# Patient Record
Sex: FEMALE | Race: BLACK OR AFRICAN AMERICAN | Employment: UNEMPLOYED | URBAN - METROPOLITAN AREA
[De-identification: names, ages, dates, MRNs, and addresses within clinical notes are randomized per-mention and may not be internally consistent; named-entity substitution may affect disease eponyms.]

---

## 2017-01-05 ENCOUNTER — HOSPITAL ENCOUNTER (OUTPATIENT)
Dept: LAB | Age: 57
Discharge: HOME | End: 2017-01-05

## 2017-01-05 PROCEDURE — 99001 SPECIMEN HANDLING PT-LAB: CPT | Performed by: SPECIALIST

## 2017-02-13 DIAGNOSIS — R06.02 SOB (SHORTNESS OF BREATH): Primary | ICD-10-CM

## 2017-02-15 ENCOUNTER — OFFICE VISIT (OUTPATIENT)
Dept: PULMONOLOGY | Age: 57
End: 2017-02-15

## 2017-02-15 VITALS
WEIGHT: 209 LBS | TEMPERATURE: 98.4 F | HEART RATE: 84 BPM | RESPIRATION RATE: 21 BRPM | BODY MASS INDEX: 33.59 KG/M2 | OXYGEN SATURATION: 95 % | HEIGHT: 66 IN | DIASTOLIC BLOOD PRESSURE: 70 MMHG | SYSTOLIC BLOOD PRESSURE: 140 MMHG

## 2017-02-15 DIAGNOSIS — Z72.0 TOBACCO ABUSE: ICD-10-CM

## 2017-02-15 DIAGNOSIS — R76.12 POSITIVE QUANTIFERON-TB GOLD TEST: ICD-10-CM

## 2017-02-15 DIAGNOSIS — J44.9 COPD, MILD (HCC): Primary | ICD-10-CM

## 2017-02-15 DIAGNOSIS — R76.11 POSITIVE PPD: ICD-10-CM

## 2017-02-15 DIAGNOSIS — R06.02 SOB (SHORTNESS OF BREATH): ICD-10-CM

## 2017-02-15 RX ORDER — SPIRONOLACTONE 25 MG/1
TABLET ORAL DAILY
COMMUNITY
End: 2018-07-11

## 2017-02-15 RX ORDER — CYCLOBENZAPRINE HCL 10 MG
TABLET ORAL
Status: ON HOLD | COMMUNITY
End: 2018-07-11 | Stop reason: CLARIF

## 2017-02-15 RX ORDER — METFORMIN HYDROCHLORIDE 500 MG/1
500 TABLET ORAL 2 TIMES DAILY WITH MEALS
Status: ON HOLD | COMMUNITY
End: 2018-07-11

## 2017-02-15 NOTE — PROGRESS NOTES
Problem list:  Positive PPD skin test in past &Positive Quantiferon gold study    Tobacco abuse   PFT demonstrated mild COPD with relatively well preserved FEV1     HPI: Ms. Mainor Powers is 61 y/o female who  Carries history of DM, HTN, Hepatitis C, Bipolar disorder evaluated for Positive Quantiferon Gold study in the setting of Past history of Positive PPD related to latent TB. She has been seen by ID and per patient she has received treatment as recommended. PFT performed this morning demonstrated mild obstructive changes with relatively well preserved FEV1. She still smokes half a pack per day      Allergies   Allergen Reactions    Ace Inhibitors Cough    Penicillins Hives     Current Outpatient Prescriptions   Medication Sig Dispense Refill    spironolactone (ALDACTONE) 25 mg tablet Take  by mouth daily.  metFORMIN (GLUCOPHAGE) 500 mg tablet Take  by mouth two (2) times daily (with meals).  cyclobenzaprine (FLEXERIL) 10 mg tablet Take  by mouth three (3) times daily as needed for Muscle Spasm(s).  cloNIDine HCl (CATAPRES) 0.2 mg tablet Take  by mouth two (2) times a day.  Shower Chair XX clemencia Use daily with showering. Dx: djd of knee 1 each 0    losartan-hydrochlorothiazide (HYZAAR) 50-12.5 mg per tablet Take 1 tablet by mouth daily. 30 tablet 5    Omeprazole delayed release (PRILOSEC D/R) 20 mg tablet Take 1 tablet by mouth daily. 30 tablet 5    Lancets misc Use daily or as directed for blood sugar monitoring, dx 250.00 50 Each 11    glucose blood VI test strips (BLOOD GLUCOSE TEST) strip Use daily or as directed for blood sugar monitoring. .00 50 Strip 11    Blood-Glucose Meter monitoring kit Use as directed. 1 Kit 0    meloxicam (MOBIC) 15 mg tablet Take 15 mg by mouth daily.  HYDROcodone-acetaminophen (NORCO) 5-325 mg per tablet Take 1 Tab by mouth every four (4) hours as needed for Pain. Max Daily Amount: 6 Tabs.  12 Tab 0    naproxen (NAPROSYN) 375 mg tablet Take 1 Tab by mouth two (2) times daily (with meals). 20 Tab 0    conjugated estrogens (PREMARIN) 0.625 mg/gram vaginal cream Insert 0.5 g into vagina daily. 45 g 2    hydrocortisone valerate (WEST-FELISHA) 0.2 % ointment Apply  to affected area two (2) times a day. use thin layer 30 g 1    cetirizine (ZYRTEC) 10 mg tablet Take 1 Tab by mouth daily. 30 Tab 5    traMADol (ULTRAM) 50 mg tablet Take 2 Tabs by mouth two (2) times daily as needed for Pain. 120 Tab 2     Review of Systems   Constitutional: Negative. HENT: Negative. Eyes: Negative. Respiratory: Negative. Cardiovascular: Negative. Gastrointestinal: Negative. Genitourinary: Negative. Musculoskeletal: Negative. Skin: Negative. Neurological: Negative. Endo/Heme/Allergies: Negative. Psychiatric/Behavioral: Negative. Blood pressure 140/70, pulse 84, temperature 98.4 °F (36.9 °C), temperature source Oral, resp. rate 21, height 5' 6\" (1.676 m), weight 94.8 kg (209 lb), SpO2 95 %. Physical Exam   Constitutional: She is oriented to person, place, and time. She appears well-developed. HENT:   Head: Normocephalic and atraumatic. Eyes: EOM are normal. Pupils are equal, round, and reactive to light. Neck: Normal range of motion. Cardiovascular: Normal rate and regular rhythm. Pulmonary/Chest: Effort normal. She has no wheezes. Abdominal: Soft. Bowel sounds are normal.   Neurological: She is alert and oriented to person, place, and time. No cranial nerve deficit. Skin: Skin is warm and dry. Psychiatric: She has a normal mood and affect. Her behavior is normal.   Nursing note and vitals reviewed. Investigation   Chest X-ray (06/30/16): No acute cardiopulmonary process.     PFT: not done      Assessment:  Positive Quantiferon Gold study in the setting of Known history of positive PPD- per patient she has received arppropriate treatment   MILD COPD  Tobacco abuse      Plan:  Patient has been seen by ID at [facility] and per her She has received treatment for latent TB. I don't have report to verify this. For mild COPD patient has been advised to use Combivent Respimat one puff every six hours as needed   Importance of smoking cessation discussed with patient at length, She has cut back ,  But does not think she can quit. RTC in six months time.     Luis Grande MD, YOLETTE   Pulmonary and Critical Care Medicine

## 2017-02-15 NOTE — MR AVS SNAPSHOT
Visit Information Date & Time Provider Department Dept. Phone Encounter #  
 2/15/2017  9:45 AM Alize Choudhury MD [facility] [place] [facility] [id number] Upcoming Health Maintenance Date Due Pneumococcal 19-64 Medium Risk (1 of 1 - PPSV23) 9/20/1977 DTaP/Tdap/Td series (1 - Tdap) 9/20/1979 EYE EXAM RETINAL OR DILATED Q1 8/13/2014 MICROALBUMIN Q1 11/19/2014 HEMOGLOBIN A1C Q6M 4/21/2015 FOOT EXAM Q1 7/18/2015 LIPID PANEL Q1 10/21/2015 INFLUENZA AGE 9 TO ADULT 8/1/2016 PAP AKA CERVICAL CYTOLOGY 9/6/2016 BREAST CANCER SCRN MAMMOGRAM 4/13/2018 COLONOSCOPY 9/18/2023 Allergies as of 2/15/2017  Review Complete On: 2/15/2017 By: Alex Huber, RT Severity Noted Reaction Type Reactions Ace Inhibitors  07/29/2013    Cough Penicillins  04/30/2013   Not Verified Hives Current Immunizations  Reviewed on 9/14/2015 Name Date Influenza Vaccine 9/1/2015 Pneumococcal Conjugate (PCV-13) 9/1/2015 Not reviewed this visit You Were Diagnosed With   
  
 Codes Comments COPD, mild ([facility] [address]    -  Primary ICD-10-CM: J44.9 ICD-9-CM: 053 SOB (shortness of breath)     ICD-10-CM: R06.02 
ICD-9-CM: 786.05 Tobacco abuse     ICD-10-CM: Z72.0 ICD-9-CM: 305.1 Positive PPD     ICD-10-CM: R76.11 
ICD-9-CM: 795.51 Positive QuantiFERON-TB Gold test     ICD-10-CM: R76.12 
ICD-9-CM: 795.52 Vitals BP Pulse Temp Resp Height(growth percentile) Weight(growth percentile) 140/70 (BP 1 Location: Right arm, BP Patient Position: At rest) 84 98.4 °F (36.9 °C) (Oral) 21 5' 6\" (1.676 m) 209 lb (94.8 kg) SpO2 BMI OB Status Smoking Status 95% 33.73 kg/m2 Postmenopausal Current Every Day Smoker BMI and BSA Data Body Mass Index Body Surface Area  
 33.73 kg/m 2 2.1 m 2 Preferred Pharmacy Pharmacy Name Phone  [address] [facility], [address] [facility] [phone] Your Updated Medication List  
  
   
This list is accurate as of: 2/15/17  9:52 AM.  Always use your most recent med list.  
  
  
  
  
 Blood-Glucose Meter monitoring kit Use as directed. cetirizine 10 mg tablet Commonly known as:  ZYRTEC Take 1 Tab by mouth daily. cloNIDine HCl 0.2 mg tablet Commonly known as:  CATAPRES Take  by mouth two (2) times a day. conjugated estrogens 0.625 mg/gram vaginal cream  
Commonly known as:  PREMARIN Insert 0.5 g into vagina daily. cyclobenzaprine 10 mg tablet Commonly known as:  FLEXERIL Take  by mouth three (3) times daily as needed for Muscle Spasm(s). glucose blood VI test strips strip Commonly known as:  blood glucose test  
Use daily or as directed for blood sugar monitoring. .00 HYDROcodone-acetaminophen 5-325 mg per tablet Commonly known as:  Wayna Glaze Take 1 Tab by mouth every four (4) hours as needed for Pain. Max Daily Amount: 6 Tabs. hydrocortisone valerate 0.2 % ointment Commonly known as:  WEST-FELISHA Apply  to affected area two (2) times a day. use thin layer  
  
 ipratropium-albuterol  mcg/actuation inhaler Commonly known as:  Airam Pour Take 1 Puff by inhalation every six (6) hours as needed for Wheezing. Lancets Misc Use daily or as directed for blood sugar monitoring, dx 250.00  
  
 losartan-hydroCHLOROthiazide 50-12.5 mg per tablet Commonly known as:  HYZAAR Take 1 tablet by mouth daily. meloxicam 15 mg tablet Commonly known as:  MOBIC Take 15 mg by mouth daily. metFORMIN 500 mg tablet Commonly known as:  GLUCOPHAGE Take  by mouth two (2) times daily (with meals). naproxen 375 mg tablet Commonly known as:  NAPROSYN Take 1 Tab by mouth two (2) times daily (with meals). Omeprazole delayed release 20 mg tablet Commonly known as:  PRILOSEC D/R Take 1 tablet by mouth daily. [address] [employer] [facility] Use daily with showering. Dx: djd of knee  
  
 spironolactone 25 mg tablet Commonly known as:  ALDACTONE Take  by mouth daily. traMADol 50 mg tablet Commonly known as:  ULTRAM  
Take 2 Tabs by mouth two (2) times daily as needed for Pain. Prescriptions Sent to Pharmacy Refills  
 ipratropium-albuterol (COMBIVENT RESPIMAT)  mcg/actuation inhaler 11 Sig: Take 1 Puff by inhalation every six (6) hours as needed for Wheezing. Class: Normal  
 Pharmacy: [address] [place] [address] #: [phone] Route: Inhalation We Performed the Following AMB POC PFT COMPLETE W/BRONCHODILATOR [87606 CPT(R)] DIFFUSING CAPACITY [id number] CPT(R)] GAS DILUT/WASHOUT LUNG VOL W/WO DISTRIB VENT&VOL [31749 CPT(R)] Patient Instructions Patient has been seen by ID at [facility] and per her She has received treatment for latent TB. For mild COPD patient has been advised to use Combivent Respimat one puff every six hours as needed Importance of smoking cessation discussed with patient at length, She has cut back ,  But does not think she can quit. RTC in six months time. Please provide this summary of care documentation to your next provider. Your primary care clinician is listed as Darnell Matos. If you have any questions after today's visit, please call [phone].

## 2017-02-15 NOTE — PATIENT INSTRUCTIONS
Patient has been seen by ID at [facility] and per her She has received treatment for latent TB. For mild COPD patient has been advised to use Combivent Respimat one puff every six hours as needed   Importance of smoking cessation discussed with patient at length, She has cut back ,  But does not think she can quit. RTC in six months time.

## 2017-05-15 ENCOUNTER — HOSPITAL ENCOUNTER (OUTPATIENT)
Dept: MAMMOGRAPHY | Age: 57
Discharge: HOME | End: 2017-05-15
Attending: INTERNAL MEDICINE
Payer: MEDICARE

## 2017-05-15 DIAGNOSIS — Z12.31 VISIT FOR SCREENING MAMMOGRAM: ICD-10-CM

## 2017-05-15 PROCEDURE — 77067 SCR MAMMO BI INCL CAD: CPT

## 2017-06-29 ENCOUNTER — HOSPITAL ENCOUNTER (OUTPATIENT)
Dept: LAB | Age: 57
Discharge: HOME | End: 2017-06-29

## 2017-06-29 PROCEDURE — 99001 SPECIMEN HANDLING PT-LAB: CPT | Performed by: SPECIALIST

## 2017-08-30 ENCOUNTER — HOSPITAL ENCOUNTER (OUTPATIENT)
Dept: ULTRASOUND IMAGING | Age: 57
Discharge: HOME | End: 2017-08-30
Attending: INTERNAL MEDICINE
Payer: MEDICARE

## 2017-08-30 DIAGNOSIS — N64.4 BILATERAL MASTODYNIA: ICD-10-CM

## 2017-08-30 PROCEDURE — 76642 ULTRASOUND BREAST LIMITED: CPT

## 2017-10-27 ENCOUNTER — HOSPITAL ENCOUNTER (OUTPATIENT)
Dept: LAB | Age: 57
Discharge: HOME | End: 2017-10-27

## 2017-10-27 PROCEDURE — 99001 SPECIMEN HANDLING PT-LAB: CPT | Performed by: SPECIALIST

## 2017-10-31 ENCOUNTER — HOSPITAL ENCOUNTER (OUTPATIENT)
Dept: MAMMOGRAPHY | Age: 57
Discharge: HOME | End: 2017-10-31
Attending: INTERNAL MEDICINE
Payer: MEDICARE

## 2017-10-31 ENCOUNTER — HOSPITAL ENCOUNTER (OUTPATIENT)
Dept: ULTRASOUND IMAGING | Age: 57
Discharge: HOME | End: 2017-10-31
Attending: INTERNAL MEDICINE
Payer: MEDICARE

## 2017-10-31 DIAGNOSIS — R10.2 ADNEXAL TENDERNESS, RIGHT: ICD-10-CM

## 2017-10-31 DIAGNOSIS — N64.1 FAT NECROSIS OF BREAST: ICD-10-CM

## 2017-10-31 PROCEDURE — 76830 TRANSVAGINAL US NON-OB: CPT

## 2017-10-31 PROCEDURE — 77061 BREAST TOMOSYNTHESIS UNI: CPT

## 2018-02-26 ENCOUNTER — OFFICE VISIT (OUTPATIENT)
Dept: ORTHOPEDIC SURGERY | Age: 58
End: 2018-02-26

## 2018-02-26 VITALS
HEART RATE: 96 BPM | WEIGHT: 193 LBS | SYSTOLIC BLOOD PRESSURE: 112 MMHG | BODY MASS INDEX: 31.02 KG/M2 | HEIGHT: 66 IN | RESPIRATION RATE: 15 BRPM | TEMPERATURE: 98.3 F | DIASTOLIC BLOOD PRESSURE: 77 MMHG

## 2018-02-26 DIAGNOSIS — M51.36 DEGENERATIVE DISC DISEASE, LUMBAR: ICD-10-CM

## 2018-02-26 DIAGNOSIS — M17.0 PRIMARY OSTEOARTHRITIS OF BOTH KNEES: Primary | ICD-10-CM

## 2018-02-26 DIAGNOSIS — M54.50 CHRONIC MIDLINE LOW BACK PAIN WITHOUT SCIATICA: ICD-10-CM

## 2018-02-26 DIAGNOSIS — G89.29 CHRONIC MIDLINE LOW BACK PAIN WITHOUT SCIATICA: ICD-10-CM

## 2018-02-26 RX ORDER — FOLIC ACID 1 MG/1
TABLET ORAL DAILY
COMMUNITY
End: 2018-07-11

## 2018-02-26 RX ORDER — PREDNISONE 20 MG/1
TABLET ORAL
Qty: 28 TAB | Refills: 0 | Status: SHIPPED | OUTPATIENT
Start: 2018-02-26 | End: 2018-07-11

## 2018-02-26 RX ORDER — MELOXICAM 15 MG/1
15 TABLET ORAL DAILY
Qty: 90 TAB | Refills: 0 | Status: SHIPPED | OUTPATIENT
Start: 2018-02-26 | End: 2018-05-07 | Stop reason: SDUPTHER

## 2018-02-26 RX ORDER — CHLORTHALIDONE 25 MG/1
TABLET ORAL DAILY
COMMUNITY
End: 2018-07-11

## 2018-02-26 NOTE — PROGRESS NOTES
HISTORY OF PRESENT ILLNESS    Floridalma Pa is a 59y.o. year old female comes in today as new patient to be evaluated and treated for: arthritis    Has pain ankles, wrists, knees, low back due to osteoarthritis not caused by any injury but much worse with weather change. Had been to Dr. Emre Marrufo for about 2 years but not good benefit from treatment. Pain rated 8 or so knees, ankles. Did have knee injections which would last 6 months or so. Known DDD lumbar and no PT in many years. XR Knees 2015 OA bilateral left > right per report. Current Outpatient Prescriptions   Medication Sig Dispense Refill    chlorthalidone (HYGROTEN) 25 mg tablet Take  by mouth daily.  folic acid (FOLVITE) 1 mg tablet Take  by mouth daily.  spironolactone (ALDACTONE) 25 mg tablet Take  by mouth daily.  metFORMIN (GLUCOPHAGE) 500 mg tablet Take  by mouth two (2) times daily (with meals).  cloNIDine HCl (CATAPRES) 0.2 mg tablet Take  by mouth two (2) times a day.  cyclobenzaprine (FLEXERIL) 10 mg tablet Take  by mouth three (3) times daily as needed for Muscle Spasm(s).  ipratropium-albuterol (COMBIVENT RESPIMAT)  mcg/actuation inhaler Take 1 Puff by inhalation every six (6) hours as needed for Wheezing. 1 Inhaler 11    meloxicam (MOBIC) 15 mg tablet Take 15 mg by mouth daily.  HYDROcodone-acetaminophen (NORCO) 5-325 mg per tablet Take 1 Tab by mouth every four (4) hours as needed for Pain. Max Daily Amount: 6 Tabs. 12 Tab 0    naproxen (NAPROSYN) 375 mg tablet Take 1 Tab by mouth two (2) times daily (with meals). 20 Tab 0    conjugated estrogens (PREMARIN) 0.625 mg/gram vaginal cream Insert 0.5 g into vagina daily. 45 g 2    hydrocortisone valerate (WEST-FELISHA) 0.2 % ointment Apply  to affected area two (2) times a day. use thin layer 30 g 1    Shower Chair XX clemencia Use daily with showering.  Dx: djd of knee 1 each 0    losartan-hydrochlorothiazide (HYZAAR) 50-12.5 mg per tablet Take 1 tablet by mouth daily. 30 tablet 5    Omeprazole delayed release (PRILOSEC D/R) 20 mg tablet Take 1 tablet by mouth daily. 30 tablet 5    cetirizine (ZYRTEC) 10 mg tablet Take 1 Tab by mouth daily. 30 Tab 5    traMADol (ULTRAM) 50 mg tablet Take 2 Tabs by mouth two (2) times daily as needed for Pain. 120 Tab 2    Lancets misc Use daily or as directed for blood sugar monitoring, dx 250.00 50 Each 11    glucose blood VI test strips (BLOOD GLUCOSE TEST) strip Use daily or as directed for blood sugar monitoring. .00 50 Strip 11    Blood-Glucose Meter monitoring kit Use as directed. 1 Kit 0     Past Medical History:   Diagnosis Date    Alcohol dependence in remission ([facility] [address]     alcohol withdrawal discharged 05/31/2013    Arthritis     knees and back    Bipolar 1 disorder ([facility] [address]     seeing Psychiatrist    Depression     Diabetes mellitus ([facility] [address]     DJD (degenerative joint disease) of knee     DM2 (diabetes mellitus, type 2) ([facility])     borderline    Eustachian tube dysfunction     Dr. Jenaro Vivar GERD (gastroesophageal reflux disease)     Hepatitis C      prior IV drug abuse - Genotype 1a    Hypertension     Needle phobia     from prior IV drug abuse    Psoriasis      Family History   Problem Relation Age of Onset    MS Mother     Heart Attack Mother      Social History     Social History    Marital status: SINGLE     Spouse name: N/A    Number of children: N/A    Years of education: N/A     Occupational History    Personal Care Assistant      disability     Social History Main Topics    Smoking status: Current Every Day Smoker     Packs/day: 0.50     Years: 40.00     Types: Cigarettes    Smokeless tobacco: Never Used    Alcohol use Yes      Comment: occasional    Drug use: Yes      Comment: jennifer twice a month    Sexual activity: No      Comment:      Other Topics Concern    None     Social History Narrative       ROS:  No numb, swell, bruise.  No All other systems reviewed and negative aside from that written in the HPI. Objective:  Visit Vitals    /77    Pulse 96    Temp 98.3 °F (36.8 °C)    Resp 15    Ht 5' 6\" (1.676 m)    Wt 193 lb (87.5 kg)    BMI 31.15 kg/m2     GEN: Appears stated age in NAD. HEAD:  Normocephalic, atraumatic. NEURO:  Sensation intact light touch B/L lower extremities. MS:  LE Strength +5/5 bilateral .  Neg slump.  bilateral  Knee:  No Effusion . Lachman negative. Valgus negative. Varus negative. negative joint line tenderness medial, lateral.  Pablito negative. Posterior drawer negative. Noble compression test negative. Patellar apprehension negative. Patellar facet  positive tender to palpation medial some crepitance bilateral .  EXT: no clubbing/cyanosis. no edema. SKIN: Warm/dry without rash. Assessment/Plan:     ICD-10-CM ICD-9-CM    1. Primary osteoarthritis of both knees M17.0 715.16 REFERRAL TO PHYSICAL THERAPY      predniSONE (DELTASONE) 20 mg tablet      meloxicam (MOBIC) 15 mg tablet   2. Chronic midline low back pain without sciatica M54.5 724.2 REFERRAL TO PHYSICAL THERAPY    G89.29 338.29 predniSONE (DELTASONE) 20 mg tablet      meloxicam (MOBIC) 15 mg tablet   3. Degenerative disc disease, lumbar M51.36 722.52 REFERRAL TO PHYSICAL THERAPY      predniSONE (DELTASONE) 20 mg tablet      meloxicam (MOBIC) 15 mg tablet       Patient verbalizes understanding of evaluation and plan. Discussed need for rehab and antiinflammatory to help with chronic issues so will refer PT and start pred taper and use mobic PRN and RTC 6 weeks.

## 2018-02-26 NOTE — MR AVS SNAPSHOT
[address] [place] [address] [place], [address] [facility] [id number] 
[phone] Patient: Monet Cerda MRN: [id number] PWW:[birth year] Visit Information Date & Time Provider Department Dept. Phone Encounter #  
 2/26/2018  9:20 AM Nino Lesches, [address] [facility] [phone] [id number] Follow-up Instructions Return in about 6 weeks (around 4/9/2018) for DDD, knee OA. Upcoming Health Maintenance Date Due Pneumococcal 19-64 Medium Risk (1 of 1 - PPSV23) 9/20/1977 DTaP/Tdap/Td series (1 - Tdap) 9/20/1979 EYE EXAM RETINAL OR DILATED Q1 8/13/2014 MICROALBUMIN Q1 11/19/2014 HEMOGLOBIN A1C Q6M 4/21/2015 FOOT EXAM Q1 7/18/2015 LIPID PANEL Q1 10/21/2015 PAP AKA CERVICAL CYTOLOGY 9/6/2016 Influenza Age 5 to Adult 8/1/2017 BREAST CANCER SCRN MAMMOGRAM 10/31/2019 COLONOSCOPY 9/18/2023 Allergies as of 2/26/2018  Review Complete On: 2/26/2018 By: Nino Lesches, DO Severity Noted Reaction Type Reactions Ace Inhibitors  07/29/2013    Cough Penicillins  04/30/2013   Not Verified Hives Current Immunizations  Reviewed on 9/14/2015 Name Date Influenza Vaccine 9/1/2015 Pneumococcal Conjugate (PCV-13) 9/1/2015 Not reviewed this visit You Were Diagnosed With   
  
 Codes Comments Primary osteoarthritis of both knees    -  Primary ICD-10-CM: M17.0 ICD-9-CM: 715.16 Chronic midline low back pain without sciatica     ICD-10-CM: M54.5, G89.29 ICD-9-CM: 724.2, 338.29 Degenerative disc disease, lumbar     ICD-10-CM: M51.36 
ICD-9-CM: 722.52 Vitals BP Pulse Temp Resp Height(growth percentile) Weight(growth percentile) 112/77 96 98.3 °F (36.8 °C) 15 5' 6\" (1.676 m) 193 lb (87.5 kg) BMI OB Status Smoking Status 31.15 kg/m2 Postmenopausal Current Every Day Smoker Vitals History BMI and BSA Data Body Mass Index Body Surface Area 31.15 kg/m 2 2.02 m 2 Preferred Pharmacy Pharmacy Name Phone [facility] [address] [place] [address]  [phone] Your Updated Medication List  
  
   
This list is accurate as of 2/26/18  9:39 AM.  Always use your most recent med list.  
  
  
  
  
 Blood-Glucose Meter monitoring kit Use as directed. cetirizine 10 mg tablet Commonly known as:  ZYRTEC Take 1 Tab by mouth daily. chlorthalidone 25 mg tablet Commonly known as:  Sabina Acosta Take  by mouth daily. cloNIDine HCl 0.2 mg tablet Commonly known as:  CATAPRES Take  by mouth two (2) times a day. conjugated estrogens 0.625 mg/gram vaginal cream  
Commonly known as:  PREMARIN Insert 0.5 g into vagina daily. cyclobenzaprine 10 mg tablet Commonly known as:  FLEXERIL Take  by mouth three (3) times daily as needed for Muscle Spasm(s). folic acid 1 mg tablet Commonly known as:  Google Take  by mouth daily. glucose blood VI test strips strip Commonly known as:  blood glucose test  
Use daily or as directed for blood sugar monitoring. .00 HYDROcodone-acetaminophen 5-325 mg per tablet Commonly known as:  Cary Records Take 1 Tab by mouth every four (4) hours as needed for Pain. Max Daily Amount: 6 Tabs. hydrocortisone valerate 0.2 % ointment Commonly known as:  WEST-FELISHA Apply  to affected area two (2) times a day. use thin layer  
  
 ipratropium-albuterol  mcg/actuation inhaler Commonly known as:  Blima Duane Take 1 Puff by inhalation every six (6) hours as needed for Wheezing. Lancets Misc Use daily or as directed for blood sugar monitoring, dx 250.00  
  
 losartan-hydroCHLOROthiazide 50-12.5 mg per tablet Commonly known as:  HYZAAR Take 1 tablet by mouth daily. meloxicam 15 mg tablet Commonly known as:  MOBIC Take 1 Tab by mouth daily. metFORMIN 500 mg tablet Commonly known as:  GLUCOPHAGE  
 Take  by mouth two (2) times daily (with meals). naproxen 375 mg tablet Commonly known as:  NAPROSYN Take 1 Tab by mouth two (2) times daily (with meals). Omeprazole delayed release 20 mg tablet Commonly known as:  PRILOSEC D/R Take 1 tablet by mouth daily. predniSONE 20 mg tablet Commonly known as:  Burnard Nation Take 2 tabs in AM with food for 7 days then 1 tab until gone [employer] [employer] Use daily with showering. Dx: djd of knee  
  
 spironolactone 25 mg tablet Commonly known as:  ALDACTONE Take  by mouth daily. traMADol 50 mg tablet Commonly known as:  ULTRAM  
Take 2 Tabs by mouth two (2) times daily as needed for Pain. Prescriptions Sent to Pharmacy Refills  
 predniSONE (DELTASONE) 20 mg tablet 0 Sig: Take 2 tabs in AM with food for 7 days then 1 tab until gone Class: Normal  
 Pharmacy: [facility] [place] [address] Ph #: [phone]  
 meloxicam (MOBIC) 15 mg tablet 0 Sig: Take 1 Tab by mouth daily. Class: Normal  
 Pharmacy: [facility] [place] [address] Ph #: [phone] Route: Oral  
  
We Performed the Following REFERRAL TO PHYSICAL THERAPY [AGM52 Custom] Comments:  
 Eval and Tx 
 
2-3 per week for 4-6 weeks 
 
 
[id number] Follow-up Instructions Return in about 6 weeks (around 4/9/2018) for DDD, knee OA. Referral Information Referral ID Referred By Referred To  
  
 [id number] [facility]   
   [address] [facility]   
   [address] [facility], [address] [place] [address] Phone: [phone] Fax: [phone] Visits Status Start Date End Date 1 New Request 2/26/18 2/26/19 If your referral has a status of pending review or denied, additional information will be sent to support the outcome of this decision. Introducing [facility]! Dear Ambika Pickett: Thank you for requesting a [employer] account. Our records indicate that you have previously registered for a [employer] account but its currently inactive. Please call our [employer] support line at [phone]. Additional Information If you have questions, please visit the Frequently Asked Questions section of the [employer] website at [email or web address] Remember, [employer] is NOT to be used for urgent needs. For medical emergencies, dial 911. Now available from your iPhone and Android! Please provide this summary of care documentation to your next provider. Your primary care clinician is listed as Duyen Morales. If you have any questions after today's visit, please call [phone].

## 2018-03-07 ENCOUNTER — APPOINTMENT (OUTPATIENT)
Dept: PHYSICAL THERAPY | Age: 58
End: 2018-03-07
Payer: MEDICARE

## 2018-03-22 ENCOUNTER — HOSPITAL ENCOUNTER (OUTPATIENT)
Dept: PHYSICAL THERAPY | Age: 58
Discharge: HOME | End: 2018-03-22
Payer: MEDICARE

## 2018-03-22 PROCEDURE — G8979 MOBILITY GOAL STATUS: HCPCS

## 2018-03-22 PROCEDURE — G8978 MOBILITY CURRENT STATUS: HCPCS

## 2018-03-22 PROCEDURE — 97161 PT EVAL LOW COMPLEX 20 MIN: CPT

## 2018-03-22 PROCEDURE — G8980 MOBILITY D/C STATUS: HCPCS

## 2018-03-22 NOTE — PROGRESS NOTES
[address] PHYSICAL THERAPY  [address] [place] [address] Rebecca Eaton, [address] - Phone: [phone]  Fax: [phone] / 7906 [facility]  Patient Name: Rubina Whaley : 1960   Medical   Diagnosis: Low back pain [M54.5] Treatment Diagnosis: Low back, knee, ankle pain   Onset Date: \"years ago\"     Referral Source: Brandi Low DO Start of Care [facility]): 3/22/2018   Prior Hospitalization: See medical history Provider #: [id number]   Prior Level of Function: Progressive decline in function however independent with ADL's and functional activities   Comorbidities: Osteoporosis, Arthritis, Diabetes, HTN, Tobacco use   Medications: Verified on Patient Summary List   The Plan of Care and following information is based on the information from the initial evaluation.   ===========================================================================================  Assessment / key information:  Pt is a 59year old female who presents to PT today with chronic low back, bilateral knee pain with recent increase in ankles giving out on her resulting in 2 falls since the new year. Prior to the subjective portion of the evaluation, Pt reports she had participated in PT prior which did not help her pain at all. PT encouraged pt due to her recent falls, she should participate in PT to decrease falls risk and increase her activity level. Pt was apprehensive however reported she would decide to keep coming to PT based on the evaluation. PT was mid way through the evaluation and Pt stood up and left. PT was assessing left hamstring gross strength which caused pt to have left knee pain. PT did not hear a snap/pop. Pt began to cry and stated \"I knew I should not have come. \" Pt walked to the lobby and waited for her public transportation. Pt was not walking with any antalgic gait pattern or limping to any degree.   While patient was seated in the [place] PT called Dr. Willard Rider nurse and reported what had happened during the evaluation. The nurse offered for the patient to be seen day of. PT informed patient and she stated she relies on public transportation and has to give the company a 5 day notice. The patient stated she would call Dr. Willard Rider office when she got home to schedule a f/u appointment. Pt would not benefit from skilled Pt services.  ===========================================================================================  Eval Complexity: History: MEDIUM  Complexity : 1-2 comorbidities / personal factors will impact the outcome/ POC Exam:LOW Complexity : 1-2 Standardized tests and measures addressing body structure, function, activity limitation and / or participation in recreation  Presentation: LOW Complexity : Stable, uncomplicated  Clinical Decision Making:MEDIUM Complexity : FOTO score of 26-74Overall Complexity:LOW   Patient / Family readiness to learn indicated by: other Pt had a negative view towards physical therapy from the start of the evaluation. Pt left mid evaluation and stated that \"this is not for me. \"  Persons(s) to be included in education: patient (P)  Barriers to Learning/Limitations: yes;  emotional  Measures taken: Pt encouraged pt to continue PT however she stated that it was not for her. Patient Goal (s): NA   Patient self reported health status: good    G-Codes (GP): Mobility [id number] Current  CL= 60-79%   Goal  CK= 40-59%  D/C  CL= 60-79%. The severity rating is based on the FOTO Score    Therapist Signature: Everett Jean PT Date: 7/27/7314   Certification Period: 3/22 Time: 9:32 AM   ===========================================================================================  I certify that the above Physical Therapy Services are being furnished while the patient is under my care. I agree with the treatment plan and certify that this therapy is necessary.     Physician Signature:        Date: Time:     Please sign and return to [facility] or you may fax the signed copy to [phone]. Thank you.

## 2018-03-22 NOTE — PROGRESS NOTES
PHYSICAL THERAPY - DAILY TREATMENT NOTE    Patient Name: Brandon Coronel        Date: 3/22/2018  : 1960   YES Patient  Verified  Visit #:   1   of   1  Insurance: Payor: [place] / Plan: [place] MEDICARE PART A & B / Product Type: Medicare /      In time: 10:05 Out time: 10:33   Total Treatment Time: 28     Medicare Time Tracking (below)   Total Timed Codes (min):  0 1:1 Treatment Time:  0     TREATMENT AREA = Low back pain [M54.5]    SUBJECTIVE    Pain Level (on 0 to 10 scale):  4  / 10   Medication Changes/New allergies or changes in medical history, any new surgeries or procedures? YES    If yes, update Summary List   Subjective Functional Status/Changes:  []  No changes reported   CC:Low back, bilateral knee and ankle pain    SHAYAN/HPI: Pt reports long history of knee and back pain. Pt states her ankle pain started this year. Pt states she was diagnosed with DDD in her early 27's. Pt. States she has had \"bone on bone\" in her knees since her 20's. Pt states she changed her MD because the last DR was of no use to her. Pt states her ankles give out and they cannot support her body weight. Pt states she has fallen 2x this new year. Right is worse than left. She has a walker but refuses to use it. Pt reports numbness/tingling on the bottom of her feet. Pt. States she is walking when her ankle gives out. She is scared to go out in public because of her fear of falling. \"I walk as slow as a turtle because I am scared of falling. \" I had a shot in my knees and ankles. Ankle pain came back after 1 minute. Pain  Today:4/10 (ankles and knees)   Best:0/10  Worst:10/10    Aggravating factors:Pt reports buckling when she is standing or walking    Relieving Factors:rest and sleeping    Past History/Treatments:Pt reports PT on same subjective complaints years ago that \"didn't do much of anything. \"     Pain/difficulty with functional activities:  [x] Yes [] No Sit<>stand  [x] Yes [] No Squatting  [x] Yes [] No Lifting  [x] Yes [] No Pushing/pulling  [x] Yes [] No   Chores/yardwork  [x] Yes [] No Stairs  [x] Yes [] No Walking(5 minutes)    Diagnostic Tests: [] Lab work [x] X-rays    [] CT [] MRI     [] Other:  Results:Low back, knees, ankles. Per pt, \"they have issues, you would have to ask my PCP. \"     OBJECTIVE  Observation/Posture: Forward held head, rounded shoulders, moderate kyphotic posture    Gait: [] Normal     [x] Abnormal: Mild forward flexed trunk, right hip drop drop during left stance phase and trunk lean. 5x sit<>stand:45 seconds    L/S Active Movements: NT due to pt leaving mid evaluation    LE Strength:    Left Right   Hip flexion 4/5 NT   Hip extension NT NT   Hip abduction NT NT   Hip IR NT NT   Hip ER NT NT   Knee extension 4/5 NT   Knee flexion 4-/5 P! NT   Ankle PF NT NT   Ankle DF NT NT     LE A/PROM  Not tested due to patient leaving mid evaluation    Other Objective/Functional Measures:    No other information to note due to patient leaving mid evaluation     Post Treatment Pain Level (on 0 to 10) scale:   NA  / 10     ASSESSMENT    Assessment/Changes in Function:See POC     Justification for Eval Code Complexity: LOW  Patient History (low 0, mod 1-2, high 3-4): Moderate: chronic low back, knee and ankle pain, recent increase in falls   Examination (low 1-2, mod 3+, high 4+): Low: decreased LE strength, increased 5x sit<>stand test, however unable to obtain further objective measures. Clinical Presentation (low: stable/uncomplicated; mod: evolving; high: unstable/unpredictable): Low, appeared stable, however further objective measures needed  Clinical Decision Making (low , mod 26-74, high 1-25):  Moderate: FOTO: 30    [x]  See Plan of Care  []  See Progress Note/ Recertification  []  See Discharge Summary           Progress toward goals / Updated goals:    See POC     PLAN    []  Upgrade activities as tolerated  []  Update interventions per flow sheet No Continue plan of care   [x] Discharge due to : Patient left mid evaluation and stated this was not for her.   []  Other:      Therapist: Zuleika Vega DPT    Date: 3/22/2018 Time: 9:33 AM     Future Appointments  Date Time Provider Carolina Devine   4/19/2018 9:20 AM Brandin Deleon DO [address] [place] [address]

## 2018-05-07 DIAGNOSIS — M54.50 CHRONIC MIDLINE LOW BACK PAIN WITHOUT SCIATICA: ICD-10-CM

## 2018-05-07 DIAGNOSIS — M51.36 DEGENERATIVE DISC DISEASE, LUMBAR: ICD-10-CM

## 2018-05-07 DIAGNOSIS — M17.0 PRIMARY OSTEOARTHRITIS OF BOTH KNEES: ICD-10-CM

## 2018-05-07 DIAGNOSIS — G89.29 CHRONIC MIDLINE LOW BACK PAIN WITHOUT SCIATICA: ICD-10-CM

## 2018-05-07 RX ORDER — MELOXICAM 15 MG/1
15 TABLET ORAL DAILY
Qty: 30 TAB | Refills: 0 | Status: SHIPPED | OUTPATIENT
Start: 2018-05-07 | End: 2018-07-11

## 2018-06-13 ENCOUNTER — APPOINTMENT (OUTPATIENT)
Dept: CT IMAGING | Age: 58
DRG: 682 | End: 2018-06-13
Attending: NURSE PRACTITIONER
Payer: MEDICARE

## 2018-06-13 ENCOUNTER — HOSPITAL ENCOUNTER (INPATIENT)
Age: 58
LOS: 14 days | Discharge: NURSING, REHAB OR LONG-TERM CARE | DRG: 682 | End: 2018-06-27
Attending: EMERGENCY MEDICINE | Admitting: INTERNAL MEDICINE
Payer: MEDICARE

## 2018-06-13 ENCOUNTER — APPOINTMENT (OUTPATIENT)
Dept: GENERAL RADIOLOGY | Age: 58
DRG: 682 | End: 2018-06-13
Attending: NURSE PRACTITIONER
Payer: MEDICARE

## 2018-06-13 DIAGNOSIS — N17.9 AKI (ACUTE KIDNEY INJURY) (HCC): ICD-10-CM

## 2018-06-13 DIAGNOSIS — R74.8 ELEVATED CPK: ICD-10-CM

## 2018-06-13 DIAGNOSIS — I48.92 ATRIAL FLUTTER, UNSPECIFIED TYPE (HCC): ICD-10-CM

## 2018-06-13 DIAGNOSIS — R41.0 CONFUSION: Primary | ICD-10-CM

## 2018-06-13 PROBLEM — M62.82 RHABDOMYOLYSIS: Status: ACTIVE | Noted: 2018-06-13

## 2018-06-13 PROBLEM — I48.91 ATRIAL FIBRILLATION WITH RVR (HCC): Status: ACTIVE | Noted: 2018-06-13

## 2018-06-13 LAB
ALBUMIN SERPL-MCNC: 2.6 G/DL (ref 3.4–5)
ALBUMIN/GLOB SERPL: 0.4 {RATIO} (ref 0.8–1.7)
ALP SERPL-CCNC: 46 U/L (ref 45–117)
ALT SERPL-CCNC: 78 U/L (ref 13–56)
AMPHET UR QL SCN: NEGATIVE
ANION GAP SERPL CALC-SCNC: 13 MMOL/L (ref 3–18)
APAP SERPL-MCNC: <2 UG/ML (ref 10–30)
APPEARANCE UR: ABNORMAL
AST SERPL-CCNC: 282 U/L (ref 15–37)
ATRIAL RATE: 178 BPM
BACTERIA URNS QL MICRO: ABNORMAL /HPF
BARBITURATES UR QL SCN: NEGATIVE
BASOPHILS # BLD: 0.1 K/UL (ref 0–0.06)
BASOPHILS NFR BLD: 1 % (ref 0–2)
BENZODIAZ UR QL: NEGATIVE
BILIRUB SERPL-MCNC: 0.4 MG/DL (ref 0.2–1)
BILIRUB UR QL: ABNORMAL
BNP SERPL-MCNC: 6339 PG/ML (ref 0–900)
BUN SERPL-MCNC: 128 MG/DL (ref 7–18)
BUN/CREAT SERPL: 53 (ref 12–20)
CALCIUM SERPL-MCNC: 9.7 MG/DL (ref 8.5–10.1)
CALCULATED R AXIS, ECG10: 65 DEGREES
CALCULATED T AXIS, ECG11: 74 DEGREES
CANNABINOIDS UR QL SCN: POSITIVE
CHLORIDE SERPL-SCNC: 100 MMOL/L (ref 100–108)
CK MB CFR SERPL CALC: 0.4 % (ref 0–4)
CK MB SERPL-MCNC: 28.4 NG/ML (ref 5–25)
CK SERPL-CCNC: 6954 U/L (ref 26–192)
CO2 SERPL-SCNC: 25 MMOL/L (ref 21–32)
COCAINE UR QL SCN: NEGATIVE
COLOR UR: ABNORMAL
CREAT SERPL-MCNC: 2.42 MG/DL (ref 0.6–1.3)
DIAGNOSIS, 93000: NORMAL
DIFFERENTIAL METHOD BLD: ABNORMAL
EOSINOPHIL # BLD: 0 K/UL (ref 0–0.4)
EOSINOPHIL NFR BLD: 0 % (ref 0–5)
EPITH CASTS URNS QL MICRO: ABNORMAL /LPF (ref 0–5)
ERYTHROCYTE [DISTWIDTH] IN BLOOD BY AUTOMATED COUNT: 12.9 % (ref 11.6–14.5)
GLOBULIN SER CALC-MCNC: 6.7 G/DL (ref 2–4)
GLUCOSE BLD STRIP.AUTO-MCNC: 125 MG/DL (ref 70–110)
GLUCOSE BLD STRIP.AUTO-MCNC: 156 MG/DL (ref 70–110)
GLUCOSE SERPL-MCNC: 158 MG/DL (ref 74–99)
GLUCOSE UR STRIP.AUTO-MCNC: NEGATIVE MG/DL
GRAN CASTS URNS QL MICRO: ABNORMAL /LPF
HCT VFR BLD AUTO: 47.1 % (ref 35–45)
HDSCOM,HDSCOM: ABNORMAL
HGB BLD-MCNC: 16.4 G/DL (ref 12–16)
HGB UR QL STRIP: ABNORMAL
INR PPP: 1.1 (ref 0.8–1.2)
KETONES UR QL STRIP.AUTO: NEGATIVE MG/DL
LACTATE BLD-SCNC: 1.8 MMOL/L (ref 0.4–2)
LACTATE BLD-SCNC: 2.7 MMOL/L (ref 0.4–2)
LEUKOCYTE ESTERASE UR QL STRIP.AUTO: ABNORMAL
LYMPHOCYTES # BLD: 1.1 K/UL (ref 0.9–3.6)
LYMPHOCYTES NFR BLD: 11 % (ref 21–52)
MAGNESIUM SERPL-MCNC: 3.4 MG/DL (ref 1.6–2.6)
MCH RBC QN AUTO: 27.2 PG (ref 24–34)
MCHC RBC AUTO-ENTMCNC: 34.8 G/DL (ref 31–37)
MCV RBC AUTO: 78.2 FL (ref 74–97)
METHADONE UR QL: NEGATIVE
MONOCYTES # BLD: 1.2 K/UL (ref 0.05–1.2)
MONOCYTES NFR BLD: 12 % (ref 3–10)
NEUTS SEG # BLD: 7.4 K/UL (ref 1.8–8)
NEUTS SEG NFR BLD: 76 % (ref 40–73)
NITRITE UR QL STRIP.AUTO: NEGATIVE
OPIATES UR QL: NEGATIVE
PCP UR QL: NEGATIVE
PH UR STRIP: 5 [PH] (ref 5–8)
PLATELET # BLD AUTO: 197 K/UL (ref 135–420)
PMV BLD AUTO: 12.6 FL (ref 9.2–11.8)
POTASSIUM SERPL-SCNC: 3.7 MMOL/L (ref 3.5–5.5)
PROT SERPL-MCNC: 9.3 G/DL (ref 6.4–8.2)
PROT UR STRIP-MCNC: 100 MG/DL
PROTHROMBIN TIME: 13.6 SEC (ref 11.5–15.2)
Q-T INTERVAL, ECG07: 274 MS
QRS DURATION, ECG06: 74 MS
QTC CALCULATION (BEZET), ECG08: 438 MS
RBC # BLD AUTO: 6.02 M/UL (ref 4.2–5.3)
RBC #/AREA URNS HPF: ABNORMAL /HPF (ref 0–5)
RBC MORPH BLD: ABNORMAL
SALICYLATES SERPL-MCNC: 3.5 MG/DL (ref 2.8–20)
SODIUM SERPL-SCNC: 138 MMOL/L (ref 136–145)
SP GR UR REFRACTOMETRY: 1.02 (ref 1–1.03)
TROPONIN I SERPL-MCNC: 0.11 NG/ML (ref 0–0.04)
TSH SERPL DL<=0.05 MIU/L-ACNC: 0.99 UIU/ML (ref 0.36–3.74)
UROBILINOGEN UR QL STRIP.AUTO: 0.2 EU/DL (ref 0.2–1)
VENTRICULAR RATE, ECG03: 154 BPM
WBC # BLD AUTO: 9.8 K/UL (ref 4.6–13.2)
WBC URNS QL MICRO: ABNORMAL /HPF (ref 0–4)

## 2018-06-13 PROCEDURE — 74011000250 HC RX REV CODE- 250: Performed by: EMERGENCY MEDICINE

## 2018-06-13 PROCEDURE — 80307 DRUG TEST PRSMV CHEM ANLYZR: CPT | Performed by: NURSE PRACTITIONER

## 2018-06-13 PROCEDURE — 80307 DRUG TEST PRSMV CHEM ANLYZR: CPT | Performed by: EMERGENCY MEDICINE

## 2018-06-13 PROCEDURE — 83735 ASSAY OF MAGNESIUM: CPT | Performed by: NURSE PRACTITIONER

## 2018-06-13 PROCEDURE — 72125 CT NECK SPINE W/O DYE: CPT

## 2018-06-13 PROCEDURE — 93005 ELECTROCARDIOGRAM TRACING: CPT

## 2018-06-13 PROCEDURE — 96365 THER/PROPH/DIAG IV INF INIT: CPT

## 2018-06-13 PROCEDURE — 70450 CT HEAD/BRAIN W/O DYE: CPT

## 2018-06-13 PROCEDURE — 71045 X-RAY EXAM CHEST 1 VIEW: CPT

## 2018-06-13 PROCEDURE — 85610 PROTHROMBIN TIME: CPT | Performed by: NURSE PRACTITIONER

## 2018-06-13 PROCEDURE — 96368 THER/DIAG CONCURRENT INF: CPT

## 2018-06-13 PROCEDURE — 77030021352 HC CBL LD SYS DISP COVD -B

## 2018-06-13 PROCEDURE — 77010033678 HC OXYGEN DAILY

## 2018-06-13 PROCEDURE — 82962 GLUCOSE BLOOD TEST: CPT

## 2018-06-13 PROCEDURE — 74011250636 HC RX REV CODE- 250/636: Performed by: EMERGENCY MEDICINE

## 2018-06-13 PROCEDURE — 83605 ASSAY OF LACTIC ACID: CPT

## 2018-06-13 PROCEDURE — 74011000250 HC RX REV CODE- 250: Performed by: NURSE PRACTITIONER

## 2018-06-13 PROCEDURE — 74011250636 HC RX REV CODE- 250/636: Performed by: NURSE PRACTITIONER

## 2018-06-13 PROCEDURE — 74011250637 HC RX REV CODE- 250/637: Performed by: EMERGENCY MEDICINE

## 2018-06-13 PROCEDURE — 87086 URINE CULTURE/COLONY COUNT: CPT | Performed by: EMERGENCY MEDICINE

## 2018-06-13 PROCEDURE — 96361 HYDRATE IV INFUSION ADD-ON: CPT

## 2018-06-13 PROCEDURE — 74011000258 HC RX REV CODE- 258: Performed by: EMERGENCY MEDICINE

## 2018-06-13 PROCEDURE — 77030020186 HC BOOT HL PROTCT SAGE -B

## 2018-06-13 PROCEDURE — 84443 ASSAY THYROID STIM HORMONE: CPT | Performed by: EMERGENCY MEDICINE

## 2018-06-13 PROCEDURE — 80053 COMPREHEN METABOLIC PANEL: CPT | Performed by: NURSE PRACTITIONER

## 2018-06-13 PROCEDURE — 85025 COMPLETE CBC W/AUTO DIFF WBC: CPT | Performed by: NURSE PRACTITIONER

## 2018-06-13 PROCEDURE — 96376 TX/PRO/DX INJ SAME DRUG ADON: CPT

## 2018-06-13 PROCEDURE — 99285 EMERGENCY DEPT VISIT HI MDM: CPT

## 2018-06-13 PROCEDURE — 81001 URINALYSIS AUTO W/SCOPE: CPT | Performed by: NURSE PRACTITIONER

## 2018-06-13 PROCEDURE — 74011000258 HC RX REV CODE- 258: Performed by: NURSE PRACTITIONER

## 2018-06-13 PROCEDURE — 51702 INSERT TEMP BLADDER CATH: CPT

## 2018-06-13 PROCEDURE — 82550 ASSAY OF CK (CPK): CPT | Performed by: NURSE PRACTITIONER

## 2018-06-13 PROCEDURE — 96375 TX/PRO/DX INJ NEW DRUG ADDON: CPT

## 2018-06-13 PROCEDURE — 65660000000 HC RM CCU STEPDOWN

## 2018-06-13 PROCEDURE — 83880 ASSAY OF NATRIURETIC PEPTIDE: CPT | Performed by: EMERGENCY MEDICINE

## 2018-06-13 RX ORDER — DILTIAZEM HYDROCHLORIDE 5 MG/ML
15 INJECTION INTRAVENOUS
Status: COMPLETED | OUTPATIENT
Start: 2018-06-13 | End: 2018-06-13

## 2018-06-13 RX ORDER — DILTIAZEM HYDROCHLORIDE 5 MG/ML
5 INJECTION INTRAVENOUS
Status: COMPLETED | OUTPATIENT
Start: 2018-06-13 | End: 2018-06-13

## 2018-06-13 RX ORDER — SODIUM CHLORIDE 0.9 % (FLUSH) 0.9 %
5-10 SYRINGE (ML) INJECTION EVERY 8 HOURS
Status: DISCONTINUED | OUTPATIENT
Start: 2018-06-13 | End: 2018-06-14 | Stop reason: SDUPTHER

## 2018-06-13 RX ORDER — DIGOXIN 0.25 MG/ML
500 INJECTION INTRAMUSCULAR; INTRAVENOUS
Status: COMPLETED | OUTPATIENT
Start: 2018-06-13 | End: 2018-06-13

## 2018-06-13 RX ORDER — SODIUM CHLORIDE 0.9 % (FLUSH) 0.9 %
5-10 SYRINGE (ML) INJECTION AS NEEDED
Status: DISCONTINUED | OUTPATIENT
Start: 2018-06-13 | End: 2018-06-27 | Stop reason: HOSPADM

## 2018-06-13 RX ORDER — MAGNESIUM SULFATE 100 %
4 CRYSTALS MISCELLANEOUS AS NEEDED
Status: DISCONTINUED | OUTPATIENT
Start: 2018-06-13 | End: 2018-06-27 | Stop reason: HOSPADM

## 2018-06-13 RX ORDER — SODIUM CHLORIDE 9 MG/ML
150 INJECTION, SOLUTION INTRAVENOUS CONTINUOUS
Status: DISCONTINUED | OUTPATIENT
Start: 2018-06-13 | End: 2018-06-14 | Stop reason: SDUPTHER

## 2018-06-13 RX ORDER — ACETAMINOPHEN 325 MG/1
650 TABLET ORAL
Status: DISCONTINUED | OUTPATIENT
Start: 2018-06-13 | End: 2018-06-27 | Stop reason: HOSPADM

## 2018-06-13 RX ORDER — HEPARIN SODIUM 5000 [USP'U]/ML
5000 INJECTION, SOLUTION INTRAVENOUS; SUBCUTANEOUS EVERY 8 HOURS
Status: DISCONTINUED | OUTPATIENT
Start: 2018-06-13 | End: 2018-06-27 | Stop reason: HOSPADM

## 2018-06-13 RX ORDER — INSULIN LISPRO 100 [IU]/ML
INJECTION, SOLUTION INTRAVENOUS; SUBCUTANEOUS
Status: DISCONTINUED | OUTPATIENT
Start: 2018-06-13 | End: 2018-06-27 | Stop reason: HOSPADM

## 2018-06-13 RX ORDER — ASPIRIN 300 MG/1
300 SUPPOSITORY RECTAL
Status: COMPLETED | OUTPATIENT
Start: 2018-06-13 | End: 2018-06-13

## 2018-06-13 RX ORDER — DEXTROSE 50 % IN WATER (D50W) INTRAVENOUS SYRINGE
25-50 AS NEEDED
Status: DISCONTINUED | OUTPATIENT
Start: 2018-06-13 | End: 2018-06-27 | Stop reason: HOSPADM

## 2018-06-13 RX ORDER — SODIUM CHLORIDE 9 MG/ML
125 INJECTION, SOLUTION INTRAVENOUS CONTINUOUS
Status: DISCONTINUED | OUTPATIENT
Start: 2018-06-13 | End: 2018-06-16

## 2018-06-13 RX ORDER — ONDANSETRON 2 MG/ML
4 INJECTION INTRAMUSCULAR; INTRAVENOUS
Status: DISCONTINUED | OUTPATIENT
Start: 2018-06-13 | End: 2018-06-27 | Stop reason: HOSPADM

## 2018-06-13 RX ORDER — DILTIAZEM HYDROCHLORIDE 5 MG/ML
5 INJECTION INTRAVENOUS ONCE
Status: COMPLETED | OUTPATIENT
Start: 2018-06-13 | End: 2018-06-13

## 2018-06-13 RX ADMIN — DILTIAZEM HYDROCHLORIDE 15 MG: 5 INJECTION INTRAVENOUS at 12:12

## 2018-06-13 RX ADMIN — SODIUM CHLORIDE 10 MG/HR: 900 INJECTION, SOLUTION INTRAVENOUS at 12:12

## 2018-06-13 RX ADMIN — DIGOXIN 500 MCG: 250 INJECTION, SOLUTION INTRAMUSCULAR; INTRAVENOUS; PARENTERAL at 10:39

## 2018-06-13 RX ADMIN — DILTIAZEM HYDROCHLORIDE 5 MG: 5 INJECTION INTRAVENOUS at 09:03

## 2018-06-13 RX ADMIN — SODIUM CHLORIDE 7.5 MG/HR: 900 INJECTION, SOLUTION INTRAVENOUS at 12:00

## 2018-06-13 RX ADMIN — SODIUM CHLORIDE 5 MG/HR: 900 INJECTION, SOLUTION INTRAVENOUS at 11:24

## 2018-06-13 RX ADMIN — HEPARIN SODIUM 5000 UNITS: 5000 INJECTION, SOLUTION INTRAVENOUS; SUBCUTANEOUS at 17:42

## 2018-06-13 RX ADMIN — DILTIAZEM HYDROCHLORIDE: 5 INJECTION INTRAVENOUS at 10:39

## 2018-06-13 RX ADMIN — SODIUM CHLORIDE 500 ML: 900 INJECTION, SOLUTION INTRAVENOUS at 10:41

## 2018-06-13 RX ADMIN — SODIUM CHLORIDE 125 ML/HR: 900 INJECTION, SOLUTION INTRAVENOUS at 17:42

## 2018-06-13 RX ADMIN — Medication 10 ML: at 18:03

## 2018-06-13 RX ADMIN — ASPIRIN 300 MG: 300 SUPPOSITORY RECTAL at 12:00

## 2018-06-13 RX ADMIN — Medication 10 ML: at 22:00

## 2018-06-13 RX ADMIN — SODIUM CHLORIDE 150 ML/HR: 900 INJECTION, SOLUTION INTRAVENOUS at 12:12

## 2018-06-13 NOTE — ED NOTES
TRANSFER - ED to INPATIENT REPORT:    SBAR report made available to receiving floor on this patient being transferred to [address] [place] [address] ([facility])  for routine progression of care       Admitting diagnosis Atrial fibrillation with RVR ([facility] [address]  Rhabdomyolysis    Information from the following report(s) ED Summary, MAR and Recent Results was made available to receiving floor. Lines:   Peripheral IV 06/13/18 Left;Upper Cephalic (Active)       Peripheral IV 06/13/18 Left Hand (Active)   Site Assessment Clean, dry, & intact 6/13/2018  8:11 AM   Phlebitis Assessment 0 6/13/2018  8:11 AM   Infiltration Assessment 0 6/13/2018  8:11 AM   Dressing Status Clean, dry, & intact 6/13/2018  8:11 AM   Dressing Type Transparent 6/13/2018  8:11 AM   Hub Color/Line Status Blue 6/13/2018  8:11 AM        Medication list unable to confirm    Opportunity for questions and clarification was provided. Patient is oriented to time, place, person and situation N/A  Patient is  continent (diallo) and ambulates without assistance at home; has not ambulated since arrived.      Valuables transported with patient     Patient transported with:   Dress Code

## 2018-06-13 NOTE — PROGRESS NOTES
Received patient from ED. Electronic SBAR reviewed prior to patients arrival.  Patient arrived to unit on 2L of O2 via NC. Patient in no apparent respiratory distress. Patient is alert to time and place and has garbled speech. Patient reports generalized pain at 5/10. Patient has a urinary bladder diallo catheter in place draining with gravity. Four eye skin assessment completed with Lloyd Garcia RN. Prevalon boots applied to bilateral feet.  Will continue to monitor patient throughout shift.        06/13/18 1647   Vitals   Temp 97.8 °F (36.6 °C)   Temp Source Oral   Pulse (Heart Rate) (!) 108   Heart Rate Source Monitor   Resp Rate 24   O2 Sat (%) 99 %   Level of Consciousness Alert   /69   MAP (Calculated) 87   BP 1 Location Right arm   BP 1 Method Automatic   BP Patient Position At rest   MEWS Score 3   Pain 1   Pain Scale 1 Numeric (0 - 10)   Pain Intensity 1 5

## 2018-06-13 NOTE — IP AVS SNAPSHOT
[address] [address] 
[phone] Patient: Melina Roe MRN: [id number] SDW:5/40/9267 A check bridgett indicates which time of day the medication should be taken. My Medications START taking these medications Instructions Each Dose to Equal  
 Morning Noon Evening Bedtime  
 aspirin 81 mg chewable tablet Your last dose was: Your next dose is: Take 1 Tab by mouth daily. 81 mg  
    
   
   
   
  
 colchicine 0.6 mg tablet Your last dose was: Your next dose is: Take 1 Tab by mouth two (2) times a day. 0.6 mg  
    
   
   
   
  
  
CONTINUE taking these medications Instructions Each Dose to Equal  
 Morning Noon Evening Bedtime Blood-Glucose Meter monitoring kit Your last dose was: Your next dose is:    
   
   
 Use as directed. cetirizine 10 mg tablet Commonly known as:  ZYRTEC Your last dose was: Your next dose is: Take 1 Tab by mouth daily. 10 mg  
    
   
   
   
  
 chlorthalidone 25 mg tablet Commonly known as:  Dariel Deborah Your last dose was: Your next dose is: Take  by mouth daily. cloNIDine HCl 0.2 mg tablet Commonly known as:  CATAPRES Your last dose was: Your next dose is: Take  by mouth two (2) times a day. conjugated estrogens 0.625 mg/gram vaginal cream  
Commonly known as:  PREMARIN Your last dose was: Your next dose is: Insert 0.5 g into vagina daily. 0.5 g  
    
   
   
   
  
 cyclobenzaprine 10 mg tablet Commonly known as:  FLEXERIL Your last dose was: Your next dose is: Take  by mouth three (3) times daily as needed for Muscle Spasm(s). folic acid 1 mg tablet Commonly known as:  Google Your last dose was: Your next dose is: Take  by mouth daily. glucose blood VI test strips strip Commonly known as:  blood glucose test  
   
Your last dose was: Your next dose is:    
   
   
 Use daily or as directed for blood sugar monitoring. .00 HYDROcodone-acetaminophen 5-325 mg per tablet Commonly known as:  Gleda Ridgel Your last dose was: Your next dose is: Take 1 Tab by mouth every four (4) hours as needed for Pain. Max Daily Amount: 6 Tabs. 1 Tab  
    
   
   
   
  
 hydrocortisone valerate 0.2 % ointment Commonly known as:  WEST-FELISHA Your last dose was: Your next dose is:    
   
   
 Apply  to affected area two (2) times a day. use thin layer  
     
   
   
   
  
 ipratropium-albuterol  mcg/actuation inhaler Commonly known as:  Anita Rash Your last dose was: Your next dose is: Take 1 Puff by inhalation every six (6) hours as needed for Wheezing. 1 Puff Lancets Misc Your last dose was: Your next dose is:    
   
   
 Use daily or as directed for blood sugar monitoring, dx 250.00  
     
   
   
   
  
 losartan-hydroCHLOROthiazide 50-12.5 mg per tablet Commonly known as:  HYZAAR Your last dose was: Your next dose is: Take 1 tablet by mouth daily. 1 Tab  
    
   
   
   
  
 meloxicam 15 mg tablet Commonly known as:  MOBIC Your last dose was: Your next dose is: Take 1 Tab by mouth daily. 15 mg  
    
   
   
   
  
 metFORMIN 500 mg tablet Commonly known as:  GLUCOPHAGE Your last dose was: Your next dose is: Take  by mouth two (2) times daily (with meals). naproxen 375 mg tablet Commonly known as:  NAPROSYN Your last dose was: Your next dose is: Take 1 Tab by mouth two (2) times daily (with meals). 375 mg Omeprazole delayed release 20 mg tablet Commonly known as:  PRILOSEC D/R Your last dose was: Your next dose is: Take 1 tablet by mouth daily. 20 mg  
    
   
   
   
  
 predniSONE 20 mg tablet Commonly known as:  Octavio Rothsay Your last dose was: Your next dose is: Take 2 tabs in AM with food for 7 days then 1 tab until gone [address] Your last dose was: Your next dose is:    
   
   
 Use daily with showering. Dx: djd of knee  
     
   
   
   
  
 spironolactone 25 mg tablet Commonly known as:  ALDACTONE Your last dose was: Your next dose is: Take  by mouth daily. traMADol 50 mg tablet Commonly known as:  ULTRAM  
   
Your last dose was: Your next dose is: Take 2 Tabs by mouth two (2) times daily as needed for Pain. 100 mg Where to Get Your Medications Information on where to get these meds will be given to you by the nurse or doctor. ! Ask your nurse or doctor about these medications  
  aspirin 81 mg chewable tablet  
 colchicine 0.6 mg tablet

## 2018-06-13 NOTE — ED PROVIDER NOTES
EMERGENCY DEPARTMENT HISTORY AND PHYSICAL EXAM    8:40 AM      Date: 6/13/2018  Patient Name: Lilia Lewis    History of Presenting Illness     Chief Complaint   Patient presents with    Altered mental status    Fall         History Provided By: Patient and daughter    Chief Complaint: AMS; patient found on ground with unknown amount of days per daughter and patient  Duration:  unknown  Timing:  Constant  Location: patient c/o neck pain  Quality: Aching  Severity: Moderate  Modifying Factors: none  Associated Symptoms: denies any other associated signs or symptoms      Additional History (Context): Lilia Lewis is a 59 y.o. female with a PMHX HTN, Arthritis, DM type 2, Hepatitis C, GERD, COPD, Bipolar 1 Disorder, Depression, Alcohol Dependence in Remission, DJD, Eustachian Tube Dysfunction, and Psoriasis arrived to ER via EMS from home for medical evaluation. Daughter reports patient was found on living room floor this am and confused. Daughter reports the last time she had spoke or seen her mother was six days ago. Patient currently alert to person, place, and location. Daughter reports patient had ripped off the labels on her prescription bottles . Patient not alert to time or day. Patient states, \"not sure what happened, I cant remember why I was on the floor. \"  Patient c/o neck pain and generalize bodyache. Patient smkoes less than 1/2 pack of cigarettes per day. Patient denies fever, chills, headache, dizziness, visual disturbance, CP, Palpitations, SOB, abdominal pain, N/V/D, flank pain, back pain, urinary sx's, or any other concerns.          PCP: Charna Epley, MD    Current Facility-Administered Medications   Medication Dose Route Frequency Provider Last Rate Last Dose    dilTIAZem (CARDIZEM) injection 5 mg  5 mg IntraVENous NOW Julienne Solum, NP        sodium chloride 0.9 % bolus infusion 500 mL  500 mL IntraVENous ONCE Caren Bob, JAX        dilTIAZem (CARDIZEM) 100 mg in 0.9% sodium chloride (MBP/ADV) 100 mL infusion  5 mg/hr IntraVENous TITRATE Earmon December, NP        digoxin (LANOXIN) injection 500 mcg  500 mcg IntraVENous NOW [facility] December, NP         Current Outpatient Prescriptions   Medication Sig Dispense Refill    meloxicam (MOBIC) 15 mg tablet Take 1 Tab by mouth daily. 30 Tab 0    chlorthalidone (HYGROTEN) 25 mg tablet Take  by mouth daily.  folic acid (FOLVITE) 1 mg tablet Take  by mouth daily.  predniSONE (DELTASONE) 20 mg tablet Take 2 tabs in AM with food for 7 days then 1 tab until gone 28 Tab 0    spironolactone (ALDACTONE) 25 mg tablet Take  by mouth daily.  metFORMIN (GLUCOPHAGE) 500 mg tablet Take  by mouth two (2) times daily (with meals).  cyclobenzaprine (FLEXERIL) 10 mg tablet Take  by mouth three (3) times daily as needed for Muscle Spasm(s).  ipratropium-albuterol (COMBIVENT RESPIMAT)  mcg/actuation inhaler Take 1 Puff by inhalation every six (6) hours as needed for Wheezing. 1 Inhaler 11    cloNIDine HCl (CATAPRES) 0.2 mg tablet Take  by mouth two (2) times a day.  HYDROcodone-acetaminophen (NORCO) 5-325 mg per tablet Take 1 Tab by mouth every four (4) hours as needed for Pain. Max Daily Amount: 6 Tabs. 12 Tab 0    naproxen (NAPROSYN) 375 mg tablet Take 1 Tab by mouth two (2) times daily (with meals). 20 Tab 0    conjugated estrogens (PREMARIN) 0.625 mg/gram vaginal cream Insert 0.5 g into vagina daily. 45 g 2    hydrocortisone valerate (WEST-FELISHA) 0.2 % ointment Apply  to affected area two (2) times a day. use thin layer 30 g 1    Shower Chair XX clemencia Use daily with showering. Dx: djd of knee 1 each 0    losartan-hydrochlorothiazide (HYZAAR) 50-12.5 mg per tablet Take 1 tablet by mouth daily. 30 tablet 5    Omeprazole delayed release (PRILOSEC D/R) 20 mg tablet Take 1 tablet by mouth daily. 30 tablet 5    cetirizine (ZYRTEC) 10 mg tablet Take 1 Tab by mouth daily.  30 Tab 5    traMADol (ULTRAM) 50 mg tablet Take 2 Tabs by mouth two (2) times daily as needed for Pain. 120 Tab 2    Lancets misc Use daily or as directed for blood sugar monitoring, dx 250.00 50 Each 11    glucose blood VI test strips (BLOOD GLUCOSE TEST) strip Use daily or as directed for blood sugar monitoring. .00 50 Strip 11    Blood-Glucose Meter monitoring kit Use as directed. 1 Kit 0       Past History     Past Medical History:  Past Medical History:   Diagnosis Date    Alcohol dependence in remission ([facility]     alcohol withdrawal discharged 2013    Arthritis     knees and back    Bipolar 1 disorder ([facility]     seeing Psychiatrist    Chronic obstructive pulmonary disease ([facility]     Depression     Diabetes mellitus ([facility]     DJD (degenerative joint disease) of knee     DM2 (diabetes mellitus, type 2) (HCC)     borderline    Eustachian tube dysfunction     Dr. 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 Berny Green - due for repeat     HX CYST REMOVAL      Hydrocystoma R eye removed    HX GYN      partical hysterectomy - fibroid tumors    HX HYSTERECTOMY      PARTIAL       Family History:  Family History   Problem Relation Age of Onset    MS Mother     Heart Attack Mother        Social History:  Social History   Substance Use Topics    Smoking status: Current Every Day Smoker     Packs/day: 0.50     Years: 40.00     Types: Cigarettes    Smokeless tobacco: Never Used    Alcohol use Yes      Comment: occasional       Allergies:   Allergies   Allergen Reactions    Ace Inhibitors Cough    Penicillins Hives         Review of Systems       Review of Systems   Constitutional: Negative for activity change, appetite change, chills, diaphoresis, fatigue, fever and unexpected weight change. Eyes: Negative for photophobia, pain and visual disturbance. Respiratory: Positive for cough. Negative for choking, chest tightness, shortness of breath, wheezing and stridor. Cardiovascular: Negative. Negative for chest pain, palpitations and leg swelling. Gastrointestinal: Negative for abdominal distention, abdominal pain, blood in stool, constipation, diarrhea, nausea, rectal pain and vomiting. Musculoskeletal: Positive for neck pain. Negative for back pain and neck stiffness. Skin: Negative. Neurological: Positive for syncope. Negative for dizziness, tremors, seizures, facial asymmetry, speech difficulty, weakness, light-headedness, numbness and headaches. All other systems reviewed and are negative. Physical Exam     Visit Vitals    /69    Pulse (!) 131    Temp 96.9 °F (36.1 °C)    Resp 21    SpO2 (!) 62%       Physical Exam   Constitutional: She appears well-developed and well-nourished. No distress. Eyes: Conjunctivae and EOM are normal. Pupils are equal, round, and reactive to light. Neck: Normal range of motion. Spinous process tenderness and muscular tenderness present. No rigidity. No edema, no erythema and normal range of motion present. Cardiovascular: An irregularly irregular rhythm present. Tachycardia present. 148   Pulmonary/Chest: Effort normal. No accessory muscle usage. No respiratory distress. She has no decreased breath sounds. She has no wheezes. She has rhonchi in the right lower field and the left lower field. She has no rales. She exhibits no tenderness. Abdominal: Soft. Bowel sounds are normal. She exhibits no distension and no mass. There is no tenderness. There is no rigidity, no rebound, no guarding, no CVA tenderness, no tenderness at McBurney's point and negative Marte's sign. Musculoskeletal: Normal range of motion. Right knee: She exhibits ecchymosis (mild).  She exhibits normal range of motion, no swelling, no effusion, no deformity, normal alignment and normal patellar mobility. Tenderness found. Medial joint line tenderness noted. Left knee: She exhibits ecchymosis (mild ecchymosis). She exhibits normal range of motion, no swelling, no effusion, no deformity, normal alignment, no LCL laxity and normal patellar mobility. Cervical back: She exhibits tenderness and bony tenderness. She exhibits normal range of motion, no swelling, no edema and no deformity. Thoracic back: Normal.        Lumbar back: Normal.   Neurological: She is alert. She has normal reflexes. She displays no atrophy, no tremor and normal reflexes. No sensory deficit. She exhibits normal muscle tone. She displays no seizure activity. Coordination normal.   Skin: Skin is warm and dry. She is not diaphoretic. Psychiatric: She has a normal mood and affect. Her speech is normal and behavior is normal. Thought content normal.   Nursing note and vitals reviewed. Diagnostic Study Results     Labs -  Recent Results (from the past 12 hour(s))   EKG, 12 LEAD, INITIAL    Collection Time: 06/13/18  8:42 AM   Result Value Ref Range    Ventricular Rate 154 BPM    Atrial Rate 178 BPM    QRS Duration 74 ms    Q-T Interval 274 ms    QTC Calculation (Bezet) 438 ms    Calculated R Axis 65 degrees    Calculated T Axis 74 degrees    Diagnosis       Atrial fibrillation with rapid ventricular response  Abnormal ECG  When compared with ECG of 23-DEC-2010 12:45,  Atrial fibrillation has replaced Sinus rhythm  Vent.  rate has increased BY  76 BPM  Confirmed by Shaun Antonio ([phone] [id number]) on 6/13/2018 9:38:40 AM     URINALYSIS W/ RFLX MICROSCOPIC    Collection Time: 06/13/18  8:45 AM   Result Value Ref Range    Color DARK YELLOW      Appearance TURBID      Specific gravity 1.023 1.005 - 1.030      pH (UA) 5.0 5.0 - 8.0      Protein 100 (A) NEG mg/dL    Glucose NEGATIVE  NEG mg/dL    Ketone NEGATIVE  NEG mg/dL    Bilirubin SMALL (A) NEG      Blood LARGE (A) NEG      Urobilinogen 0.2 0.2 - 1.0 EU/dL    Nitrites NEGATIVE  NEG      Leukocyte Esterase TRACE (A) NEG     DRUG SCREEN, URINE    Collection Time: 06/13/18  8:45 AM   Result Value Ref Range    BENZODIAZEPINES NEGATIVE  NEG      BARBITURATES NEGATIVE  NEG      THC (TH-CANNABINOL) POSITIVE (A) NEG      OPIATES NEGATIVE  NEG      PCP(PHENCYCLIDINE) NEGATIVE  NEG      COCAINE NEGATIVE  NEG      AMPHETAMINES NEGATIVE  NEG      METHADONE NEGATIVE  NEG      HDSCOM (NOTE)    URINE MICROSCOPIC ONLY    Collection Time: 06/13/18  8:45 AM   Result Value Ref Range    WBC 11 to 20 0 - 4 /hpf    RBC 2 to 4 0 - 5 /hpf    Epithelial cells 2+ 0 - 5 /lpf    Bacteria 4+ (A) NEG /hpf    Granular cast 10 to 15 NEG /lpf   CBC WITH AUTOMATED DIFF    Collection Time: 06/13/18  9:52 AM   Result Value Ref Range    WBC 9.8 4.6 - 13.2 K/uL    RBC 6.02 (H) 4.20 - 5.30 M/uL    HGB 16.4 (H) 12.0 - 16.0 g/dL    HCT 47.1 (H) 35.0 - 45.0 %    MCV 78.2 74.0 - 97.0 FL    MCH 27.2 24.0 - 34.0 PG    MCHC 34.8 31.0 - 37.0 g/dL    RDW 12.9 11.6 - 14.5 %    PLATELET 829 652 - 231 K/uL    MPV 12.6 (H) 9.2 - 11.8 FL    NEUTROPHILS PENDING %    LYMPHOCYTES PENDING %    MONOCYTES PENDING %    EOSINOPHILS PENDING %    BASOPHILS PENDING %    ABS. NEUTROPHILS PENDING K/UL    ABS. LYMPHOCYTES PENDING K/UL    ABS. MONOCYTES PENDING K/UL    ABS. EOSINOPHILS PENDING K/UL    ABS. BASOPHILS PENDING K/UL    DF PENDING    POC LACTIC ACID    Collection Time: 06/13/18  9:53 AM   Result Value Ref Range    Lactic Acid (POC) 2.7 (HH) 0.4 - 2.0 mmol/L       Radiologic Studies -   CT HEAD WO CONT    (Results Pending)   XR CHEST PA LAT    (Results Pending)   CT SPINE CERV WO CONT    (Results Pending)         Medical Decision Making   I am the first provider for this patient. I reviewed the vital signs, available nursing notes, past medical history, past surgical history, family history and social history.     Vital Signs-Reviewed the patient's vital signs. Records Reviewed: Old Medical Records (Time of Review: 8:40 AM)    ED Course: Progress Notes, Reevaluation, and Consults:    08:45 AM:  Consulted and discussed case with Dr. Grsielda Eldridge. Standard discussion included reason for patient ER visit, PMHx, V/S, ROS, PE. Lab, CT of head and neck, chest x-ray, and EKG ordered. Dr. Griselda Eldridge also has been made aware and has evaluated patient. Patient in afib, ordered cardizem IV.      09:35 AM:  Blood sent to lab    10:10 AM:  Report and care turned over to Dr. Felton Alaniz (ER Physician). Standard discussion included reason for patient ER visit, PMHx, V/S, ROS, PE. Pending diagnostic results at this time. Dr. Felton Alaniz has re-examined patient. Per Dr. Felton Alaniz, cardizem drip ordered and digoxin 500 mcg x1 dose. Provider Notes (Medical Decision Making):     Procedures:     Core Measures:     Critical Care Time:     For Hospitalized Patients:    1. Hospitalization Decision Time:  The decision to hospitalize the patient was made by  on 6/13/2018        Diagnosis     Clinical Impression: No diagnosis found. Disposition: Admit    Follow-up Information     None           Patient's Medications   Start Taking    No medications on file   Continue Taking    BLOOD-GLUCOSE METER MONITORING KIT    Use as directed. CETIRIZINE (ZYRTEC) 10 MG TABLET    Take 1 Tab by mouth daily. CHLORTHALIDONE (HYGROTEN) 25 MG TABLET    Take  by mouth daily. CLONIDINE HCL (CATAPRES) 0.2 MG TABLET    Take  by mouth two (2) times a day. CONJUGATED ESTROGENS (PREMARIN) 0.625 MG/GRAM VAGINAL CREAM    Insert 0.5 g into vagina daily. CYCLOBENZAPRINE (FLEXERIL) 10 MG TABLET    Take  by mouth three (3) times daily as needed for Muscle Spasm(s). FOLIC ACID (FOLVITE) 1 MG TABLET    Take  by mouth daily. GLUCOSE BLOOD VI TEST STRIPS (BLOOD GLUCOSE TEST) STRIP    Use daily or as directed for blood sugar monitoring.  .00    HYDROCODONE-ACETAMINOPHEN (NORCO) 5-325 MG PER TABLET    Take 1 Tab by mouth every four (4) hours as needed for Pain. Max Daily Amount: 6 Tabs. HYDROCORTISONE VALERATE (WEST-FELISHA) 0.2 % OINTMENT    Apply  to affected area two (2) times a day. use thin layer    IPRATROPIUM-ALBUTEROL (COMBIVENT RESPIMAT)  MCG/ACTUATION INHALER    Take 1 Puff by inhalation every six (6) hours as needed for Wheezing. LANCETS MISC    Use daily or as directed for blood sugar monitoring, dx 250.00    LOSARTAN-HYDROCHLOROTHIAZIDE (HYZAAR) 50-12.5 MG PER TABLET    Take 1 tablet by mouth daily. MELOXICAM (MOBIC) 15 MG TABLET    Take 1 Tab by mouth daily. METFORMIN (GLUCOPHAGE) 500 MG TABLET    Take  by mouth two (2) times daily (with meals). NAPROXEN (NAPROSYN) 375 MG TABLET    Take 1 Tab by mouth two (2) times daily (with meals). OMEPRAZOLE DELAYED RELEASE (PRILOSEC D/R) 20 MG TABLET    Take 1 tablet by mouth daily. PREDNISONE (DELTASONE) 20 MG TABLET    Take 2 tabs in AM with food for 7 days then 1 tab until gone    SHOWER CHAIR XX TAYLOR    Use daily with showering. Dx: djd of knee    SPIRONOLACTONE (ALDACTONE) 25 MG TABLET    Take  by mouth daily. TRAMADOL (ULTRAM) 50 MG TABLET    Take 2 Tabs by mouth two (2) times daily as needed for Pain. These Medications have changed    No medications on file   Stop Taking    No medications on file     _______________________________    Attestations:  JAX Peng acting as a scribe for and in the presence of Luis Mejia MD      June 13, 2018 at 10:18 AM       Provider Attestation:      I personally performed the services described in the documentation, reviewed the documentation, as recorded by the scribe in my presence, and it accurately and completely records my words and actions.  June 13, 2018 at 10:18 AM - Luis Mejia MD    _______________________________

## 2018-06-13 NOTE — PROGRESS NOTES
Patients MEWS score currently 3. Patient is sinus tach on the monitor with a rate between 110-104. Patient previously on Cardizem drip. Nursing supervisor aware of score. Will continue to monitor.

## 2018-06-13 NOTE — ED NOTES
10:00 AM :Pt care assumed from Annel Glassma ED provider. Pt complaint(s), current treatment plan, progression and available diagnostic results have been discussed thoroughly. Rounding occurred: yes  Intended Disposition: TBD   Pending diagnostic reports and/or labs (please list):     10:59 AM I was made aware by nurses that after multiple attempts they couldn't get a line in. Sterile prep, 20 gauge Diffusics in right arm. Sterile vascular probe used live. Good venous flow. IV secured and no complications. 1 attempt. 11:25 Patient more alert and more interactive. CRITICAL CARE:  Systems at risk are cardiovascular and renal    IV Fluids started for Rhabdomyolysis     Aspirin given for elevated Troponin. Digoxin and Cardizem started for A Flutter. 11:38 AM  I have spent 30 minutes of critical care time involved in lab review, consultations with specialist, family decision-making, and documentation. During this entire length of time I was immediately available to the patient. Critical Care: The reason for providing this level of medical care for this critically ill patient was due a critical illness that impaired one or more vital organ systems such that there was a high probability of imminent or life threatening deterioration in the patients condition. This care involved high complexity decision making to assess, manipulate, and support vital system functions, to treat this degreee vital organ system failure and to prevent further life threatening deterioration of the patients condition. Decision to admit was made by Dr. Marita Peraza at 11:33 AM    11:40 AM Consult: I discussed care with Dr. Macrina Avina (Hospitalist). It was a standard discussion including patient history, chief complaint, available diagnostic results, and predicted treatment course.   Will admit    12:42 PM  Pt converted to NSR will repeat ekg;  [address] acting as a scribe for and in the presence of Nii Smart MD      June 13, 2018 at 11:19 AM       Provider Attestation:      I personally performed the services described in the documentation, reviewed the documentation, as recorded by the scribe in my presence, and it accurately and completely records my words and actions. June 13, 2018 at 11:19 AM - Nii Smart MD      Diagnosis:   1. Confusion    2. Atrial flutter, unspecified type ([facility]    3. PAM (acute kidney injury) ([facility]    4. Elevated CPK          Disposition: admit      Follow-up Information     None          Patient's Medications   Start Taking    No medications on file   Continue Taking    BLOOD-GLUCOSE METER MONITORING KIT    Use as directed. CETIRIZINE (ZYRTEC) 10 MG TABLET    Take 1 Tab by mouth daily. CHLORTHALIDONE (HYGROTEN) 25 MG TABLET    Take  by mouth daily. CLONIDINE HCL (CATAPRES) 0.2 MG TABLET    Take  by mouth two (2) times a day. CONJUGATED ESTROGENS (PREMARIN) 0.625 MG/GRAM VAGINAL CREAM    Insert 0.5 g into vagina daily. CYCLOBENZAPRINE (FLEXERIL) 10 MG TABLET    Take  by mouth three (3) times daily as needed for Muscle Spasm(s). FOLIC ACID (FOLVITE) 1 MG TABLET    Take  by mouth daily. GLUCOSE BLOOD VI TEST STRIPS (BLOOD GLUCOSE TEST) STRIP    Use daily or as directed for blood sugar monitoring. .00    HYDROCODONE-ACETAMINOPHEN (NORCO) 5-325 MG PER TABLET    Take 1 Tab by mouth every four (4) hours as needed for Pain. Max Daily Amount: 6 Tabs. HYDROCORTISONE VALERATE (WEST-FELISHA) 0.2 % OINTMENT    Apply  to affected area two (2) times a day. use thin layer    IPRATROPIUM-ALBUTEROL (COMBIVENT RESPIMAT)  MCG/ACTUATION INHALER    Take 1 Puff by inhalation every six (6) hours as needed for Wheezing. LANCETS MISC    Use daily or as directed for blood sugar monitoring, dx 250.00    LOSARTAN-HYDROCHLOROTHIAZIDE (HYZAAR) 50-12.5 MG PER TABLET    Take 1 tablet by mouth daily. MELOXICAM (MOBIC) 15 MG TABLET    Take 1 Tab by mouth daily. METFORMIN (GLUCOPHAGE) 500 MG TABLET    Take  by mouth two (2) times daily (with meals). NAPROXEN (NAPROSYN) 375 MG TABLET    Take 1 Tab by mouth two (2) times daily (with meals). OMEPRAZOLE DELAYED RELEASE (PRILOSEC D/R) 20 MG TABLET    Take 1 tablet by mouth daily. PREDNISONE (DELTASONE) 20 MG TABLET    Take 2 tabs in AM with food for 7 days then 1 tab until gone    SHOWER CHAIR XX TAYLOR    Use daily with showering. Dx: djd of knee    SPIRONOLACTONE (ALDACTONE) 25 MG TABLET    Take  by mouth daily. TRAMADOL (ULTRAM) 50 MG TABLET    Take 2 Tabs by mouth two (2) times daily as needed for Pain.    These Medications have changed    No medications on file   Stop Taking    No medications on file

## 2018-06-13 NOTE — Clinical Note
Status[de-identified] Inpatient [101] Type of Bed: Stepdown [17] Inpatient Hospitalization Certified Necessary for the Following Reasons: 3. Patient receiving treatment that can only be provided in an inpatient setting (further clarification in H&P documentation) Admitting Diagnosis: Atrial fibrillation with RVR [facility] [[id number]] Admitting Physician: Chuck Way Attending Physician: Chuck Way Estimated Length of Stay: 2 Midnights Discharge Plan[de-identified] Home with Office Follow-up Comments: A-fib RVR with PAM and Rhabo

## 2018-06-13 NOTE — H&P
GENERAL GENERIC H&P/CONSULT    Leoncio Lorenzo is a 59 y.o. female with a PMHX HTN, Arthritis, DM type 2, Hepatitis C, GERD, COPD, Bipolar 1 Disorder, Depression, Alcohol Dependence in Remission, DJD, Eustachian Tube Dysfunction, and Psoriasis who presents to the ED for AMS noting patient was found by daughter on the floor this morning with confusion. Daughter notes last time she saw her was 6 days ago. Patient awake however not oriented to day or time and notes generalized myalgia and does not remember how she ended up on the floor. No cp, sob, fever, chills, diarrhea. UPon arrival patient was found to be in a-fib with rvr and was subseqeuntly placed on cardizem gtt and given Dig with conversion. Patient was also found to have Rhabdo along with PAM. Patient will be admitted for further evaluation.        Past Medical History:   Diagnosis Date    Alcohol dependence in remission ([facility] [address]     alcohol withdrawal discharged 2013    Arthritis     knees and back    Bipolar 1 disorder ([facility] [address]     seeing Psychiatrist    Chronic obstructive pulmonary disease ([facility] [address]     Depression     Diabetes mellitus ([facility] [address]     DJD (degenerative joint disease) of knee     DM2 (diabetes mellitus, type 2) (HCC)     borderline    Eustachian tube dysfunction     Dr. Buffy Ko GERD (gastroesophageal reflux disease)     Hepatitis C      prior IV drug abuse - Genotype 1a    Hypertension     Needle phobia     from prior IV drug abuse    Psoriasis       Past Surgical History:   Procedure Laterality Date    HX  SECTION      x 3    HX COLONOSCOPY      Dr. Tiny Nguyen - due for repeat     HX CYST REMOVAL      Hydrocystoma R eye removed    HX GYN      partical hysterectomy - fibroid tumors    HX HYSTERECTOMY      PARTIAL      Prior to Admission medications    Medication Sig Start Date End Date Taking? Authorizing Provider   meloxicam (MOBIC) 15 mg tablet Take 1 Tab by mouth daily.  18   Ying Lin, DO chlorthalidone (HYGROTEN) 25 mg tablet Take  by mouth daily. Historical Provider   folic acid (FOLVITE) 1 mg tablet Take  by mouth daily. Historical Provider   predniSONE (DELTASONE) 20 mg tablet Take 2 tabs in AM with food for 7 days then 1 tab until gone 2/26/18   Neal Jacobsen DO   spironolactone (ALDACTONE) 25 mg tablet Take  by mouth daily. Historical Provider   metFORMIN (GLUCOPHAGE) 500 mg tablet Take  by mouth two (2) times daily (with meals). Historical Provider   cyclobenzaprine (FLEXERIL) 10 mg tablet Take  by mouth three (3) times daily as needed for Muscle Spasm(s). Historical Provider   ipratropium-albuterol (COMBIVENT RESPIMAT)  mcg/actuation inhaler Take 1 Puff by inhalation every six (6) hours as needed for Wheezing. 2/15/17   Charles Orr MD   cloNIDine HCl (CATAPRES) 0.2 mg tablet Take  by mouth two (2) times a day. Historical Provider   HYDROcodone-acetaminophen (NORCO) 5-325 mg per tablet Take 1 Tab by mouth every four (4) hours as needed for Pain. Max Daily Amount: 6 Tabs. 12/11/15   ANTOINETTE Shrestha   naproxen (NAPROSYN) 375 mg tablet Take 1 Tab by mouth two (2) times daily (with meals). 12/11/15   ANTOINETTE Shrestha   conjugated estrogens (PREMARIN) 0.625 mg/gram vaginal cream Insert 0.5 g into vagina daily. 2/2/15   Suha Pedraza DO   hydrocortisone valerate (WEST-FELISHA) 0.2 % ointment Apply  to affected area two (2) times a day. use thin layer 2/2/15   Suha Pedraza DO   Shower Chair XX clemencia Use daily with showering. Dx: djd of knee 10/21/14   Suha Pedraza DO   losartan-hydrochlorothiazide [facility]) 50-12.5 mg per tablet Take 1 tablet by mouth daily. 10/21/14   Suha Pedraza DO   Omeprazole delayed release (PRILOSEC D/R) 20 mg tablet Take 1 tablet by mouth daily. 10/21/14   Suha Pedraza DO   cetirizine (ZYRTEC) 10 mg tablet Take 1 Tab by mouth daily.  7/18/14   Suha Pedraza, DO   traMADol [facility]coll) 50 mg tablet Take 2 Tabs by mouth two (2) times daily as needed for Pain. 7/18/14   Alicia Johnson DO   Lancets misc Use daily or as directed for blood sugar monitoring, dx 250.00 4/16/14   Alicia Johnson DO   glucose blood VI test strips (BLOOD GLUCOSE TEST) strip Use daily or as directed for blood sugar monitoring. .00 4/16/14   Alicia Johnson DO   Blood-Glucose Meter monitoring kit Use as directed. 4/15/14   Alicia Johnson DO     Allergies   Allergen Reactions    Ace Inhibitors Cough    Penicillins Hives      Social History   Substance Use Topics    Smoking status: Current Every Day Smoker     Packs/day: 0.50     Years: 40.00     Types: Cigarettes    Smokeless tobacco: Never Used    Alcohol use Yes      Comment: occasional      Family History   Problem Relation Age of Onset   Ibrahima Colindres MS Mother     Heart Attack Mother       Review of Systems   Unable to perform ROS: Acuity of condition   Musculoskeletal: Positive for myalgias.        Objective:          Patient Vitals for the past 8 hrs:   BP Temp Pulse Resp SpO2   06/13/18 1417 118/79 - (!) 107 26 -   06/13/18 1359 - - (!) 106 25 100 %   06/13/18 1345 106/82 - (!) 101 24 -   06/13/18 1330 105/79 - (!) 103 22 -   06/13/18 1315 113/79 - (!) 104 26 -   06/13/18 1300 113/80 - (!) 106 27 -   06/13/18 1245 112/79 - (!) 106 18 -   06/13/18 1238 110/79 - (!) 105 21 -   06/13/18 1230 (!) 88/54 - (!) 145 21 -   06/13/18 1215 121/90 - (!) 146 22 -   06/13/18 1202 112/83 - (!) 147 28 -   06/13/18 1200 - - (!) 144 23 -   06/13/18 1159 - - (!) 148 - -   06/13/18 1145 (!) 116/103 - (!) 139 20 -   06/13/18 1130 122/86 - (!) 135 26 -   06/13/18 1115 119/82 - (!) 133 23 -   06/13/18 1100 109/74 - (!) 129 23 -   06/13/18 1045 (!) 138/109 - (!) 141 26 -   06/13/18 1039 (!) 126/95 - (!) 152 - -   06/13/18 1030 (!) 126/95 - (!) 147 22 -   06/13/18 1028 - - (!) 144 23 -   06/13/18 1027 (!) 125/94 - - - -   06/13/18 1000 (!) 135/95 - (!) 144 25 - 06/13/18 0945 128/84 - (!) 138 21 -   06/13/18 0930 (!) 118/96 - (!) 136 22 -   06/13/18 0915 113/79 - (!) 132 29 -   06/13/18 0912 106/69 - (!) 131 21 (!) 62 %   06/13/18 0911 94/47 - (!) 132 26 (!) 70 %   06/13/18 0903 (!) 121/96 - (!) 181 - -   06/13/18 0900 (!) 121/96 - (!) 160 21 -   06/13/18 0845 113/85 - (!) 160 23 -   06/13/18 0830 101/79 96.9 °F (36.1 °C) (!) 160 23 -     Physical Exam   HENT:   Head: Normocephalic. Eyes: Pupils are equal, round, and reactive to light. Neck: Normal range of motion. Cardiovascular: Regular rhythm. Tachycardia present. Pulmonary/Chest: Effort normal and breath sounds normal. No respiratory distress. She has no wheezes. She has no rales. Abdominal: Soft. Bowel sounds are normal.   Neurological:   drowsy   Skin: She is not diaphoretic. Labs:    Recent Results (from the past 24 hour(s))   EKG, 12 LEAD, INITIAL    Collection Time: 06/13/18  8:42 AM   Result Value Ref Range    Ventricular Rate 154 BPM    Atrial Rate 178 BPM    QRS Duration 74 ms    Q-T Interval 274 ms    QTC Calculation (Bezet) 438 ms    Calculated R Axis 65 degrees    Calculated T Axis 74 degrees    Diagnosis       Atrial fibrillation with rapid ventricular response  Abnormal ECG  When compared with ECG of 23-DEC-2010 12:45,  Atrial fibrillation has replaced Sinus rhythm  Vent.  rate has increased BY  76 BPM  Confirmed by Wicho Dawson [id number] [id number]) on 6/13/2018 9:38:40 AM     URINALYSIS W/ RFLX MICROSCOPIC    Collection Time: 06/13/18  8:45 AM   Result Value Ref Range    Color DARK YELLOW      Appearance TURBID      Specific gravity 1.023 1.005 - 1.030      pH (UA) 5.0 5.0 - 8.0      Protein 100 (A) NEG mg/dL    Glucose NEGATIVE  NEG mg/dL    Ketone NEGATIVE  NEG mg/dL    Bilirubin SMALL (A) NEG      Blood LARGE (A) NEG      Urobilinogen 0.2 0.2 - 1.0 EU/dL    Nitrites NEGATIVE  NEG      Leukocyte Esterase TRACE (A) NEG     DRUG SCREEN, URINE    Collection Time: 06/13/18  8:45 AM   Result Value Ref Range    BENZODIAZEPINES NEGATIVE  NEG      BARBITURATES NEGATIVE  NEG      THC (TH-CANNABINOL) POSITIVE (A) NEG      OPIATES NEGATIVE  NEG      PCP(PHENCYCLIDINE) NEGATIVE  NEG      COCAINE NEGATIVE  NEG      AMPHETAMINES NEGATIVE  NEG      METHADONE NEGATIVE  NEG      HDSCOM (NOTE)    URINE MICROSCOPIC ONLY    Collection Time: 06/13/18  8:45 AM   Result Value Ref Range    WBC 11 to 20 0 - 4 /hpf    RBC 2 to 4 0 - 5 /hpf    Epithelial cells 2+ 0 - 5 /lpf    Bacteria 4+ (A) NEG /hpf    Granular cast 10 to 15 NEG /lpf   CBC WITH AUTOMATED DIFF    Collection Time: 06/13/18  9:52 AM   Result Value Ref Range    WBC 9.8 4.6 - 13.2 K/uL    RBC 6.02 (H) 4.20 - 5.30 M/uL    HGB 16.4 (H) 12.0 - 16.0 g/dL    HCT 47.1 (H) 35.0 - 45.0 %    MCV 78.2 74.0 - 97.0 FL    MCH 27.2 24.0 - 34.0 PG    MCHC 34.8 31.0 - 37.0 g/dL    RDW 12.9 11.6 - 14.5 %    PLATELET 709 290 - 588 K/uL    MPV 12.6 (H) 9.2 - 11.8 FL    NEUTROPHILS 76 (H) 40 - 73 %    LYMPHOCYTES 11 (L) 21 - 52 %    MONOCYTES 12 (H) 3 - 10 %    EOSINOPHILS 0 0 - 5 %    BASOPHILS 1 0 - 2 %    ABS. NEUTROPHILS 7.4 1.8 - 8.0 K/UL    ABS. LYMPHOCYTES 1.1 0.9 - 3.6 K/UL    ABS. MONOCYTES 1.2 0.05 - 1.2 K/UL    ABS. EOSINOPHILS 0.0 0.0 - 0.4 K/UL    ABS. BASOPHILS 0.1 (H) 0.0 - 0.06 K/UL    RBC COMMENTS NORMOCYTIC, NORMOCHROMIC      DF AUTOMATED     METABOLIC PANEL, COMPREHENSIVE    Collection Time: 06/13/18  9:52 AM   Result Value Ref Range    Sodium 138 136 - 145 mmol/L    Potassium 3.7 3.5 - 5.5 mmol/L    Chloride 100 100 - 108 mmol/L    CO2 25 21 - 32 mmol/L    Anion gap 13 3.0 - 18 mmol/L    Glucose 158 (H) 74 - 99 mg/dL     (H) 7.0 - 18 MG/DL    Creatinine 2.42 (H) 0.6 - 1.3 MG/DL    BUN/Creatinine ratio 53 (H) 12 - 20      GFR est AA 25 (L) >60 ml/min/1.73m2    GFR est non-AA 20 (L) >60 ml/min/1.73m2    Calcium 9.7 8.5 - 10.1 MG/DL    Bilirubin, total 0.4 0.2 - 1.0 MG/DL    ALT (SGPT) 78 (H) 13 - 56 U/L    AST (SGOT) 282 (H) 15 - 37 U/L    Alk.  phosphatase 46 45 - 117 U/L    Protein, total 9.3 (H) 6.4 - 8.2 g/dL    Albumin 2.6 (L) 3.4 - 5.0 g/dL    Globulin 6.7 (H) 2.0 - 4.0 g/dL    A-G Ratio 0.4 (L) 0.8 - 1.7     MAGNESIUM    Collection Time: 06/13/18  9:52 AM   Result Value Ref Range    Magnesium 3.4 (H) 1.6 - 2.6 mg/dL   CARDIAC PANEL,(CK, CKMB & TROPONIN)    Collection Time: 06/13/18  9:52 AM   Result Value Ref Range    CK 6954 (H) 26 - 192 U/L    CK - MB 28.4 (H) <3.6 ng/ml    CK-MB Index 0.4 0.0 - 4.0 %    Troponin-I, Qt. 0.11 (H) 0.0 - 0.045 NG/ML   PROTHROMBIN TIME + INR    Collection Time: 06/13/18  9:52 AM   Result Value Ref Range    Prothrombin time 13.6 11.5 - 15.2 sec    INR 1.1 0.8 - 1.2     ACETAMINOPHEN    Collection Time: 06/13/18  9:52 AM   Result Value Ref Range    Acetaminophen level <2 (L) 10.0 - 72.3 ug/mL   SALICYLATE    Collection Time: 06/13/18  9:52 AM   Result Value Ref Range    Salicylate level 3.5 2.8 - 20.0 MG/DL   TSH 3RD GENERATION    Collection Time: 06/13/18  9:52 AM   Result Value Ref Range    TSH 0.99 0.36 - 3.74 uIU/mL   NT-PRO BNP    Collection Time: 06/13/18  9:52 AM   Result Value Ref Range    NT pro-BNP 6339 (H) 0 - 900 PG/ML   POC LACTIC ACID    Collection Time: 06/13/18  9:53 AM   Result Value Ref Range    Lactic Acid (POC) 2.7 (HH) 0.4 - 2.0 mmol/L   EKG, 12 LEAD, INITIAL    Collection Time: 06/13/18 12:50 PM   Result Value Ref Range    Ventricular Rate 105 BPM    Atrial Rate 105 BPM    P-R Interval 132 ms    QRS Duration 78 ms    Q-T Interval 316 ms    QTC Calculation (Bezet) 417 ms    Calculated P Axis 63 degrees    Calculated R Axis 73 degrees    Calculated T Axis 58 degrees    Diagnosis       Sinus tachycardia  Otherwise normal ECG  When compared with ECG of 13-JUN-2018 08:42,  Sinus rhythm has replaced Atrial fibrillation     POC LACTIC ACID    Collection Time: 06/13/18  2:14 PM   Result Value Ref Range    Lactic Acid (POC) 1.8 0.4 - 2.0 mmol/L       Assessment:  Principal Problem:    Rhabdomyolysis (6/13/2018)    Active Problems:    Bipolar 1 disorder (HCC) ()      Overview: seeing Psychiatrist      Depression ()      DM2 (diabetes mellitus, type 2) ([facility] [address] (9/6/2013)      Atrial fibrillation with RVR ([email or web address] (6/13/2018)        Plan:    Rhabdo  -IVF  -2/2 trauma/fall    PAM  -2/2 rhabdo  -IVF    S/p Fall  -unclear of syncope?   -ECHO  -CT head negative  -UDS only positive for THC    A-fib with RVR  -resolved after cardizem and dig  -ekg ok  -no cp  -BNP 6K  -serial enzymes  -tele  -ECHO    DM II  -corrective insulin  -hold oaa    Depression  -hold meds    DVT prophyalxis  -scd/teds  -heparn sq    Signed:  Abdullahi Ramirez NP 6/13/2018

## 2018-06-13 NOTE — IP AVS SNAPSHOT
[address] [address] 
[phone] Patient: Ricard Favre MRN: [id number] TDS:2/58/7917 About your hospitalization You were admitted on:  June 13, 2018 You last received care in the:  [address] NEURO [facility] You were discharged on:  June 27, 2018 Why you were hospitalized Your primary diagnosis was:  Rhabdomyolysis Your diagnoses also included:  Atrial Fibrillation With Rvr ([facility]), Bipolar 1 Disorder ([facility]), Depression, Dm2 (Diabetes Mellitus, Type 2) ([facility]), Acute Kidney Injury ([facility]), Acute Metabolic Encephalopathy, Abnormal Urinalysis, Hyperammonemia ([facility]), Elevated Transaminase Level Follow-up Information Follow up With Details Comments Contact Info Mik Carbajal MD Schedule an appointment as soon as possible for a visit in 1 week follow up appointment [facility] [id number] [phone] Discharge Orders None A check bridgett indicates which time of day the medication should be taken. My Medications START taking these medications Instructions Each Dose to Equal  
 Morning Noon Evening Bedtime  
 aspirin 81 mg chewable tablet Your last dose was: Your next dose is: Take 1 Tab by mouth daily. 81 mg  
    
   
   
   
  
 colchicine 0.6 mg tablet Your last dose was: Your next dose is: Take 1 Tab by mouth two (2) times a day. 0.6 mg  
    
   
   
   
  
  
CONTINUE taking these medications Instructions Each Dose to Equal  
 Morning Noon Evening Bedtime Blood-Glucose Meter monitoring kit Your last dose was: Your next dose is:    
   
   
 Use as directed. cetirizine 10 mg tablet Commonly known as:  ZYRTEC Your last dose was: Your next dose is: Take 1 Tab by mouth daily. 10 mg  
    
   
   
   
  
 chlorthalidone 25 mg tablet Commonly known as:  Nayla Connors Your last dose was: Your next dose is: Take  by mouth daily. cloNIDine HCl 0.2 mg tablet Commonly known as:  CATAPRES Your last dose was: Your next dose is: Take  by mouth two (2) times a day. conjugated estrogens 0.625 mg/gram vaginal cream  
Commonly known as:  PREMARIN Your last dose was: Your next dose is: Insert 0.5 g into vagina daily. 0.5 g  
    
   
   
   
  
 cyclobenzaprine 10 mg tablet Commonly known as:  FLEXERIL Your last dose was: Your next dose is: Take  by mouth three (3) times daily as needed for Muscle Spasm(s). folic acid 1 mg tablet Commonly known as:  Google Your last dose was: Your next dose is: Take  by mouth daily. glucose blood VI test strips strip Commonly known as:  blood glucose test  
   
Your last dose was: Your next dose is:    
   
   
 Use daily or as directed for blood sugar monitoring. .00 HYDROcodone-acetaminophen 5-325 mg per tablet Commonly known as:  Shannon Palacio Your last dose was: Your next dose is: Take 1 Tab by mouth every four (4) hours as needed for Pain. Max Daily Amount: 6 Tabs. 1 Tab  
    
   
   
   
  
 hydrocortisone valerate 0.2 % ointment Commonly known as:  WEST-FELISHA Your last dose was: Your next dose is:    
   
   
 Apply  to affected area two (2) times a day. use thin layer  
     
   
   
   
  
 ipratropium-albuterol  mcg/actuation inhaler Commonly known as:  Chidi Edward Your last dose was: Your next dose is: Take 1 Puff by inhalation every six (6) hours as needed for Wheezing. 1 Puff Lancets Misc Your last dose was: Your next dose is:    
   
   
 Use daily or as directed for blood sugar monitoring, dx 250.00  
     
   
   
   
  
 losartan-hydroCHLOROthiazide 50-12.5 mg per tablet Commonly known as:  HYZAAR Your last dose was: Your next dose is: Take 1 tablet by mouth daily. 1 Tab  
    
   
   
   
  
 meloxicam 15 mg tablet Commonly known as:  MOBIC Your last dose was: Your next dose is: Take 1 Tab by mouth daily. 15 mg  
    
   
   
   
  
 metFORMIN 500 mg tablet Commonly known as:  GLUCOPHAGE Your last dose was: Your next dose is: Take  by mouth two (2) times daily (with meals). naproxen 375 mg tablet Commonly known as:  NAPROSYN Your last dose was: Your next dose is: Take 1 Tab by mouth two (2) times daily (with meals). 375 mg Omeprazole delayed release 20 mg tablet Commonly known as:  PRILOSEC D/R Your last dose was: Your next dose is: Take 1 tablet by mouth daily. 20 mg  
    
   
   
   
  
 predniSONE 20 mg tablet Commonly known as:  Dank Clark Your last dose was: Your next dose is: Take 2 tabs in AM with food for 7 days then 1 tab until gone [address] Your last dose was: Your next dose is:    
   
   
 Use daily with showering. Dx: djd of knee  
     
   
   
   
  
 spironolactone 25 mg tablet Commonly known as:  ALDACTONE Your last dose was: Your next dose is: Take  by mouth daily. traMADol 50 mg tablet Commonly known as:  ULTRAM  
   
Your last dose was: Your next dose is: Take 2 Tabs by mouth two (2) times daily as needed for Pain. 100 mg Where to Get Your Medications Information on where to get these meds will be given to you by the nurse or doctor. ! Ask your nurse or doctor about these medications  
  aspirin 81 mg chewable tablet  
 colchicine 0.6 mg tablet Opioid Education Prescription Opioids: What You Need to Know: 
 
 
What to do at [facility]: 
* Recommended activity: [facility] * If you experience any of the following symptoms as listed in your teachings, please inform your nurse and/ or doctor. *  Please give a list of your current medications to your Primary Care Provider. *  Please update this list whenever your medications are discontinued, doses are 
    changed, or new medications (including over-the-counter products) are added. *  Please carry medication information at all times in case of emergency situations. These are general instructions for a healthy lifestyle: No smoking/ No tobacco products/ Avoid exposure to second hand smoke Surgeon General's Warning:  Quitting smoking now greatly reduces serious risk to your health. Obesity, smoking, and sedentary lifestyle greatly increases your risk for illness A healthy diet, regular physical exercise & weight monitoring are important for maintaining a healthy lifestyle You may be retaining fluid if you have a history of heart failure or if you experience any of the following symptoms:  Weight gain of 3 pounds or more overnight or 5 pounds in a week, increased swelling in our hands or feet or shortness of breath while lying flat in bed. Please call your doctor as soon as you notice any of these symptoms; do not wait until your next office visit. Recognize signs and symptoms of STROKE: 
 
F-face looks uneven A-arms unable to move or move unevenly S-speech slurred or non-existent T-time-call 911 as soon as signs and symptoms begin-DO NOT go Back to bed or wait to see if you get better-TIME IS BRAIN. Warning Signs of HEART ATTACK Call 911 if you have these symptoms: 
? Chest discomfort. Most heart attacks involve discomfort in the center of the chest that lasts more than a few minutes, or that goes away and comes back. It can feel like uncomfortable pressure, squeezing, fullness, or pain. ? Discomfort in other areas of the upper body. Symptoms can include pain or discomfort in one or both arms, the back, neck, jaw, or stomach. ? Shortness of breath with or without chest discomfort. ? Other signs may include breaking out in a cold sweat, nausea, or lightheadedness. Don't wait more than five minutes to call [address]! Fast action can save your life. Calling 911 is almost always the fastest way to get lifesaving treatment. Emergency Medical Services staff can begin treatment when they arrive  up to an hour sooner than if someone gets to the hospital by car. Patient discharged without removing armband and transfered to another healthcare acute, sub acute , or extended care facility. Informed of privacy risks if armband lost or stolen The discharge information has been reviewed with the patient. The patient verbalized understanding. Discharge medications reviewed with the patient and appropriate educational materials and side effects teaching were provided. ___________________________________________________________________________________________________________________________________ MyChart Announcement We are excited to announce that we are making your provider's discharge notes available to you in [employer]hart.   You will see these notes when they are completed and signed by the physician that discharged you from your recent hospital stay. If you have any questions or concerns about any information you see in Conductor, please call the Health Information Department where you were seen or reach out to your Primary Care Provider for more information about your plan of care. Introducing [facility]! [employer] introduces Conductor patient portal. Now you can access parts of your medical record, email your doctor's office, and request medication refills online. 1. In your internet browser, go to [email or web address] 2. Click on the First Time User? Click Here link in the Sign In box. You will see the New Member Sign Up page. 3. Enter your Conductor Access Code exactly as it appears below. You will not need to use this code after youve completed the sign-up process. If you do not sign up before the expiration date, you must request a new code. · Conductor Access Code: [email or web address] Expires: 9/25/2018 11:27 AM 
 
4. Enter the last four digits of your Social Security Number (xxxx) and Date of Birth (mm/dd/yyyy) as indicated and click Submit. You will be taken to the next sign-up page. 5. Create a Conductor ID. This will be your Conductor login ID and cannot be changed, so think of one that is secure and easy to remember. 6. Create a Conductor password. You can change your password at any time. 7. Enter your Password Reset Question and Answer. This can be used at a later time if you forget your password. 8. Enter your e-mail address. You will receive e-mail notification when new information is available in [address] 9. Click Sign Up. You can now view and download portions of your medical record. 10. Click the Download Summary menu link to download a portable copy of your medical information. If you have questions, please visit the Frequently Asked Questions section of the Conductor website. Remember, Conductor is NOT to be used for urgent needs. For medical emergencies, dial 911. Now available from your iPhone and Android! Introducing Tomas Resendez As a [employer] patient, I wanted to make you aware of our electronic visit tool called Tomas Resendez. [email or web address] allows you to connect within minutes with a medical provider 24 hours a day, seven days a week via a mobile device or tablet or logging into a secure website from your computer. You can access Tomas Resendez from anywhere in the [place]. A virtual visit might be right for you when you have a simple condition and feel like you just dont want to get out of bed, or cant get away from work for an appointment, when your regular [facility] provider is not available (evenings, weekends or holidays), or when youre out of town and need minor care. Electronic visits cost only $49 and if the [email or web address] provider determines a prescription is needed to treat your condition, one can be electronically transmitted to a nearby pharmacy*. Please take a moment to enroll today if you have not already done so. The enrollment process is free and takes just a few minutes. To enroll, please download the [email or web address] elva to your tablet or phone, or visit [email or web address] to enroll on your computer. And, as an [address] [place] [address] patient with a [employer] account, the results of your visits will be scanned into your electronic medical record and your primary care provider will be able to view the scanned results. We urge you to continue to see your regular [facility] provider for your ongoing medical care. And while your primary care provider may not be the one available when you seek a Tomas Resendez virtual visit, the peace of mind you get from getting a real diagnosis real time can be priceless. For more information on Tomas Resendez, view our Frequently Asked Questions (FAQs) at [email or web address]. Sincerely, 
 
Meenakshi Johnson MD 
Chief Medical Officer [address] *:  certain medications cannot be prescribed via [facility] Unresulted tests-please follow up with your PCP on these results Procedure/Test Authorizing Provider  2D ECHO LIMTED ADULT W OR WO CONTR Lani Dill, NP  
 ACETAMINOPHEN Kanwal Sheffield MD  
 AMMONIA Rubye Parcel, NP  
 AMMONIA Nurudeen Vega Winchester, DO  
 CARDIAC PANEL,(CK, CKMB & TROPONIN) Rocio Justo, NP  
 CBC W/O DIFF Lani Dill, NP  
 CBC W/O DIFF Levorn Whittemore, NP  
 CBC WITH AUTOMATED DIFF Rocio Pinson, NP  
 CBC WITH AUTOMATED DIFF Nurudeen S Lawani, DO  
 CBC WITH AUTOMATED DIFF Nurudeen S Lawani, DO  
 CBC WITH AUTOMATED DIFF Nurudeen S Kimber Parisian, DO  
 CK Gerard Portland, DO  
 CK Levorn Whittemore, NP  
 CK Nurudeen Vega Winchester, DO  
 CK Nurudeen S Lawani, DO  
 CK Nurudeen S Lawani, DO  
 CRP, HIGH SENSITIVITY Lani Dill, NP  
 CT HEAD WO CONT Rocio Justo, NP  
 CT SPINE CERV WO CONT Rocio Justo, NP  
 CULTURE, URINE Kanwal Sheffield MD  
 DRUG SCREEN, URINE Rocio Pinson, NP  
 EKG, 12 LEAD, INITIAL Rocio Kemp, NP  
 EKG, 12 LEAD, INITIAL Kanwal Sheffield MD  
 HEMOGLOBIN A1C WITH EAG Gerard Portland, DO  
 LIPID PANEL Lani Dill, NP  
 MAGNESIUM Rocio Pinson, NP  
 METABOLIC PANEL, COMPREHENSIVE Gerard Portland, DO  
 METABOLIC PANEL, COMPREHENSIVE Rocio Pinson, NP  
 METABOLIC PANEL, COMPREHENSIVE Lani Dill, NP  
 METABOLIC PANEL, COMPREHENSIVE Lani Dill, NP  
 METABOLIC PANEL, COMPREHENSIVE Jugtown Buggy, DO  
 METABOLIC PANEL, COMPREHENSIVE Nurudeen Vega Winchester, DO  
 METABOLIC PANEL, COMPREHENSIVE Nurudeen S Kimber Parisian, DO  
 MRI BRAIN W WO CONT Gerard Portland, DO  
 NT-PRO BNP Rocio Justo, NP  
 OCCULT BLOOD, STOOL Lani Dill, NP  
 OCCULT BLOOD, STOOL Lani Dill, NP  
 OCCULT BLOOD, STOOL Elisha Schilder, NP  
 PHOSPHORUS Gordy Moan, NP  
 PHOSPHORUS [address] DO  
 PROTHROMBIN TIME + INR Chato ChiuJAX  
 SALICYLATE Maya Hassan MD  
 SED RATE (ESR) Elisha Schilder, JAX  
 T3, FREE Elisha Schilder, NP  
 T4, FREE Elisha Schilder, NP  
 TSH 3RD GENERATION Elisha Schilder, NP  
 TSH 3RD Lashawn Shahid MD  
 URINALYSIS W/ RFLX MICROSCOPIC Chato Chiu, NP  
 URINE MICROSCOPIC ONLY Chato Chiu, NP  
 VITAMIN B12 & FOLATE Sarah Beth Duel, NP  
 XR CHEST PA LAT Jen Bolanos, DO  
 XR CHEST PORT Maya Hassan MD  
  
Providers Seen During Your Hospitalization Provider Specialty Primary office phone Mayra Archer MD Emergency Medicine [phone] Maya Hassan MD Emergency Medicine [phone] Abhi Burnette MD Internal Medicine [phone] Jen Bolanos, DO Internal Medicine [phone] [address] DO Internal Medicine [phone] Nicolette Rand MD Family Practice [phone] Peyton Zazueta MD Internal Medicine [phone] Your Primary Care Physician (PCP) Primary Care Physician Office Phone Office Fax Jordan Dooley [phone] You are allergic to the following Allergen Reactions Ace Inhibitors Cough Penicillins Hives Recent Documentation Height Weight Breastfeeding? BMI OB Status Smoking Status 1.702 m 90.3 kg No 31.17 kg/m2 Postmenopausal Current Every Day Smoker Emergency Contacts Name Discharge Info Relation Home Work Mobile [address] [3] Daughter [21] [phone] Patient Belongings  The following personal items are in your possession at time of discharge: 
  Dental Appliances: None  Visual Aid: None      Home Medications: None Jewelry: Body Piercing (nose ring)  Clothing: None (pt reports coming with no belongings)    Other Valuables: None Discharge Instructions Attachments/References ATRIAL FIBRILLATION: GENERAL INFO (ENGLISH) ASPIRIN (BY MOUTH) (ENGLISH) COLCHICINE (BY MOUTH) (ENGLISH) Patient Handouts Learning About Atrial Fibrillation What is atrial fibrillation? Atrial fibrillation (say \"AY-tree-luther yji-xblo-THU-shun\") is the most common type of irregular heartbeat (arrhythmia). Normally, the heart beats in a strong, steady rhythm. In atrial fibrillation, a problem with the heart's electrical system causes the two upper parts of the heart (the atria) to quiver, or fibrillate. Your heart rate also may be faster than normal. 
Atrial fibrillation can be dangerous because if the heartbeat isn't strong and steady, blood can collect, or pool, in the atria. And pooled blood is more likely to form clots. Clots can travel to the brain, block blood flow, and cause a stroke. Atrial fibrillation can also lead to heart failure. Treatment for atrial fibrillation helps prevent stroke and heart failure. It also helps relieve symptoms. Atrial fibrillation is often caused by another heart problem. It may happen after heart surgery. It may also be caused by other problems, such as an overactive thyroid gland or lung disease. Many people with atrial fibrillation are able to live full and active lives. What are the symptoms? Some people feel symptoms when they have episodes of atrial fibrillation. But other people don't notice any symptoms. If you have symptoms, you may feel: · A fluttering, racing, or pounding feeling in your chest called palpitations. · Weak or tired. · Dizzy or lightheaded. · Short of breath. · Chest pain. · Confused. You may notice signs of atrial fibrillation when you check your pulse. Your pulse may seem uneven or fast. 
What can you expect when you have atrial fibrillation? At first, spells of atrial fibrillation may come on suddenly and last a short time. It may go away on its own or it goes away after treatment. This is called paroxysmal atrial fibrillation. Over time, the spells may last longer and occur more often. They often don't go away on their own. How is it treated? Treatments can help you feel better and prevent future problems, especially stroke and heart failure. The main types of treatment slow the heart rate, control the heart rhythm, and help prevent stroke. Your treatment will depend on the cause of your atrial fibrillation, your symptoms, and your risk for stroke. · Heart rate treatment. Medicine may be used to slow your heart rate. Your heartbeat may still be irregular. But these medicines keep your heart from beating too fast. They may also help relieve your symptoms. · Heart rhythm treatment. Different treatments may be used to try to stop atrial fibrillation and keep it from returning. They can also relieve symptoms. These treatments include medicine, electrical cardioversion to shock the heart back to a normal rhythm, a procedure called catheter ablation, and heart surgery. · Stroke prevention. You and your doctor can decide how to lower your risk. You may decide to take a blood-thinning medicine, such as aspirin or an anticoagulant. How can you live well with it? You can live well and help manage atrial fibrillation by having a heart-healthy lifestyle. To have a heart-healthy lifestyle: · Don't smoke. · Eat heart-healthy foods. · Be active. Talk to your doctor about what type and level of exercise is safe for you. · Stay at a healthy weight. Lose weight if you need to. · Manage stress. · Avoid alcohol if it triggers symptoms. · Manage other health problems such as high blood pressure, high cholesterol, and diabetes. · Avoid getting sick from the flu. Get a flu shot every year. Where can you learn more? Go to http://aleah-rm.info/. Enter 141-322-8968 in the search box to learn more about \"Learning About Atrial Fibrillation. \" Current as of: September 21, 2016 Content Version: 11.4 © 8066-7304 TableNOW. Care instructions adapted under license by [employer] (which disclaims liability or warranty for this information). If you have questions about a medical condition or this instruction, always ask your healthcare professional. [address] any warranty or liability for your use of this information. Aspirin (By mouth) Aspirin (AS-pir-in) Treats pain, fever, and inflammation. May lower risk of heart attack and stroke. Brand Name(s): Ascriptin Regular Strength, Aspergum, Aspir Low, Aspirin Adult Low Dose, Aspirin Low Dose, Barbra Aspirin Children's, Barbra Aspirin Regimen, Barbra Extra Strength, Barbra Genuine Aspirin, Barbra Low Dose, Bufferin, Bufferin Low Dose, Durlaza, Ecotrin, Ecpirin There may be other brand names for this medicine. When This Medicine Should Not Be Used: This medicine is not right for everyone. Do not use it if you had an allergic reaction to aspirin or other NSAIDs, or if you have a history of asthma with nasal polyps and rhinitis. How to Use This Medicine:  
Delayed Release Capsule, Long Acting Capsule, Gum, Tablet, Chewable Tablet, Fizzy Tablet, Coated Tablet, Long Acting Tablet, 24 Hour Capsule · Your doctor will tell you how much medicine to use. Do not use more than directed. · It is best to take this medicine with food or milk. · Capsule, tablet, or coated tablet: Swallow whole. Do not crush, break, or chew it. · Chewable tablet: You may chew it completely or swallow it whole. · Gum: Chew completely to make sure you get as much medicine as possible. Drink a full glass (8 ounces) of water after chewing the gum. · Swallow the extended-release capsule whole.  Do not crush, break, or chew it. Take the capsule with a full glass of water at the same time each day. · Follow the instructions on the medicine label if you are using this medicine without a prescription. · Missed dose: If you miss a dose of Durlaza, skip the missed dose and go back to your regular dosing schedule. Do not take extra medicine to make up for a missed dose. · Store the medicine in a closed container at room temperature, away from heat, moisture, and direct light. Drugs and Foods to Avoid: Ask your doctor or pharmacist before using any other medicine, including over-the-counter medicines, vitamins, and herbal products. · Some foods and medicines can affect how aspirin works. Tell your doctor if you are using any of the following: ¨ Dipyridamole, methotrexate, probenecid, sulfinpyrazone, ticlopidine ¨ Blood thinner (including clopidogrel, prasugrel, ticagrelor, warfarin) ¨ Blood pressure medicine ¨ Medicine to treat seizures (including phenytoin, valproic acid) ¨ NSAID pain or arthritis medicine (including celecoxib, diclofenac, ibuprofen, naproxen) ¨ Steroid medicine (including dexamethasone, hydrocortisone, methylprednisolone, prednisolone, prednisone) · Do not take Durlaza 2 hours before or 1 hour after you drink alcohol or take medicines that contain alcohol. Warnings While Using This Medicine: · Tell your doctor if you are pregnant or breastfeeding. Do not use this medicine during the later part of a pregnancy unless your doctor tells you to. · Tell your doctor if you have kidney disease, liver disease, high blood pressure, heart disease, or a history of stomach bleeding or ulcers. · This medicine may increase your risk for bleeding, including stomach ulcers. · Do not give aspirin to a child or teenager who has chickenpox or flu symptoms, unless the doctor says it is okay. Aspirin can cause a life-threatening reaction called Reye syndrome. · Tell any doctor or dentist who treats you that you are using this medicine. This medicine may affect certain medical test results. · Keep all medicine out of the reach of children. Never share your medicine with anyone. Possible Side Effects While Using This Medicine:  
Call your doctor right away if you notice any of these side effects: · Allergic reaction: Itching or hives, swelling in your face or hands, swelling or tingling in your mouth or throat, chest tightness, trouble breathing · Bloody or black stools, bloody vomit or vomit that looks like coffee grounds · Chest tightness, wheezing · Ringing in the ears · Severe stomach pain · Unusual bleeding, bruising, or weakness If you notice other side effects that you think are caused by this medicine, tell your doctor. Call your doctor for medical advice about side effects. You may report side effects to FDA at 9-200-FDA-3572 © 2017 [address]  Information is for End User's use only and may not be sold, redistributed or otherwise used for commercial purposes. The above information is an  only. It is not intended as medical advice for individual conditions or treatments. Talk to your doctor, nurse or pharmacist before following any medical regimen to see if it is safe and effective for you. Colchicine (By mouth) Colchicine ([id number]) Treats and prevents gout attacks. Also treats familial Mediterranean fever (FMF). Brand Name(s): Mklucia Juarezanna marie There may be other brand names for this medicine. When This Medicine Should Not Be Used: This medicine is generally considered safe for most people. Talk to your doctor if you have concerns. How to Use This Medicine:  
Capsule, Tablet · Take your medicine as directed. Your dose may need to be changed several times to find what works best for you. · Keep using this medicine for the full treatment time, even if you feel better after the first few doses. · This medicine should come with a Medication Guide. Ask your pharmacist for a copy if you do not have one. · Missed dose: Take a dose as soon as you remember. If it is almost time for your next dose, wait until then and take a regular dose. Do not take extra medicine to make up for a missed dose. · Store the medicine in a closed container at room temperature, away from heat, moisture, and direct light. Drugs and Foods to Avoid: Ask your doctor or pharmacist before using any other medicine, including over-the-counter medicines, vitamins, and herbal products. · Some medicines and foods can affect how colchicine works. Tell your doctor if you are using any of the following: 
¨ Aprepitant, cyclosporine, digoxin, diltiazem, nefazodone, ranolazine, verapamil ¨ Medicine to treat HIV or AIDS ¨ Medicine to treat an infection (such as clarithromycin, erythromycin, itraconazole, ketoconazole) ¨ Medicine to lower cholesterol (such as atorvastatin, bezafibrate, fenofibrate, fenofibric acid, fluvastatin, gemfibrozil, lovastatin, pravastatin, simvastatin) · Do not eat grapefruit or drink grapefruit juice while you are using this medicine. Warnings While Using This Medicine: · Tell your doctor if you are pregnant or breastfeeding, or if you have kidney disease, liver disease, anemia, bleeding problems, or muscle problems. · This medicine may cause muscle problems. · This medicine may make you bleed, bruise, or get infections more easily. Take precautions to prevent illness and injury. Wash your hands often. · Your doctor will do lab tests at regular visits to check on the effects of this medicine. Keep all appointments. · Keep all medicine out of the reach of children. Never share your medicine with anyone. Possible Side Effects While Using This Medicine:  
Call your doctor right away if you notice any of these side effects: · Allergic reaction: Itching or hives, swelling in your face or hands, swelling or tingling in your mouth or throat, chest tightness, trouble breathing · Bloody or black tarry stools, red or dark brown urine · Fever, chills, cough, sore throat, body aches · Muscle pain, tenderness, or weakness · Numbness or tingling in your hands or feet · Pale or gray lips, tongue, or palms · Severe diarrhea or vomiting · Unusual bleeding, bruising, or weakness If you notice these less serious side effects, talk with your doctor: · Mild diarrhea or vomiting, nausea, stomach pain or cramps If you notice other side effects that you think are caused by this medicine, tell your doctor. Call your doctor for medical advice about side effects. You may report side effects to FDA at 5-991-HCQ-8594 © 2017 [facility] Information is for End User's use only and may not be sold, redistributed or otherwise used for commercial purposes. The above information is an  only. It is not intended as medical advice for individual conditions or treatments. Talk to your doctor, nurse or pharmacist before following any medical regimen to see if it is safe and effective for you. Please provide this summary of care documentation to your next provider. Signatures-by signing, you are acknowledging that this After Visit Summary has been reviewed with you and you have received a copy. Patient Signature:  ____________________________________________________________ Date:  ____________________________________________________________  
  
Mckenzie Valdivia Provider Signature:  ____________________________________________________________ Date:  ____________________________________________________________

## 2018-06-14 ENCOUNTER — APPOINTMENT (OUTPATIENT)
Dept: MRI IMAGING | Age: 58
DRG: 682 | End: 2018-06-14
Attending: HOSPITALIST
Payer: MEDICARE

## 2018-06-14 LAB
ALBUMIN SERPL-MCNC: 2.3 G/DL (ref 3.4–5)
ALBUMIN/GLOB SERPL: 0.5 {RATIO} (ref 0.8–1.7)
ALP SERPL-CCNC: 39 U/L (ref 45–117)
ALT SERPL-CCNC: 66 U/L (ref 13–56)
ANION GAP SERPL CALC-SCNC: 12 MMOL/L (ref 3–18)
AST SERPL-CCNC: 237 U/L (ref 15–37)
ATRIAL RATE: 105 BPM
BACTERIA SPEC CULT: NORMAL
BILIRUB SERPL-MCNC: 0.3 MG/DL (ref 0.2–1)
BUN SERPL-MCNC: 114 MG/DL (ref 7–18)
BUN/CREAT SERPL: 62 (ref 12–20)
CALCIUM SERPL-MCNC: 8.1 MG/DL (ref 8.5–10.1)
CALCULATED P AXIS, ECG09: 63 DEGREES
CALCULATED R AXIS, ECG10: 73 DEGREES
CALCULATED T AXIS, ECG11: 58 DEGREES
CHLORIDE SERPL-SCNC: 111 MMOL/L (ref 100–108)
CHOLEST SERPL-MCNC: 137 MG/DL
CK SERPL-CCNC: 5696 U/L (ref 26–192)
CO2 SERPL-SCNC: 22 MMOL/L (ref 21–32)
CREAT SERPL-MCNC: 1.84 MG/DL (ref 0.6–1.3)
DIAGNOSIS, 93000: NORMAL
ERYTHROCYTE [DISTWIDTH] IN BLOOD BY AUTOMATED COUNT: 13.1 % (ref 11.6–14.5)
ERYTHROCYTE [SEDIMENTATION RATE] IN BLOOD: 54 MM/HR (ref 0–30)
EST. AVERAGE GLUCOSE BLD GHB EST-MCNC: 151 MG/DL
GLOBULIN SER CALC-MCNC: 4.7 G/DL (ref 2–4)
GLUCOSE BLD STRIP.AUTO-MCNC: 104 MG/DL (ref 70–110)
GLUCOSE BLD STRIP.AUTO-MCNC: 109 MG/DL (ref 70–110)
GLUCOSE BLD STRIP.AUTO-MCNC: 135 MG/DL (ref 70–110)
GLUCOSE BLD STRIP.AUTO-MCNC: 95 MG/DL (ref 70–110)
GLUCOSE SERPL-MCNC: 106 MG/DL (ref 74–99)
HBA1C MFR BLD: 6.9 % (ref 4.2–5.6)
HCT VFR BLD AUTO: 43 % (ref 35–45)
HDLC SERPL-MCNC: 12 MG/DL (ref 40–60)
HDLC SERPL: 11.4 {RATIO} (ref 0–5)
HGB BLD-MCNC: 14.3 G/DL (ref 12–16)
LDLC SERPL CALC-MCNC: 59.8 MG/DL (ref 0–100)
LIPID PROFILE,FLP: ABNORMAL
MCH RBC QN AUTO: 27.3 PG (ref 24–34)
MCHC RBC AUTO-ENTMCNC: 33.3 G/DL (ref 31–37)
MCV RBC AUTO: 82.1 FL (ref 74–97)
P-R INTERVAL, ECG05: 132 MS
PLATELET # BLD AUTO: 189 K/UL (ref 135–420)
PMV BLD AUTO: 13.3 FL (ref 9.2–11.8)
POTASSIUM SERPL-SCNC: 4.2 MMOL/L (ref 3.5–5.5)
PROT SERPL-MCNC: 7 G/DL (ref 6.4–8.2)
Q-T INTERVAL, ECG07: 316 MS
QRS DURATION, ECG06: 78 MS
QTC CALCULATION (BEZET), ECG08: 417 MS
RBC # BLD AUTO: 5.24 M/UL (ref 4.2–5.3)
SERVICE CMNT-IMP: NORMAL
SODIUM SERPL-SCNC: 145 MMOL/L (ref 136–145)
T3FREE SERPL-MCNC: 1.9 PG/ML (ref 2.18–3.98)
T4 FREE SERPL-MCNC: 1.2 NG/DL (ref 0.7–1.5)
TRIGL SERPL-MCNC: 326 MG/DL (ref ?–150)
TSH SERPL DL<=0.05 MIU/L-ACNC: 0.89 UIU/ML (ref 0.36–3.74)
VENTRICULAR RATE, ECG03: 105 BPM
VLDLC SERPL CALC-MCNC: 65.2 MG/DL
WBC # BLD AUTO: 12.1 K/UL (ref 4.6–13.2)

## 2018-06-14 PROCEDURE — 93306 TTE W/DOPPLER COMPLETE: CPT

## 2018-06-14 PROCEDURE — 74011000258 HC RX REV CODE- 258: Performed by: NURSE PRACTITIONER

## 2018-06-14 PROCEDURE — 80053 COMPREHEN METABOLIC PANEL: CPT | Performed by: HOSPITALIST

## 2018-06-14 PROCEDURE — 84439 ASSAY OF FREE THYROXINE: CPT | Performed by: NURSE PRACTITIONER

## 2018-06-14 PROCEDURE — 77030033269 HC SLV COMPR SCD KNE2 CARD -B

## 2018-06-14 PROCEDURE — 85652 RBC SED RATE AUTOMATED: CPT | Performed by: NURSE PRACTITIONER

## 2018-06-14 PROCEDURE — 74011250636 HC RX REV CODE- 250/636: Performed by: HOSPITALIST

## 2018-06-14 PROCEDURE — 74011250637 HC RX REV CODE- 250/637: Performed by: NURSE PRACTITIONER

## 2018-06-14 PROCEDURE — 80061 LIPID PANEL: CPT | Performed by: NURSE PRACTITIONER

## 2018-06-14 PROCEDURE — 74011000250 HC RX REV CODE- 250: Performed by: NURSE PRACTITIONER

## 2018-06-14 PROCEDURE — 36415 COLL VENOUS BLD VENIPUNCTURE: CPT | Performed by: NURSE PRACTITIONER

## 2018-06-14 PROCEDURE — 70553 MRI BRAIN STEM W/O & W/DYE: CPT

## 2018-06-14 PROCEDURE — A9575 INJ GADOTERATE MEGLUMI 0.1ML: HCPCS | Performed by: HOSPITALIST

## 2018-06-14 PROCEDURE — 82962 GLUCOSE BLOOD TEST: CPT

## 2018-06-14 PROCEDURE — 65270000029 HC RM PRIVATE

## 2018-06-14 PROCEDURE — 84481 FREE ASSAY (FT-3): CPT | Performed by: NURSE PRACTITIONER

## 2018-06-14 PROCEDURE — 77030021352 HC CBL LD SYS DISP COVD -B

## 2018-06-14 PROCEDURE — 85027 COMPLETE CBC AUTOMATED: CPT | Performed by: NURSE PRACTITIONER

## 2018-06-14 PROCEDURE — 84443 ASSAY THYROID STIM HORMONE: CPT | Performed by: NURSE PRACTITIONER

## 2018-06-14 PROCEDURE — 77010033678 HC OXYGEN DAILY

## 2018-06-14 PROCEDURE — 86141 C-REACTIVE PROTEIN HS: CPT | Performed by: NURSE PRACTITIONER

## 2018-06-14 PROCEDURE — 82550 ASSAY OF CK (CPK): CPT | Performed by: HOSPITALIST

## 2018-06-14 PROCEDURE — 74011250636 HC RX REV CODE- 250/636: Performed by: NURSE PRACTITIONER

## 2018-06-14 PROCEDURE — 92610 EVALUATE SWALLOWING FUNCTION: CPT

## 2018-06-14 PROCEDURE — 83036 HEMOGLOBIN GLYCOSYLATED A1C: CPT | Performed by: HOSPITALIST

## 2018-06-14 RX ORDER — LEVOFLOXACIN 5 MG/ML
750 INJECTION, SOLUTION INTRAVENOUS
Status: DISCONTINUED | OUTPATIENT
Start: 2018-06-14 | End: 2018-06-16

## 2018-06-14 RX ORDER — ATORVASTATIN CALCIUM 40 MG/1
80 TABLET, FILM COATED ORAL
Status: DISCONTINUED | OUTPATIENT
Start: 2018-06-14 | End: 2018-06-15

## 2018-06-14 RX ORDER — ALBUTEROL SULFATE 0.83 MG/ML
2.5 SOLUTION RESPIRATORY (INHALATION)
Status: DISCONTINUED | OUTPATIENT
Start: 2018-06-14 | End: 2018-06-27 | Stop reason: HOSPADM

## 2018-06-14 RX ORDER — AMOXICILLIN 250 MG
2 CAPSULE ORAL
Status: DISCONTINUED | OUTPATIENT
Start: 2018-06-14 | End: 2018-06-27 | Stop reason: HOSPADM

## 2018-06-14 RX ORDER — ACETAMINOPHEN 650 MG/1
650 SUPPOSITORY RECTAL
Status: DISCONTINUED | OUTPATIENT
Start: 2018-06-14 | End: 2018-06-27 | Stop reason: HOSPADM

## 2018-06-14 RX ORDER — ASPIRIN 325 MG
325 TABLET ORAL DAILY
Status: DISCONTINUED | OUTPATIENT
Start: 2018-06-14 | End: 2018-06-15

## 2018-06-14 RX ORDER — ONDANSETRON 2 MG/ML
2 INJECTION INTRAMUSCULAR; INTRAVENOUS
Status: DISCONTINUED | OUTPATIENT
Start: 2018-06-14 | End: 2018-06-27 | Stop reason: HOSPADM

## 2018-06-14 RX ORDER — LEVOFLOXACIN 5 MG/ML
750 INJECTION, SOLUTION INTRAVENOUS EVERY 24 HOURS
Status: DISCONTINUED | OUTPATIENT
Start: 2018-06-14 | End: 2018-06-14 | Stop reason: DRUGHIGH

## 2018-06-14 RX ORDER — GADOTERATE MEGLUMINE 376.9 MG/ML
15 INJECTION INTRAVENOUS
Status: COMPLETED | OUTPATIENT
Start: 2018-06-14 | End: 2018-06-14

## 2018-06-14 RX ORDER — ACETAMINOPHEN 325 MG/1
650 TABLET ORAL
Status: DISCONTINUED | OUTPATIENT
Start: 2018-06-14 | End: 2018-06-27 | Stop reason: HOSPADM

## 2018-06-14 RX ADMIN — ACETAMINOPHEN 650 MG: 325 TABLET ORAL at 00:28

## 2018-06-14 RX ADMIN — DOCUSATE SODIUM AND SENNOSIDES 2 TABLET: 8.6; 5 TABLET, FILM COATED ORAL at 23:51

## 2018-06-14 RX ADMIN — Medication 10 ML: at 06:00

## 2018-06-14 RX ADMIN — HEPARIN SODIUM 5000 UNITS: 5000 INJECTION, SOLUTION INTRAVENOUS; SUBCUTANEOUS at 02:16

## 2018-06-14 RX ADMIN — SODIUM CHLORIDE 125 ML/HR: 900 INJECTION, SOLUTION INTRAVENOUS at 03:39

## 2018-06-14 RX ADMIN — GADOTERATE MEGLUMINE 15 ML: 376.9 INJECTION INTRAVENOUS at 14:19

## 2018-06-14 RX ADMIN — HEPARIN SODIUM 5000 UNITS: 5000 INJECTION, SOLUTION INTRAVENOUS; SUBCUTANEOUS at 11:03

## 2018-06-14 RX ADMIN — HEPARIN SODIUM 5000 UNITS: 5000 INJECTION, SOLUTION INTRAVENOUS; SUBCUTANEOUS at 18:24

## 2018-06-14 RX ADMIN — ACETAMINOPHEN 650 MG: 325 TABLET ORAL at 11:03

## 2018-06-14 RX ADMIN — SODIUM CHLORIDE 125 ML/HR: 900 INJECTION, SOLUTION INTRAVENOUS at 11:07

## 2018-06-14 RX ADMIN — LEVOFLOXACIN 750 MG: 5 INJECTION, SOLUTION INTRAVENOUS at 18:23

## 2018-06-14 RX ADMIN — FOLIC ACID: 5 INJECTION, SOLUTION INTRAMUSCULAR; INTRAVENOUS; SUBCUTANEOUS at 18:22

## 2018-06-14 NOTE — PROGRESS NOTES
1220- Spoke with [facility] (Dtr) consented for MRI  of brain. MRI screening completed via telephone. Meaghan Najera RN Nurse Manager also spoke with daughter via telephone verifying MRI  checklist is completed and consent for MRI.

## 2018-06-14 NOTE — PROGRESS NOTES
Reason for Admission:   afib                  RRAT Score:     16             Do you (patient/family) have any concerns for transition/discharge? Plan for utilizing home health:     Yes, if refuses snf    Likelihood of readmission? Mod yellow            Transition of Care Plan:      Spoke with pt , difficult to understand so she allowed me to call her dtr. Lives alone, was independent with adls and amb. dtr states was totally self sufficient. pcp dr Walter Cr, pt states last seen 6/4. Has used hh in past. Designates her dtr for dcp. Demographics correct. Pt not open to snf/rehab at present, str will speak with her. snf list left in room for dtr and pt to discuss. plan snf, hh if pt wont agree. Patient has designated __________daughter______________ to participate in his/her discharge plan and to receive any needed information.      Name: Aida Ortez   Address:  Phone number:[phone]

## 2018-06-14 NOTE — PROGRESS NOTES
Received bedside shift report from SHANNAN Maurer (offgoing RN) and assumed care. Patient currently off unit. Unable to do dual NIH at this time. 1700 -- Patient returned to unit. Dual NIH assessment and skin assessment done with SHANNAN Maurer.

## 2018-06-14 NOTE — PROGRESS NOTES
conducted an initial consultation and Spiritual Assessment for Rafiq Bonilla, who is a 59 y.o.,female. Patients Primary Language is: [place]. According to the patients EMR Caodaism Affiliation is: Christian Luis.     The reason the Patient came to the hospital is:   Patient Active Problem List    Diagnosis Date Noted    Atrial fibrillation with RVR ([facility] 06/13/2018    Rhabdomyolysis 06/13/2018    Psoriasis     Eustachian tube dysfunction     Needle phobia     [facility] 09/06/2013    GERD (gastroesophageal reflux disease)     Bipolar 1 disorder (HCC)     Depression     Alcohol dependence in remission ([facility]     DJD (degenerative joint disease) of knee     Hypertension     Arthritis     Hepatitis C         The  provided the following Interventions:  Initiated a relationship of care and support. Explored issues of bucky, belief, spirituality and Presybeterian/ritual needs while hospitalized. Listened empathically as she tried to talk bits and pieces of words. Patient had very dry lips and asked for water and lotion. Nurses notified. Provided information about Spiritual Care Services. Offered prayer and assurance of continued prayers on patient's behalf. Chart reviewed. The following outcomes where achieved:  Patient shared limited information about both their medical narrative and spiritual journey/beliefs.  confirmed Patient's Caodaism Affiliation. Patient processed feeling about current hospitalization. Patient expressed gratitude for 's visit. Assessment:  Patient does not have any Presybeterian/cultural needs that will affect patients preferences in health care. There are no spiritual or Presybeterian issues which require intervention at this time. Plan:  Chaplains will continue to follow and will provide pastoral care on an as needed/requested basis.    recommends bedside caregivers page  on duty if patient shows signs of acute spiritual or emotional distress.      Intern [address]   [phone]

## 2018-06-14 NOTE — PROGRESS NOTES
1 - MRI Screening dual verified with patients daughter via phone. Daughter verified and will allow patient to have MRI. Pt is currently presenting with CVA like symptomology impairing her ability to verbally communicate.

## 2018-06-14 NOTE — ROUTINE PROCESS
Bedside and Verbal shift change report given to KATYA Chamberlain (oncoming nurse) by Hilda Monzon (offgoing nurse). Report included the following information SBAR, Kardex, Intake/Output, MAR, Recent Results and Cardiac Rhythm ST.     1030 Pt resting in bed. Complaints of pain have been addressed. Will continue to monitor. 1220 Per MRI pt is to be go for testing at [id number]. Will continue to monitor. 1239 Report given to Erasto RN. Report consists of situation, background, assessment, and recommendation. Will continue to monitor. 1331 Pt transported to MRI. 1410 Per MRI staff pt can be transported to 2 at completion of testing.

## 2018-06-14 NOTE — PROGRESS NOTES
Problem: Dysphagia (Adult)  Goal: *Acute Goals and Plan of Care (Insert Text)  Recommendations:  Diet: Puree/NTL  Meds: Crushed in puree  Aspiration Precautions  Oral Care TID    Goals:  Patient will:  1. Tolerate PO trials with 0 s/s overt distress in 4/5 trials  2. Utilize compensatory swallow strategies/maneuvers (decrease bite/sip, size/rate, alt. liq/sol) with min cues in 4/5 trials  3. Perform oral-motor/laryngeal exercises to increase oropharyngeal swallow function with min cues  4. Complete an objective swallow study (i.e., MBSS) to assess swallow integrity, r/o aspiration, and determine of safest LRD, min A      Outcome: Progressing Towards Goal  Speech LAnguage Pathology bedside swallow evaluation    Patient: Marilynn Phoenix (1 y.o. female)  Date: 6/14/2018  Primary Diagnosis: Atrial fibrillation with RVR (HCC)  Rhabdomyolysis        Precautions: Aspiration       PLOF: Independent    ASSESSMENT :  Based on the objective data described below, the patient presents with moderate oropharyngeal dysphagia. Pt A&Ox4. Expressive and receptive language intact. Pt with dysarthric speech; would benefit from motor speech evaluation. Oral-motor exam revealed pt with poor dentition; however, all other structures grossly intact for mastication and deglutition. Pt accepted self-fed thin liquid +/- straw with serial swallows; demo immediate cough and throat clear. Pt impulsive, max verbal cues to reduce rate; ineffective. NTL and puree trials; WFL. Solid cracker trial, pt with incoordinated and incomplete mastication, cracker removed by SLP. Recommend puree/NTL diet with aspiration precautions and supervision with all PO. D/w RN, Erasto. ST will follow as indicated. Patient will benefit from skilled intervention to address the above impairments.   Patients rehabilitation potential is considered to be Fair  Factors which may influence rehabilitation potential include:   []            None noted  []            Mental ability/status  []            Medical condition  []            Home/family situation and support systems  [x]            Safety awareness  []            Pain tolerance/management  []            Other:      PLAN :  Recommendations and Planned Interventions:  Puree/NTL  Frequency/Duration: Patient will be followed by speech-language pathology 1-2 times per day/4-7 days per week to address goals. Discharge Recommendations: To Be Determined     SUBJECTIVE:   Patient stated No.     OBJECTIVE:     Past Medical History:   Diagnosis Date    [facility] dependence in [facility] ([facility]     alcohol withdrawal discharged 2013    Arthritis     knees and back    Bipolar 1 disorder ([facility])     seeing Psychiatrist    Chronic obstructive pulmonary disease ([facility]     Depression     [facility]     DJD (degenerative joint disease) of knee     DM2 (diabetes mellitus, type 2) ([facility])     borderline    Eustachian tube dysfunction     Dr. Ashleigh Gardner GERD (gastroesophageal reflux disease)     Hepatitis C      prior IV drug abuse - Genotype 1a    Hypertension     Needle phobia     from prior IV drug abuse    Psoriasis      Past Surgical History:   Procedure Laterality Date    HX  SECTION      x 3    HX COLONOSCOPY      Dr. Suzanne Gay - due for repeat     HX CYST REMOVAL      Hydrocystoma R eye removed    HX GYN      partical hysterectomy - fibroid tumors    HX HYSTERECTOMY      PARTIAL     Prior Level of Function/Home Situation: Independent  Home Situation  Home Environment: Apartment  One/Two Story Residence: Two story  Living Alone: Yes  Support Systems: Family member(s)  Patient Expects to be Discharged to[de-identified] Apartment  Current DME Used/Available at Home: None  Diet prior to admission: Regular/thin  Current Diet:  Puree/NTL   Cognitive and Communication Status:  Neurologic State: Alert  Orientation Level: Oriented X4  Cognition: Impulsive  Perception: Appears intact  Perseveration: No perseveration noted  Safety/Judgement: Decreased awareness of need for safety  Oral Assessment:  Oral Assessment  Labial: Impaired coordination  Dentition: Natural  Oral Hygiene: Fair  Lingual: Incoordinated  Velum: No impairment  Mandible: No impairment  P.O. Trials:  Patient Position: HOB 50  Vocal quality prior to P.O.: Low volume  Consistency Presented: Thin liquid; Solid;Puree;Nectar thick liquid  How Presented: Self-fed/presented;Straw;SLP-fed/presented; Successive swallows     Bolus Acceptance: No impairment  Bolus Formation/Control: Impaired  Type of Impairment: Incomplete;Mastication  Propulsion: Discoordination  Oral Residue: Greater than 50% of bolus; Lingual  Initiation of Swallow: No impairment  Laryngeal Elevation: Functional  Aspiration Signs/Symptoms: Delayed cough/throat clear;Change vocal quality  Pharyngeal Phase Characteristics: Altered vocal quality; Suspected pharyngeal residue  Effective Modifications: Alternate liquids/solids;Small sips and bites  Cues for Modifications: Moderate       Oral Phase Severity: Moderate  Pharyngeal Phase Severity : Moderate    GCODESwallowing:  Swallow Current Status CL= 60-79%   Swallow Goal Status CH= 0%    The severity rating is based on the following outcomes:  RIVER Noms Swallow Level 4    Clinical Judgement    PAIN:  Start of Eval: 0  End of Eval: 0     After treatment:   []            Patient left in no apparent distress sitting up in chair  [x]            Patient left in no apparent distress in bed  [x]            Call bell left within reach  [x]            Nursing notified  []            Family present  []            Caregiver present  []            Bed alarm activated    COMMUNICATION/EDUCATION:   [x]            Safe swallowing guidelines; compensatory techniques. [x]            Patient/family have participated as able in goal setting and plan of care. [x]            Patient/family agree to work toward stated goals and plan of care.   []            Patient understands intent and goals of therapy; neutral about participation. []            Patient unable to participate in goal setting/plan of care; educ ongoing with interdisciplinary staff  []         Posted safety precautions in patient's room.     Thank you for this referral.  Adelfo Rivera, SLP  Time Calculation: 15 mins

## 2018-06-14 NOTE — PROGRESS NOTES
Problem: Falls - Risk of  Goal: *Absence of Falls  Document Radha Fall Risk and appropriate interventions in the flowsheet. Outcome: Progressing Towards Goal  Fall Risk Interventions:  Mobility Interventions: Bed/chair exit alarm              Elimination Interventions: Bed/chair exit alarm, Call light in reach    History of Falls Interventions: Bed/chair exit alarm, Door open when patient unattended        Problem: Pressure Injury - Risk of  Goal: *Prevention of pressure injury  Document Ryan Scale and appropriate interventions in the flowsheet. Outcome: Progressing Towards Goal  Pressure Injury Interventions:             Activity Interventions: Pressure redistribution bed/mattress(bed type), PT/OT evaluation    Mobility Interventions: HOB 30 degrees or less, Pressure redistribution bed/mattress (bed type)    Nutrition Interventions: Document food/fluid/supplement intake, Offer support with meals,snacks and hydration

## 2018-06-14 NOTE — CONSULTS
Patient off the floor, unable to do Neuro eval, but I note that her brain MRI does not show a stroke, she appears to have a UTI, elevated liver enzymes (suspect for Hepatitis C), elevated Hemoglobin A1c, elevated CPK, and atrial fibrillation on admission. Will check in on her tomorrow.   adr

## 2018-06-14 NOTE — PROGRESS NOTES
Bedside shift change report given to Carlos Martinez RN (oncoming nurse) by Tila Kay RN (offgoing nurse). Report included the following information SBAR, Kardex, Intake/Output, MAR, Accordion and Cardiac Rhythm Sinus tach. Pt AxOx4. On 2l/min of O2. Lungs are diminished. Weston in place and draining. Fluids running. No c/o pain. NAD. Call bell within reach. Will continue to monitor. 0028- pt complaining of generalized weakness/pain. Prn tylenol given. 0216- due med given. Pt asleep in bed.    0339- new bag of NS hung    Bedside shift change report given to Mary Coronel RN (oncoming nurse) by Carlos Martinez RN (offgoing nurse). Report included the following information SBAR, Kardex, Intake/Output, MAR, Accordion, Recent Results and Cardiac Rhythm Sinus tach.

## 2018-06-14 NOTE — CDMP QUERY
Please clarify if this patient is being treated/managed for:    =>PAM  POA   =>Other Explanation of clinical findings  =>Unable to Determine (no explanation of clinical findings)    The medical record reflects the following:    Risk: 60 yo female w/PMH DM2, COPD, HTN, Hep C, Bipolar 1, admitted with rhabdomyolysis    Clinical Indicators: Labs on admission bun  128  creatinine 2.42  GFR  25                                                 6/14    bun  114  creatinine 1.84  GFR  34    Treatment: IV fluids, serial labs    Please clarify and document your clinical opinion in the progress notes and discharge summary including the definitive and/or presumptive diagnosis, (suspected or probable), related to the above clinical findings. Please include clinical findings supporting your diagnosis. If you DECLINE this query or would like to communicate with [facility], please utilize the \"[employer] message box\" at the TOP of the Progress Note on the right.       Thank you,  Rayvon Duane RN [facility] [phone]

## 2018-06-14 NOTE — PROGRESS NOTES
Medical Progress Note      NAME: Nam Crews   :  1960  MRM:  [id number]    Date/Time: 2018  10:44 AM         Subjective:     Freddie Lorenzo is a 59 y.o. female with a PMHX HTN, Arthritis, DM type 2, Hepatitis C, GERD, COPD, Bipolar 1 Disorder, Depression, Alcohol Dependence in Remission, DJD, Eustachian Tube Dysfunction, and Psoriasis who presents to the ED for AMS noting patient was found by daughter on the floor this morning with confusion. Daughter notes last time she saw her was 6 days ago. Patient awake however not oriented to day or time and notes generalized myalgia and does not remember how she ended up on the floor. No cp, sob, fever, chills, diarrhea. UPon arrival patient was found to be in a-fib with rvr and was subseqeuntly placed on cardizem gtt and given Dig with conversion. Patient was also found to have Rhabdo along with PAM. Patient will be admitted for further evaluation. Past Medical History reviewed and unchanged from Admission History and Physical    Review of Systems   Unable to perform ROS: Acuity of condition            Objective:     18  -She is much more awake today vs yesterday however noting slurred speech for which she is adamant she did not come in with also noting BLE weakness. Very slight Left upper ext drift. CVA? Noted she came in with A-fib RVR however that has resovled and has remained SR. Will get STAT MRI head and consult neurology. CT head yesterday was negative. ECHO ordered pending. Attempted to called daughter to find out baseline however no answer. Neurochecks and transfer to stroke floor. Neuro orderset has been ordered. In regards to Rhabdo her CPK is coming down and Renal markers improved. Vitals:      Last 24hrs VS reviewed since prior progress note.  Most recent are:    Visit Vitals    /82 (BP 1 Location: Right arm)    Pulse 79    Temp 97.5 °F (36.4 °C)    Resp 20    Wt 83.3 kg (183 lb 9.6 oz)    SpO2 98%    Breastfeeding No    BMI 29.63 kg/m2     SpO2 Readings from Last 6 Encounters:   06/14/18 98%   02/15/17 95%   07/07/16 98%   05/09/16 99%   12/11/15 99%   12/22/14 94%    O2 Flow Rate (L/min): 2 l/min     Intake/Output Summary (Last 24 hours) at 06/14/18 1044  Last data filed at 06/14/18 0659   Gross per 24 hour   Intake             1860 ml   Output              400 ml   Net             1460 ml          Exam:      Physical Exam   Constitutional: No distress. Eyes: Pupils are equal, round, and reactive to light. Neck: Normal range of motion. Cardiovascular: Normal rate. Pulmonary/Chest: Effort normal and breath sounds normal. No respiratory distress. She has no wheezes. She has no rales. She exhibits no tenderness. Musculoskeletal:   BLE weakness    Slight left drift noted   Neurological: She is alert. Slurred speech, slight    Skin: She is not diaphoretic.          Medications:  Current Facility-Administered Medications   Medication Dose Route Frequency    0.9% sodium chloride infusion  150 mL/hr IntraVENous CONTINUOUS    sodium chloride (NS) flush 5-10 mL  5-10 mL IntraVENous Q8H    sodium chloride (NS) flush 5-10 mL  5-10 mL IntraVENous PRN    0.9% sodium chloride infusion  125 mL/hr IntraVENous CONTINUOUS    acetaminophen (TYLENOL) tablet 650 mg  650 mg Oral Q6H PRN    heparin (porcine) injection 5,000 Units  5,000 Units SubCUTAneous Q8H    insulin lispro (HUMALOG) injection   SubCUTAneous AC&HS    glucose chewable tablet 16 g  4 Tab Oral PRN    glucagon (GLUCAGEN) injection 1 mg  1 mg IntraMUSCular PRN    dextrose (D50W) injection syrg 12.5-25 g  25-50 mL IntraVENous PRN    ondansetron (ZOFRAN) injection 4 mg  4 mg IntraVENous Q4H PRN       ______________________________________________________________________      Lab Review:     Recent Labs      06/14/18   0518  06/13/18   0952   WBC  12.1  9.8   HGB  14.3  16.4*   HCT  43.0  47.1*   PLT  189  197     Recent Labs      06/14/18   0805  06/13/18   5006 NA  145  138   K  4.2  3.7   CL  111*  100   CO2  22  25   GLU  106*  158*   BUN  114*  128*   CREA  1.84*  2.42*   CA  8.1*  9.7   MG   --   3.4*   ALB  2.3*  2.6*   SGOT  237*  282*   ALT  66*  78*   INR   --   1.1     No components found for: GLPOC  No results for input(s): PH, PCO2, PO2, HCO3, FIO2 in the last 72 hours. Recent Labs      06/13/18   0952   INR  1.1                Assessment:     Principal Problem:    Rhabdomyolysis (6/13/2018)    Active Problems:    Bipolar 1 disorder (HCC) ()      Overview: seeing Psychiatrist      Depression ()      DM2 (diabetes mellitus, type 2) ([facility] (9/6/2013)      Atrial fibrillation with RVR ([facility] (6/13/2018)           Plan:     Rhabdo  -IVF  -2/2 trauma/fall     PAM  -2/2 rhabdo  -IVF     S/p Fall  -unclear of syncope?   -ECHO  -CT head negative  -UDS only positive for THC     A-fib with RVR  -resolved after cardizem and dig  -ekg ok  -no cp  -BNP 6K  -serial enzymes  -tele  -ECHO     DM II  -corrective insulin  -hold oaa    Elevated tropinon   -0.11 likely 2/2 demand ishemia noting a-fib rvr in setting PAM  -monitor  -no CP     Depression  -hold meds     DVT prophyalxis  -scd/teds  -heparn sq           ___________________________________________________    Attending Physician: Justin Garcia NP

## 2018-06-14 NOTE — PROGRESS NOTES
@2025 Received Patient from MRI department. Lateral transfer from . Oriented patient to room and call bell. Vitals to be taken. @0465 Speech therapy to bedside to eval patient as ordered. @8376 Patient off unit for ECHO.     @5313 Report given to Ricky Goodwin RN. Patient currently off unit for testing. Patient returned to unit at 1700. Dual NIH completed at that time.

## 2018-06-15 ENCOUNTER — APPOINTMENT (OUTPATIENT)
Dept: GENERAL RADIOLOGY | Age: 58
DRG: 682 | End: 2018-06-15
Attending: HOSPITALIST
Payer: MEDICARE

## 2018-06-15 LAB
ALBUMIN SERPL-MCNC: 2 G/DL (ref 3.4–5)
ALBUMIN/GLOB SERPL: 0.4 {RATIO} (ref 0.8–1.7)
ALP SERPL-CCNC: 31 U/L (ref 45–117)
ALT SERPL-CCNC: 60 U/L (ref 13–56)
AMMONIA PLAS-SCNC: 53 UMOL/L (ref 11–32)
ANION GAP SERPL CALC-SCNC: 7 MMOL/L (ref 3–18)
AST SERPL-CCNC: 184 U/L (ref 15–37)
BILIRUB SERPL-MCNC: 0.2 MG/DL (ref 0.2–1)
BUN SERPL-MCNC: 76 MG/DL (ref 7–18)
BUN/CREAT SERPL: 52 (ref 12–20)
CALCIUM SERPL-MCNC: 8.5 MG/DL (ref 8.5–10.1)
CHLORIDE SERPL-SCNC: 113 MMOL/L (ref 100–108)
CK SERPL-CCNC: 3069 U/L (ref 26–192)
CO2 SERPL-SCNC: 25 MMOL/L (ref 21–32)
CREAT SERPL-MCNC: 1.46 MG/DL (ref 0.6–1.3)
CRP SERPL HS-MCNC: 132.07 MG/L (ref 0–3)
ERYTHROCYTE [DISTWIDTH] IN BLOOD BY AUTOMATED COUNT: 13.3 % (ref 11.6–14.5)
FOLATE SERPL-MCNC: 15.2 NG/ML (ref 3.1–17.5)
GLOBULIN SER CALC-MCNC: 4.7 G/DL (ref 2–4)
GLUCOSE BLD STRIP.AUTO-MCNC: 120 MG/DL (ref 70–110)
GLUCOSE BLD STRIP.AUTO-MCNC: 125 MG/DL (ref 70–110)
GLUCOSE BLD STRIP.AUTO-MCNC: 131 MG/DL (ref 70–110)
GLUCOSE BLD STRIP.AUTO-MCNC: 174 MG/DL (ref 70–110)
GLUCOSE BLD STRIP.AUTO-MCNC: 175 MG/DL (ref 70–110)
GLUCOSE SERPL-MCNC: 132 MG/DL (ref 74–99)
HCT VFR BLD AUTO: 38.1 % (ref 35–45)
HGB BLD-MCNC: 12.4 G/DL (ref 12–16)
MCH RBC QN AUTO: 26.9 PG (ref 24–34)
MCHC RBC AUTO-ENTMCNC: 32.5 G/DL (ref 31–37)
MCV RBC AUTO: 82.6 FL (ref 74–97)
PLATELET # BLD AUTO: 181 K/UL (ref 135–420)
PMV BLD AUTO: 11.8 FL (ref 9.2–11.8)
POTASSIUM SERPL-SCNC: 3.7 MMOL/L (ref 3.5–5.5)
PROT SERPL-MCNC: 6.7 G/DL (ref 6.4–8.2)
RBC # BLD AUTO: 4.61 M/UL (ref 4.2–5.3)
SODIUM SERPL-SCNC: 145 MMOL/L (ref 136–145)
VIT B12 SERPL-MCNC: 1878 PG/ML (ref 211–911)
WBC # BLD AUTO: 6.4 K/UL (ref 4.6–13.2)

## 2018-06-15 PROCEDURE — 77030038269 HC DRN EXT URIN PURWCK BARD -A

## 2018-06-15 PROCEDURE — 97167 OT EVAL HIGH COMPLEX 60 MIN: CPT

## 2018-06-15 PROCEDURE — 82550 ASSAY OF CK (CPK): CPT | Performed by: HOSPITALIST

## 2018-06-15 PROCEDURE — 85027 COMPLETE CBC AUTOMATED: CPT | Performed by: NURSE PRACTITIONER

## 2018-06-15 PROCEDURE — 65270000029 HC RM PRIVATE

## 2018-06-15 PROCEDURE — 74011250636 HC RX REV CODE- 250/636: Performed by: NURSE PRACTITIONER

## 2018-06-15 PROCEDURE — 80053 COMPREHEN METABOLIC PANEL: CPT | Performed by: NURSE PRACTITIONER

## 2018-06-15 PROCEDURE — 36415 COLL VENOUS BLD VENIPUNCTURE: CPT | Performed by: NURSE PRACTITIONER

## 2018-06-15 PROCEDURE — 97530 THERAPEUTIC ACTIVITIES: CPT

## 2018-06-15 PROCEDURE — 82962 GLUCOSE BLOOD TEST: CPT

## 2018-06-15 PROCEDURE — 82140 ASSAY OF AMMONIA: CPT | Performed by: NURSE PRACTITIONER

## 2018-06-15 PROCEDURE — 74011636637 HC RX REV CODE- 636/637: Performed by: NURSE PRACTITIONER

## 2018-06-15 PROCEDURE — 71046 X-RAY EXAM CHEST 2 VIEWS: CPT

## 2018-06-15 PROCEDURE — 97162 PT EVAL MOD COMPLEX 30 MIN: CPT

## 2018-06-15 PROCEDURE — 74011250637 HC RX REV CODE- 250/637: Performed by: PSYCHIATRY & NEUROLOGY

## 2018-06-15 PROCEDURE — 82607 VITAMIN B-12: CPT | Performed by: NURSE PRACTITIONER

## 2018-06-15 PROCEDURE — 74011250637 HC RX REV CODE- 250/637: Performed by: NURSE PRACTITIONER

## 2018-06-15 PROCEDURE — 77010033678 HC OXYGEN DAILY

## 2018-06-15 PROCEDURE — 97110 THERAPEUTIC EXERCISES: CPT

## 2018-06-15 RX ORDER — ASPIRIN 325 MG/1
100 TABLET, FILM COATED ORAL DAILY
Status: DISCONTINUED | OUTPATIENT
Start: 2018-06-16 | End: 2018-06-27 | Stop reason: HOSPADM

## 2018-06-15 RX ORDER — LORAZEPAM 2 MG/ML
3 INJECTION INTRAMUSCULAR
Status: DISCONTINUED | OUTPATIENT
Start: 2018-06-15 | End: 2018-06-27 | Stop reason: HOSPADM

## 2018-06-15 RX ORDER — GUAIFENESIN 100 MG/5ML
81 LIQUID (ML) ORAL DAILY
Status: DISCONTINUED | OUTPATIENT
Start: 2018-06-16 | End: 2018-06-27 | Stop reason: HOSPADM

## 2018-06-15 RX ORDER — THERA TABS 400 MCG
1 TAB ORAL DAILY
Status: DISCONTINUED | OUTPATIENT
Start: 2018-06-16 | End: 2018-06-27 | Stop reason: HOSPADM

## 2018-06-15 RX ORDER — LORAZEPAM 1 MG/1
1 TABLET ORAL
Status: DISCONTINUED | OUTPATIENT
Start: 2018-06-15 | End: 2018-06-27 | Stop reason: HOSPADM

## 2018-06-15 RX ORDER — ATORVASTATIN CALCIUM 20 MG/1
20 TABLET, FILM COATED ORAL
Status: DISCONTINUED | OUTPATIENT
Start: 2018-06-15 | End: 2018-06-16

## 2018-06-15 RX ORDER — SODIUM CHLORIDE 0.9 % (FLUSH) 0.9 %
5-10 SYRINGE (ML) INJECTION AS NEEDED
Status: DISCONTINUED | OUTPATIENT
Start: 2018-06-15 | End: 2018-06-27 | Stop reason: HOSPADM

## 2018-06-15 RX ORDER — LORAZEPAM 2 MG/ML
2 INJECTION INTRAMUSCULAR
Status: DISCONTINUED | OUTPATIENT
Start: 2018-06-15 | End: 2018-06-27 | Stop reason: HOSPADM

## 2018-06-15 RX ORDER — LORAZEPAM 2 MG/ML
1 INJECTION INTRAMUSCULAR
Status: DISCONTINUED | OUTPATIENT
Start: 2018-06-15 | End: 2018-06-27 | Stop reason: HOSPADM

## 2018-06-15 RX ORDER — LORAZEPAM 1 MG/1
2 TABLET ORAL
Status: DISCONTINUED | OUTPATIENT
Start: 2018-06-15 | End: 2018-06-27 | Stop reason: HOSPADM

## 2018-06-15 RX ORDER — FOLIC ACID 1 MG/1
1 TABLET ORAL DAILY
Status: DISCONTINUED | OUTPATIENT
Start: 2018-06-16 | End: 2018-06-27 | Stop reason: HOSPADM

## 2018-06-15 RX ORDER — SODIUM CHLORIDE 0.9 % (FLUSH) 0.9 %
5-10 SYRINGE (ML) INJECTION EVERY 8 HOURS
Status: DISCONTINUED | OUTPATIENT
Start: 2018-06-15 | End: 2018-06-16

## 2018-06-15 RX ADMIN — SODIUM CHLORIDE 125 ML/HR: 900 INJECTION, SOLUTION INTRAVENOUS at 03:10

## 2018-06-15 RX ADMIN — INSULIN LISPRO 2 UNITS: 100 INJECTION, SOLUTION INTRAVENOUS; SUBCUTANEOUS at 18:21

## 2018-06-15 RX ADMIN — HEPARIN SODIUM 5000 UNITS: 5000 INJECTION, SOLUTION INTRAVENOUS; SUBCUTANEOUS at 03:07

## 2018-06-15 RX ADMIN — ASPIRIN 325 MG ORAL TABLET 325 MG: 325 PILL ORAL at 09:36

## 2018-06-15 RX ADMIN — HEPARIN SODIUM 5000 UNITS: 5000 INJECTION, SOLUTION INTRAVENOUS; SUBCUTANEOUS at 09:35

## 2018-06-15 RX ADMIN — HEPARIN SODIUM 5000 UNITS: 5000 INJECTION, SOLUTION INTRAVENOUS; SUBCUTANEOUS at 18:21

## 2018-06-15 RX ADMIN — LACTULOSE 10 G: 20 SOLUTION ORAL at 22:28

## 2018-06-15 RX ADMIN — DOCUSATE SODIUM AND SENNOSIDES 2 TABLET: 8.6; 5 TABLET, FILM COATED ORAL at 22:28

## 2018-06-15 RX ADMIN — ATORVASTATIN CALCIUM 20 MG: 20 TABLET, FILM COATED ORAL at 22:28

## 2018-06-15 RX ADMIN — Medication 10 ML: at 15:10

## 2018-06-15 RX ADMIN — INSULIN LISPRO 2 UNITS: 100 INJECTION, SOLUTION INTRAVENOUS; SUBCUTANEOUS at 09:36

## 2018-06-15 RX ADMIN — Medication 10 ML: at 22:28

## 2018-06-15 NOTE — ROUTINE PROCESS
Bedside and Verbal shift change report given to Kasia Chowdhury RN (oncoming nurse) by Ward Gutierrez RN (offgoing nurse). Dual NIH and skin assessment done with Kasia Chowdhury RN. Report included the following information SBAR, Kardex, Intake/Output, MAR and Recent Results.

## 2018-06-15 NOTE — ANCILLARY DISCHARGE INSTRUCTIONS
Patient and/or next of kin has been given the [facility] Important Message From Medicare About Your Rights\" letter and all questions were answered. Patient unable to sign. Copy left at bedside for daughter.

## 2018-06-15 NOTE — PROGRESS NOTES
Internal Medicine Progress Note    Patient's Name: Jacob Perales Date: 6/13/2018  Length of Stay: 2      Assessment/Plan     Active Hospital Problems    Diagnosis Date Noted    Atrial fibrillation with RVR ([facility] 06/13/2018    Rhabdomyolysis 06/13/2018    DM2 (diabetes mellitus, type 2) ([facility] 09/06/2013    Bipolar 1 disorder ([facility]      seeing Psychiatrist      Depression      - CPK trending down  - PAM resolving  - Cont IVFs  - Trend BMP  - Recheck CXR today, if still infiltrate will cont IVAB, if clear will d/c as likely pneumonitis  - MRI is negative for stroke  - Patient's speech is unclear, tremors are quite bad, possible EtOH w/d?  - Pureed diet  - Neuro consult pending  - CIWA protocol  - Thiamine, folate, MVI  - PT/OT  - t/c psych consult  - Cont acceptable home medications for chronic conditions   - DVT protocol    I have personally reviewed all pertinent labs and films that have officially resulted over the last 24 hours. I have personally checked for all pending labs that are awaiting final results.     Subjective     Pt s/e @ bedside  No major events overnight  Speech better than yest, but still slurring  Seen with PT, pt very weak  Remains in sinus  Denies CP or SOB    Objective     Visit Vitals    /76 (BP 1 Location: Left arm)    Pulse 83    Temp 97.5 °F (36.4 °C)    Resp 18    Ht 5' 6\" (1.676 m)    Wt 83.3 kg (183 lb 9.6 oz)    SpO2 94%    Breastfeeding No    BMI 29.63 kg/m2       Physical Exam:  General Appearance: NAD, slurred speech  Lungs: CTA with normal respiratory effort  CV: RRR, no m/r/g  Abdomen: soft, non-tender, normal bowel sounds  Extremities: no cyanosis, no peripheral edema  Neuro: No focal deficits, generalized weakness w/ B/L tremors    Lab/Data Reviewed:  BMP:   Lab Results   Component Value Date/Time     06/15/2018 07:20 AM    K 3.7 06/15/2018 07:20 AM     (H) 06/15/2018 07:20 AM    CO2 25 06/15/2018 07:20 AM    AGAP 7 06/15/2018 07:20 AM  (H) 06/15/2018 07:20 AM    BUN 76 (H) 06/15/2018 07:20 AM    CREA 1.46 (H) 06/15/2018 07:20 AM    GFRAA 44 (L) 06/15/2018 07:20 AM    GFRNA 37 (L) 06/15/2018 07:20 AM     CBC:   Lab Results   Component Value Date/Time    WBC 6.4 06/15/2018 07:20 AM    HGB 12.4 06/15/2018 07:20 AM    HCT 38.1 06/15/2018 07:20 AM     06/15/2018 07:20 AM       Imaging Reviewed:  Mri Brain W Wo Cont    Result Date: 6/14/2018  EXAM: MRI brain without and with contrast 6/14/2018 INDICATION: Altered mental status with confusion. COMPARISON: CT scan of the head from 6/13/2013. TECHNIQUE: Multiplanar multisequential images of the brain were obtained before and after the administration of 15 cc of IV Dotarem. _______________ FINDINGS: BRAIN AND POSTERIOR FOSSA: Mild atrophy and minimal chronic small vessel changes are present. There is no intracranial hemorrhage, mass effect, or midline shift. There are no significant additional areas of abnormal parenchymal signal. There are no areas of restricted diffusion to suggest acute infarct. There is no abnormal cerebral or cerebellar enhancement. EXTRA-AXIAL SPACES AND MENINGES: There are no abnormal extra-axial fluid collections. There is no abnormal meningeal enhancement. VASCULAR: The visualized portions of the intracranial carotid and vertebrobasilar systems demonstrate patent appearing flow voids. CALVARIA: Normal. SINUSES: Clear. CRANIOCERVICAL JUNCTION: Normal. OTHER: None. _______________     IMPRESSION: 1. Mild atrophy and minimal chronic small vessel changes. 2.  Otherwise, unremarkable evaluation. Specifically, no acute intracranial abnormalities are identified.        Medications Reviewed:  Current Facility-Administered Medications   Medication Dose Route Frequency    ondansetron (ZOFRAN) injection 2 mg  2 mg IntraVENous Q6H PRN    aspirin (ASPIRIN) tablet 325 mg  325 mg Oral DAILY    atorvastatin (LIPITOR) tablet 80 mg  80 mg Oral QHS    acetaminophen (TYLENOL) tablet 650 mg  650 mg Oral Q4H PRN    acetaminophen (TYLENOL) suppository 650 mg  650 mg Rectal Q4H PRN    senna-docusate (PERICOLACE) 8.6-50 mg per tablet 2 Tab  2 Tab Oral QHS    albuterol (PROVENTIL VENTOLIN) nebulizer solution 2.5 mg  2.5 mg Nebulization A9F PRN    folic acid (FOLVITE) 1 mg, thiamine (B-1) 100 mg in 0.9% sodium chloride 50 mL ivpb   IntraVENous ACD    levoFLOXacin (LEVAQUIN) 750 mg in D5W IVPB  750 mg IntraVENous Q48H    sodium chloride (NS) flush 5-10 mL  5-10 mL IntraVENous PRN    0.9% sodium chloride infusion  125 mL/hr IntraVENous CONTINUOUS    acetaminophen (TYLENOL) tablet 650 mg  650 mg Oral Q6H PRN    heparin (porcine) injection 5,000 Units  5,000 Units SubCUTAneous Q8H    insulin lispro (HUMALOG) injection   SubCUTAneous AC&HS    glucose chewable tablet 16 g  4 Tab Oral PRN    glucagon (GLUCAGEN) injection 1 mg  1 mg IntraMUSCular PRN    dextrose (D50W) injection syrg 12.5-25 g  25-50 mL IntraVENous PRN    ondansetron (ZOFRAN) injection 4 mg  4 mg IntraVENous Q4H PRN           Vishal Cho DO  Internal Medicine, Hospitalist  Pager: [phone]  [address] [place] [address]

## 2018-06-15 NOTE — PROGRESS NOTES
Left message for dtr, ms snell pt needs snf per therapy, asked her to call wkchadd cm and give her their choices to post,likely dc early next wk.

## 2018-06-15 NOTE — PROGRESS NOTES
Problem: Mobility Impaired (Adult and Pediatric)  Goal: *Acute Goals and Plan of Care (Insert Text)  Physical Therapy Goals  Initiated 6/15/2018 and to be accomplished within 7 day(s)  1. Patient will maintain eyes open for greater than 90% of session to allow for active participation in mobility training. 2.  Patient will follow 90% of commands to maximize safety and active participation in mobility training. 3.  Patient will move from supine to sit and sit to supine  in bed with supervision/set-up. 4.  Patient will maintain seated at edge of bed for 10 min with supervision/set-up to prepare for out of bed activity. 5.  Patient will perform sit to stand with minimal assistance/contact guard assist.  6.  Patient will transfer from bed to chair and chair to bed with minimal assistance/contact guard assist using the least restrictive device. 7.  Patient will ambulate with minimal assistance/contact guard assist for 25 feet with the least restrictive device. 8.  Patient will negotiate obstacles during ambulation with supervision/set-up. Outcome: Progressing Towards Goal  PHYSICAL THERAPY: Initial Assessment   INPATIENT: Medicare: Hospital Day: 3     Patient: Lilia Lewis (45 y.o. female)    Date: 6/15/2018  Primary Diagnosis: Atrial fibrillation with RVR (HCC)  Rhabdomyolysis    Precautions: Skin  PLOF: Amb with no AD    ASSESSMENT :  Patient requires between maximal assistance and minimal assistance/contact guard assist for bed mobility, transfers and ambulation. Eyes open upon entry. Oriented to person, time, and place. Speech difficulty; unclear if cause is pathology v. Patient preference as difficulties intermittent. Follows 75% of commands; requires max encouragement and re-direction for most commands. Max A x2 for supine to sit. Seated EOB with poor balance. Cues to maintain midline. Able to support self with supervision if cued to attend to task.  Three episodes of spontaneous loss of balance to left side despite patient with supervision balance at time of episode. Max A to correct loss of balance to maintain upright. Poor effort for BLE AROM and MMT; scored as noted below. Attempted sit to stand with max Ax2. Unable to clear buttocks from bed despite 2 attempts. Returned to supine in bed max Ax2. HOB elevated; call flor in reach. RN Gavino Vaca aware. Unsure home set-up; reports independent in amb. Would benefit from rehab at discharge to return to prior level of function. BP seated /84, HR 90  BP post mobility 112/76, HR 77     Patient presents with deficits in:  Bed Mobility, Transfers, Gait, Strength, Balance and Stairs    Patient will benefit from skilled intervention to address the above impairments.   Patients rehabilitation potential is considered to be Fair  Factors which may influence rehabilitation potential include:   []         None noted  [x]         Mental ability/status  [x]         Medical condition  []         Home/family situation and support systems  [x]         Safety awareness  []         Pain tolerance/management  []         Other:      PLAN :  Recommendations and Planned Interventions:  [x]           Bed Mobility Training             [x]    Neuromuscular Re-Education  [x]           Transfer Training                   []    Orthotic/Prosthetic Training  [x]           Gait Training                          []    Modalities  [x]           Therapeutic Exercises          []    Edema Management/Control  [x]           Therapeutic Activities            [x]    Patient and Family Training/Education  []           Other (comment):      EDUCATION:   Education:  Patient was educated on the following topics: Bed mobility, transfers, ADLs, balance, safety, exercise, role of PT, plan of care, cognition, skin integrity, vitals     Barriers to Learning/Limitations: yes;  cognitive, sensory deficits-vision/hearing/speech and altered mental status (i.e.Sedation, Confusion)  Compensate with: visual, verbal, tactile, kinesthetic cues/model  Recommendations for the next treatment session:   Frequency/Duration: Patient will be followed by physical therapy 3-5 times a week to address goals. Discharge Recommendations: Rehab  Further Equipment Recommendations for Discharge: TBD with OOB     SUBJECTIVE:   Patient stated We're at [facility].     OBJECTIVE DATA SUMMARY:     Past Medical History:   Diagnosis Date    Alcohol dependence in remission ([facility]     alcohol withdrawal discharged 2013    Arthritis     knees and back    Bipolar 1 disorder ([facility])     seeing Psychiatrist    Chronic obstructive pulmonary disease ([facility]     Depression     Diabetes mellitus ([facility]     DJD (degenerative joint disease) of knee     DM2 (diabetes mellitus, type 2) ([facility])     borderline    Eustachian tube dysfunction     Dr. Earlene Kussmaul GERD (gastroesophageal reflux disease)     Hepatitis C      prior IV drug abuse - Genotype 1a    Hypertension     Needle phobia     from prior IV drug abuse    Psoriasis      Past Surgical History:   Procedure Laterality Date    HX  SECTION      x 3    HX COLONOSCOPY      Dr. Katharina Marcus - due for repeat     HX CYST REMOVAL      Hydrocystoma R eye removed    HX GYN      partical hysterectomy - fibroid tumors    HX HYSTERECTOMY      PARTIAL     Eval Complexity: History: MEDIUM  Complexity : 1-2 comorbidities / personal factors will impact the outcome/ POC Exam:MEDIUM Complexity : 3 Standardized tests and measures addressing body structure, function, activity limitation and / or participation in recreation  Presentation: MEDIUM Complexity : Evolving with changing characteristics  Clinical Decision Making:Medium Complexity  clinical judgement; ROM, MMT, functional mobility Overall Complexity:MEDIUM    G CODES:Mobility  Current  CM= 80-99%   Goal  CK= 40-59%.   The severity rating is based on the Other clinical judgement; ROM, MMT, functional mobility    Prior Level of Function/Home Situation: Amb with no AD  Home Situation  Home Environment: Apartment  One/Two Story Residence: Two story  Living Alone: Yes  Support Systems: Family member(s)  Patient Expects to be Discharged to[de-identified] Apartment  Current DME Used/Available at Home: None  Critical Behavior:  Neurologic State: Eyes open to voice  Orientation Level: Oriented X4  Cognition: Decreased command following;Decreased attention/concentration;Poor safety awareness  Safety/Judgement: Fall prevention  Psychosocial  Patient Behaviors: Agitated    Manual Muscle Testing (LE)U (Poor effort in MMT and AROM assessment)         R     L    Hip Flexion:   3+/5  3+/5  Knee EXT:   3+/5  3+/5  Knee FLEX:   3+/5  3+/5  Ankle DF:   3+/5  3+/5  _________________________________________________   Tone : BLE normal  Sensation: Intact to light touch BLE  Range Of Motion: BLE AROM; non-functional/ PROM decreased; functional  Functional Mobility:      Functional Status     Indep   (I)   Mod I   Super-vision   Min A   Mod A   Max A   Total A   Assist x2 Verbal cues Additional time Not tested   Comments   Rolling []  []  [] []    [x]    []  []  [] [] [] []    Supine to sit []  []  [] []  []  [x]  []  [x] [] [] []    Sit to supine []  []  [] []  []  [x]  []  [x] [] [] []    Sit to stand []  []  [] []  [x]  [x]  []  [x] [x] [] [] Unable to achieve standing   Stand to sit []  []  [] []  []  []  []  [] [] [] [x]    Bed to chair transfers []  []  [] []  []  []  []  [] [] [] [x]        Balance    Good   Fair   Poor   Unable   Not tested   Comments   Sitting static []  []  [x]  []  []    Sitting dynamic []  []  [x]  []  []    Standing static []  []  []  []  [x]    Standing dynamic []  []  []  []  [x]      Therapeutic Exercises:   Seated EOB 15 minutes  AROM BLE  Sit to stand x2 attempted    Pain:  Pre treatment pain level: 0  Post treatment pain level: 0  Pain Scale 1: Numeric (0 - 10)  Pain Intensity 1: 0    Activity Tolerance:   Poor  Please refer to the flowsheet for vital signs taken during this treatment. After treatment:   []         Patient left in no apparent distress sitting up in chair  [x]         Patient left in no apparent distress in bed  [x]         Call bell left within reach  [x]         Nursing notified  []         Caregiver present  []         Bed alarm activated    COMMUNICATION/EDUCATION:   [x]         Fall prevention education was provided and the patient/caregiver indicated understanding. [x]         Patient/family have participated as able in goal setting and plan of care. [x]         Patient/family agree to work toward stated goals and plan of care. []         Patient understands intent and goals of therapy, but is neutral about his/her participation. []         Patient is unable to participate in goal setting and plan of care.       Thank you for this referral.  Fatmata Doss, PT   Time Calculation: 35 mins

## 2018-06-15 NOTE — PROGRESS NOTES
Assumed care of patient from [address] (offgoing nurse). Patient awake in bed, no complaints at this time. Dual NIH completed. Bed locked and in lowest position with call bell in reach. Patient encouraged to call for assistance. Patient verbalizes understanding. 1320- Patient transported to X Ray for Chest XR.    1445- Telephone orders received from Dr. Daisha Sandra to remove diallo. RBV. 1550- Diallo catheter removed. Patient tolerated well. Educated patient about voiding within 6 hours. Patient voiced understanding.

## 2018-06-15 NOTE — PROGRESS NOTES
Nutrition initial assessment/Plan of care      RECOMMENDATIONS:     1. Pureed diet; NTL per SLP  2. Monitor weight, labs and PO intake  3. RD to follow     GOALS:     1. PO intake meets >75% of protein/calorie needs by 6/20  2. Weight Maintenance (+/- 1-2 lb by 6/20)       ASSESSMENT:     Weight status is classified as overweight per BMI of 28.8. PO intake is adequate. Labs noted. Elevated triglyceride level (326). BUN/Creatinine levels elevated; GFR: 44. Elevated LFT's but trending down. Nutrition recommendations listed. RD to follow. Nutrition Diagnoses: Altered nutrition related lab values related to hyperlipidemia as evidenced by triglyceride level of 326. Nutrition Risk:  [] High  [x] Moderate []  Low    SUBJECTIVE/OBJECTIVE:      Admitted with confusion and stroke workup. Patent with history of bipolar disorder, diabetes, HTN, GERD. Reviewed SLP note. Patient with moderate oropharyngeal dysphagia and recommends pureed diet and NTL. Patient just got from having tests and hasn't started lunch meal. Per RN, patient had 75% intake of breakfast this morning. Patient is a poor historian. Not appropriate for education at this time. Will attempt to provide education (high triglycerides) at follow-up. Information Obtained from:    [x] Chart Review   [x] Patient   [] Family/Caregiver   [x] Nurse/Physician   [] Interdisciplinary Meeting/Rounds    Diet:Pureed diet; NTL per SLP  Medications: [x] Reviewed (IVF: NS at 125 ml/hr,Lipitor,Folvite,THeragran, B1)  Allergies: [x] Reviewed   Encounter Diagnoses     ICD-10-CM ICD-9-CM   1. Confusion R41.0 298.9   2. Atrial flutter, unspecified type ([facility] I48.92 427.32   3. PAM (acute kidney injury) ([facility] N17.9 584.9   4.  Elevated CPK R74.8 790.5     Past Medical History:   Diagnosis Date    [facility]     alcohol withdrawal discharged 05/31/2013    Arthritis     knees and back    Bipolar 1 disorder [facility])     seeing Psychiatrist    Chronic obstructive pulmonary disease (HCC)     Depression     Diabetes mellitus (HCC)     DJD (degenerative joint disease) of knee     DM2 (diabetes mellitus, type 2) (HCC)     borderline    Eustachian tube dysfunction     Dr. Earlene Kussmaul GERD (gastroesophageal reflux disease)     Hepatitis C      prior IV drug abuse - Genotype 1a    Hypertension     Needle phobia     from prior IV drug abuse    Psoriasis       Labs:  Lab Results   Component Value Date/Time    Sodium 145 06/15/2018 07:20 AM    Potassium 3.7 06/15/2018 07:20 AM    Chloride 113 (H) 06/15/2018 07:20 AM    CO2 25 06/15/2018 07:20 AM    Anion gap 7 06/15/2018 07:20 AM    Glucose 132 (H) 06/15/2018 07:20 AM    BUN 76 (H) 06/15/2018 07:20 AM    Creatinine 1.46 (H) 06/15/2018 07:20 AM    Calcium 8.5 06/15/2018 07:20 AM    Magnesium 3.4 (H) 06/13/2018 09:52 AM    Albumin 2.0 (L) 06/15/2018 07:20 AM     Anthropometrics: BMI (calculated): 28.8  Last 3 Recorded Weights in this Encounter    06/14/18 0410   Weight: 83.3 kg (183 lb 9.6 oz)    Ht Readings from Last 1 Encounters:   06/14/18 5' 6\" (1.676 m)     Patient Vitals for the past 100 hrs:   % Diet Eaten   06/15/18 1000 75 %     IBW: 135 lb %IBW: 136%    Estimated Nutrition Needs: [x] MSJ  [] Other:  Calories: 1875 Kcal Based on:   [x] Actual BW    Protein:    g Based on:   [x] Actual BW    Fluid:       2000 ml Based on:   [x] Actual BW      [x] No Cultural, Restorationist or ethnic dietary need identified.     [] Cultural, Restorationist and ethnic food preferences identified and addressed     Wt Status:  [] Normal (18.6 - 24.9) [] Underweight (<18.5) [x] Overweight (25 - 29.9) [] Mild Obesity (30 - 34.9)  [] Moderate Obesity (35 - 39.9) [] Morbid Obesity (40+)     Nutrition Problems Identified:   [] Suboptimal PO intake   [] Food Allergies  [x] Difficulty chewing/swallowing/poor dentition  [] Constipation/Diarrhea   [] Nausea/Vomiting   [] None  [] Other:     Plan:   [x] Therapeutic Diet  [] Obtained/adjusted food preferences/tolerances and/or snacks options   []  Supplements added   [x]  SLP following for feeding techniques  []  HS snack added   [x]  Modify diet texture   []  Modify diet for food allergies   []  Educate patient   []  Assist with menu selection   [x]  Monitor PO intake on meal rounds   [x]  Continue inpatient monitoring and intervention   []  Participated in discharge planning/Interdisciplinary rounds/Team meetings   []  Other:     Education Needs:   [x] Not appropriate for teaching at this time    [] Identified and addressed    Nutrition Monitoring and Evaluation:  [x] Continue ongoing monitoring and intervention  [] Other    Will Victor

## 2018-06-15 NOTE — CONSULTS
Neurology Consult Note    Admit Date: 6/13/2018  Length of Stay: 2  Primary Care: Violette Crawford MD   Principle Problem: Rhabdomyolysis     Assessment:    Encephalopathy    Plan:    Encephalopathy secondary to infectious and metabolic derangements. Urinalysis shows WBCs with trace leukocyte easterase. Levaquin is in place. Drug screen was THC positive. BUN/Creatinin and GFR AA improving. CK also improving. ALT/ AST elevated but improving- appears from ETOH? By ratio. CRP is elevated but non-specific. Sed rate slightly elevated. MRI is negative for any acute findings. Repeat cxray pending. Will check ammonia, B12, and folate. Continue to treat electrolyte abnormalities and cause of infection. Exam inconsistent. Initially states she can't speak very well but as she focuses on other aspects of exam, speech becomes clearer for example. May have some underlying psych issues. History: History is obtained from the chart as patient poor historian. Chrissy Hurst is a 63yo AAF with PMH of HTN, arthritis, DM2, Hep C, GERD, COPD, Bipolar disorder, depression, ETOH abuse, and psoriasis that presented to ED via EMS for AMS. She was seen by daughter 6/7/18 normal and 6/13 found her confused and on the floor. She was in a-flutter/fib with RVR. She was given cardizem gtt and digoxin and was able to cardiovert. She was also found to have rhabdo with PAM. She is on [facility] protocol for possible withdrawal and awaiting tele-psych consult. Results reviewed:     CT Results (most recent):    Results from [place] encounter on 06/13/18   CT SPINE CERV WO CONT   Narrative EXAM: CT Cervical spine    INDICATION: Cervical neck pain. Patient on the floor. COMPARISON: No prior study. TECHNIQUE: Axial CT imaging of the cervical spine was performed from the skull  base to the upper thoracic spine without intravenous contrast. Multiplanar  reformats were generated. One or more dose reduction techniques were used on  this CT: automated exposure control, adjustment of the mAs and/or kVp according  to patient's size, and iterative reconstruction techniques. The specific  techniques utilized on this CT exam have been documented in the patient's  electronic medical record.    _______________    FINDINGS:    VERTEBRAE AND DISCS: Coronal reformatted images demonstrate a normal appearance  to the occipital condyles. The atlantodental and atlantooccipital articulations  are normal in appearance. There is mild straightening of usual cervical  lordosis, without evidence of listhesis. Vertebral body heights are intact. No  evidence of compression fracture. No displaced fracture. Moderate severity  spondylosis present C4-C7. No acute facet malalignment. SPINAL CANAL AND FORAMINA: Moderate severity right-sided C4-C5 neural foraminal  narrowing. No evidence of high-grade spinal canal stenosis or neural foraminal  narrowing. PREVERTEBRAL SOFT TISSUES: Normal    VISIBLE INTRACRANIAL CONTENTS: Unremarkable. LUNG APICES: Patchy groundglass opacity present in the periphery of the right  upper lobe. OTHER: None.    _______________         Impression IMPRESSION:      1. Straightening of the usual cervical lordosis without evidence of fracture or  acute traumatic listhesis. 2. Cervical spondylosis and neuroforaminal narrowing as described. 3. Patchy groundglass opacity at the periphery of the imaged right upper lobe,  potentially reflecting pneumonitis or pneumonia given the presenting history. MRI Results (most recent):    Results from [place] encounter on 06/13/18   MRI BRAIN W WO CONT   Narrative EXAM: MRI brain without and with contrast 6/14/2018    INDICATION: Altered mental status with confusion. COMPARISON: CT scan of the head from 6/13/2013.     TECHNIQUE: Multiplanar multisequential images of the brain were obtained before  and after the administration of 15 cc of IV Dotarem. _______________    FINDINGS:    BRAIN AND POSTERIOR FOSSA: Mild atrophy and minimal chronic small vessel changes  are present. There is no intracranial hemorrhage, mass effect, or midline  shift. There are no significant additional areas of abnormal parenchymal signal.  There are no areas of restricted diffusion to suggest acute infarct. There is  no abnormal cerebral or cerebellar enhancement. EXTRA-AXIAL SPACES AND MENINGES: There are no abnormal extra-axial fluid  collections. There is no abnormal meningeal enhancement. VASCULAR: The visualized portions of the intracranial carotid and  vertebrobasilar systems demonstrate patent appearing flow voids. CALVARIA: Normal.    SINUSES: Clear. CRANIOCERVICAL JUNCTION: Normal.    OTHER: None.    _______________         Impression IMPRESSION:    1. Mild atrophy and minimal chronic small vessel changes. 2.  Otherwise, unremarkable evaluation. Specifically, no acute intracranial  abnormalities are identified.           Latest Hemoglobin A1C:  Lab Results   Component Value Date/Time    Hemoglobin A1c 6.9 (H) 06/14/2018 12:08 PM    Hemoglobin A1c (POC) 5.5 07/18/2014 10:05 AM       Latest Cardiology Procedure:  Results for orders placed or performed during the hospital encounter of 06/13/18   EKG, 12 LEAD, INITIAL   Result Value Ref Range    Ventricular Rate 105 BPM    Atrial Rate 105 BPM    P-R Interval 132 ms    QRS Duration 78 ms    Q-T Interval 316 ms    QTC Calculation (Bezet) 417 ms    Calculated P Axis 63 degrees    Calculated R Axis 73 degrees    Calculated T Axis 58 degrees    Diagnosis       Sinus tachycardia  Otherwise normal ECG  When compared with ECG of 13-JUN-2018 08:42,  Sinus rhythm has replaced Atrial fibrillation  Confirmed by Jerome Rowe ([id number]) on 6/14/2018 11:33:24 AM         Important Labs:  No results found for: FOL, RBCF  Lab Results   Component Value Date/Time    Cholesterol, total 137 06/14/2018 12:08 PM HDL Cholesterol 12 (L) 06/14/2018 12:08 PM    LDL, calculated 59.8 06/14/2018 12:08 PM    VLDL, calculated 65.2 06/14/2018 12:08 PM    Triglyceride 326 (H) 06/14/2018 12:08 PM    CHOL/HDL Ratio 11.4 (H) 06/14/2018 12:08 PM     Lab Results   Component Value Date/Time    TSH 0.89 06/14/2018 12:08 PM    Triiodothyronine (T3), free 1.9 (L) 06/14/2018 12:08 PM    T4, Free 1.2 06/14/2018 12:08 PM     No results found for: DS35, PHEN, PHENO, PHENT, DILF, DS39, PHENY, PTN, VALF2, VALAC, VALP, VALPR, DS6, CRBAM, CRBAMP, CARB2, XCRBAM  Discussed with: nursing     Allergies:    Allergies   Allergen Reactions    Ace Inhibitors Cough    Penicillins Hives      Review of Systems:    Y  N   Constitutional: [] [] recent weight change  [] [] fever  [] [] sleep difficulties   ENT/Mouth:  [] [] hearing loss  [] [x] swallowing problems  [] [] slurred speech   Cardiovascular:  [] [x] chest pain   [] [] palpitations    Respiratory: [] [] cough with swallow  [] [] shortness of breath  [] [] sleep apnea  [] [] intubated   Gastrointestinal: [] [x] abdominal pain  [] [] nausea   Genitourinary: [] [] frequent urination  [] [] incontinence    Musculoskeletal:   [] [] joint pain  [x] [] muscle pain   Integument:   [] [] rash/itching   Neurological:  [] [] dizziness/vertigo  [] [] sedation  [x] [] confusion  [] [] agitation/combativeness  [] [] loss of consciousness  [] [] numbness/tingling sensation  [] [] tremors  [x] [] weakness in limbs  [x] [] difficulty with balance  [] [] frequent or recurring headaches  [x] [] memory loss   [] [] comatose  [] [] seizures   Psychiatric:   [] [] depression  [] [] hallucinations   Endocrine: [] [] excessive thirst or urination   [] [] heat or cold intolerance   Hematologic/Lymphatic: [] [] bleeding tendency  [] [] enlarged lymph nodes     PMH:   Past Medical History:   Diagnosis Date    Alcohol dependence in remission ([facility] [address]     alcohol withdrawal discharged 05/31/2013    Arthritis     knees and back  Bipolar 1 disorder ([facility]     seeing Psychiatrist    Chronic obstructive pulmonary disease ([facility]     Depression     Diabetes mellitus ([facility]     DJD (degenerative joint disease) of knee     DM2 (diabetes mellitus, type 2) (HCC)     borderline    Eustachian tube dysfunction     Dr. Alma Luis GERD (gastroesophageal reflux disease)     Hepatitis C      prior IV drug abuse - Genotype 1a    Hypertension     Needle phobia     from prior IV drug abuse    Psoriasis         Problem List: Principal Problem:    Rhabdomyolysis (6/13/2018)    Active Problems:    Bipolar 1 disorder (HCC) ()      Overview: seeing Psychiatrist      Depression ()      DM2 (diabetes mellitus, type 2) ([facility] (9/6/2013)      Atrial fibrillation with RVR ([facility] (6/13/2018)        FH:   Family History   Problem Relation Age of Onset   Bryan Mooney MS Mother     Heart Attack Mother         SH:   Social History     Social History    Marital status: SINGLE     Spouse name: N/A    Number of children: N/A    Years of education: N/A     Occupational History    Personal Care Assistant      disability     Social History Main Topics    Smoking status: Current Every Day Smoker     Packs/day: 0.50     Years: 40.00     Types: Cigarettes    Smokeless tobacco: Never Used    Alcohol use Yes      Comment: occasional    Drug use: Yes      Comment: marajuna twice a month    Sexual activity: No      Comment:      Other Topics Concern    None     Social History Narrative          Vital Signs:   Visit Vitals    /76 (BP 1 Location: Left arm)    Pulse 83    Temp 97.5 °F (36.4 °C)    Resp 18    Ht 5' 7\" (1.702 m)    Wt 83.3 kg (183 lb 10.3 oz)    SpO2 94%    Breastfeeding No    BMI 28.76 kg/m2      Neurological examination:    Appearance: In no acute distress, well developed, well nourished, cooperative   Cardiovascular: Heart is regular rate and rhythm, peripheral edema is absent.  No carotid bruits heard   Mental status examination: Alert and oriented X 3- person, place, and year. Speech soft and garbled. Normal attention. Decreased memory and fund of knowledge. Recall 0/3.  Cranial Nerves:     I: smell Not tested   II: visual fields Visual fields were grossly intact to confrontation. Eye movements were full and conjugate, saccades were accurate, pursuit movements were smooth, and there was no nystagmus. II: pupils Equal, round, reactive to light   III,VII: ptosis none   III,IV,VI: extraocular muscles  Full ROM   V: facial light touch sensation  normal   V,VII: corneal reflex     VII: facial muscle function  Bilateral weakness   VIII: hearing    IX: soft palate elevation     IX,X: gag reflex present   XI: sternocleidomastoid strength 5/5   XII: tongue strength  Normal.          Motor exam: Station, gait:  deferred. Muscle tone, bulk and manual muscle testing: generalized weakness. Some asterixis.  Sensory: Intact to primary modalities.  Coordination: Refused   Reflexes: Symmetric and 3+ in bilateral biceps, triceps, and brachioradialis. 2+ patellar, ankle reflexes. Plantar reflexes are flexor.            Medications:      [x] REVIEWED  Current Facility-Administered Medications   Medication    sodium chloride (NS) flush 5-10 mL    sodium chloride (NS) flush 5-10 mL    LORazepam (ATIVAN) tablet 1 mg    Or    LORazepam (ATIVAN) injection 1 mg    LORazepam (ATIVAN) tablet 2 mg    Or    LORazepam (ATIVAN) injection 2 mg    LORazepam (ATIVAN) injection 3 mg    [START ON 6/16/2018] Thiamine Mononitrate (B-1) tablet 100 mg    [START ON 0/33/2550] folic acid (FOLVITE) tablet 1 mg    [START ON 6/16/2018] therapeutic multivitamin (THERAGRAN) tablet 1 Tab    ondansetron (ZOFRAN) injection 2 mg    aspirin (ASPIRIN) tablet 325 mg    atorvastatin (LIPITOR) tablet 80 mg    acetaminophen (TYLENOL) tablet 650 mg    acetaminophen (TYLENOL) suppository 650 mg    senna-docusate (PERICOLACE) 8.6-50 mg per tablet 2 Tab    albuterol (PROVENTIL VENTOLIN) nebulizer solution 2.5 mg    levoFLOXacin (LEVAQUIN) 750 mg in D5W IVPB    sodium chloride (NS) flush 5-10 mL    0.9% sodium chloride infusion    acetaminophen (TYLENOL) tablet 650 mg    heparin (porcine) injection 5,000 Units    insulin lispro (HUMALOG) injection    glucose chewable tablet 16 g    glucagon (GLUCAGEN) injection 1 mg    dextrose (D50W) injection syrg 12.5-25 g    ondansetron (ZOFRAN) injection 4 mg     Data:      [x] REVIEWED  Recent Results (from the past 24 hour(s))   GLUCOSE, POC    Collection Time: 06/14/18  5:01 PM   Result Value Ref Range    Glucose (POC) 95 70 - 110 mg/dL   GLUCOSE, POC    Collection Time: 06/14/18 10:21 PM   Result Value Ref Range    Glucose (POC) 104 70 - 110 mg/dL   CBC W/O DIFF    Collection Time: 06/15/18  7:20 AM   Result Value Ref Range    WBC 6.4 4.6 - 13.2 K/uL    RBC 4.61 4.20 - 5.30 M/uL    HGB 12.4 12.0 - 16.0 g/dL    HCT 38.1 35.0 - 45.0 %    MCV 82.6 74.0 - 97.0 FL    MCH 26.9 24.0 - 34.0 PG    MCHC 32.5 31.0 - 37.0 g/dL    RDW 13.3 11.6 - 14.5 %    PLATELET 487 804 - 176 K/uL    MPV 11.8 9.2 - 41.1 FL   METABOLIC PANEL, COMPREHENSIVE    Collection Time: 06/15/18  7:20 AM   Result Value Ref Range    Sodium 145 136 - 145 mmol/L    Potassium 3.7 3.5 - 5.5 mmol/L    Chloride 113 (H) 100 - 108 mmol/L    CO2 25 21 - 32 mmol/L    Anion gap 7 3.0 - 18 mmol/L    Glucose 132 (H) 74 - 99 mg/dL    BUN 76 (H) 7.0 - 18 MG/DL    Creatinine 1.46 (H) 0.6 - 1.3 MG/DL    BUN/Creatinine ratio 52 (H) 12 - 20      GFR est AA 44 (L) >60 ml/min/1.73m2    GFR est non-AA 37 (L) >60 ml/min/1.73m2    Calcium 8.5 8.5 - 10.1 MG/DL    Bilirubin, total 0.2 0.2 - 1.0 MG/DL    ALT (SGPT) 60 (H) 13 - 56 U/L    AST (SGOT) 184 (H) 15 - 37 U/L    Alk.  phosphatase 31 (L) 45 - 117 U/L    Protein, total 6.7 6.4 - 8.2 g/dL    Albumin 2.0 (L) 3.4 - 5.0 g/dL    Globulin 4.7 (H) 2.0 - 4.0 g/dL    A-G Ratio 0.4 (L) 0.8 - 1.7     CK    Collection Time: 06/15/18  7:20 AM Result Value Ref Range    CK 3069 (H) 26 - 192 U/L   GLUCOSE, POC    Collection Time: 06/15/18  7:55 AM   Result Value Ref Range    Glucose (POC) 174 (H) 70 - 110 mg/dL   GLUCOSE, POC    Collection Time: 06/15/18 12:07 PM   Result Value Ref Range    Glucose (POC) 125 (H) 70 - 110 mg/dL       Georgi George NP

## 2018-06-15 NOTE — PROGRESS NOTES
Problem: Falls - Risk of  Goal: *Absence of Falls  Document Radha Fall Risk and appropriate interventions in the flowsheet. Outcome: Progressing Towards Goal  Fall Risk Interventions:  Mobility Interventions: Communicate number of staff needed for ambulation/transfer, Strengthening exercises (ROM-active/passive)         Medication Interventions: Patient to call before getting OOB    Elimination Interventions: Call light in reach, Patient to call for help with toileting needs    History of Falls Interventions: Door open when patient unattended        Problem: Pressure Injury - Risk of  Goal: *Prevention of pressure injury  Document Ryan Scale and appropriate interventions in the flowsheet.    Outcome: Progressing Towards Goal  Pressure Injury Interventions:  Sensory Interventions: Keep linens dry and wrinkle-free, Float heels, Pressure redistribution bed/mattress (bed type)         Activity Interventions: Pressure redistribution bed/mattress(bed type), PT/OT evaluation    Mobility Interventions: Float heels, Pressure redistribution bed/mattress (bed type)    Nutrition Interventions: Document food/fluid/supplement intake    Friction and Shear Interventions: Apply protective barrier, creams and emollients

## 2018-06-15 NOTE — PROGRESS NOTES
Problem: Falls - Risk of  Goal: *Absence of Falls  Document Radha Fall Risk and appropriate interventions in the flowsheet. Fall Risk Interventions:  Mobility Interventions: Communicate number of staff needed for ambulation/transfer, Patient to call before getting OOB         Medication Interventions: Evaluate medications/consider consulting pharmacy, Patient to call before getting OOB    Elimination Interventions: Call light in reach, Patient to call for help with toileting needs    History of Falls Interventions: Door open when patient unattended        Problem: Pressure Injury - Risk of  Goal: *Prevention of pressure injury  Document Ryan Scale and appropriate interventions in the flowsheet. Outcome: Progressing Towards Goal  Pressure Injury Interventions:  Sensory Interventions: Keep linens dry and wrinkle-free, Pressure redistribution bed/mattress (bed type)    Moisture Interventions: Absorbent underpads, Internal/External urinary devices    Activity Interventions: Pressure redistribution bed/mattress(bed type), Increase time out of bed, PT/OT evaluation    Mobility Interventions: Float heels, HOB 30 degrees or less, Pressure redistribution bed/mattress (bed type), Turn and reposition approx.  every two hours(pillow and wedges)    Nutrition Interventions: Document food/fluid/supplement intake    Friction and Shear Interventions: Foam dressings/transparent film/skin sealants, HOB 30 degrees or less

## 2018-06-15 NOTE — PROGRESS NOTES
Problem: Falls - Risk of  Goal: *Absence of Falls  Document Radha Fall Risk and appropriate interventions in the flowsheet. Outcome: Progressing Towards Goal  Fall Risk Interventions:  Mobility Interventions: Patient to call before getting OOB         Medication Interventions: Patient to call before getting OOB    Elimination Interventions: Call light in reach, Toileting schedule/hourly rounds    History of Falls Interventions: Door open when patient unattended        Problem: Pressure Injury - Risk of  Goal: *Prevention of pressure injury  Document Ryan Scale and appropriate interventions in the flowsheet. Outcome: Progressing Towards Goal  Pressure Injury Interventions: Activity Interventions: PT/OT evaluation    Mobility Interventions: Float heels, Turn and reposition approx.  every two hours(pillow and wedges)    Nutrition Interventions: Document food/fluid/supplement intake

## 2018-06-15 NOTE — PROGRESS NOTES
Assumed care of pt from [facility]. Alert and oriented x 4. Report included SBAR, MAR, kardex. Frequent use items within reach. Bed locked in lowest position. Call bell within reach. Pt verbalizes understanding to call for assistance. Dual NIH done. Patient reluctant to participate in [place].  0000 drinking fluids well,more awake and alert. Refusing to move. 0400 More awake and cooperative. 0800 Bedside shift change report given to 2600 Shahid Carter (oncoming nurse) by Kenia Peralta (offgoing nurse). Report included the following information SBAR, Kardex and MAR.

## 2018-06-15 NOTE — PROGRESS NOTES
Problem: Self Care Deficits Care Plan (Adult)  Goal: *Acute Goals and Plan of Care (Insert Text)  Occupational Therapy Goals  Initiated 6/15/2018 within 7 day(s). 1.  Patient will perform grooming tasks at EOB with minimal assistance for balance. 2.  Patient will perform lower body dressing with minimal assistance. 3.  Patient will perform functional task for 8 minutes in standing with moderate assistance for balance to increase activity tolerance for ADLs. 4.  Patient will perform toilet transfers with moderate assistance. 5.  Patient will perform all aspects of toileting with moderate assistance. 6.  Patient will participate in upper extremity therapeutic exercise/activities for 8 minutes with supervision/set-up to increase BUE strength for functional transfers and ADLs. 7.  Patient will utilize energy conservation techniques during functional activities with minimal verbal cues. Outcome: Progressing Towards Goal  [facility]  Occupational THERAPY: Initial Assessment   INPATIENT: Medicare: Hospital Day: 3     Patient: Laxmi Escalante (67 y.o. female)    Date: 6/15/2018  Primary Diagnosis: Atrial fibrillation with RVR (HCC)  Rhabdomyolysis    ,  ,   Precautions: Falls; Impulsive  PLOF: Pt was independent with basic self care tasks and functional mobility PTA. ASSESSMENT:   Based on the objective data described below, the patient presents with impairments with regard to bed mobility, activity tolerance and independence in ADLs. Pt supine on arrival, moaning and whining, difficult to understand pt's speech majority of the session despite max cueing. Pt reports independence in ADLs & IADLs PTA. Mod Ax2 for rolling & supine-->sit. Fair & poor sitting balance; pt impulsive & frequently losing balance to R and L sides resulting in max A for balance. Supervision/set-up for facial hygiene with constant support. Unable to complete LB dressing.  Standing not assessed due to fair/poor sitting balance and increased impulsivity. Pt inconsistent with speech & functional behaviors; needs reinforcement for safety. Mod A x2 to return supine; max A x2 to scoot towards HOB. Pt left supine with HOB elevated, needs within reach. Recommend SNF upon d/c. Recommendations for the next treatment session: [facility] transfer  Ms. Renato Luevano will benefit from skilled intervention to address the above impairments. Her rehabilitation potential is considered to be Fair. EDUCATION     Education:  Patient was educated on the following topics: importance of safety during transitions in prep for ADLs  Barriers to Learning/Limitations: yes;  altered mental status (i.e.Sedation, Confusion)  Compensate with: visual, verbal, tactile, kinesthetic cues/model     PLAN OF CARE:  Problems:  Decreased ROM, Decreased strength affecting function, Decreased ADL/functioning of activities, Decreased transfer abilities and Decreased activity tolerance    Recommendations and Planned Interventions:  [x]                  Self Care Training                   [x]         Therapeutic Activities  [x]                  Functional Mobility Training    []          Cognitive Retraining  [x]                  Therapeutic Exercises            [x]          Endurance Activities  [x]                  Balance Training                     []          Neuromuscular Re-ed   []                  Visual/Perceptual Training      [x]       Home Safety Training  [x]                  Patient Education                    [x]          Family Training/Education  []                  Other (comment):    Frequency/Duration: Patient will be followed by occupational therapy 3-5 times a week to address goals.     Discharge Recommendations: Skilled Nursing Facility     Further Equipment Recommendations for Discharge: shower chair; bedside commodeSUBJECTIVE:  Patient stated: \"I don't know whats wrong with me\" (very difficult to understand)    OBJECTIVE/TREATMENT:     Past Medical History: Diagnosis Date    Alcohol dependence in remission [facility])     alcohol withdrawal discharged 2013    Arthritis     knees and back    Bipolar 1 disorder (HCC)     seeing Psychiatrist    Chronic obstructive pulmonary disease ([facility] [address]     Depression     Diabetes mellitus ([facility]     DJD (degenerative joint disease) of knee     DM2 (diabetes mellitus, type 2) (HCC)     borderline    Eustachian tube dysfunction     Dr. 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 Rizwana Collins - due for repeat     HX CYST REMOVAL      Hydrocystoma R eye removed    HX GYN      partical hysterectomy - fibroid tumors    HX HYSTERECTOMY      PARTIAL      Eval Complexity: History: HIGH Complexity : Extensive review of history including physical, cognitive and psychosocial history ; Examination: HIGH Complexity : 5 or more performance deficits relating to physical, cognitive , or psychosocial skils that result in activity limitations and / or participation restrictions; Decision Making:HIGH Complexity : Patient presents with comorbidities that affect occupational performance. Signifigant modification of tasks or assistance (eg, physical or verbal) with assessment (s) is necessary to enable patient to complete evaluation     G CODES: Self Care  Current  CL= 60-79%   Goal  CK= 40-59%.   The severity rating is based on the Other Functional Assessment, MMT, ROM    Prior Level of Function/Home Situation:   Fully active; able to carry on all performance without restriction  Lives alone  none reported    Cognitive/Behavioral Status:   Neurologic State: alert   Orientation: oriented to time, place, person and situation  Cognition:   appropriate decision making, appropriate for age attention/concentration, appropriate safety awareness and following commands  follows multi-step simple commands/direction   Safety/Judgement: Awareness of environment and Fall prevention    ROM: approx 1/2 shoulder flex (BUEs)  MMT: BUEs 3/5  Coordination: BUEs WFL  Hand dominance:Right    Skin: Intact (BUEs)  Edema: None noted (BUEs)  Sensation: Intact (BUEs)    Vision/Perceptual: normal      Functional Status      Indep   Mod I   Sup. /  Set- Up    SBA   CGA   Min Assist   Mod Assist   Max assist   Total Assist   Assist x2    Additional Time   NT   Comments   Rolling []  []  []  []  []    []    [x]    []  []  [x]  []  []     Supine to sit []  []  []  []  []  []  [x]  []  []  [x]  []  []     Sit to supine []  []  []  []  []  []  [x]  []  []  [x]  []  []     Sit to stand []  []  []  []  []  []  []  []  []  []  []  [x]     Toilet Transfer []  []  []  []  []  []  []  []  []  []  []  [x]                     Feeding []  []  [x]  []  []  []  []  []  []  []  []  []     Grooming []  []  [x]  []  []  []  []  []  []  []  []  []     Bathing  []  []  []  []  []  []  []  [x]  []  []  []  []     UB Dressing  []  []  []  []  []  [x]  []  []  []  []  []  []     LB Dressing  []  []  []  []  []  []  []  [x]  []  []  []  []     Toileting []  []  []  []  []  []  []  []  [x]  []  []  []  [place] catheter       Balance    Good   Fair   Poor   Unable   Comments   Sitting static []  [x]  []  []     Sitting dynamic []  []  [x]  []     Standing static []  []  []  []     Standing dynamic []  []  []  []       Therapeutic Activity:   Pt tolerated ~10 mins at EOB with max A for balance due to frequent LOB and increased impulsivity. Pain:   Pre treatment:  0/10  Post treatment: 0/10  Scale: numeric     Activity tolerance:  fair    COMMUNICATION/EDUCATION: Pt educated on role of OT and POC; she needs reinforcement. [x]         Fall prevention education was provided and the patient/caregiver indicated understanding.   [x] Patient/family have participated as able in goal setting and plan of care. [x]         Patient/family agree to work toward stated goals and plan of care. []         Patient understands intent and goals of therapy, but is neutral about his/her participation     The patients plan of care was also discussed with: Physical Therapist, Certified Occupational Therapy Assistant and Registered Nurse.      · After treatment position/precautions:   o Supine in bed  o Call light within reach  o RN notified     Recommendations for nursing: up with assist x2    Thank you for this referral.  Nathalie Sosa MS OTR/L  Time Calculation: 18 mins

## 2018-06-16 LAB
ALBUMIN SERPL-MCNC: 1.9 G/DL (ref 3.4–5)
ALBUMIN/GLOB SERPL: 0.5 {RATIO} (ref 0.8–1.7)
ALP SERPL-CCNC: 29 U/L (ref 45–117)
ALT SERPL-CCNC: 55 U/L (ref 13–56)
ANION GAP SERPL CALC-SCNC: 8 MMOL/L (ref 3–18)
AST SERPL-CCNC: 147 U/L (ref 15–37)
BILIRUB SERPL-MCNC: 0.2 MG/DL (ref 0.2–1)
BUN SERPL-MCNC: 49 MG/DL (ref 7–18)
BUN/CREAT SERPL: 49 (ref 12–20)
CALCIUM SERPL-MCNC: 7.9 MG/DL (ref 8.5–10.1)
CHLORIDE SERPL-SCNC: 116 MMOL/L (ref 100–108)
CK SERPL-CCNC: 1784 U/L (ref 26–192)
CO2 SERPL-SCNC: 25 MMOL/L (ref 21–32)
CREAT SERPL-MCNC: 1.01 MG/DL (ref 0.6–1.3)
ERYTHROCYTE [DISTWIDTH] IN BLOOD BY AUTOMATED COUNT: 13.5 % (ref 11.6–14.5)
GLOBULIN SER CALC-MCNC: 3.7 G/DL (ref 2–4)
GLUCOSE BLD STRIP.AUTO-MCNC: 140 MG/DL (ref 70–110)
GLUCOSE BLD STRIP.AUTO-MCNC: 143 MG/DL (ref 70–110)
GLUCOSE BLD STRIP.AUTO-MCNC: 144 MG/DL (ref 70–110)
GLUCOSE BLD STRIP.AUTO-MCNC: 157 MG/DL (ref 70–110)
GLUCOSE SERPL-MCNC: 102 MG/DL (ref 74–99)
HCT VFR BLD AUTO: 35.2 % (ref 35–45)
HGB BLD-MCNC: 11.2 G/DL (ref 12–16)
MCH RBC QN AUTO: 26.4 PG (ref 24–34)
MCHC RBC AUTO-ENTMCNC: 31.8 G/DL (ref 31–37)
MCV RBC AUTO: 83 FL (ref 74–97)
PHOSPHATE SERPL-MCNC: 1.6 MG/DL (ref 2.5–4.9)
PLATELET # BLD AUTO: 193 K/UL (ref 135–420)
PMV BLD AUTO: 11.5 FL (ref 9.2–11.8)
POTASSIUM SERPL-SCNC: 4 MMOL/L (ref 3.5–5.5)
PROT SERPL-MCNC: 5.6 G/DL (ref 6.4–8.2)
RBC # BLD AUTO: 4.24 M/UL (ref 4.2–5.3)
SODIUM SERPL-SCNC: 149 MMOL/L (ref 136–145)
WBC # BLD AUTO: 6 K/UL (ref 4.6–13.2)

## 2018-06-16 PROCEDURE — 80053 COMPREHEN METABOLIC PANEL: CPT | Performed by: NURSE PRACTITIONER

## 2018-06-16 PROCEDURE — 97110 THERAPEUTIC EXERCISES: CPT

## 2018-06-16 PROCEDURE — 82550 ASSAY OF CK (CPK): CPT | Performed by: NURSE PRACTITIONER

## 2018-06-16 PROCEDURE — 74011000258 HC RX REV CODE- 258: Performed by: HOSPITALIST

## 2018-06-16 PROCEDURE — C9113 INJ PANTOPRAZOLE SODIUM, VIA: HCPCS | Performed by: HOSPITALIST

## 2018-06-16 PROCEDURE — 74011250637 HC RX REV CODE- 250/637: Performed by: HOSPITALIST

## 2018-06-16 PROCEDURE — 74011636637 HC RX REV CODE- 636/637: Performed by: NURSE PRACTITIONER

## 2018-06-16 PROCEDURE — 97530 THERAPEUTIC ACTIVITIES: CPT

## 2018-06-16 PROCEDURE — 36415 COLL VENOUS BLD VENIPUNCTURE: CPT | Performed by: NURSE PRACTITIONER

## 2018-06-16 PROCEDURE — 84100 ASSAY OF PHOSPHORUS: CPT | Performed by: HOSPITALIST

## 2018-06-16 PROCEDURE — 77030038269 HC DRN EXT URIN PURWCK BARD -A

## 2018-06-16 PROCEDURE — 74011250636 HC RX REV CODE- 250/636: Performed by: NURSE PRACTITIONER

## 2018-06-16 PROCEDURE — 85027 COMPLETE CBC AUTOMATED: CPT | Performed by: NURSE PRACTITIONER

## 2018-06-16 PROCEDURE — 65270000029 HC RM PRIVATE

## 2018-06-16 PROCEDURE — 74011250636 HC RX REV CODE- 250/636: Performed by: INTERNAL MEDICINE

## 2018-06-16 PROCEDURE — 77030037878 HC DRSG MEPILEX >48IN BORD MOLN -B

## 2018-06-16 PROCEDURE — 77030011256 HC DRSG MEPILEX <16IN NO BORD MOLN -A

## 2018-06-16 PROCEDURE — 74011250636 HC RX REV CODE- 250/636: Performed by: HOSPITALIST

## 2018-06-16 PROCEDURE — 74011250637 HC RX REV CODE- 250/637: Performed by: PSYCHIATRY & NEUROLOGY

## 2018-06-16 PROCEDURE — 82962 GLUCOSE BLOOD TEST: CPT

## 2018-06-16 PROCEDURE — 77010033678 HC OXYGEN DAILY

## 2018-06-16 RX ORDER — SODIUM,POTASSIUM PHOSPHATES 280-250MG
1 POWDER IN PACKET (EA) ORAL 4 TIMES DAILY
Status: DISCONTINUED | OUTPATIENT
Start: 2018-06-16 | End: 2018-06-18

## 2018-06-16 RX ORDER — LEVOFLOXACIN 5 MG/ML
750 INJECTION, SOLUTION INTRAVENOUS EVERY 24 HOURS
Status: COMPLETED | OUTPATIENT
Start: 2018-06-16 | End: 2018-06-20

## 2018-06-16 RX ORDER — DEXTROSE MONOHYDRATE AND SODIUM CHLORIDE 5; .45 G/100ML; G/100ML
150 INJECTION, SOLUTION INTRAVENOUS CONTINUOUS
Status: DISCONTINUED | OUTPATIENT
Start: 2018-06-16 | End: 2018-06-26

## 2018-06-16 RX ORDER — PANTOPRAZOLE SODIUM 40 MG/10ML
40 INJECTION, POWDER, LYOPHILIZED, FOR SOLUTION INTRAVENOUS EVERY 24 HOURS
Status: DISCONTINUED | OUTPATIENT
Start: 2018-06-16 | End: 2018-06-25

## 2018-06-16 RX ADMIN — FOLIC ACID 1 MG: 1 TABLET ORAL at 09:27

## 2018-06-16 RX ADMIN — LACTULOSE 20 G: 20 SOLUTION ORAL at 15:02

## 2018-06-16 RX ADMIN — HEPARIN SODIUM 5000 UNITS: 5000 INJECTION, SOLUTION INTRAVENOUS; SUBCUTANEOUS at 18:13

## 2018-06-16 RX ADMIN — POTASSIUM & SODIUM PHOSPHATES POWDER PACK 280-160-250 MG 1 PACKET: 280-160-250 PACK at 22:22

## 2018-06-16 RX ADMIN — Medication 100 MG: at 09:28

## 2018-06-16 RX ADMIN — ASPIRIN 81 MG 81 MG: 81 TABLET ORAL at 09:28

## 2018-06-16 RX ADMIN — POTASSIUM & SODIUM PHOSPHATES POWDER PACK 280-160-250 MG 1 PACKET: 280-160-250 PACK at 18:16

## 2018-06-16 RX ADMIN — LACTULOSE 20 G: 20 SOLUTION ORAL at 18:19

## 2018-06-16 RX ADMIN — THERA TABS 1 TABLET: TAB at 09:27

## 2018-06-16 RX ADMIN — PANTOPRAZOLE SODIUM 40 MG: 40 INJECTION, POWDER, FOR SOLUTION INTRAVENOUS at 12:00

## 2018-06-16 RX ADMIN — HEPARIN SODIUM 5000 UNITS: 5000 INJECTION, SOLUTION INTRAVENOUS; SUBCUTANEOUS at 09:48

## 2018-06-16 RX ADMIN — DEXTROSE MONOHYDRATE AND SODIUM CHLORIDE 150 ML/HR: 5; .45 INJECTION, SOLUTION INTRAVENOUS at 11:47

## 2018-06-16 RX ADMIN — SODIUM CHLORIDE 125 ML/HR: 900 INJECTION, SOLUTION INTRAVENOUS at 09:21

## 2018-06-16 RX ADMIN — SODIUM CHLORIDE 125 ML/HR: 900 INJECTION, SOLUTION INTRAVENOUS at 00:21

## 2018-06-16 RX ADMIN — INSULIN LISPRO 2 UNITS: 100 INJECTION, SOLUTION INTRAVENOUS; SUBCUTANEOUS at 18:11

## 2018-06-16 RX ADMIN — LEVOFLOXACIN 750 MG: 5 INJECTION, SOLUTION INTRAVENOUS at 11:51

## 2018-06-16 RX ADMIN — HEPARIN SODIUM 5000 UNITS: 5000 INJECTION, SOLUTION INTRAVENOUS; SUBCUTANEOUS at 02:32

## 2018-06-16 RX ADMIN — LACTULOSE 10 G: 20 SOLUTION ORAL at 09:38

## 2018-06-16 NOTE — PROGRESS NOTES
Neurology Progress Note    Admit Date: 6/13/2018  Length of Stay: 3  Primary Care: Cam Garcia MD     Interim History: Phosphorus down, now being supplemented, started on lactulose for elevated ammonia. Discussed with: Dr Katz Signs    Assessment: metabolic encephalopathy       Plan: will sign off, and be available as needed     Principle Problem: Rhabdomyolysis     Problem List: Principal Problem:    Rhabdomyolysis (6/13/2018)    Active Problems:    Bipolar 1 disorder (HCC) ()      Overview: seeing Psychiatrist      Depression ()      DM2 (diabetes mellitus, type 2) ([facility] [address] (9/6/2013)      Atrial fibrillation with RVR ([facility] [address] (6/13/2018)        Vital Signs:   Visit Vitals    /76 (BP 1 Location: Right arm, BP Patient Position: At rest)    Pulse 83    Temp 98.5 °F (36.9 °C)    Resp 16    Ht 5' 7\" (1.702 m)    Wt 83.3 kg (183 lb 10.3 oz)    SpO2 96%    Breastfeeding No    BMI 28.76 kg/m2        Neurological examination:   Appearance: awake, cooperative  Moving all 4 extremities well, eating pureed without difficulty, speech still a little slurred    CT Results (most recent):    Results from Hospital Encounter encounter on 06/13/18   CT SPINE CERV WO CONT   Narrative EXAM: CT Cervical spine    INDICATION: Cervical neck pain. Patient on the floor. COMPARISON: No prior study. TECHNIQUE: Axial CT imaging of the cervical spine was performed from the skull  base to the upper thoracic spine without intravenous contrast. Multiplanar  reformats were generated. One or more dose reduction techniques were used on  this CT: automated exposure control, adjustment of the mAs and/or kVp according  to patient's size, and iterative reconstruction techniques.  The specific  techniques utilized on this CT exam have been documented in the patient's  electronic medical record.    _______________    FINDINGS:    VERTEBRAE AND DISCS: Coronal reformatted images demonstrate a normal appearance  to the occipital condyles. The atlantodental and atlantooccipital articulations  are normal in appearance. There is mild straightening of usual cervical  lordosis, without evidence of listhesis. Vertebral body heights are intact. No  evidence of compression fracture. No displaced fracture. Moderate severity  spondylosis present C4-C7. No acute facet malalignment. SPINAL CANAL AND FORAMINA: Moderate severity right-sided C4-C5 neural foraminal  narrowing. No evidence of high-grade spinal canal stenosis or neural foraminal  narrowing. PREVERTEBRAL SOFT TISSUES: Normal    VISIBLE INTRACRANIAL CONTENTS: Unremarkable. LUNG APICES: Patchy groundglass opacity present in the periphery of the right  upper lobe. OTHER: None.    _______________         Impression IMPRESSION:      1. Straightening of the usual cervical lordosis without evidence of fracture or  acute traumatic listhesis. 2. Cervical spondylosis and neuroforaminal narrowing as described. 3. Patchy groundglass opacity at the periphery of the imaged right upper lobe,  potentially reflecting pneumonitis or pneumonia given the presenting history. MRI Results (most recent):    Results from [place] encounter on 06/13/18   MRI BRAIN W WO CONT   Narrative EXAM: MRI brain without and with contrast 6/14/2018    INDICATION: Altered mental status with confusion. COMPARISON: CT scan of the head from 6/13/2013. TECHNIQUE: Multiplanar multisequential images of the brain were obtained before  and after the administration of 15 cc of IV Dotarem. _______________    FINDINGS:    BRAIN AND POSTERIOR FOSSA: Mild atrophy and minimal chronic small vessel changes  are present. There is no intracranial hemorrhage, mass effect, or midline  shift. There are no significant additional areas of abnormal parenchymal signal.  There are no areas of restricted diffusion to suggest acute infarct.   There is  no abnormal cerebral or cerebellar enhancement. EXTRA-AXIAL SPACES AND MENINGES: There are no abnormal extra-axial fluid  collections. There is no abnormal meningeal enhancement. VASCULAR: The visualized portions of the intracranial carotid and  vertebrobasilar systems demonstrate patent appearing flow voids. CALVARIA: Normal.    SINUSES: Clear. CRANIOCERVICAL JUNCTION: Normal.    OTHER: None.    _______________         Impression IMPRESSION:    1. Mild atrophy and minimal chronic small vessel changes. 2.  Otherwise, unremarkable evaluation. Specifically, no acute intracranial  abnormalities are identified.           Medications:    [x] REVIEWED  Current Facility-Administered Medications   Medication    lactulose (CHRONULAC) solution 20 g    dextrose 5 % - 0.45% NaCl infusion    pantoprazole (PROTONIX) injection 40 mg    levoFLOXacin (LEVAQUIN) 750 mg in D5W IVPB    sodium chloride (NS) flush 5-10 mL    LORazepam (ATIVAN) tablet 1 mg    Or    LORazepam (ATIVAN) injection 1 mg    LORazepam (ATIVAN) tablet 2 mg    Or    LORazepam (ATIVAN) injection 2 mg    LORazepam (ATIVAN) injection 3 mg    Thiamine Mononitrate (B-1) tablet 153 mg    folic acid (FOLVITE) tablet 1 mg    therapeutic multivitamin (THERAGRAN) tablet 1 Tab    aspirin chewable tablet 81 mg    ondansetron (ZOFRAN) injection 2 mg    acetaminophen (TYLENOL) tablet 650 mg    acetaminophen (TYLENOL) suppository 650 mg    senna-docusate (PERICOLACE) 8.6-50 mg per tablet 2 Tab    albuterol (PROVENTIL VENTOLIN) nebulizer solution 2.5 mg    sodium chloride (NS) flush 5-10 mL    acetaminophen (TYLENOL) tablet 650 mg    heparin (porcine) injection 5,000 Units    insulin lispro (HUMALOG) injection    glucose chewable tablet 16 g    glucagon (GLUCAGEN) injection 1 mg    dextrose (D50W) injection syrg 12.5-25 g    ondansetron (ZOFRAN) injection 4 mg     Data:    [x] REVIEWED  Recent Results (from the past 24 hour(s))   AMMONIA    Collection Time: 06/15/18 3:30 PM   Result Value Ref Range    Ammonia 53 (H) 11 - 32 UMOL/L   VITAMIN B12 & FOLATE    Collection Time: 06/15/18  3:30 PM   Result Value Ref Range    Vitamin B12 1878 (H) 211 - 911 pg/mL    Folate 15.2 3.10 - 17.50 ng/mL   GLUCOSE, POC    Collection Time: 06/15/18  5:23 PM   Result Value Ref Range    Glucose (POC) 175 (H) 70 - 110 mg/dL   GLUCOSE, POC    Collection Time: 06/15/18  9:02 PM   Result Value Ref Range    Glucose (POC) 120 (H) 70 - 110 mg/dL   GLUCOSE, POC    Collection Time: 06/15/18  9:37 PM   Result Value Ref Range    Glucose (POC) 131 (H) 70 - 110 mg/dL   CBC W/O DIFF    Collection Time: 06/16/18  3:57 AM   Result Value Ref Range    WBC 6.0 4.6 - 13.2 K/uL    RBC 4.24 4.20 - 5.30 M/uL    HGB 11.2 (L) 12.0 - 16.0 g/dL    HCT 35.2 35.0 - 45.0 %    MCV 83.0 74.0 - 97.0 FL    MCH 26.4 24.0 - 34.0 PG    MCHC 31.8 31.0 - 37.0 g/dL    RDW 13.5 11.6 - 14.5 %    PLATELET 213 496 - 982 K/uL    MPV 11.5 9.2 - 32.5 FL   METABOLIC PANEL, COMPREHENSIVE    Collection Time: 06/16/18  3:57 AM   Result Value Ref Range    Sodium 149 (H) 136 - 145 mmol/L    Potassium 4.0 3.5 - 5.5 mmol/L    Chloride 116 (H) 100 - 108 mmol/L    CO2 25 21 - 32 mmol/L    Anion gap 8 3.0 - 18 mmol/L    Glucose 102 (H) 74 - 99 mg/dL    BUN 49 (H) 7.0 - 18 MG/DL    Creatinine 1.01 0.6 - 1.3 MG/DL    BUN/Creatinine ratio 49 (H) 12 - 20      GFR est AA >60 >60 ml/min/1.73m2    GFR est non-AA 56 (L) >60 ml/min/1.73m2    Calcium 7.9 (L) 8.5 - 10.1 MG/DL    Bilirubin, total 0.2 0.2 - 1.0 MG/DL    ALT (SGPT) 55 13 - 56 U/L    AST (SGOT) 147 (H) 15 - 37 U/L    Alk.  phosphatase 29 (L) 45 - 117 U/L    Protein, total 5.6 (L) 6.4 - 8.2 g/dL    Albumin 1.9 (L) 3.4 - 5.0 g/dL    Globulin 3.7 2.0 - 4.0 g/dL    A-G Ratio 0.5 (L) 0.8 - 1.7     CK    Collection Time: 06/16/18  3:57 AM   Result Value Ref Range    CK 1784 (H) 26 - 192 U/L   GLUCOSE, POC    Collection Time: 06/16/18  7:53 AM   Result Value Ref Range    Glucose (POC) 140 (H) 70 - 110 mg/dL GLUCOSE, POC    Collection Time: 06/16/18 11:32 AM   Result Value Ref Range    Glucose (POC) 144 (H) 70 - 110 mg/dL   PHOSPHORUS    Collection Time: 06/16/18 11:37 AM   Result Value Ref Range    Phosphorus 1.6 (L) 2.5 - 4.9 MG/DL

## 2018-06-16 NOTE — PROGRESS NOTES
Assumed care of patient. Patient is AOx4, resting in bed, in stable condition. Patient denies having any pain or discomfort. Dual skin assessment and NIHSS conducted with [address].

## 2018-06-16 NOTE — PROGRESS NOTES
Assumed care of patient from \[facility]\"" (offgoing nurse). Patient awake in bed, no complaints at this time. Dual NIH completed. Bed locked and in lowest position with call bell in reach. Patient encouraged to call for assistance. Patient verbalizes understanding. 1320- Patient transported to X Ray for Chest XR.    1445- Telephone orders received from Dr. Cynda Bumpers to remove diallo. RBV. 1550- Diallo catheter removed. Patient tolerated well. Educated patient about voiding within 6 hours. Patient voiced understanding. 1950-Bedside and Verbal shift change report given to SHANNAN Tineo  (oncoming nurse) by Justyna Briseno (offgoing nurse). Report included the following information SBAR, Kardex, Intake/Output, MAR and Recent Results. Patient voided 250 ml yellow urine in bedpan.

## 2018-06-16 NOTE — PROGRESS NOTES
Progress Note      Patient: Antonette Garcia               Sex: female          DOA: 6/13/2018       YOB: 1960      Age:  59 y.o.        LOS:  LOS: 3 days             CHIEF COMPLAINT:    Subjective:     No significant events noted as pt was sleeping at time of my evaluation. Objective:      Visit Vitals    /76 (BP 1 Location: Right arm, BP Patient Position: At rest)    Pulse 83    Temp 98.5 °F (36.9 °C)    Resp 16    Ht 5' 7\" (1.702 m)    Wt 183 lb 10.3 oz (83.3 kg)    SpO2 96%    Breastfeeding No    BMI 28.76 kg/m2       Physical Exam:  GEN: Sleeping in no distress  CVS: Normal S1S2, RRR  RESP: CTAB  ABD: Soft, non distended, +BS  EXT: No edema noted  NEURO: Could not be assessed as sleeping        Lab/Data Reviewed:  CMP:   Lab Results   Component Value Date/Time     (H) 06/16/2018 03:57 AM    K 4.0 06/16/2018 03:57 AM     (H) 06/16/2018 03:57 AM    CO2 25 06/16/2018 03:57 AM    AGAP 8 06/16/2018 03:57 AM     (H) 06/16/2018 03:57 AM    BUN 49 (H) 06/16/2018 03:57 AM    CREA 1.01 06/16/2018 03:57 AM    GFRAA >60 06/16/2018 03:57 AM    GFRNA 56 (L) 06/16/2018 03:57 AM    CA 7.9 (L) 06/16/2018 03:57 AM    ALB 1.9 (L) 06/16/2018 03:57 AM    TP 5.6 (L) 06/16/2018 03:57 AM    GLOB 3.7 06/16/2018 03:57 AM    AGRAT 0.5 (L) 06/16/2018 03:57 AM    SGOT 147 (H) 06/16/2018 03:57 AM    ALT 55 06/16/2018 03:57 AM     CBC:   Lab Results   Component Value Date/Time    WBC 6.0 06/16/2018 03:57 AM    HGB 11.2 (L) 06/16/2018 03:57 AM    HCT 35.2 06/16/2018 03:57 AM     06/16/2018 03:57 AM           Assessment/Plan     Principal Problem:    Rhabdomyolysis (6/13/2018)    Active Problems:    Bipolar 1 disorder ([facility]) ()      Overview: seeing Psychiatrist      Depression ()      DM2 (diabetes mellitus, type 2) ([facility]) (9/6/2013)      Atrial fibrillation with RVR ([facility] [address] (6/13/2018)        Plan:  AMS mostly likely due to infectious and metabolic derangements.  CT Head and MRI Brain negative. Continue routine neuro checks and neurology following and appreciate help. Rhabdomyolysis improving with hydration as CPK trending down. IVF changed today with NS discontinued and pt started on D 5 0.45 NS at 150 cc/hr as pt with hx of ethanol use and will repeat CPK in AM.  Hyperammonemia. Ammonia was 53. lactulose increased from 10 g to 20 g BID and will recheck Ammonia in AM.  PAM - Resolved with hydration  Transaminitis - improving with AST:ALT greater than 2:1 suggestive of ethanol use. Lipitor discontinued and will repeat LFT in AM.  Hx of Ethanol use per chart. Continue MVT, Thiamine, and Folic acid. On CIWA protocol. Mg okay and will check Phos now. Counseled and IVF changed to D 5 0.45 NS today (ordered)  GI prophylaxis. Protonix 40 mg IV daily started today (ordered)  Hypophosphatemia. Phos today low at 1.6. Replete and recheck Phos in AM  DVT prophylaxis. Heparin  Discussed with neurologist Dr. Robby Cristina.       Deandre Dougherty DO, MPH  Internal Medicine

## 2018-06-16 NOTE — PROGRESS NOTES
Problem: Mobility Impaired (Adult and Pediatric)  Goal: *Acute Goals and Plan of Care (Insert Text)  Physical Therapy Goals  Initiated 6/15/2018 and to be accomplished within 7 day(s)  1. Patient will maintain eyes open for greater than 90% of session to allow for active participation in mobility training. 2.  Patient will follow 90% of commands to maximize safety and active participation in mobility training. 3.  Patient will move from supine to sit and sit to supine  in bed with supervision/set-up. 4.  Patient will maintain seated at edge of bed for 10 min with supervision/set-up to prepare for out of bed activity. 5.  Patient will perform sit to stand with minimal assistance/contact guard assist.  6.  Patient will transfer from bed to chair and chair to bed with minimal assistance/contact guard assist using the least restrictive device. 7.  Patient will ambulate with minimal assistance/contact guard assist for 25 feet with the least restrictive device. 8.  Patient will negotiate obstacles during ambulation with supervision/set-up. Outcome: Progressing Towards Goal  physical Therapy TREATMENT    Patient: Melina Roe (14 y.o. female)  Date: 6/16/2018  Diagnosis: Atrial fibrillation with RVR (HCC)  Rhabdomyolysis Rhabdomyolysis  Precautions: Skin   Chart, physical therapy assessment, plan of care and goals were reviewed. PLOF:Ambulatory without AD, ETOH abuse  ASSESSMENT:  ModA to sit up at EOB. Improved sitting balance this session. Pt able to perform (B)LE AROM strengthening exercises, 10reps each. MaxA to return to supine and totalA to scoot up to [facility] with bed in trendelenburg. HOB left at 30 degrees, pillows under knees for comfort. Education: UE placement to assist with bed mobility and seated balance.   Progression toward goals:  []      Improving appropriately and progressing toward goals  [x]      Improving slowly and progressing toward goals  []      Not making progress toward goals and plan of care will be adjusted     PLAN:  Patient continues to benefit from skilled intervention to address the above impairments. Continue treatment per established plan of care. Discharge Recommendations:  Skilled Nursing Facility  Further Equipment Recommendations for Discharge:  TBD unable to ambulate a this time, likely RW and or w/c     SUBJECTIVE:   Patient stated it hurts.  (motioning to L knee)    OBJECTIVE DATA SUMMARY:   Critical Behavior:  Neurologic State: Alert  Orientation Level: Oriented X4  Cognition: Decreased command following  Safety/Judgement: Fall prevention  Functional Mobility Training:  Bed Mobility:  Rolling: Minimum assistance  Supine to Sit: Moderate assistance  Sit to Supine: Maximum assistance  Scooting: Total assistance  Balance:  Sitting: Impaired  Sitting - Static: Fair (occasional)  Sitting - Dynamic: Fair (occasional)  GCODE:Mobility [id number] Current  CM= 80-99%   Goal  CK= 40-59%. The severity rating is based on the Level of Assistance required for Functional Mobility and ADLs. Therapeutic Exercises:   Seated (B)LE AROM: AP, LAQ, hip add with pillow 10x each  Pain:  Pre:5  Post:8  Pain Scale 1: Visual  Activity Tolerance:   Fair(-)  Please refer to the flowsheet for vital signs taken during this treatment.   After treatment:   [] Patient left in no apparent distress sitting up in chair  [x] Patient left in no apparent distress in bed  [x] Call bell left within reach  [] Nursing notified  [] Caregiver present  [] Bed alarm activated      Divya Garcia PTA   Time Calculation: 23 mins

## 2018-06-16 NOTE — PROGRESS NOTES
Day# 3 of 7    Indication: CAP  Current regimen:   Levofloxacin 750 mg iv q48h    Recent Labs      18   0357  06/15/18   0720  18   0805  18   0518   WBC  6.0  6.4   --   12.1   CREA  1.01  1.46*  1.84*   --         Est Crcl: 66 ml/min    Temp (24hrs), Av.1 °F (36.7 °C), Min:97.8 °F (36.6 °C), Max:98.5 °F (36.9 °C)      Cultures:      Impression/Plan:  -renal fxn improved, will increase dose to 750 mg IV q24h         Thank you,  Remigio Herrera, PHARMD

## 2018-06-16 NOTE — PROGRESS NOTES
I left snf list at bedside, with my phone and pager, asking family to call me. [facility].  [facility]  [phone], beeper [phone]

## 2018-06-16 NOTE — PROGRESS NOTES
Problem: Falls - Risk of  Goal: *Absence of Falls  Document Radha Fall Risk and appropriate interventions in the flowsheet. Outcome: Progressing Towards Goal  Fall Risk Interventions:  Mobility Interventions: Communicate number of staff needed for ambulation/transfer         Medication Interventions: Evaluate medications/consider consulting pharmacy    Elimination Interventions: Call light in reach    History of Falls Interventions: Evaluate medications/consider consulting pharmacy        Problem: Pressure Injury - Risk of  Goal: *Prevention of pressure injury  Document Ryan Scale and appropriate interventions in the flowsheet.    Outcome: Progressing Towards Goal  Pressure Injury Interventions:  Sensory Interventions: Assess changes in LOC    Moisture Interventions: Absorbent underpads    Activity Interventions: Pressure redistribution bed/mattress(bed type)    Mobility Interventions: Pressure redistribution bed/mattress (bed type)    Nutrition Interventions: Document food/fluid/supplement intake    Friction and Shear Interventions: Apply protective barrier, creams and emollients

## 2018-06-16 NOTE — PROGRESS NOTES
Assume care of patient with eyes closed from SHANNAN Tineo. Arouse when approached for NIH. Alert and oriented X 4. Dual NIH with Rivka scoring 4. Denies pain or discomfort at present. Bed locked in lowest position. Daughter present at bedside. Call light within reach and understand to use for assistance and needs. 0732 Bedside and Verbal shift change report given to Kwaku Asif RN (oncoming nurse) by Chica Bustillos RN (offgoing nurse). Report given with SBAR, Kardex, Intake/Output, MAR and Recent Results.

## 2018-06-16 NOTE — PROGRESS NOTES
1950: Bedside verbal shift report received from [facility]. Pt is awake in bed, no signs of distress, instructed to press call light if assistance is needed, call light within reach. 7563: Bedside shift change report given to Monica Carrington RN (oncoming nurse) by Luke Trujillo RN (offgoing nurse). Report included the following information SBAR, Kardex, Intake/Output, MAR and Recent Results.

## 2018-06-17 LAB
ALBUMIN SERPL-MCNC: 1.7 G/DL (ref 3.4–5)
ALBUMIN/GLOB SERPL: 0.5 {RATIO} (ref 0.8–1.7)
ALP SERPL-CCNC: 33 U/L (ref 45–117)
ALT SERPL-CCNC: 63 U/L (ref 13–56)
AMMONIA PLAS-SCNC: 36 UMOL/L (ref 11–32)
ANION GAP SERPL CALC-SCNC: 8 MMOL/L (ref 3–18)
AST SERPL-CCNC: 115 U/L (ref 15–37)
BASOPHILS # BLD: 0 K/UL (ref 0–0.06)
BASOPHILS NFR BLD: 0 % (ref 0–2)
BILIRUB SERPL-MCNC: 0.2 MG/DL (ref 0.2–1)
BUN SERPL-MCNC: 23 MG/DL (ref 7–18)
BUN/CREAT SERPL: 27 (ref 12–20)
CALCIUM SERPL-MCNC: 7.8 MG/DL (ref 8.5–10.1)
CHLORIDE SERPL-SCNC: 113 MMOL/L (ref 100–108)
CK SERPL-CCNC: 891 U/L (ref 26–192)
CO2 SERPL-SCNC: 25 MMOL/L (ref 21–32)
CREAT SERPL-MCNC: 0.84 MG/DL (ref 0.6–1.3)
DIFFERENTIAL METHOD BLD: ABNORMAL
EOSINOPHIL # BLD: 0.2 K/UL (ref 0–0.4)
EOSINOPHIL NFR BLD: 3 % (ref 0–5)
ERYTHROCYTE [DISTWIDTH] IN BLOOD BY AUTOMATED COUNT: 13.7 % (ref 11.6–14.5)
GLOBULIN SER CALC-MCNC: 3.5 G/DL (ref 2–4)
GLUCOSE BLD STRIP.AUTO-MCNC: 135 MG/DL (ref 70–110)
GLUCOSE BLD STRIP.AUTO-MCNC: 147 MG/DL (ref 70–110)
GLUCOSE BLD STRIP.AUTO-MCNC: 169 MG/DL (ref 70–110)
GLUCOSE BLD STRIP.AUTO-MCNC: 176 MG/DL (ref 70–110)
GLUCOSE SERPL-MCNC: 139 MG/DL (ref 74–99)
HCT VFR BLD AUTO: 34.6 % (ref 35–45)
HGB BLD-MCNC: 11 G/DL (ref 12–16)
LYMPHOCYTES # BLD: 1.6 K/UL (ref 0.9–3.6)
LYMPHOCYTES NFR BLD: 21 % (ref 21–52)
MCH RBC QN AUTO: 26.8 PG (ref 24–34)
MCHC RBC AUTO-ENTMCNC: 31.8 G/DL (ref 31–37)
MCV RBC AUTO: 84.2 FL (ref 74–97)
MONOCYTES # BLD: 0.9 K/UL (ref 0.05–1.2)
MONOCYTES NFR BLD: 12 % (ref 3–10)
NEUTS SEG # BLD: 4.8 K/UL (ref 1.8–8)
NEUTS SEG NFR BLD: 64 % (ref 40–73)
PHOSPHATE SERPL-MCNC: 2.2 MG/DL (ref 2.5–4.9)
PLATELET # BLD AUTO: 216 K/UL (ref 135–420)
PMV BLD AUTO: 11.6 FL (ref 9.2–11.8)
POTASSIUM SERPL-SCNC: 3.9 MMOL/L (ref 3.5–5.5)
PROT SERPL-MCNC: 5.2 G/DL (ref 6.4–8.2)
RBC # BLD AUTO: 4.11 M/UL (ref 4.2–5.3)
SODIUM SERPL-SCNC: 146 MMOL/L (ref 136–145)
WBC # BLD AUTO: 7.6 K/UL (ref 4.6–13.2)

## 2018-06-17 PROCEDURE — 80053 COMPREHEN METABOLIC PANEL: CPT | Performed by: HOSPITALIST

## 2018-06-17 PROCEDURE — 74011250637 HC RX REV CODE- 250/637: Performed by: HOSPITALIST

## 2018-06-17 PROCEDURE — 65270000029 HC RM PRIVATE

## 2018-06-17 PROCEDURE — 74011000258 HC RX REV CODE- 258: Performed by: HOSPITALIST

## 2018-06-17 PROCEDURE — 77010033678 HC OXYGEN DAILY

## 2018-06-17 PROCEDURE — 82550 ASSAY OF CK (CPK): CPT | Performed by: HOSPITALIST

## 2018-06-17 PROCEDURE — 82962 GLUCOSE BLOOD TEST: CPT

## 2018-06-17 PROCEDURE — 85025 COMPLETE CBC W/AUTO DIFF WBC: CPT | Performed by: HOSPITALIST

## 2018-06-17 PROCEDURE — 82140 ASSAY OF AMMONIA: CPT | Performed by: HOSPITALIST

## 2018-06-17 PROCEDURE — 74011250637 HC RX REV CODE- 250/637: Performed by: NURSE PRACTITIONER

## 2018-06-17 PROCEDURE — 36415 COLL VENOUS BLD VENIPUNCTURE: CPT | Performed by: HOSPITALIST

## 2018-06-17 PROCEDURE — 74011250636 HC RX REV CODE- 250/636: Performed by: HOSPITALIST

## 2018-06-17 PROCEDURE — 77030038269 HC DRN EXT URIN PURWCK BARD -A

## 2018-06-17 PROCEDURE — 74011250636 HC RX REV CODE- 250/636: Performed by: NURSE PRACTITIONER

## 2018-06-17 PROCEDURE — 74011250636 HC RX REV CODE- 250/636: Performed by: INTERNAL MEDICINE

## 2018-06-17 PROCEDURE — 74011250637 HC RX REV CODE- 250/637: Performed by: PSYCHIATRY & NEUROLOGY

## 2018-06-17 PROCEDURE — 84100 ASSAY OF PHOSPHORUS: CPT | Performed by: HOSPITALIST

## 2018-06-17 PROCEDURE — 74011636637 HC RX REV CODE- 636/637: Performed by: NURSE PRACTITIONER

## 2018-06-17 PROCEDURE — C9113 INJ PANTOPRAZOLE SODIUM, VIA: HCPCS | Performed by: HOSPITALIST

## 2018-06-17 RX ADMIN — PANTOPRAZOLE SODIUM 40 MG: 40 INJECTION, POWDER, FOR SOLUTION INTRAVENOUS at 10:47

## 2018-06-17 RX ADMIN — POTASSIUM & SODIUM PHOSPHATES POWDER PACK 280-160-250 MG 1 PACKET: 280-160-250 PACK at 23:05

## 2018-06-17 RX ADMIN — HEPARIN SODIUM 5000 UNITS: 5000 INJECTION, SOLUTION INTRAVENOUS; SUBCUTANEOUS at 09:43

## 2018-06-17 RX ADMIN — HEPARIN SODIUM 5000 UNITS: 5000 INJECTION, SOLUTION INTRAVENOUS; SUBCUTANEOUS at 03:21

## 2018-06-17 RX ADMIN — LEVOFLOXACIN 750 MG: 5 INJECTION, SOLUTION INTRAVENOUS at 13:04

## 2018-06-17 RX ADMIN — DEXTROSE MONOHYDRATE AND SODIUM CHLORIDE 150 ML/HR: 5; .45 INJECTION, SOLUTION INTRAVENOUS at 03:31

## 2018-06-17 RX ADMIN — THERA TABS 1 TABLET: TAB at 09:42

## 2018-06-17 RX ADMIN — FOLIC ACID 1 MG: 1 TABLET ORAL at 09:42

## 2018-06-17 RX ADMIN — ASPIRIN 81 MG 81 MG: 81 TABLET ORAL at 09:43

## 2018-06-17 RX ADMIN — DEXTROSE MONOHYDRATE AND SODIUM CHLORIDE 150 ML/HR: 5; .45 INJECTION, SOLUTION INTRAVENOUS at 18:20

## 2018-06-17 RX ADMIN — POTASSIUM & SODIUM PHOSPHATES POWDER PACK 280-160-250 MG 1 PACKET: 280-160-250 PACK at 13:05

## 2018-06-17 RX ADMIN — Medication 100 MG: at 09:42

## 2018-06-17 RX ADMIN — POTASSIUM & SODIUM PHOSPHATES POWDER PACK 280-160-250 MG 1 PACKET: 280-160-250 PACK at 18:20

## 2018-06-17 RX ADMIN — Medication 10 ML: at 10:48

## 2018-06-17 RX ADMIN — DEXTROSE MONOHYDRATE AND SODIUM CHLORIDE 150 ML/HR: 5; .45 INJECTION, SOLUTION INTRAVENOUS at 10:05

## 2018-06-17 RX ADMIN — INSULIN LISPRO 2 UNITS: 100 INJECTION, SOLUTION INTRAVENOUS; SUBCUTANEOUS at 18:19

## 2018-06-17 RX ADMIN — INSULIN LISPRO 2 UNITS: 100 INJECTION, SOLUTION INTRAVENOUS; SUBCUTANEOUS at 23:02

## 2018-06-17 RX ADMIN — HEPARIN SODIUM 5000 UNITS: 5000 INJECTION, SOLUTION INTRAVENOUS; SUBCUTANEOUS at 18:22

## 2018-06-17 RX ADMIN — ACETAMINOPHEN 650 MG: 325 TABLET ORAL at 20:32

## 2018-06-17 RX ADMIN — POTASSIUM & SODIUM PHOSPHATES POWDER PACK 280-160-250 MG 1 PACKET: 280-160-250 PACK at 09:42

## 2018-06-17 NOTE — ROUTINE PROCESS
Bedside and Verbal shift change report given to [address] (oncoming nurse) by Severiano Robinsons, RN   (offgoing nurse). Report included the following information SBAR, Kardex, MAR and Recent Results.

## 2018-06-17 NOTE — PROGRESS NOTES
Patient received in bed awake. Patient alert and oriented X4, denies pain and discomfort. Patient resting quietly. Frequent use items within reach. Bed locked in low position. Call bell within reach and patient verbalized understanding of use for assistance and needs. 1150:  Paged Dr. Kimber Kendall. Pt MRI negative for infarct and doing NIHSS, also cardiac monitoring expires today. 1505: This nurse informed by Charge nurse that order was received to discontinue NIHSS. 1507:  Spoke to Dr. Kimber Kendall at nurses station  Received order to renew telemetry . 2009:  Bedside and Verbal shift change report given to Lillian Hunt RN (oncoming nurse) by Durga Nolen RN BSN (offgoing nurse). Report included the following information SBAR, Kardex, Intake/Output, MAR and Recent Results.

## 2018-06-17 NOTE — PROGRESS NOTES
Problem: Falls - Risk of  Goal: *Absence of Falls  Document Radha Fall Risk and appropriate interventions in the flowsheet. Outcome: Progressing Towards Goal  Fall Risk Interventions:  Mobility Interventions: Communicate number of staff needed for ambulation/transfer         Medication Interventions: Patient to call before getting OOB    Elimination Interventions: Call light in reach    History of Falls Interventions: Door open when patient unattended        Problem: Pressure Injury - Risk of  Goal: *Prevention of pressure injury  Document Ryan Scale and appropriate interventions in the flowsheet.    Outcome: Progressing Towards Goal  Pressure Injury Interventions:  Sensory Interventions: Assess changes in LOC    Moisture Interventions: Absorbent underpads    Activity Interventions: Pressure redistribution bed/mattress(bed type)    Mobility Interventions: Pressure redistribution bed/mattress (bed type)    Nutrition Interventions: Document food/fluid/supplement intake    Friction and Shear Interventions: HOB 30 degrees or less

## 2018-06-17 NOTE — PROGRESS NOTES
Per Dr. Lavona Claude, patient's MRI and CT were negative for a stroke. NIHSS can be discontinued and neurological assessment changed from Q4 to routine.

## 2018-06-17 NOTE — PROGRESS NOTES
Problem: Falls - Risk of  Goal: *Absence of Falls  Document Radha Fall Risk and appropriate interventions in the flowsheet. Outcome: Progressing Towards Goal  Fall Risk Interventions:  Mobility Interventions: Communicate number of staff needed for ambulation/transfer         Medication Interventions: Patient to call before getting OOB    Elimination Interventions: Call light in reach    History of Falls Interventions: Door open when patient unattended        Problem: Pressure Injury - Risk of  Goal: *Prevention of pressure injury  Document Ryan Scale and appropriate interventions in the flowsheet.    Outcome: Progressing Towards Goal  Pressure Injury Interventions:  Sensory Interventions: Assess changes in LOC, Keep linens dry and wrinkle-free, Minimize linen layers, Check visual cues for pain    Moisture Interventions: Absorbent underpads, Internal/External urinary devices, Minimize layers    Activity Interventions: Pressure redistribution bed/mattress(bed type)    Mobility Interventions: Pressure redistribution bed/mattress (bed type)    Nutrition Interventions: Document food/fluid/supplement intake    Friction and Shear Interventions: HOB 30 degrees or less

## 2018-06-17 NOTE — PROGRESS NOTES
Progress Note      Patient: Bren Wilson               Sex: female          DOA: 6/13/2018       YOB: 1960      Age:  59 y.o.        LOS:  LOS: 4 days             CHIEF COMPLAINT:    Subjective:     She denies CP, SOB, fever, or chills. Objective:      Visit Vitals    /71 (BP 1 Location: Right arm, BP Patient Position: At rest;Supine)    Pulse (!) 103    Temp 98.6 °F (37 °C)    Resp 16    Ht 5' 7\" (1.702 m)    Wt 183 lb 10.3 oz (83.3 kg)    SpO2 96%    Breastfeeding No    BMI 28.76 kg/m2       Physical Exam:  GEN: Awake in no distress  CVS: Normal S1S2, RRR  RESP: CTAB  ABD: Soft, NT/ND, +BS  EXT: No edema noted  NEURO: Moving all extremity with decreased strength noted on the left lower extremity         Lab/Data Reviewed:  CMP: No results found for: NA, K, CL, CO2, AGAP, GLU, BUN, CREA, GFRAA, GFRNA, CA, MG, PHOS, ALB, TBIL, TP, ALB, GLOB, AGRAT, SGOT, ALT, GPT  CBC: No results found for: WBC, HGB, HGBEXT, HCT, HCTEXT, PLT, PLTEXT, HGBEXT, HCTEXT, PLTEXT        Assessment/Plan     Principal Problem:    Rhabdomyolysis (6/13/2018)    Active Problems:    Bipolar 1 disorder (HCC) ()      Overview: seeing Psychiatrist      Depression ()      DM2 (diabetes mellitus, type 2) ([facility] (9/6/2013)      Atrial fibrillation with RVR ([facility] (6/13/2018)        Plan:  AMS mostly likely due to infectious and metabolic derangements. CT Head and MRI Brain negative. Continue routine neuro checks and neurology following and appreciate help. Rhabdomyolysis improving with hydration as CPK trending down. Continue D 5 0.45 NS at 150 cc/hr as pt with hx of ethanol use and will repeat CPK in AM.  Hyperammonemia. Ammonia today improved at 36. Continue lactulose and will recheck Ammonia in AM.  Pyuria. Continue Levaquin and will repeat UA today. PAM - Resolved with hydration  Transaminitis - improving with AST:ALT greater than 2:1 suggestive of ethanol use.  Lipitor discontinued yesterday and will repeat LFT in AM.  Hx of Ethanol use per chart. Continue MVT, Thiamine, and Folic acid. On CIWA protocol and continue D 5 0.45 NS  GI prophylaxis. Continue Protonix 40 mg IV daily. Hypophosphatemia. Phos today yesterday at 1.6. Repleted and will recheck Phos in AM  DVT prophylaxis.  Heparin          Ana Huntley DO, MPH  Internal Medicine

## 2018-06-17 NOTE — PROGRESS NOTES
Problem: Pressure Injury - Risk of  Goal: *Prevention of pressure injury  Document Ryan Scale and appropriate interventions in the flowsheet.    Outcome: Progressing Towards Goal  Pressure Injury Interventions:  Sensory Interventions: Assess changes in LOC    Moisture Interventions: Absorbent underpads    Activity Interventions: Pressure redistribution bed/mattress(bed type)    Mobility Interventions: Pressure redistribution bed/mattress (bed type)    Nutrition Interventions: Document food/fluid/supplement intake    Friction and Shear Interventions: HOB 30 degrees or less

## 2018-06-17 NOTE — PROGRESS NOTES
1920 - Assumed pt care from [address]. Pt in bed, alert and oriented x 4. Not in any form of distress. Denies pain. Frequent use items and call bell within reach. Verbalized understanding to call for assistance. Bed locked in lowest position. Dual NIH completed. 0720 - Bedside and Verbal shift change report given to Eleni Charles RN (oncoming nurse) by Princess Alcala RN (offgoing nurse). Report included the following information SBAR, Kardex, Intake/Output, MAR and Recent Results. Dual [facility] completed.

## 2018-06-18 LAB
ALBUMIN SERPL-MCNC: 1.8 G/DL (ref 3.4–5)
ALBUMIN/GLOB SERPL: 0.5 {RATIO} (ref 0.8–1.7)
ALP SERPL-CCNC: 34 U/L (ref 45–117)
ALT SERPL-CCNC: 68 U/L (ref 13–56)
AMMONIA PLAS-SCNC: 58 UMOL/L (ref 11–32)
ANION GAP SERPL CALC-SCNC: 8 MMOL/L (ref 3–18)
AST SERPL-CCNC: 92 U/L (ref 15–37)
BASOPHILS # BLD: 0 K/UL (ref 0–0.06)
BASOPHILS NFR BLD: 0 % (ref 0–2)
BILIRUB SERPL-MCNC: 0.3 MG/DL (ref 0.2–1)
BUN SERPL-MCNC: 18 MG/DL (ref 7–18)
BUN/CREAT SERPL: 21 (ref 12–20)
CALCIUM SERPL-MCNC: 7.9 MG/DL (ref 8.5–10.1)
CHLORIDE SERPL-SCNC: 108 MMOL/L (ref 100–108)
CK SERPL-CCNC: 516 U/L (ref 26–192)
CO2 SERPL-SCNC: 27 MMOL/L (ref 21–32)
CREAT SERPL-MCNC: 0.86 MG/DL (ref 0.6–1.3)
DIFFERENTIAL METHOD BLD: ABNORMAL
EOSINOPHIL # BLD: 0.2 K/UL (ref 0–0.4)
EOSINOPHIL NFR BLD: 2 % (ref 0–5)
ERYTHROCYTE [DISTWIDTH] IN BLOOD BY AUTOMATED COUNT: 13.9 % (ref 11.6–14.5)
GLOBULIN SER CALC-MCNC: 3.7 G/DL (ref 2–4)
GLUCOSE BLD STRIP.AUTO-MCNC: 133 MG/DL (ref 70–110)
GLUCOSE BLD STRIP.AUTO-MCNC: 149 MG/DL (ref 70–110)
GLUCOSE BLD STRIP.AUTO-MCNC: 181 MG/DL (ref 70–110)
GLUCOSE BLD STRIP.AUTO-MCNC: 196 MG/DL (ref 70–110)
GLUCOSE SERPL-MCNC: 117 MG/DL (ref 74–99)
HCT VFR BLD AUTO: 34.9 % (ref 35–45)
HGB BLD-MCNC: 11.1 G/DL (ref 12–16)
LYMPHOCYTES # BLD: 1.9 K/UL (ref 0.9–3.6)
LYMPHOCYTES NFR BLD: 22 % (ref 21–52)
MCH RBC QN AUTO: 26.8 PG (ref 24–34)
MCHC RBC AUTO-ENTMCNC: 31.8 G/DL (ref 31–37)
MCV RBC AUTO: 84.3 FL (ref 74–97)
MONOCYTES # BLD: 1.1 K/UL (ref 0.05–1.2)
MONOCYTES NFR BLD: 13 % (ref 3–10)
NEUTS SEG # BLD: 5.3 K/UL (ref 1.8–8)
NEUTS SEG NFR BLD: 63 % (ref 40–73)
PHOSPHATE SERPL-MCNC: 2.7 MG/DL (ref 2.5–4.9)
PLATELET # BLD AUTO: 236 K/UL (ref 135–420)
PMV BLD AUTO: 11 FL (ref 9.2–11.8)
POTASSIUM SERPL-SCNC: 4.2 MMOL/L (ref 3.5–5.5)
PROT SERPL-MCNC: 5.5 G/DL (ref 6.4–8.2)
RBC # BLD AUTO: 4.14 M/UL (ref 4.2–5.3)
SODIUM SERPL-SCNC: 143 MMOL/L (ref 136–145)
WBC # BLD AUTO: 8.4 K/UL (ref 4.6–13.2)

## 2018-06-18 PROCEDURE — 74011250637 HC RX REV CODE- 250/637: Performed by: HOSPITALIST

## 2018-06-18 PROCEDURE — 77010033678 HC OXYGEN DAILY

## 2018-06-18 PROCEDURE — 82550 ASSAY OF CK (CPK): CPT | Performed by: HOSPITALIST

## 2018-06-18 PROCEDURE — 74011000258 HC RX REV CODE- 258: Performed by: HOSPITALIST

## 2018-06-18 PROCEDURE — 65270000029 HC RM PRIVATE

## 2018-06-18 PROCEDURE — 92523 SPEECH SOUND LANG COMPREHEN: CPT

## 2018-06-18 PROCEDURE — 74011636637 HC RX REV CODE- 636/637: Performed by: NURSE PRACTITIONER

## 2018-06-18 PROCEDURE — 85025 COMPLETE CBC W/AUTO DIFF WBC: CPT | Performed by: HOSPITALIST

## 2018-06-18 PROCEDURE — 74011250636 HC RX REV CODE- 250/636: Performed by: NURSE PRACTITIONER

## 2018-06-18 PROCEDURE — 80053 COMPREHEN METABOLIC PANEL: CPT | Performed by: HOSPITALIST

## 2018-06-18 PROCEDURE — 84100 ASSAY OF PHOSPHORUS: CPT | Performed by: HOSPITALIST

## 2018-06-18 PROCEDURE — 36415 COLL VENOUS BLD VENIPUNCTURE: CPT | Performed by: HOSPITALIST

## 2018-06-18 PROCEDURE — 77030038269 HC DRN EXT URIN PURWCK BARD -A

## 2018-06-18 PROCEDURE — 82962 GLUCOSE BLOOD TEST: CPT

## 2018-06-18 PROCEDURE — C9113 INJ PANTOPRAZOLE SODIUM, VIA: HCPCS | Performed by: HOSPITALIST

## 2018-06-18 PROCEDURE — 74011250636 HC RX REV CODE- 250/636: Performed by: HOSPITALIST

## 2018-06-18 PROCEDURE — 74011250636 HC RX REV CODE- 250/636: Performed by: INTERNAL MEDICINE

## 2018-06-18 PROCEDURE — 82140 ASSAY OF AMMONIA: CPT | Performed by: HOSPITALIST

## 2018-06-18 PROCEDURE — 92526 ORAL FUNCTION THERAPY: CPT

## 2018-06-18 PROCEDURE — 74011250637 HC RX REV CODE- 250/637: Performed by: PSYCHIATRY & NEUROLOGY

## 2018-06-18 RX ADMIN — INSULIN LISPRO 2 UNITS: 100 INJECTION, SOLUTION INTRAVENOUS; SUBCUTANEOUS at 21:38

## 2018-06-18 RX ADMIN — PANTOPRAZOLE SODIUM 40 MG: 40 INJECTION, POWDER, FOR SOLUTION INTRAVENOUS at 10:36

## 2018-06-18 RX ADMIN — FOLIC ACID 1 MG: 1 TABLET ORAL at 09:45

## 2018-06-18 RX ADMIN — DEXTROSE MONOHYDRATE AND SODIUM CHLORIDE 150 ML/HR: 5; .45 INJECTION, SOLUTION INTRAVENOUS at 09:46

## 2018-06-18 RX ADMIN — POTASSIUM & SODIUM PHOSPHATES POWDER PACK 280-160-250 MG 1 PACKET: 280-160-250 PACK at 09:45

## 2018-06-18 RX ADMIN — DEXTROSE MONOHYDRATE AND SODIUM CHLORIDE 150 ML/HR: 5; .45 INJECTION, SOLUTION INTRAVENOUS at 02:55

## 2018-06-18 RX ADMIN — HEPARIN SODIUM 5000 UNITS: 5000 INJECTION, SOLUTION INTRAVENOUS; SUBCUTANEOUS at 17:34

## 2018-06-18 RX ADMIN — THERA TABS 1 TABLET: TAB at 09:45

## 2018-06-18 RX ADMIN — LACTULOSE 20 G: 20 SOLUTION ORAL at 17:34

## 2018-06-18 RX ADMIN — LACTULOSE 20 G: 20 SOLUTION ORAL at 09:45

## 2018-06-18 RX ADMIN — HEPARIN SODIUM 5000 UNITS: 5000 INJECTION, SOLUTION INTRAVENOUS; SUBCUTANEOUS at 10:36

## 2018-06-18 RX ADMIN — DEXTROSE MONOHYDRATE AND SODIUM CHLORIDE 150 ML/HR: 5; .45 INJECTION, SOLUTION INTRAVENOUS at 17:35

## 2018-06-18 RX ADMIN — ASPIRIN 81 MG 81 MG: 81 TABLET ORAL at 09:45

## 2018-06-18 RX ADMIN — INSULIN LISPRO 2 UNITS: 100 INJECTION, SOLUTION INTRAVENOUS; SUBCUTANEOUS at 12:41

## 2018-06-18 RX ADMIN — Medication 100 MG: at 09:45

## 2018-06-18 RX ADMIN — LEVOFLOXACIN 750 MG: 5 INJECTION, SOLUTION INTRAVENOUS at 12:41

## 2018-06-18 RX ADMIN — HEPARIN SODIUM 5000 UNITS: 5000 INJECTION, SOLUTION INTRAVENOUS; SUBCUTANEOUS at 02:56

## 2018-06-18 NOTE — PROGRESS NOTES
Problem: Dysphagia (Adult)  Goal: *Acute Goals and Plan of Care (Insert Text)  Recommendations:  Diet: Mech-soft/thin liquid   Meds: Whole in puree  Aspiration Precautions  Oral Care TID  No straws, small sips, supervision with PO    Goals:  Patient will:  1. Tolerate PO trials with 0 s/s overt distress in 4/5 trials  2. Utilize compensatory swallow strategies/maneuvers (decrease bite/sip, size/rate, alt. liq/sol) with min cues in 4/5 trials  3. Perform oral-motor/laryngeal exercises to increase oropharyngeal swallow function with min cues  4. Complete an objective swallow study (i.e., MBSS) to assess swallow integrity, r/o aspiration, and determine of safest LRD, min A       Outcome: Progressing Towards Goal  Speech language pathology Speech Evaluation & dysphagia treatment     Patient: Marco Diego (66 y.o. female)  Date: 6/18/2018  Diagnosis: Atrial fibrillation with RVR (HCC)  Rhabdomyolysis Rhabdomyolysis       Precautions: Aspiration Skin  PLOF: Independent     ASSESSMENT:  Followed up this day with dysphagia tx. Pt A&Ox4. Pt accepted self-fed thin liquid, NTL, puree and mech-soft solids; no overt s/s of aspiration. Delayed mastication with solids. Pt impulsive, required mod cues to reduce rate of intake with thin liquid via straw. Recommend mech-soft/thin liquid diet with aspiration precautions, no straws and single sips. D/w RN, Otilia Patel. Completed dysarthria assessment using the Harrison Dysarthria Assessment Tool. Pt presents with moderate dysarthria c/b blended word boundaries, inconsistent, imprecise articulation errors, and phonation breaks. Introduced S-O-S (Speak Up, Over-articulate, Slow Down) compensatory strategies. Pt utilized x2 with mod verbal cues. Needs reinforcement. ST will continue to follow as indicated.      Progression toward goals:  [x]         Improving appropriately and progressing toward goals  []         Improving slowly and progressing toward goals  []         Not making progress toward goals and plan of care will be adjusted     PLAN:  Recommendations and Planned Interventions:  Mech-soft, thin liquid NO STRAWS, SINGLE SIPS  Patient continues to benefit from skilled intervention to address the above impairments. Continue treatment per established plan of care. Discharge Recommendations:  Skilled Nursing Facility     SUBJECTIVE:   Patient stated I get food? . OBJECTIVE:   Cognitive and Communication Status:  Neurologic State: Alert  Orientation Level: Oriented X4  Cognition: Follows commands, Impulsive  Perception: Appears intact  Perseveration: No perseveration noted  Safety/Judgement: Decreased awareness of need for safety  Dysphagia Treatment:  Oral Assessment:  Oral Assessment  Labial: Impaired coordination  Dentition: Natural  Oral Hygiene: Fair  Lingual: Incoordinated  Velum: No impairment  Mandible: No impairment  P.O. Trials:   Patient Position: HOB 60   Vocal quality prior to P.O.: Low volume   Consistency Presented: Thin liquid, Solid, Pudding   How Presented: Straw, Cup/sip, Self-fed/presented       Bolus Acceptance: No impairment   Bolus Formation/Control: Impaired   Type of Impairment: Delayed, Mastication   Propulsion: Discoordination   Oral Residue: Lingual, Less than 10% of bolus   Initiation of Swallow: No impairment   Laryngeal Elevation: Functional   Aspiration Signs/Symptoms: Clear throat   Pharyngeal Phase Characteristics: Suspected pharyngeal residue   Effective Modifications: Cup/sip, Small sips and bites   Cues for Modifications:  Moderate         Oral Phase Severity: Mild   Pharyngeal Phase Severity : Mild     PAIN:  Start of Tx: 0  End of Tx: 0     GCODESwallowing:  Swallow Current Status CK= 40-59%   Swallow Goal Status CI= 1-19%    The severity rating is based on the following outcomes:  RIVER [place] Swallow Level 4    Clinical Judgement    After treatment:   []              Patient left in no apparent distress sitting up in chair  [x] Patient left in no apparent distress in bed  [x]              Call bell left within reach  [x]              Nursing notified  []              Family present  []              Caregiver present  []              Bed alarm activated      COMMUNICATION/EDUCATION:   [x] Safe swallowing guidelines; compensatory techniques  []        Patient unable to participate in education; education ongoing with staff  [x]  Posted safety precautions in patient's room.   [] Oral-motor/laryngeal strengthening exercises      ALLISON Talbert  Time Calculation: 22 min  Swallow Treatment: 11  Speech Evaluation: 11

## 2018-06-18 NOTE — PROGRESS NOTES
Problem: Falls - Risk of  Goal: *Absence of Falls  Document Radha Fall Risk and appropriate interventions in the flowsheet. Outcome: Progressing Towards Goal  Fall Risk Interventions:  Mobility Interventions: Communicate number of staff needed for ambulation/transfer         Medication Interventions: Patient to call before getting OOB    Elimination Interventions: Call light in reach    History of Falls Interventions: Door open when patient unattended        Problem: Pressure Injury - Risk of  Goal: *Prevention of pressure injury  Document Ryan Scale and appropriate interventions in the flowsheet.    Outcome: Progressing Towards Goal  Pressure Injury Interventions:  Sensory Interventions: Assess changes in LOC    Moisture Interventions: Internal/External urinary devices    Activity Interventions: Pressure redistribution bed/mattress(bed type)    Mobility Interventions: Pressure redistribution bed/mattress (bed type)    Nutrition Interventions: Document food/fluid/supplement intake    Friction and Shear Interventions: HOB 30 degrees or less

## 2018-06-18 NOTE — PROGRESS NOTES
Patient received in bed awake. Patient alert and oriented X4, denies pain and discomfort. Patient resting quietly. Frequent use items within reach. Bed locked in low position. Call bell within reach and patient verbalized understanding of use for assistance and needs. 1920: Bedside and Verbal shift change report given to Honorio Mcdonald RN (oncoming nurse) by Shilo Brooks RN BSN (offgoing nurse). Report included the following information SBAR, Kardex, Intake/Output, MAR and Recent Results.

## 2018-06-18 NOTE — INTERDISCIPLINARY ROUNDS
IDR Summary      Patient: Marco Diego MRN: [id number]    Age: 59 y.o.  : 1960     Admit Diagnosis: Atrial fibrillation with RVR ([address]  Rhabdomyolysis        DIET status: Dysphagia     Lines/Tubes:   IV: YES   Needed: YES  [place]: NO  Needed:NO  Central Line: NO  Needed: NO      VTE Prophylaxis: Chemical    Mobility needs: Yes     PT ordered:  YES PT eval on chart: YES    OT ordered:  YES OT eval on chart: YES      ST ordered:  YES ST eval on chart:  YES     Disposition/Care Management:  Discharge plan: SNF     Home Health ordered? NO     Recommended DME from PT/OT:      DME ordered? NO     SNF- has patient been matched? YES    Accepting bed?   NO       Barriers to discharge:   Financial concerns:No   PCP: Jackie Ramos MD    : NO  Interventions:       LOS: 5 days     Expected days until discharge: TBD           Signed:     JEN Watson  [address]  Hospitalist Division  Pager:  [phone]  Office:  [phone]

## 2018-06-18 NOTE — PROGRESS NOTES
Patient is unable to communicate at this time as she is resting peacefully at present. No family at time of visit.  offered prayer and left Spiritual Care brochure. Chaplains will continue to follow and will provide pastoral care on an as needed/requested basis.     [facility]   [phone]

## 2018-06-18 NOTE — PROGRESS NOTES
Spoke with pt and dtr, therapy recommends snf, pt agrees. Shakeel luna, then 2 places in [employer]. Posted in [employer] and epic.

## 2018-06-18 NOTE — PROGRESS NOTES
Progress Note      Patient: Marty Corona               Sex: female          DOA: 6/13/2018       YOB: 1960      Age:  59 y.o.        LOS:  LOS: 5 days             CHIEF COMPLAINT:    Subjective:     No significant events noted and she was sleeping at time of my evaluation. Objective:      Visit Vitals    /82 (BP 1 Location: Left arm, BP Patient Position: At rest)    Pulse (!) 103    Temp 98.3 °F (36.8 °C)    Resp 16    Ht 5' 7\" (1.702 m)    Wt 199 lb (90.3 kg)    SpO2 98%    Breastfeeding No    BMI 31.17 kg/m2       Physical Exam:  GEN: Sleeping in no distress  CVS: Normal S1S2, RRR  RESP: CTAB  ABD: Soft, non distended, +BS  EXT: No edema noted  NEURO: Could not be assessed as sleeping.         Lab/Data Reviewed:  CMP:   Lab Results   Component Value Date/Time     06/18/2018 04:00 AM    K 4.2 06/18/2018 04:00 AM     06/18/2018 04:00 AM    CO2 27 06/18/2018 04:00 AM    AGAP 8 06/18/2018 04:00 AM     (H) 06/18/2018 04:00 AM    BUN 18 06/18/2018 04:00 AM    CREA 0.86 06/18/2018 04:00 AM    GFRAA >60 06/18/2018 04:00 AM    GFRNA >60 06/18/2018 04:00 AM    CA 7.9 (L) 06/18/2018 04:00 AM    PHOS 2.7 06/18/2018 04:00 AM    ALB 1.8 (L) 06/18/2018 04:00 AM    TP 5.5 (L) 06/18/2018 04:00 AM    GLOB 3.7 06/18/2018 04:00 AM    AGRAT 0.5 (L) 06/18/2018 04:00 AM    SGOT 92 (H) 06/18/2018 04:00 AM    ALT 68 (H) 06/18/2018 04:00 AM     CBC:   Lab Results   Component Value Date/Time    WBC 8.4 06/18/2018 04:00 AM    HGB 11.1 (L) 06/18/2018 04:00 AM    HCT 34.9 (L) 06/18/2018 04:00 AM     06/18/2018 04:00 AM           Assessment/Plan     Principal Problem:    Rhabdomyolysis (6/13/2018)    Active Problems:    Bipolar 1 disorder (HCC) ()      Overview: seeing Psychiatrist      Depression ()      DM2 (diabetes mellitus, type 2) ([facility] (9/6/2013)      Atrial fibrillation with RVR ([facility] (6/13/2018)        Plan:  AMS mostly likely due to infectious and metabolic derangements. CT Head and MRI Brain negative. Continue routine neuro checks and neurology following and appreciate help. Rhabdomyolysis improving with hydration as CPK trending down today decreased at 516. Continue D 5 0.45 NS at 150 cc/hr as pt with hx of ethanol use and will repeat CPK in AM.  Hyperammonemia. Ammonia today increased at 58. Continue lactulose and will recheck Ammonia in AM.  Pyuria. Continue Levaquin and repeat UA was ordered yesterday but was not done. PAM - Resolved with hydration  Transaminitis - improving with AST today decreased at 92 but ALT increased at 68. Lipitor has been discontinued and will repeat LFT in AM.  Hx of Ethanol use per chart. Continue MVT, Thiamine, and Folic acid. On CIWA protocol and continue D 5 0.45 NS  GI prophylaxis. Continue Protonix 40 mg IV daily. Hypophosphatemia. Phos repleted and normal today at 2.7. Neutraphos discontinued. DVT prophylaxis.  Heparin  Disposition: PT recommends SNF and CM working on SNF placement.             Dot Pimentel DO, MPH  Internal Medicine

## 2018-06-18 NOTE — PROGRESS NOTES
2010: Assumed patient care. Received report from [address], RN (offgoing nurse). Report included SBAR, Kardex, and MAR. Patient resting in bed, in no signs of distress. When asked if in pain, patient stated \"I'm more sore than anything. \" This nurse stated she will bring tylenol when finished with rounds. Possessions and call light in reach, bed in lowest position. 2032: Tylenol given for pain in lower legs- patient rates pain as 7/10      2115: When asked if patient is more comfortable patient stated \"a little\" but is unable to rate on pain scale 1-10. Repositioned patient with pillow under left leg. Patient states this is more comfortable    2310: Asked patient if pain in legs was relieved, patient stated yes. When asked to rate on a scale 1-10, patient replied \"It's hard to say when i'm laying still like this\". Applied warm compress to patient's legs. Patient stated she felt much better. Changed periwick and provided perineal care. Offered patient bath, patient declined    0005: Patient sleeping    [id number]: Bedside and Verbal shift change report given to Jocelyne Carr RN (oncoming nurse) by Tamiko Pratt RN (offgoing nurse). Report included the following information SBAR, Kardex and MAR.

## 2018-06-19 LAB
ALBUMIN SERPL-MCNC: 1.8 G/DL (ref 3.4–5)
ALBUMIN/GLOB SERPL: 0.5 {RATIO} (ref 0.8–1.7)
ALP SERPL-CCNC: 34 U/L (ref 45–117)
ALT SERPL-CCNC: 71 U/L (ref 13–56)
AMMONIA PLAS-SCNC: 28 UMOL/L (ref 11–32)
ANION GAP SERPL CALC-SCNC: 5 MMOL/L (ref 3–18)
AST SERPL-CCNC: 79 U/L (ref 15–37)
BASOPHILS # BLD: 0 K/UL (ref 0–0.06)
BASOPHILS NFR BLD: 0 % (ref 0–2)
BILIRUB SERPL-MCNC: 0.3 MG/DL (ref 0.2–1)
BUN SERPL-MCNC: 11 MG/DL (ref 7–18)
BUN/CREAT SERPL: 13 (ref 12–20)
CALCIUM SERPL-MCNC: 8.2 MG/DL (ref 8.5–10.1)
CHLORIDE SERPL-SCNC: 108 MMOL/L (ref 100–108)
CK SERPL-CCNC: 262 U/L (ref 26–192)
CO2 SERPL-SCNC: 29 MMOL/L (ref 21–32)
CREAT SERPL-MCNC: 0.86 MG/DL (ref 0.6–1.3)
DIFFERENTIAL METHOD BLD: ABNORMAL
EOSINOPHIL # BLD: 0.2 K/UL (ref 0–0.4)
EOSINOPHIL NFR BLD: 2 % (ref 0–5)
ERYTHROCYTE [DISTWIDTH] IN BLOOD BY AUTOMATED COUNT: 13.9 % (ref 11.6–14.5)
GLOBULIN SER CALC-MCNC: 3.4 G/DL (ref 2–4)
GLUCOSE BLD STRIP.AUTO-MCNC: 128 MG/DL (ref 70–110)
GLUCOSE BLD STRIP.AUTO-MCNC: 141 MG/DL (ref 70–110)
GLUCOSE BLD STRIP.AUTO-MCNC: 148 MG/DL (ref 70–110)
GLUCOSE BLD STRIP.AUTO-MCNC: 149 MG/DL (ref 70–110)
GLUCOSE SERPL-MCNC: 149 MG/DL (ref 74–99)
HCT VFR BLD AUTO: 33.7 % (ref 35–45)
HGB BLD-MCNC: 10.7 G/DL (ref 12–16)
LYMPHOCYTES # BLD: 1.5 K/UL (ref 0.9–3.6)
LYMPHOCYTES NFR BLD: 19 % (ref 21–52)
MCH RBC QN AUTO: 26.9 PG (ref 24–34)
MCHC RBC AUTO-ENTMCNC: 31.8 G/DL (ref 31–37)
MCV RBC AUTO: 84.7 FL (ref 74–97)
MONOCYTES # BLD: 0.9 K/UL (ref 0.05–1.2)
MONOCYTES NFR BLD: 11 % (ref 3–10)
NEUTS SEG # BLD: 5.6 K/UL (ref 1.8–8)
NEUTS SEG NFR BLD: 68 % (ref 40–73)
PLATELET # BLD AUTO: 247 K/UL (ref 135–420)
PMV BLD AUTO: 11.1 FL (ref 9.2–11.8)
POTASSIUM SERPL-SCNC: 4.3 MMOL/L (ref 3.5–5.5)
PROT SERPL-MCNC: 5.2 G/DL (ref 6.4–8.2)
RBC # BLD AUTO: 3.98 M/UL (ref 4.2–5.3)
SODIUM SERPL-SCNC: 142 MMOL/L (ref 136–145)
WBC # BLD AUTO: 8.2 K/UL (ref 4.6–13.2)

## 2018-06-19 PROCEDURE — 97530 THERAPEUTIC ACTIVITIES: CPT

## 2018-06-19 PROCEDURE — 74011250636 HC RX REV CODE- 250/636: Performed by: HOSPITALIST

## 2018-06-19 PROCEDURE — 74011250637 HC RX REV CODE- 250/637: Performed by: HOSPITALIST

## 2018-06-19 PROCEDURE — 82962 GLUCOSE BLOOD TEST: CPT

## 2018-06-19 PROCEDURE — 74011250637 HC RX REV CODE- 250/637: Performed by: PSYCHIATRY & NEUROLOGY

## 2018-06-19 PROCEDURE — 74011250636 HC RX REV CODE- 250/636: Performed by: INTERNAL MEDICINE

## 2018-06-19 PROCEDURE — 74011250636 HC RX REV CODE- 250/636: Performed by: NURSE PRACTITIONER

## 2018-06-19 PROCEDURE — 77010033678 HC OXYGEN DAILY

## 2018-06-19 PROCEDURE — 82550 ASSAY OF CK (CPK): CPT | Performed by: HOSPITALIST

## 2018-06-19 PROCEDURE — 82140 ASSAY OF AMMONIA: CPT | Performed by: HOSPITALIST

## 2018-06-19 PROCEDURE — 80053 COMPREHEN METABOLIC PANEL: CPT | Performed by: HOSPITALIST

## 2018-06-19 PROCEDURE — 65270000029 HC RM PRIVATE

## 2018-06-19 PROCEDURE — 85025 COMPLETE CBC W/AUTO DIFF WBC: CPT | Performed by: HOSPITALIST

## 2018-06-19 PROCEDURE — 36415 COLL VENOUS BLD VENIPUNCTURE: CPT | Performed by: HOSPITALIST

## 2018-06-19 PROCEDURE — C9113 INJ PANTOPRAZOLE SODIUM, VIA: HCPCS | Performed by: HOSPITALIST

## 2018-06-19 PROCEDURE — 97535 SELF CARE MNGMENT TRAINING: CPT

## 2018-06-19 RX ADMIN — HEPARIN SODIUM 5000 UNITS: 5000 INJECTION, SOLUTION INTRAVENOUS; SUBCUTANEOUS at 09:45

## 2018-06-19 RX ADMIN — HEPARIN SODIUM 5000 UNITS: 5000 INJECTION, SOLUTION INTRAVENOUS; SUBCUTANEOUS at 18:03

## 2018-06-19 RX ADMIN — THERA TABS 1 TABLET: TAB at 09:44

## 2018-06-19 RX ADMIN — LEVOFLOXACIN 750 MG: 5 INJECTION, SOLUTION INTRAVENOUS at 12:34

## 2018-06-19 RX ADMIN — FOLIC ACID 1 MG: 1 TABLET ORAL at 09:44

## 2018-06-19 RX ADMIN — Medication 100 MG: at 09:44

## 2018-06-19 RX ADMIN — LACTULOSE 20 G: 20 SOLUTION ORAL at 09:44

## 2018-06-19 RX ADMIN — PANTOPRAZOLE SODIUM 40 MG: 40 INJECTION, POWDER, FOR SOLUTION INTRAVENOUS at 12:34

## 2018-06-19 RX ADMIN — ASPIRIN 81 MG 81 MG: 81 TABLET ORAL at 09:45

## 2018-06-19 RX ADMIN — HEPARIN SODIUM 5000 UNITS: 5000 INJECTION, SOLUTION INTRAVENOUS; SUBCUTANEOUS at 03:13

## 2018-06-19 RX ADMIN — LACTULOSE 20 G: 20 SOLUTION ORAL at 18:02

## 2018-06-19 NOTE — PROGRESS NOTES
Problem: Falls - Risk of  Goal: *Absence of Falls  Document Radha Fall Risk and appropriate interventions in the flowsheet. Outcome: Progressing Towards Goal  Fall Risk Interventions:  Mobility Interventions: Communicate number of staff needed for ambulation/transfer         Medication Interventions: Patient to call before getting OOB, Teach patient to arise slowly    Elimination Interventions: Call light in reach, Patient to call for help with toileting needs, Toileting schedule/hourly rounds    History of Falls Interventions: Door open when patient unattended, Investigate reason for fall, Room close to nurse's station        Problem: Pressure Injury - Risk of  Goal: *Prevention of pressure injury  Document Ryan Scale and appropriate interventions in the flowsheet.    Outcome: Progressing Towards Goal  Pressure Injury Interventions:  Sensory Interventions: Pressure redistribution bed/mattress (bed type), Minimize linen layers, Keep linens dry and wrinkle-free    Moisture Interventions: Absorbent underpads, Minimize layers, Maintain skin hydration (lotion/cream), Limit adult briefs    Activity Interventions: Pressure redistribution bed/mattress(bed type), Increase time out of bed    Mobility Interventions: Pressure redistribution bed/mattress (bed type), HOB 30 degrees or less    Nutrition Interventions: Document food/fluid/supplement intake    Friction and Shear Interventions: Apply protective barrier, creams and emollients, HOB 30 degrees or less, Minimize layers

## 2018-06-19 NOTE — PROGRESS NOTES
Assumed care of pt from [facility]. Alert and oriented x 4. Report included SBAR, MAR, kardex. Frequent use items within reach. Bed locked in lowest position. Call bell within reach. Pt verbalizes understanding to call for assistance.

## 2018-06-19 NOTE — PROGRESS NOTES
Problem: Falls - Risk of  Goal: *Absence of Falls  Document Radha Fall Risk and appropriate interventions in the flowsheet.    Outcome: Progressing Towards Goal  Fall Risk Interventions:  Mobility Interventions: Communicate number of staff needed for ambulation/transfer         Medication Interventions: Patient to call before getting OOB, Teach patient to arise slowly    Elimination Interventions: Call light in reach, Patient to call for help with toileting needs, Toileting schedule/hourly rounds    History of Falls Interventions: Door open when patient unattended, Investigate reason for fall, Room close to nurse's station

## 2018-06-19 NOTE — PROGRESS NOTES
In Patient's room assisting with placing new peripheral IV site. See Lines. Patient awake; call bell w/in reach.

## 2018-06-19 NOTE — PROGRESS NOTES
Problem: Pressure Injury - Risk of  Goal: *Prevention of pressure injury  Document Ryan Scale and appropriate interventions in the flowsheet.    Outcome: Progressing Towards Goal  Pressure Injury Interventions:  Sensory Interventions: Pressure redistribution bed/mattress (bed type), Minimize linen layers, Keep linens dry and wrinkle-free    Moisture Interventions: Absorbent underpads, Minimize layers, Maintain skin hydration (lotion/cream), Limit adult briefs    Activity Interventions: Pressure redistribution bed/mattress(bed type), Increase time out of bed    Mobility Interventions: Pressure redistribution bed/mattress (bed type), HOB 30 degrees or less    Nutrition Interventions: Document food/fluid/supplement intake    Friction and Shear Interventions: Apply protective barrier, creams and emollients, HOB 30 degrees or less, Minimize layers

## 2018-06-19 NOTE — PROGRESS NOTES
Problem: Falls - Risk of  Goal: *Absence of Falls  Document Radha Fall Risk and appropriate interventions in the flowsheet. Outcome: Progressing Towards Goal  Fall Risk Interventions:  Mobility Interventions: Communicate number of staff needed for ambulation/transfer, PT Consult for mobility concerns         Medication Interventions: Patient to call before getting OOB    Elimination Interventions: Patient to call for help with toileting needs, Call light in reach    History of Falls Interventions: Door open when patient unattended        Problem: Pressure Injury - Risk of  Goal: *Prevention of pressure injury  Document Ryan Scale and appropriate interventions in the flowsheet.    Outcome: Progressing Towards Goal  Pressure Injury Interventions:  Sensory Interventions: Assess changes in LOC, Keep linens dry and wrinkle-free, Minimize linen layers    Moisture Interventions: Internal/External urinary devices, Minimize layers, Assess need for specialty bed    Activity Interventions: Pressure redistribution bed/mattress(bed type)    Mobility Interventions: Pressure redistribution bed/mattress (bed type)    Nutrition Interventions: Document food/fluid/supplement intake    Friction and Shear Interventions: HOB 30 degrees or less

## 2018-06-19 NOTE — CDMP QUERY
Please clarify if this patient is being treated/managed for:  =>Pneumonia  or  =>pleural effusion or  =>Other Explanation of clinical findings  =>Unable to Determine (no explanation of clinical findings)    The medical record reflects the following:    Risk: 60 yo female admitted with possible CVA, AMS, rhabdomyolysis,    Clinical Indicators: Per CXR 6/15  Right lower lobe infiltrate without change, interval developing smaller left  lower lobe infiltrate. Cannot exclude minimal bilateral pleural effusions. Treatment: IV levaquin, serial CXR    Please clarify and document your clinical opinion in the progress notes and discharge summary including the definitive and/or presumptive diagnosis, (suspected or probable), related to the above clinical findings. Please include clinical findings supporting your diagnosis. If you DECLINE this query or would like to communicate with [employer], please utilize the \"ciValue message box\" at the TOP of the Progress Note on the right.       Thank you,  Randa Han RN   [phone]

## 2018-06-19 NOTE — PROGRESS NOTES
Bedside and Verbal shift change report given to Malcolm Mejia RN (oncoming nurse) by Koko Pope RN (offgoing nurse). Report included the following information SBAR, Kardex, Intake/Output, MAR, Recent Results and Cardiac Rhythm NSR.

## 2018-06-19 NOTE — PROGRESS NOTES
Progress Note      Patient: Bren Wilson               Sex: female          DOA: 6/13/2018       YOB: 1960      Age:  59 y.o.        LOS:  LOS: 6 days             CHIEF COMPLAINT:  Rhabdomyolysis    Subjective:     Patient feels okay  She remains weak    Objective:      Visit Vitals    /73    Pulse 95    Temp 97.6 °F (36.4 °C)    Resp 16    Ht 5' 7\" (1.702 m)    Wt 90.3 kg (199 lb)    SpO2 98%    Breastfeeding No    BMI 31.17 kg/m2       Physical Exam:  Gen:  No distress, no complaint  Lungs:  Clear bilaterally, no wheeze or rhonchi  Heart:  Regular rate and rhythm, no murmurs or gallops  Abdomen:  Soft, non-tender, normal bowel sounds        Lab/Data Reviewed:  BMP:   Lab Results   Component Value Date/Time     06/19/2018 04:45 AM    K 4.3 06/19/2018 04:45 AM     06/19/2018 04:45 AM    CO2 29 06/19/2018 04:45 AM    AGAP 5 06/19/2018 04:45 AM     (H) 06/19/2018 04:45 AM    BUN 11 06/19/2018 04:45 AM    CREA 0.86 06/19/2018 04:45 AM    GFRAA >60 06/19/2018 04:45 AM    GFRNA >60 06/19/2018 04:45 AM     CBC:   Lab Results   Component Value Date/Time    WBC 8.2 06/19/2018 04:45 AM    HGB 10.7 (L) 06/19/2018 04:45 AM    HCT 33.7 (L) 06/19/2018 04:45 AM     06/19/2018 04:45 AM           Assessment/Plan     Principal Problem:    Rhabdomyolysis (6/13/2018)    Active Problems:    Bipolar 1 disorder (HCC) ()      Overview: seeing Psychiatrist      Depression ()      DM2 (diabetes mellitus, type 2) ([facility] (9/6/2013)      Atrial fibrillation with RVR ([facility] (6/13/2018)        Plan:  Following CK  Mobilize  BS control  Working toward disposition.

## 2018-06-19 NOTE — PROGRESS NOTES
Nutrition follow-up/Plan of care      RECOMMENDATIONS:     1. Mech Soft; no straws per SLP  2. Monitor weight, labs and PO intake  3. RD to follow     GOALS:     1. Ongoing: PO intake meets >75% of protein/calorie needs by 6/24  2. Ongoing: Weight Maintenance (+/- 1-2 lb) by 6/26      ASSESSMENT:     Weight status is classified as overweight per BMI of 31.2 PO intake is fair to good. Labs noted. Elevated triglyceride level (326) and elevated LFT's. Nutrition recommendations listed. RD to follow. Nutrition Diagnoses: Altered nutrition related lab values related to hyperlipidemia as evidenced by triglyceride level of 326. Nutrition Risk:  [] High  [x] Moderate []  Low    SUBJECTIVE/OBJECTIVE:      Admitted with confusion and stroke workup. Patent with history of bipolar disorder, diabetes, HTN, GERD. Reviewed SLP note. Patient with moderate oropharyngeal dysphagia and recommends pureed diet and NTL. Patient just got from having tests and hasn't started lunch meal. Per RN, patient had 75% intake of breakfast this morning. Patient is a poor historian. Not appropriate for education at this time. Will attempt to provide education (high triglycerides) at follow-up. (6/19): Pt with fair to good PO intake per vitals. SLP following and (6/18) recommended mech-soft/thin liquid diet with aspiration precautions, no straws and single sips. Pt seen in room eating her lunch. Attempted to educate Pt on triglyceride levels but during discussion Pt was having a \"dizzy spell. \" Ana Whitfield, who assisted Pt. Encouraged intake, left handouts and will attempt to follow up to answer any questions at later time.      Information Obtained from:    [x] Chart Review   [x] Patient   [] Family/Caregiver   [x] Nurse/Physician   [] Interdisciplinary Meeting/Rounds    Diet:Mech Alt Diet no straws  Medications: [x] Reviewed   IVF:D5 1/2 NS @150 mL/hr (612 kcal/d)  Lactulose, Lipitor, Folvite, MVI, B1  Allergies: [x] Reviewed Encounter Diagnoses     ICD-10-CM ICD-9-CM   1. Confusion R41.0 298.9   2. Atrial flutter, unspecified type ([place] [address] I48.92 427.32   3. PAM (acute kidney injury) ([place] [address] N17.9 584.9   4.  Elevated CPK R74.8 790.5     Past Medical History:   Diagnosis Date    Alcohol dependence in remission ([place] [id number]     alcohol withdrawal discharged 05/31/2013    Arthritis     knees and back    Bipolar 1 disorder ([facility]     seeing Psychiatrist    Chronic obstructive pulmonary disease ([place] [address]     Depression     Diabetes mellitus ([place] [address]     DJD (degenerative joint disease) of knee     DM2 (diabetes mellitus, type 2) ([place] [address]     borderline    Eustachian tube dysfunction     Dr. 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 abuse    Psoriasis       Labs:    Lab Results   Component Value Date/Time    Sodium 142 06/19/2018 04:45 AM    Potassium 4.3 06/19/2018 04:45 AM    Chloride 108 06/19/2018 04:45 AM    CO2 29 06/19/2018 04:45 AM    Anion gap 5 06/19/2018 04:45 AM    Glucose 149 (H) 06/19/2018 04:45 AM    BUN 11 06/19/2018 04:45 AM    Creatinine 0.86 06/19/2018 04:45 AM    Calcium 8.2 (L) 06/19/2018 04:45 AM    Magnesium 3.4 (H) 06/13/2018 09:52 AM    Phosphorus 2.7 06/18/2018 04:00 AM    Albumin 1.8 (L) 06/19/2018 04:45 AM     Anthropometrics: BMI (calculated): 31.2  Last 3 Recorded Weights in this Encounter    06/14/18 0410 06/15/18 1247 06/18/18 0524   Weight: 83.3 kg (183 lb 9.6 oz) 83.3 kg (183 lb 10.3 oz) 90.3 kg (199 lb)      Ht Readings from Last 1 Encounters:   06/15/18 5' 7\" (1.702 m)     Patient Vitals for the past 100 hrs:   % Diet Eaten   06/19/18 1413 50 %   06/19/18 1014 75 %   06/17/18 0918 100 %   06/16/18 1813 50 %   06/16/18 1251 50 %   06/16/18 0900 100 %       Estimated Nutrition Needs: [x] MSJ  [] Other:  Calories: 1875 Kcal Based on:   [x] Actual BW    Protein:    g Based on:   [x] Actual BW Fluid:       2000 ml Based on:   [x] Actual BW        Nutrition Problems Identified:   [] Suboptimal PO intake   [] Food Allergies  [x] Difficulty chewing/swallowing/poor dentition  [] Constipation/Diarrhea   [] Nausea/Vomiting   [] None  [] Other:     Plan:   [x] Therapeutic Diet  []  Obtained/adjusted food preferences/tolerances and/or snacks options   []  Supplements added   [x]  SLP following for feeding techniques  []  HS snack added   [x]  Modify diet texture   []  Modify diet for food allergies   [x]  Educate patient   []  Assist with menu selection   [x]  Monitor PO intake on meal rounds   [x]  Continue inpatient monitoring and intervention   []  Participated in discharge planning/Interdisciplinary rounds/Team meetings   []  Other:     Education Needs:   [] Not appropriate for teaching at this time    [x] Identified and addressed    Nutrition Monitoring and Evaluation:  [x] Continue ongoing monitoring and intervention  [] Other    Mary Burn

## 2018-06-19 NOTE — CDMP QUERY
Please clarify if this patient is being treated/managed for:    =>metabolic encephalopathy related to effects of rhabdomyolysis, hyperammonemia, PAM  =>Other Explanation of clinical findings  =>Unable to Determine (no explanation of clinical findings)    The medical record reflects the following:    Risk: 58 yo female admitted w/AMS, rhabdomyolysis, PAM     Clinical Indicators: on admission CK 6954, bun 128 creatinine 2.42 GFR 25     Treatment: IV hydration, lactulose, serial labs    Please clarify and document your clinical opinion in the progress notes and discharge summary including the definitive and/or presumptive diagnosis, (suspected or probable), related to the above clinical findings. Please include clinical findings supporting your diagnosis. If you DECLINE this query or would like to communicate with [employer], please utilize the \"[employer] message box\" at the TOP of the Progress Note on the right.       Thank you,  Rebekah Pacheco RN   [phone]

## 2018-06-19 NOTE — PROGRESS NOTES
Assumed care of patient from [place], [address] [place] [address] (offgoing nurse). Patient awake in bed, no complaints at this time. Bed locked and in lowest position with call bell and frequently used items in reach. Patient encouraged to call for assistance. 1100- Patient up in chair with help of PT.     1925- Bedside and Verbal shift change report given to Kasia Chowdhury. RN  (oncoming nurse) by Corey Tello (offgoing nurse). Report included the following information SBAR, Kardex, Intake/Output, MAR and Recent Results.

## 2018-06-19 NOTE — PROGRESS NOTES
Problem: Mobility Impaired (Adult and Pediatric)  Goal: *Acute Goals and Plan of Care (Insert Text)  Physical Therapy Goals  Initiated 6/15/2018 and to be accomplished within 7 day(s)  1. Patient will maintain eyes open for greater than 90% of session to allow for active participation in mobility training. 2.  Patient will follow 90% of commands to maximize safety and active participation in mobility training. 3.  Patient will move from supine to sit and sit to supine  in bed with supervision/set-up. 4.  Patient will maintain seated at edge of bed for 10 min with supervision/set-up to prepare for out of bed activity. 5.  Patient will perform sit to stand with minimal assistance/contact guard assist.  6.  Patient will transfer from bed to chair and chair to bed with minimal assistance/contact guard assist using the least restrictive device. 7.  Patient will ambulate with minimal assistance/contact guard assist for 25 feet with the least restrictive device. 8.  Patient will negotiate obstacles during ambulation with supervision/set-up. Outcome: Progressing Towards Goal    PHYSICAL THERAPY: Daily TREATMENT Note   INPATIENT: Medicare: Hospital Day: 7     Patient: Chancy Fothergill (86 y.o. female)    Date: 6/19/2018  Primary Diagnosis: Atrial fibrillation with RVR (HCC)  Rhabdomyolysis    ,  ,   Precautions: Skin    Chart, physical therapy assessment, plan of care and goals were reviewed. PLOF:ind without assistive device    ASSESSMENT:  AM VISIT:  Presents sitting at EOB working w REY. FOllowing commands. Worked on weight shift R<>L in sitting for scooting forward, progressing to  sit <> stand x 3 trials w vito walker & gait belt  from EOB. Pivot transfer bed to recliner with MIN A x 1 and MAX A of a 2nd person providing VC, tactile to advance and weight shift. Once inc hair worked on LE ex., trunk mobility and deep breathing.    PM : sit <> stand and transfer trg bed <> chair; Bed mobility trg.; sit to supine MOD A for LE's ; rollign R<> L MIN to MOD A   Progression toward goals:         Improving slowly and progressing toward goals     PLAN:  Patient continues to benefit from skilled intervention to address the above impairments. Continue treatment per established plan of care. EDUCATION:   Education:  Patient was educated on the following topics:  transfer  Barriers to Learning/Limitations: physical  Compensate with: visual, verbal, tactile, kinesthetic cues/model    Discharge Recommendations:SNF  Further Equipment Recommendations for Discharge: determine once completes rehab  Factors which may impact discharge planning: currently no accepting bed for SNF as this Am LOS mtg. SUBJECTIVE:   Patient stated .    OBJECTIVE DATA SUMMARY:   Critical Behavior:  Neurologic State: Alert  Orientation Level: Oriented X4  Cognition: Follows commands  Safety/Judgement: Decreased awareness of need for safety    G CODE:Mobility  Current  CM= 80-99%   Goal  CK= 40-59%. The severity rating is based on the Other kusb    [employer] Balance Scale  1: Pt supports self with upper extremities but requires therapist assistance. Pt performs 25-50% of effort (Mod assist) CM, 80% to <100% impaired. Functional Mobility:      Functional Status      Indep   (I)   Mod I   Super-vision   Min A   Mod A   Max A   Total A   Assist x2 Verbal cues Additional time Not tested   Comments   Rolling []  []  [] [x]    [x]    []  []  [] [] [x] []    Supine to sit []  []  [] []  []  []  []  [] [] [] []    Sit to supine []  []  [] []  [x]  []  []  [] [] [] []    Sit to stand []  []  [] [x]  [x]  []  []  [] [x] [x] []    Stand to sit []  []  [] [x]  [x]  []  []  [] [] [] []    Bed to chair transfers []  []  [] []  [x]  []  []  [x] [x] [] [] Tactile ; se enote.        Balance    Good   Fair   Poor   Unable   Not tested   Comments   Sitting static [x]  []  []  []  []    Sitting dynamic []  [x]  []  []  [] Standing static []  []  [x]  []  []    Standing dynamic []  []  [x]  []  []      Mobility/Gait: pre gait only  Neuro Re-Education:  Sitting balance, standing balance, manual cueing for weight shift anad advancing LE during transfer. Therapeutic Exercises:  Seated B SH abd/add coordinating w deep breathing x  5 reps; seated trunk rotaiton <>L x 3 reps. EXERCISE   Sets   Reps   Active Active Assist   Passive Self ROM   Comments   Long Arc Quads 2 10 [] [] [] []    Seated Marching 1 10 [] [] [] [] w manual reisist       Vital Signs  Temp: 97.6 °F (36.4 °C)     Pulse (Heart Rate): 95     BP: 118/73     Resp Rate: 16     O2 Sat (%): 98 %  Pain: initially reporting no pain however note bruise L knee where pt fell PTA apparently and when offered Tylenol she indicated that would be good. Alerted RN Gavino Vaca of pt request. Primarily uncomfortable L knee w weightbearing. Pre treatment pain level:  0  Post treatment pain level:  3-4  Pain Scale 1: Numeric (0 - 10)  Pain Intensity 1: 0     Activity Tolerance:   Poor limited to bed <> chair at this time  After treatment:   Patient left in no apparent distress sitting up in chair end of AM session; in bed end PM session.   Call bell left within reach  [address] notified  `  Soledad Fowler PTA   AM Time Calculation: 19 mins ( co treating w REY)     PM:  Time Calculation: 10 mins

## 2018-06-19 NOTE — PROGRESS NOTES
Left 2 messages for [facility] and rehab to see if can accept pt. [facility] has no beds. Called dtr ms snell she gave me other choices. fax'd packets to admits.

## 2018-06-20 PROBLEM — R82.90 ABNORMAL URINALYSIS: Status: ACTIVE | Noted: 2018-06-13

## 2018-06-20 PROBLEM — G93.41 ACUTE METABOLIC ENCEPHALOPATHY: Status: ACTIVE | Noted: 2018-06-13

## 2018-06-20 PROBLEM — N17.9 ACUTE KIDNEY INJURY (HCC): Status: ACTIVE | Noted: 2018-06-13

## 2018-06-20 PROBLEM — R74.01 ELEVATED TRANSAMINASE LEVEL: Status: ACTIVE | Noted: 2018-06-13

## 2018-06-20 PROBLEM — E72.20 HYPERAMMONEMIA (HCC): Status: ACTIVE | Noted: 2018-06-13

## 2018-06-20 LAB
GLUCOSE BLD STRIP.AUTO-MCNC: 117 MG/DL (ref 70–110)
GLUCOSE BLD STRIP.AUTO-MCNC: 134 MG/DL (ref 70–110)
GLUCOSE BLD STRIP.AUTO-MCNC: 146 MG/DL (ref 70–110)
GLUCOSE BLD STRIP.AUTO-MCNC: 155 MG/DL (ref 70–110)

## 2018-06-20 PROCEDURE — 74011250636 HC RX REV CODE- 250/636: Performed by: INTERNAL MEDICINE

## 2018-06-20 PROCEDURE — 77030038269 HC DRN EXT URIN PURWCK BARD -A

## 2018-06-20 PROCEDURE — 74011250636 HC RX REV CODE- 250/636: Performed by: NURSE PRACTITIONER

## 2018-06-20 PROCEDURE — 77030020186 HC BOOT HL PROTCT SAGE -B

## 2018-06-20 PROCEDURE — 74011636637 HC RX REV CODE- 636/637: Performed by: NURSE PRACTITIONER

## 2018-06-20 PROCEDURE — 92507 TX SP LANG VOICE COMM INDIV: CPT

## 2018-06-20 PROCEDURE — 65270000029 HC RM PRIVATE

## 2018-06-20 PROCEDURE — 97530 THERAPEUTIC ACTIVITIES: CPT

## 2018-06-20 PROCEDURE — 74011250637 HC RX REV CODE- 250/637: Performed by: HOSPITALIST

## 2018-06-20 PROCEDURE — 77030011256 HC DRSG MEPILEX <16IN NO BORD MOLN -A

## 2018-06-20 PROCEDURE — 97535 SELF CARE MNGMENT TRAINING: CPT

## 2018-06-20 PROCEDURE — 82962 GLUCOSE BLOOD TEST: CPT

## 2018-06-20 PROCEDURE — 77030037870 HC GLD SHT PREVALON SAGE -B

## 2018-06-20 PROCEDURE — 77010033678 HC OXYGEN DAILY

## 2018-06-20 PROCEDURE — 77030037878 HC DRSG MEPILEX >48IN BORD MOLN -B

## 2018-06-20 PROCEDURE — 74011250637 HC RX REV CODE- 250/637: Performed by: PSYCHIATRY & NEUROLOGY

## 2018-06-20 PROCEDURE — 92526 ORAL FUNCTION THERAPY: CPT

## 2018-06-20 PROCEDURE — 74011000258 HC RX REV CODE- 258: Performed by: HOSPITALIST

## 2018-06-20 RX ADMIN — HEPARIN SODIUM 5000 UNITS: 5000 INJECTION, SOLUTION INTRAVENOUS; SUBCUTANEOUS at 21:17

## 2018-06-20 RX ADMIN — THERA TABS 1 TABLET: TAB at 10:05

## 2018-06-20 RX ADMIN — DEXTROSE MONOHYDRATE AND SODIUM CHLORIDE 150 ML/HR: 5; .45 INJECTION, SOLUTION INTRAVENOUS at 10:07

## 2018-06-20 RX ADMIN — HEPARIN SODIUM 5000 UNITS: 5000 INJECTION, SOLUTION INTRAVENOUS; SUBCUTANEOUS at 04:27

## 2018-06-20 RX ADMIN — INSULIN LISPRO 2 UNITS: 100 INJECTION, SOLUTION INTRAVENOUS; SUBCUTANEOUS at 10:05

## 2018-06-20 RX ADMIN — ASPIRIN 81 MG 81 MG: 81 TABLET ORAL at 10:05

## 2018-06-20 RX ADMIN — LEVOFLOXACIN 750 MG: 5 INJECTION, SOLUTION INTRAVENOUS at 12:42

## 2018-06-20 RX ADMIN — HEPARIN SODIUM 5000 UNITS: 5000 INJECTION, SOLUTION INTRAVENOUS; SUBCUTANEOUS at 12:42

## 2018-06-20 RX ADMIN — Medication 100 MG: at 10:05

## 2018-06-20 RX ADMIN — LACTULOSE 20 G: 20 SOLUTION ORAL at 10:05

## 2018-06-20 RX ADMIN — FOLIC ACID 1 MG: 1 TABLET ORAL at 10:05

## 2018-06-20 NOTE — PROGRESS NOTES
Patient received in bed awake. Patient alert and oriented X4, denies pain and discomfort. Patient resting quietly. Frequent use items within reach. Bed locked in low position. Call bell within reach and patient verbalized understanding of use for assistance and needs. 1215: INcontinence care performed. 1400: Incontinence care performed. Purewick changed. 1700:  Telepsyche doctor at bedside on monitor    1715: Daughter at bedside. 1940:  Bedside and Verbal shift change report given to Armond Alcala RN (oncoming nurse) by Sherine Hagan RN BSN (offgoing nurse). Report included the following information SBAR, Kardex, Intake/Output, MAR and Recent Results.

## 2018-06-20 NOTE — PROGRESS NOTES
Problem: Self Care Deficits Care Plan (Adult)  Goal: *Acute Goals and Plan of Care (Insert Text)  Occupational Therapy Goals  Initiated 6/15/2018 within 7 day(s). 1.  Patient will perform grooming tasks at EOB with minimal assistance for balance. 2.  Patient will perform lower body dressing with minimal assistance. 3.  Patient will perform functional task for 8 minutes in standing with moderate assistance for balance to increase activity tolerance for ADLs. 4.  Patient will perform toilet transfers with moderate assistance. 5.  Patient will perform all aspects of toileting with moderate assistance. 6.  Patient will participate in upper extremity therapeutic exercise/activities for 8 minutes with supervision/set-up to increase BUE strength for functional transfers and ADLs. 7.  Patient will utilize energy conservation techniques during functional activities with minimal verbal cues. Outcome: Progressing Towards Goal  Occupational Therapy TREATMENT    Patient: Chanell Penny (6 y.o. female)  Date: 6/20/2018  Diagnosis: Atrial fibrillation with RVR (HCC)  Rhabdomyolysis Rhabdomyolysis       Precautions: Skin  Chart, occupational therapy assessment, plan of care, and goals were reviewed. PLOF: Independent    ASSESSMENT:  Pt soiled upon entry, requiring CGA w/bed mobility rolling L/R for hygiene. Pt motivated for OOB/chair activity. Increase time and Mod Assist to maneuver to EOB 2/2 poor trunk control. BLE weakness requires MAx Assist w/functional transfer to chair/BSC using stand pivot technique. Poor dynamic standing balance requires 2/3 person assist w/toileting ADL. (appreciate Inga BENEDICT and Sosa, assist) Pt participate in therapeutic reaching activity at chair level for improved trunk control. Pt left w/all item within reach. EDUCATION Pt educated on importance of hand placement w/functional tranfers for increase safety.    Progression toward goals:  []          Improving appropriately and progressing toward goals  [x]          Improving slowly and progressing toward goals  []          Not making progress toward goals and plan of care will be adjusted     PLAN:  Patient continues to benefit from skilled intervention to address the above impairments. Continue treatment per established plan of care. Discharge Recommendations:  Inpatient Rehab  Further Equipment Recommendations for Discharge:  shower chair     SUBJECTIVE:   Patient stated I need to go to the bathroom again.     OBJECTIVE DATA SUMMARY:       Cognitive/Behavioral Status:  Neurologic State: Alert  Orientation Level: Oriented X4  Cognition: Follows commands  Safety/Judgement: Decreased awareness of need for safety  Functional Mobility and Transfers for ADLs:   Bed Mobility:  Rolling: Contact guard assistance  Supine to Sit: Moderate assistance; Additional time   Transfers:  Sit to Stand: Maximum assistance  Bed to Chair: Maximum assistance (EOB --> chair xfer w/SPT)   Stand Pivot Transfers: Maximum assistance  Balance:  Sitting: Intact  Sitting - Static: Fair (occasional)  Sitting - Dynamic: Fair (occasional)  Standing: Impaired; With support  Standing - Static: Poor  Standing - Dynamic : Poor  ADL Intervention:  Grooming  Washing Hands: Supervision/set-up    Toileting  Toileting Assistance: Maximum assistance  Bladder Hygiene: Stand-by assistance (seated)  Bowel Hygiene: Maximum assistance  Clothing Management: Maximum assistance    Neuro Re-Education:  Weight-shifting L/R at EOB and at chair level    Therapeutic Activity:   FM strengthening/coordination activity tearing paper towels and rolling into balls in prep for reaching activity. Pt participates in therapeutic reaching activity at chair level, reaching in multiple planes alternating L/R for core strengthening and improved dynamic sitting balance. Pt performs floor level item retrieval w/AE for improved dynamic sitting balance.      Pain:  Pre Treatment:0  Post Treatment:0  Pain Scale 1: Numeric (0 - 10)  Pain Intensity 1: 0    Activity Tolerance:    Good    Please refer to the flowsheet for vital signs taken during this treatment.   After treatment:   [x]  Patient left in no apparent distress sitting up in chair  []  Patient left in no apparent distress in bed  [x]  Call bell left within reach  [x]  Nursing notified  []  Caregiver present  []  Bed alarm activated    DEANA Vidales  Time Calculation: 46 mins

## 2018-06-20 NOTE — PROGRESS NOTES
Problem: Pressure Injury - Risk of  Goal: *Prevention of pressure injury  Document Ryan Scale and appropriate interventions in the flowsheet. Outcome: Progressing Towards Goal  Pressure Injury Interventions:  Sensory Interventions: Check visual cues for pain, Keep linens dry and wrinkle-free, Pressure redistribution bed/mattress (bed type), Turn and reposition approx. every two hours (pillows and wedges if needed)    Moisture Interventions: Apply protective barrier, creams and emollients, Absorbent underpads, Check for incontinence Q2 hours and as needed, Maintain skin hydration (lotion/cream), Internal/External urinary devices    Activity Interventions: Pressure redistribution bed/mattress(bed type)    Mobility Interventions: Pressure redistribution bed/mattress (bed type), Turn and reposition approx.  every two hours(pillow and wedges)    Nutrition Interventions: Document food/fluid/supplement intake    Friction and Shear Interventions: Apply protective barrier, creams and emollients

## 2018-06-20 NOTE — PROGRESS NOTES
Problem: Dysphagia (Adult)  Goal: *Acute Goals and Plan of Care (Insert Text)  Recommendations:  Diet: Mech-soft/thin liquid   Meds: Whole in puree  Aspiration Precautions  Oral Care TID  No straws, small sips, supervision with PO    Goals:  Patient will:  1. Tolerate PO trials with 0 s/s overt distress in 4/5 trials  2. Utilize compensatory swallow strategies/maneuvers (decrease bite/sip, size/rate, alt. liq/sol) with min cues in 4/5 trials  3. Perform oral-motor/laryngeal exercises to increase oropharyngeal swallow function with min cues  4. Complete an objective swallow study (i.e., MBSS) to assess swallow integrity, r/o aspiration, and determine of safest LRD, min A        Outcome: Progressing Towards Goal  Speech language pathology speech & dysphagia treatment    Patient: Antonio Azevedo (43 y.o. female)  Date: 6/20/2018  Diagnosis: Atrial fibrillation with RVR (HCC)  Rhabdomyolysis Rhabdomyolysis       Precautions: aspiration Skin     ASSESSMENT:  Followed up this day with skilled meal assessment of self-fed thin liquid and mech-soft solids. Delayed mastication with solids; with increased time, 100% oral clearance noted. No overt s/s of aspiration. Pt continues mildly impulsive, requiring mod cues to reduce rate of intake with thin liquid via straw. Recommend continue mech-soft/thin liquid diet with aspiration precautions, no straws and single sips. Follow up dysarthria tx completed with min verbal cues to utilize compensatory intelligibility technique: S-O-S (Speak Up, Over-articulate, Slow Down). Needs reinforcement. ST will continue to follow as indicated.     Progression toward goals:  []         Improving appropriately and progressing toward goals  [x]         Improving slowly and progressing toward goals  []         Not making progress toward goals and plan of care will be adjusted     PLAN:  Recommendations and Planned Interventions:  Patient continues to benefit from skilled intervention to address the above impairments. Continue treatment per established plan of care. Discharge Recommendations:  Inpatient Rehab     SUBJECTIVE:   Patient stated ok thanks. OBJECTIVE:   Cognitive and Communication Status:  Neurologic State: Alert  Orientation Level: Oriented X4  Cognition: Follows commands  Perception: Appears intact  Perseveration: No perseveration noted  Safety/Judgement: Decreased awareness of need for safety  Dysphagia Treatment:  Oral Assessment:  Oral Assessment  Labial: Impaired coordination  Dentition: Natural  Oral Hygiene: Fair  Lingual: Incoordinated  Velum: No impairment  Mandible: No impairment  P.O. Trials:   Patient Position: [facility]   Vocal quality prior to P.O.: Low volume   Consistency Presented: Thin liquid, Solid, Pudding   How Presented: Straw, Cup/sip, Self-fed/presented       Bolus Acceptance: No impairment   Bolus Formation/Control: Impaired   Type of Impairment: Delayed, Mastication   Propulsion: Discoordination   Oral Residue: Lingual, Less than 10% of bolus   Initiation of Swallow: No impairment   Laryngeal Elevation: Functional   Aspiration Signs/Symptoms: Clear throat   Pharyngeal Phase Characteristics: Suspected pharyngeal residue   Effective Modifications: Cup/sip, Small sips and bites   Cues for Modifications:  Moderate         Oral Phase Severity: Mild   Pharyngeal Phase Severity : Mild        PAIN:  Start of Tx: 0  End of Tx: 0     After treatment:   []              Patient left in no apparent distress sitting up in chair  [x]              Patient left in no apparent distress in bed  [x]              Call bell left within reach  [x]              Nursing notified  []              Family present  []              Caregiver present  []              Bed alarm activated      COMMUNICATION/EDUCATION:   [x] Aspiration precautions; swallow safety; compensatory techniques  [x]        Expressive communication techniques  []        Patient unable to participate in education; education ongoing with staff  []  Posted safety precautions in patient's room.   [] Oral-motor/laryngeal strengthening exercises      Winter Iniguez, SLP  Time Calculation: 22 mins  Swallow Treatment Time: 12 mins  Speech Treatment Time: 10 mins

## 2018-06-20 NOTE — PROGRESS NOTES
Problem: Falls - Risk of  Goal: *Absence of Falls  Document Radha Fall Risk and appropriate interventions in the flowsheet. Outcome: Progressing Towards Goal  Fall Risk Interventions:  Mobility Interventions: Patient to call before getting OOB, Communicate number of staff needed for ambulation/transfer         Medication Interventions: Evaluate medications/consider consulting pharmacy, Patient to call before getting OOB    Elimination Interventions: Patient to call for help with toileting needs, Call light in reach, Toileting schedule/hourly rounds    History of Falls Interventions: Door open when patient unattended, Room close to nurse's station        Problem: Pressure Injury - Risk of  Goal: *Prevention of pressure injury  Document Ryan Scale and appropriate interventions in the flowsheet.    Outcome: Progressing Towards Goal  Pressure Injury Interventions:  Sensory Interventions: Assess changes in LOC, Check visual cues for pain, Assess need for specialty bed, Minimize linen layers, Keep linens dry and wrinkle-free    Moisture Interventions: Absorbent underpads, Apply protective barrier, creams and emollients, Assess need for specialty bed, Check for incontinence Q2 hours and as needed, Internal/External urinary devices, Minimize layers    Activity Interventions: Pressure redistribution bed/mattress(bed type), Assess need for specialty bed    Mobility Interventions: Assess need for specialty bed, Pressure redistribution bed/mattress (bed type)    Nutrition Interventions: Document food/fluid/supplement intake    Friction and Shear Interventions: Apply protective barrier, creams and emollients, Foam dressings/transparent film/skin sealants, Minimize layers

## 2018-06-20 NOTE — PROGRESS NOTES
Problem: Falls - Risk of  Goal: *Absence of Falls  Document Radha Fall Risk and appropriate interventions in the flowsheet.    Outcome: Progressing Towards Goal  Fall Risk Interventions:  Mobility Interventions: Patient to call before getting OOB, PT Consult for mobility concerns, Strengthening exercises (ROM-active/passive)         Medication Interventions: Evaluate medications/consider consulting pharmacy, Patient to call before getting OOB    Elimination Interventions: Patient to call for help with toileting needs, Call light in reach, Toileting schedule/hourly rounds    History of Falls Interventions: Door open when patient unattended, Evaluate medications/consider consulting pharmacy

## 2018-06-20 NOTE — PROGRESS NOTES
Problem: Mobility Impaired (Adult and Pediatric)  Goal: *Acute Goals and Plan of Care (Insert Text)  Physical Therapy Goals  Initiated 6/15/2018 and to be accomplished within 7 day(s)  1. Patient will maintain eyes open for greater than 90% of session to allow for active participation in mobility training. 2.  Patient will follow 90% of commands to maximize safety and active participation in mobility training. 3.  Patient will move from supine to sit and sit to supine  in bed with supervision/set-up. 4.  Patient will maintain seated at edge of bed for 10 min with supervision/set-up to prepare for out of bed activity. 5.  Patient will perform sit to stand with minimal assistance/contact guard assist.  6.  Patient will transfer from bed to chair and chair to bed with minimal assistance/contact guard assist using the least restrictive device. 7.  Patient will ambulate with minimal assistance/contact guard assist for 25 feet with the least restrictive device. 8.  Patient will negotiate obstacles during ambulation with supervision/set-up. Outcome: Progressing Towards Goal    PHYSICAL THERAPY: Daily TREATMENT Note   INPATIENT: Medicare: Hospital Day: 8     Patient: Kaelyn Carcamo (45 y.o. female)    Date: 6/20/2018  Primary Diagnosis: Atrial fibrillation with RVR (HCC)  Rhabdomyolysis    ,  ,   Precautions: Skin  Chart, physical therapy assessment, plan of care and goals were reviewed. PLOF:  ind    ASSESSMENT:  Goals 1 and 2 met for the PT POC. Pt presents on the [facility]. Worked on sit <> stand x 3 trials. Max encouragement. Demos with MOD to MAX A; Stand pivot MAX  A x 1 & SBA/ CGA of a second person for safety BSC to recliner. Sit <> stand x 2 trials. Instructed in chair push up x 3 trials. Manual cueing for weight shift hips to position in chair. Needs encouragement however based on independent  PLOF anticipate will do well. No c/o L knee pain today however L great toe very tender.  Alerted SHANNAN Ordaz  Progression toward goals:          Improving slowly and progressing toward goals     PLAN:  Patient continues to benefit from skilled intervention to address the above impairments. Continue treatment per established plan of care. EDUCATION:   Education:  Patient was educated on the following topics:   Transfer, chair push up  Barriers to Learning/Limitations: yes;  emotional and physical  Compensate with: visual, verbal, tactile, kinesthetic cues/model    Discharge Recommendations:  Inpatient Rehab  Further Equipment Recommendations for Discharge: determine once comlpetes rehab       SUBJECTIVE:   Patient stated I have too much to live for.     OBJECTIVE DATA SUMMARY:   Critical Behavior:  Neurologic State: Alert, Other   Orientation Level: Oriented X4  Cognition: Follows commands  Safety/Judgement: Decreased awareness of need for safety    G CODE:Mobility  Current  CN= 100%   Goal  CK= 40-59%. The severity rating is based on the Other kusb    [employer] Balance Scale  0: Pt performs 25% or less of standing activity (Max assist) CN, 100% impaired.   Functional Mobility:      Functional Status      Indep   (I)   Mod I   Super-vision   Min A   Mod A   Max A   Total A   Assist x2 Verbal cues Additional time Not tested   Comments   Rolling []  []  [] [x]    []    []  []  [] [] [] []    Supine to sit []  []  [] []  []  []  []  [] [] [] []    Sit to supine []  []  [] []  []  []  []  [] [] [] []    Sit to stand []  []  [] []  [x]  [x]  []  [] [] [] []    Stand to sit []  []  [] [x]  []  []  []  [] [] [] []    Bed to chair transfers []  []  [] []  []  [x]  []  [] [x] [x] []        Balance    Good   Fair   Poor   Unable   Not tested   Comments   Sitting static [x]  []  []  []  []    Sitting dynamic []  [x]  []  []  []    Standing static []  []  [x]  []  []    Standing dynamic []  []  [x]  []  []      Mobility/Gait:  Non amb at this time    Neuro Re-Education:  standing activity.   Therapeutic Exercises:  Chair push ups x 3       Vital Signs  Temp: 98.2 °F (36.8 °C)     Pulse (Heart Rate): (!) 104     BP: 133/85     Resp Rate: 16     O2 Sat (%): 100 %  Pain:  Pre treatment pain level:  0  Post treatment pain level:  0  Pain Scale 1: Numeric (0 - 10)  Pain Intensity 1: 0  Activity Tolerance:   poor  After treatment:   Patient left in no apparent distress sitting up in chair  Call bell left within reach  [facility] notified  [address] present  Angelica Maria, RADU   Time Calculation: 35 mins

## 2018-06-20 NOTE — CONSULTS
Location of patient: [facility]  Location of provider: [place]    This evaluation was conducted via telepsychiatry with the assistance of onsite staff. Reason for consult: possible overdose  History of Present Illness: 59 y.o. female with history of bipolar disorder, alcohol dependence (in remission) and multiple medical problems, admitted on 6/13 after being found down with altered mental status. Pt found to have rhabdo, PAM, a-fib with RVR, elevated ammonia level. Negative head CT. Pt was put on CIWA protocol but on chart review no doses of ativan were required. Pt was confused early in admission but per RN this has improved and she is now oriented x 4. Dispo plan is transfer to SNF. Today, pt reported possibly taking too many sleeping pills prior to admission so consult requested for risk assessment. Per chart review, there is no indication that pt reported SI or that this action was a suicide attempt. Unable to reach referring provider for further info at this time. On exam, pt reports feeling better each day. She is still not sure what happened leading up to admission, is frustrated that she is unable to speak properly and not sure why that is. However, she denies depressed mood or anxiety. Denies suicidal or homicidal ideations, denies hallucinations. Pt states that she was taking OTC sleep aid prior to admission, one pill each night. She is not sure if she perhaps took an extra one before hospital admission. States that she does not remember exactly but expects that at most she took 2 pills instead of one, and she denies doing this intentionally. States that the medication was helping with her sleep, which is why she continued to take it. Just not sure if she got confused and repeated a dose. States that she told staff about this to try to help figure out whether it could have contributed to any of her medical issues. She adamantly denies trying to harm herself in any way.  Reports wanting to get better and get out of the hospital when she is able. Past SI/Self harm: Pt denies  Past HI/Violence: Pt denies  Access to firearms: Pt denies  Legal: Pt denies  Collateral: SHANNAN FLORIAN  Psychiatric History/Treatment History: Denies psych admissions but has had outpatient treatment for bipolar disorder in the past. Last took meds \"years ago\", has done well without it per report. Drug/Alcohol History: History of alcohol dependence, but reports that this is in remission. Pt last drank a beer several weeks ago. Endorses cannabis use for pain twice a month. Denies other drug use. UDS on admission positive for cannabis only.   Medical History:   Past Medical History:   Diagnosis Date    Alcohol dependence in remission ([facility] [address]     alcohol withdrawal discharged 05/31/2013    Arthritis     knees and back    Bipolar 1 disorder ([facility] [address]     seeing Psychiatrist    Chronic obstructive pulmonary disease ([facility] [address]     Depression     Diabetes mellitus ([facility] [address]     DJD (degenerative joint disease) of knee     DM2 (diabetes mellitus, type 2) (HCC)     borderline    Eustachian tube dysfunction     Dr. 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    Psoriasis      Medications & Freq:     Current Facility-Administered Medications:     lactulose (CHRONULAC) solution 20 g, 20 g, Oral, BID, Toby ESPIANL Lawani, DO, 20 g at 06/20/18 1005    dextrose 5 % - 0.45% NaCl infusion, 150 mL/hr, IntraVENous, CONTINUOUS, Toby Mcgarry, DO, Last Rate: 150 mL/hr at 06/20/18 1007, 150 mL/hr at 06/20/18 1007    pantoprazole (PROTONIX) injection 40 mg, 40 mg, IntraVENous, Q24H, Candidoudeen S Lawani, DO, 40 mg at 06/19/18 1234    sodium chloride (NS) flush 5-10 mL, 5-10 mL, IntraVENous, PRN, Thomes Po, DO    LORazepam (ATIVAN) tablet 1 mg, 1 mg, Oral, Q1H PRN **OR** LORazepam (ATIVAN) injection 1 mg, 1 mg, IntraVENous, Q1H PRN, Thomes Po, DO    LORazepam (ATIVAN) tablet 2 mg, 2 mg, Oral, Q1H PRN **OR** LORazepam (ATIVAN) injection 2 mg, 2 mg, IntraVENous, Q1H PRN, Thomes Po, DO    LORazepam (ATIVAN) injection 3 mg, 3 mg, IntraVENous, Q15MIN PRN, Thomes Po, DO    Thiamine Mononitrate (B-1) tablet 100 mg, 100 mg, Oral, DAILY, Thomes Po, DO, 100 mg at 36/00/99 9379    folic acid (FOLVITE) tablet 1 mg, 1 mg, Oral, DAILY, Thomes Po, DO, 1 mg at 06/20/18 1005    therapeutic multivitamin (THERAGRAN) tablet 1 Tab, 1 Tab, Oral, DAILY, Thomes Po, DO, 1 Tab at 06/20/18 1005    aspirin chewable tablet 81 mg, 81 mg, Oral, DAILY, Jocelyn Dunaway MD, 81 mg at 06/20/18 1005    ondansetron (ZOFRAN) injection 2 mg, 2 mg, IntraVENous, Q6H PRN, Jaun Prom, NP    acetaminophen (TYLENOL) tablet 650 mg, 650 mg, Oral, Q4H PRN, Jaun Prom, NP    acetaminophen (TYLENOL) suppository 650 mg, 650 mg, Rectal, Q4H PRN, Jaun Prom, NP    senna-docusate (PERICOLACE) 8.6-50 mg per tablet 2 Tab, 2 Tab, Oral, QHS, Jaun Prom, NP, Stopped at 06/16/18 2221    albuterol (PROVENTIL VENTOLIN) nebulizer solution 2.5 mg, 2.5 mg, Nebulization, Q4H PRN, Jaun Prom, NP    sodium chloride (NS) flush 5-10 mL, 5-10 mL, IntraVENous, PRN, Jaun Prom, NP, 10 mL at 06/17/18 1048    acetaminophen (TYLENOL) tablet 650 mg, 650 mg, Oral, Q6H PRN, Jaun Prom, NP, 650 mg at 06/17/18 2032    heparin (porcine) injection 5,000 Units, 5,000 Units, SubCUTAneous, Q8H, Jaun Prom, NP, 5,000 Units at 06/20/18 1242    insulin lispro (HUMALOG) injection, , SubCUTAneous, AC&HS, Jaun Prom, NP, Stopped at 06/20/18 1242    glucose chewable tablet 16 g, 4 Tab, Oral, PRN, Jaun Prom, NP    glucagon (GLUCAGEN) injection 1 mg, 1 mg, IntraMUSCular, PRN, Jaun Prom, NP    dextrose (D50W) injection syrg 12.5-25 g, 25-50 mL, IntraVENous, PRN, Jaun Prom, NP    ondansetron (ZOFRAN) injection 4 mg, 4 mg, IntraVENous, Q4H PRN, Francisco Mayorga NP  Allergies: Allergies   Allergen Reactions    Ace Inhibitors Cough    Penicillins Hives     Family Psych History/History of suicide: \"I don't think so\". Social History: Lives alone, has 3 children who all live in [place]. States that daughter checks on her frequently. Education: 10th grade   Employment: On disability   Stressors: being in hospital, \"can't talk right\"   Strengths/supports:  Reports good support from family. Mental Status Exam:   Appearance and attire: Fair grooming, wearing hospital gown, sitting in chair. Attitude and behavior: Calm, cooperative, pleasant; no motor agitation  Speech: Slow rate; slurred at times, necessitating pt to repeat some of her answers; Normal volume  Mood: Euthymic  Affect: Appropriate  Association and thought processes: Goal-directed  Thought content: Denies SI or HI  Perception: Not responding to internal stimuli; no evident delusions  Sensorium and orientation: Alert, oriented x 4  Memory and intellectual functioning: No gross deficits noted  Insight and judgment: Fair  Impression/Risk Assessment: 58 y/o female with history of bipolar disorder, alcohol dependence (in remission), and multiple medical problems, admitted with AMS in setting of rhabdo, PAM, a-fib with rvr and hyperammonemia. Mental status has improved over time, indicating likely acute delirium which has improved. Unclear whether bipolar disorder was an accurate diagnosis, since pt has not required further treatment. Could have been substance-induced mood disorder in the past. Pt reports possibly taking an extra sleeping pill prior to admission, states that this was accidental, and adamantly and consistently denies any intentional self-harm or suicidal thoughts. Denies HI. Denies history of prior suicide attempts or violence. Good family support, stable housing. No recent psychiatric treatment but reports stable mood. No evidence of psychosis.  Based on current exam, imminent risk of harm to self or others is low. Diagnosis: R41.0  Unspecified delirium, resolving; History of bipolar disorder; History of alcohol use disorder (in remission)  Treatment Recommendations:  1. Disposition: medically admitted with plan for SNF, does not meet criteria for inpatient psychiatric treatment at this time. 2. Psychiatric medications: None. 3. Recommend obtaining collateral info from pt's daughter if possible, regarding pt's psychiatric history since she has not been in treatment recently, as well as any safety concerns she may have for pt. If there is concern for any mood instability, recommend referral back to outpatient psychiatry. If there is safety concern, please re-consult for further recommendations. 4. As pt has presented with altered mental status, recommend regular assessment by staff for suicidal ideations to make sure pt's report remains consistent. If risk status changes, please re-consult for further recommendations.

## 2018-06-20 NOTE — PROGRESS NOTES
[facility]  Hospitalist Division           Inpatient Daily Progress Note        Patient: Marco Diego MRN: [id number]  CSN: [id number]    YOB: 1960  Age: 59 y.o. Sex: female    DOA: 6/13/2018 LOS:  LOS: 7 days                    Chief Complaint:  Erick Angela     Interval History:    Lisa Cho is a 59 y.o. Female with a PMHx of HTN, arthritis, DM II, Hep C, GERD, COPD, Bipolar 1 disorder, depression, etoh dependence in remission, DJD, Eustachain tube dysunction, and psoriasis who presents to the ED for AMS noting patient was found by daughter on the floor with confusion. Daughter last saw patient 6 days prior. Patient awake but not oriented to day/time and notes generalize myalgia and doesn't remember how she got on the floor. Upon arrival, patient in a-fib with rvr and was placed on cardizem gtt and given dig with conversion. Rhabdo with PAM. Admitted for further eval.  6/20/2018- No acute events overnight. Speech is still slurred and illogical at times. Patient today stating that she thinks she may have taken too many of her sleeping pills to cause this 2/2 depression. Psych consult placed today. 6/21/2018: Psych evaluation completed. Working on SNF disposition. Subjective:      \"I'm doing ok\"   No acute events overnight. Sitter at bedside. Objective:      Visit Vitals    /86 (BP 1 Location: Right arm, BP Patient Position: At rest)    Pulse 94    Temp 97.7 °F (36.5 °C)    Resp 16    Ht 5' 7\" (1.702 m)    Wt 90.3 kg (199 lb)    SpO2 99%    Breastfeeding No    BMI 31.17 kg/m2           Physical Exam:  General appearance: cooperative   Lungs: CTA   Heart: RRR, S1, S2 normal, no murmur, click, rub or gallop  Abdomen: soft, non tender, non distended. Normoactive bowel sounds.   Extremities: extremities normal, atraumatic, no cyanosis or edema  Skin: Skin color, texture, turgor normal.   Neurologic: Speech slurred, moves all ext   PSY: Mood and affect normal, appropriately behaved      Intake and Output:  Current Shift:     Last three shifts:   1901 -  0700  In: 2160 [P.O.:360; I.V.:1800]  Out: 2800 [Urine:2800]    Recent Results (from the past 24 hour(s))   GLUCOSE, POC    Collection Time: 18 11:28 AM   Result Value Ref Range    Glucose (POC) 148 (H) 70 - 110 mg/dL   GLUCOSE, POC    Collection Time: 18  4:31 PM   Result Value Ref Range    Glucose (POC) 128 (H) 70 - 110 mg/dL   GLUCOSE, POC    Collection Time: 18  9:09 PM   Result Value Ref Range    Glucose (POC) 141 (H) 70 - 110 mg/dL   GLUCOSE, POC    Collection Time: 18  8:09 AM   Result Value Ref Range    Glucose (POC) 155 (H) 70 - 110 mg/dL           Lab Results   Component Value Date/Time    Glucose 149 (H) 2018 04:45 AM    Glucose 117 (H) 2018 04:00 AM    Glucose 102 (H) 2018 03:57 AM    Glucose 132 (H) 06/15/2018 07:20 AM    Glucose 106 (H) 2018 08:05 AM    [address] [facility]  [phone]    Transthoracic Echocardiogram    Patient: Jalyn Aguirre  MRN: [id number]  [address]  #: [de-identified]  : 1960  Age: 59 years  Gender: Female  Height: 66 in  Weight: 182.6 lb  BSA: 1.93 m-sq  BP: 112 / 72 mmHg  Study date: 2018  Status: Routine  Location: Echo lab  [facility] #: [id number]    Allergies: ACE INHIBITORS, PENICILLINS    Referring_Ordering Physician: Alem Smith. Samuel Young Dr  Interpreting Physician: Toma Al MD  Interpreting Group: [email or web address]  Technologist: Francesca Crump RVT [facility]    SUMMARY:  Left ventricle: Systolic function was normal by visual assessment. Ejection   fraction was estimated in the range of 55 %  to 60 %. No obvious wall motion abnormalities identified in the views   obtained. Left ventricular diastolic function  parameters were normal for the patient's age. Right ventricle:  The size was normal. Systolic function was low normal.    Left atrium: Size was normal.    Mitral valve: There was no regurgitation. Aortic valve: There was no stenosis. There was no regurgitation. Tricuspid valve: There was trivial regurgitation. Inferior vena cava, hepatic veins: The inferior vena cava was normal in size   and course. Pericardium: A trivial pericardial effusion was identified. INDICATIONS: Atrial fibrillation. PROCEDURE: This was a routine study. The study included complete 2D imaging,   complete spectral Doppler, and color  Doppler. Systolic blood pressure was 112 mmHg, at the start of the study. Diastolic blood pressure was 72 mmHg, at the  start of the study. Images were obtained from the parasternal, apical,   subcostal, and suprasternal notch acoustic  windows. Image quality was adequate. LEFT VENTRICLE: Size was normal. Systolic function was normal by visual   assessment. Ejection fraction was estimated in  the range of 55 % to 60 %. No obvious wall motion abnormalities identified in   the views obtained. Wall thickness was  normal. DOPPLER: Left ventricular diastolic function parameters were normal   for the patient's age. RIGHT VENTRICLE: The size was normal. Systolic function was low normal.   DOPPLER: Estimated peak pressure was in the  range of 25 mmHg to 30 mmHg. LEFT ATRIUM: Size was normal. LA volume index was 20 ml/m-sq. RIGHT ATRIUM: Size was normal.    MITRAL VALVE: There was normal leaflet separation. DOPPLER: The transmitral   velocity was within the normal range. There  was no evidence for stenosis. There was no regurgitation. AORTIC VALVE: The valve was trileaflet. Leaflets exhibited normal thickness   and normal cuspal separation. DOPPLER:  Transaortic velocity was within the normal range. There was no stenosis. There was no regurgitation. TRICUSPID VALVE: There was normal leaflet separation. DOPPLER: The   transtricuspid velocity was within the normal range. There was no evidence for tricuspid stenosis.  There was trivial   regurgitation. PULMONIC VALVE: Not well visualized, but normal Doppler findings. DOPPLER:   There was no stenosis. There was trivial  regurgitation. AORTA: The root exhibited normal size. SYSTEMIC VEINS: IVC: The inferior vena cava was normal in size and course. Respirophasic changes were normal.    PERICARDIUM: A trivial pericardial effusion was identified. Insignificant   pericardial effusion and/or pericardial fat  was present. SYSTEM MEASUREMENT TABLES    2D  AV Cusp: 1.6 cm  Ao Diam: 3 cm  LA Diam: 2.8 cm  LVOT Diam: 1.9 cm  IVSd: 0.9 cm  LVIDd: 3.9 cm  LVIDs: 2.2 cm  LVPWd: 0.9 cm  EF Biplane: 63.3 %  LAESV Index (A-L): 19.7 ml/m2  LVEF MOD A2C: 60.6 %  LVEF MOD A4C: 69.6 %  RA Area: 9.8 cm2  RV Major: 5.3 cm  RV Minor: 3.4 cm    CW  AV Vmax: 1.2 m/s  AV maxP.5 mmHg  PV Vmax: 0.9 m/s  PV maxPG: 3 mmHg  TR Vmax: 2.3 m/s  TR maxP.6 mmHg  AV VTI: 18.3 cm  AV Vmean: 0.7 m/s  AV meanP.6 mmHg    MM  TAPSE: 1.5 cm    PW  MICHAEL Vmax, Pt: 2.9 cm2  LVOT Vmax: 1.2 m/s  LVOT maxP.4 mmHg  E/E': 5.9  MV A Benton: 0.6 m/s  MV Dec McKean: 2.4 m/s2  MV DecT: 225.9 ms  MV E Benton: 0.5 m/s  MV E/A Ratio: 0.9  MV PHT: 65.5 ms  MVA By PHT: 3.4 cm2  TAPSV: 12.1 cm/s  EXAM: CT head     INDICATION: Found on floor, altered mental status.     COMPARISON: None.     TECHNIQUE: Axial CT imaging of the head was performed without intravenous  contrast.     One or more dose reduction techniques were used on this CT: automated exposure  control, adjustment of the mAs and/or kVp according to patient's size, and  iterative reconstruction techniques. The specific techniques utilized on this CT  exam have been documented in the patient's electronic medical record.      _______________     FINDINGS:     BRAIN AND POSTERIOR FOSSA: The sulci, folia, ventricles and basal cisterns are  within normal limits for the patient?s age. There is no intracranial hemorrhage,  mass effect, or midline shift.  There are no areas of abnormal parenchymal  attenuation.     EXTRA-AXIAL SPACES AND MENINGES: There are no abnormal extra-axial fluid  collections.     CALVARIUM: Intact.     SINUSES: Imaged paranasal sinuses and mastoid air cells are clear.     OTHER: None.     IMPRESSION:        1. No acute intracranial abnormality.          EXAM: CT Cervical spine     INDICATION: Cervical neck pain. Patient on the floor.     COMPARISON: No prior study.     TECHNIQUE: Axial CT imaging of the cervical spine was performed from the skull  base to the upper thoracic spine without intravenous contrast. Multiplanar  reformats were generated. One or more dose reduction techniques were used on  this CT: automated exposure control, adjustment of the mAs and/or kVp according  to patient's size, and iterative reconstruction techniques. The specific  techniques utilized on this CT exam have been documented in the patient's  electronic medical record.     _______________     FINDINGS:     VERTEBRAE AND DISCS: Coronal reformatted images demonstrate a normal appearance  to the occipital condyles. The atlantodental and atlantooccipital articulations  are normal in appearance. There is mild straightening of usual cervical  lordosis, without evidence of listhesis. Vertebral body heights are intact. No  evidence of compression fracture. No displaced fracture. Moderate severity  spondylosis present C4-C7. No acute facet malalignment.     SPINAL CANAL AND FORAMINA: Moderate severity right-sided C4-C5 neural foraminal  narrowing. No evidence of high-grade spinal canal stenosis or neural foraminal  narrowing.     PREVERTEBRAL SOFT TISSUES: Normal     VISIBLE INTRACRANIAL CONTENTS: Unremarkable.     LUNG APICES: Patchy groundglass opacity present in the periphery of the right  upper lobe.     OTHER: None.     _______________     IMPRESSION  IMPRESSION:        1. Straightening of the usual cervical lordosis without evidence of fracture or  acute traumatic listhesis.   2. Cervical spondylosis and neuroforaminal narrowing as described. 3. Patchy groundglass opacity at the periphery of the imaged right upper lobe,  potentially reflecting pneumonitis or pneumonia given the presenting history.          Chest, single view     Indication: Coughing and chest congestion, patient found down     Comparison: 1/30/2016     Findings:  Portable upright AP view of the chest was obtained. Interval  development of asymmetric alveolar opacity throughout the majority of the right  lower lobe. No pneumothorax or pleural effusion. Left lung appears clear. Cardiac size and mediastinal contours are stable. Degenerative changes of each  shoulder girdle demonstrated without acute osseous abnormality.     IMPRESSION  Impression:  1.  Focal airspace opacity in the right lower lobe, potentially reflecting  pneumonitis or pneumonia.             Assessment/Plan:     Patient Active Problem List   Diagnosis Code    Hypertension I10    Arthritis M19.90    Hepatitis C B19.20    DJD (degenerative joint disease) of knee M17.10    GERD (gastroesophageal reflux disease) K21.9    Bipolar 1 disorder ([address] F31.9    Depression F32.9    Alcohol dependence in remission ([facility] [address] F10.21    DM2 (diabetes mellitus, type 2) ([facility] [address] E11.9    Needle phobia F40.298    Eustachian tube dysfunction H69.80    Psoriasis L40.9    Atrial fibrillation with RVR (HCC) I48.91    Rhabdomyolysis M62.82       A/P:    Rhabdo  -IVFs  -2/2 trauma/fall  -trend ck -improving 516->262       PAM  -resolved   -2/2 rhabdo  -IVFs      UTI  -on ivab     AMS  -ammonia 28 today   -unclear of syncope-may 2/2 to taking higher dose of sleeping pills per patient (patient unable to name pill?) -psych consulted today   -ECHO  -CT head negative/MRI neg   -UDS only positive for THC  --2/2 infectious and metabolic derangements  -suicide precautions  -sitter at bedside  -will need to speak with daughter in regards to the patient's psych history          A-fib with RVR  -resolved after cardizem and dig  -ekg ok  -no cp  -BNP 6K  -serial enzymes  -tele  -ECHO EF 55-60%      DM II  -corrective insulin  -hold oaa     Elevated tropinon   -0.11 likely 2/2 demand ishemia noting a-fib rvr in setting PAM  -monitor  -no CP    Elevated LFTs  -etoh abuse   -dc atorvastatin  -reduce ASA dose   -lactulose       Depression  -hold meds  -pscyh following recommendations made-do not feel patient is a suicidal risk-recommend talking to patient's daughter in regards to psych history. Will reevaluate sitter at bedside after speaking with daughter     ETOH abuse  -continue thiamine, folic acid, MVT     PNA   -IVAB       DVT prophyalxis  -scd/teds  -heparn sq  GI-protonix     Working on SNF disposition.      YING [address]ie[address]  MSWSL:[phone]  Office:  [phone]

## 2018-06-21 LAB
CK SERPL-CCNC: 183 U/L (ref 26–192)
ERYTHROCYTE [DISTWIDTH] IN BLOOD BY AUTOMATED COUNT: 14.1 % (ref 11.6–14.5)
GLUCOSE BLD STRIP.AUTO-MCNC: 133 MG/DL (ref 70–110)
GLUCOSE BLD STRIP.AUTO-MCNC: 133 MG/DL (ref 70–110)
GLUCOSE BLD STRIP.AUTO-MCNC: 163 MG/DL (ref 70–110)
GLUCOSE BLD STRIP.AUTO-MCNC: 167 MG/DL (ref 70–110)
HCT VFR BLD AUTO: 31.8 % (ref 35–45)
HGB BLD-MCNC: 10.2 G/DL (ref 12–16)
MCH RBC QN AUTO: 27 PG (ref 24–34)
MCHC RBC AUTO-ENTMCNC: 32.1 G/DL (ref 31–37)
MCV RBC AUTO: 84.1 FL (ref 74–97)
PHOSPHATE SERPL-MCNC: 3.8 MG/DL (ref 2.5–4.9)
PLATELET # BLD AUTO: 280 K/UL (ref 135–420)
PMV BLD AUTO: 10.6 FL (ref 9.2–11.8)
RBC # BLD AUTO: 3.78 M/UL (ref 4.2–5.3)
WBC # BLD AUTO: 7.7 K/UL (ref 4.6–13.2)

## 2018-06-21 PROCEDURE — 77030038269 HC DRN EXT URIN PURWCK BARD -A

## 2018-06-21 PROCEDURE — 77010033678 HC OXYGEN DAILY

## 2018-06-21 PROCEDURE — 82962 GLUCOSE BLOOD TEST: CPT

## 2018-06-21 PROCEDURE — 74011000258 HC RX REV CODE- 258: Performed by: HOSPITALIST

## 2018-06-21 PROCEDURE — 97530 THERAPEUTIC ACTIVITIES: CPT

## 2018-06-21 PROCEDURE — 92526 ORAL FUNCTION THERAPY: CPT

## 2018-06-21 PROCEDURE — 65270000029 HC RM PRIVATE

## 2018-06-21 PROCEDURE — 77030037878 HC DRSG MEPILEX >48IN BORD MOLN -B

## 2018-06-21 PROCEDURE — 74011250637 HC RX REV CODE- 250/637: Performed by: NURSE PRACTITIONER

## 2018-06-21 PROCEDURE — 74011250637 HC RX REV CODE- 250/637: Performed by: PSYCHIATRY & NEUROLOGY

## 2018-06-21 PROCEDURE — 74011250637 HC RX REV CODE- 250/637: Performed by: HOSPITALIST

## 2018-06-21 PROCEDURE — 92507 TX SP LANG VOICE COMM INDIV: CPT

## 2018-06-21 PROCEDURE — 85027 COMPLETE CBC AUTOMATED: CPT | Performed by: NURSE PRACTITIONER

## 2018-06-21 PROCEDURE — 82550 ASSAY OF CK (CPK): CPT | Performed by: NURSE PRACTITIONER

## 2018-06-21 PROCEDURE — 97535 SELF CARE MNGMENT TRAINING: CPT

## 2018-06-21 PROCEDURE — C9113 INJ PANTOPRAZOLE SODIUM, VIA: HCPCS | Performed by: HOSPITALIST

## 2018-06-21 PROCEDURE — 74011250636 HC RX REV CODE- 250/636: Performed by: HOSPITALIST

## 2018-06-21 PROCEDURE — 84100 ASSAY OF PHOSPHORUS: CPT | Performed by: NURSE PRACTITIONER

## 2018-06-21 PROCEDURE — 36415 COLL VENOUS BLD VENIPUNCTURE: CPT | Performed by: NURSE PRACTITIONER

## 2018-06-21 PROCEDURE — 74011636637 HC RX REV CODE- 636/637: Performed by: NURSE PRACTITIONER

## 2018-06-21 PROCEDURE — 74011250636 HC RX REV CODE- 250/636: Performed by: NURSE PRACTITIONER

## 2018-06-21 RX ADMIN — ASPIRIN 81 MG 81 MG: 81 TABLET ORAL at 09:38

## 2018-06-21 RX ADMIN — LACTULOSE 20 G: 20 SOLUTION ORAL at 17:54

## 2018-06-21 RX ADMIN — HEPARIN SODIUM 5000 UNITS: 5000 INJECTION, SOLUTION INTRAVENOUS; SUBCUTANEOUS at 04:42

## 2018-06-21 RX ADMIN — PANTOPRAZOLE SODIUM 40 MG: 40 INJECTION, POWDER, FOR SOLUTION INTRAVENOUS at 12:43

## 2018-06-21 RX ADMIN — FOLIC ACID 1 MG: 1 TABLET ORAL at 09:38

## 2018-06-21 RX ADMIN — DEXTROSE MONOHYDRATE AND SODIUM CHLORIDE 150 ML/HR: 5; .45 INJECTION, SOLUTION INTRAVENOUS at 18:35

## 2018-06-21 RX ADMIN — HEPARIN SODIUM 5000 UNITS: 5000 INJECTION, SOLUTION INTRAVENOUS; SUBCUTANEOUS at 12:42

## 2018-06-21 RX ADMIN — LACTULOSE 20 G: 20 SOLUTION ORAL at 09:38

## 2018-06-21 RX ADMIN — HEPARIN SODIUM 5000 UNITS: 5000 INJECTION, SOLUTION INTRAVENOUS; SUBCUTANEOUS at 21:00

## 2018-06-21 RX ADMIN — DEXTROSE MONOHYDRATE AND SODIUM CHLORIDE 150 ML/HR: 5; .45 INJECTION, SOLUTION INTRAVENOUS at 04:34

## 2018-06-21 RX ADMIN — INSULIN LISPRO 2 UNITS: 100 INJECTION, SOLUTION INTRAVENOUS; SUBCUTANEOUS at 22:15

## 2018-06-21 RX ADMIN — ACETAMINOPHEN 650 MG: 325 TABLET ORAL at 04:41

## 2018-06-21 RX ADMIN — Medication 100 MG: at 09:38

## 2018-06-21 RX ADMIN — THERA TABS 1 TABLET: TAB at 09:38

## 2018-06-21 RX ADMIN — INSULIN LISPRO 2 UNITS: 100 INJECTION, SOLUTION INTRAVENOUS; SUBCUTANEOUS at 12:43

## 2018-06-21 NOTE — PROGRESS NOTES
[facility]           Inpatient Daily Progress Note        Patient: Marty Corona MRN: [id number]  CSN: [id number]    YOB: 1960  Age: 59 y.o. Sex: female    DOA: 6/13/2018 LOS:  LOS: 8 days                    Chief Complaint:  Lennis Cluck     Interval History:    Dread Alexandre. Cristal Mccarthy is a 59 y.o. Female with a PMHx of HTN, arthritis, DM II, Hep C, GERD, COPD, Bipolar 1 disorder, depression, etoh dependence in remission, DJD, Eustachain tube dysunction, and psoriasis who presents to the ED for AMS noting patient was found by daughter on the floor with confusion. Daughter last saw patient 6 days prior. Patient awake but not oriented to day/time and notes generalize myalgia and doesn't remember how she got on the floor. Upon arrival, patient in a-fib with rvr and was placed on cardizem gtt and given dig with conversion. Rhabdo with PAM. Admitted for further eval.  6/20/2018- No acute events overnight. Speech is still slurred and illogical at times. Patient today stating that she thinks she may have taken too many of her sleeping pills to cause this 2/2 depression. Psych consult placed today. 6/21/2018: Psych evaluation completed. Sitter at bedside. Working on SNF disposition. Subjective:      \"I'm doing ok\"   No acute events overnight. Sitter at bedside. Objective:      Visit Vitals    /77 (BP 1 Location: Left arm, BP Patient Position: At rest)    Pulse (!) 105    Temp 98 °F (36.7 °C)    Resp 16    Ht 5' 7\" (1.702 m)    Wt 90.3 kg (199 lb)    SpO2 99%    Breastfeeding No    BMI 31.17 kg/m2           Physical Exam:  General appearance: cooperative   Lungs: CTA   Heart: RRR, S1, S2 normal, no murmur, click, rub or gallop  Abdomen: soft, non tender, non distended. Normoactive bowel sounds.   Extremities: extremities normal, atraumatic, no cyanosis or edema  Skin: Skin color, texture, turgor normal.   Neurologic: Speech slurred, moves all ext   PSY: Mood and affect normal, appropriately behaved      Intake and Output:  Current Shift:  701 - 1900  In: -   Out: 500 [Urine:500]  Last three shifts:  1901 - 700  In:  [P.O.:240;  I.V.:1800]  Out: 1000 [Urine:1000]    Recent Results (from the past 24 hour(s))   GLUCOSE, POC    Collection Time: 18  4:47 PM   Result Value Ref Range    Glucose (POC) 117 (H) 70 - 110 mg/dL   GLUCOSE, POC    Collection Time: 18  8:30 PM   Result Value Ref Range    Glucose (POC) 134 (H) 70 - 110 mg/dL   CBC W/O DIFF    Collection Time: 18  4:10 AM   Result Value Ref Range    WBC 7.7 4.6 - 13.2 K/uL    RBC 3.78 (L) 4.20 - 5.30 M/uL    HGB 10.2 (L) 12.0 - 16.0 g/dL    HCT 31.8 (L) 35.0 - 45.0 %    MCV 84.1 74.0 - 97.0 FL    MCH 27.0 24.0 - 34.0 PG    MCHC 32.1 31.0 - 37.0 g/dL    RDW 14.1 11.6 - 14.5 %    PLATELET 304 696 - 166 K/uL    MPV 10.6 9.2 - 11.8 FL   CK    Collection Time: 18  4:10 AM   Result Value Ref Range     26 - 192 U/L   PHOSPHORUS    Collection Time: 18  4:10 AM   Result Value Ref Range    Phosphorus 3.8 2.5 - 4.9 MG/DL   GLUCOSE, POC    Collection Time: 18  7:36 AM   Result Value Ref Range    Glucose (POC) 133 (H) 70 - 110 mg/dL   GLUCOSE, POC    Collection Time: 18 11:31 AM   Result Value Ref Range    Glucose (POC) 167 (H) 70 - 110 mg/dL           Lab Results   Component Value Date/Time    Glucose 149 (H) 2018 04:45 AM    Glucose 117 (H) 2018 04:00 AM    Glucose 102 (H) 2018 03:57 AM    Glucose 132 (H) 06/15/2018 07:20 AM    Glucose 106 (H) 2018 08:05 AM    [address] [facility], [address]  [phone]    Transthoracic Echocardiogram    Patient: Angely Ortiz  MRN: [id number]  [address]  #: [de-identified]  : 1960  Age: 59 years  Gender: Female  Height: 66 in  Weight: 182.6 lb  BSA: 1.93 m-sq  BP: 112 / 72 mmHg  Study date: 2018  Status: Routine  Location: Echo lab  [facility] #: [id number]    Allergies: ACE INHIBITORS, PENICILLINS    Referring_Ordering Physician: Oksana Restrepo. Barrera Wolf, Samuel Tavarezutdanilo Goddard  Interpreting Physician: Renuka Fitzgerald MD  Interpreting Group: [email or web address]  Technologist: Lala Lmeon RVT RDCS    SUMMARY:  Left ventricle: Systolic function was normal by visual assessment. Ejection   fraction was estimated in the range of 55 %  to 60 %. No obvious wall motion abnormalities identified in the views   obtained. Left ventricular diastolic function  parameters were normal for the patient's age. Right ventricle: The size was normal. Systolic function was low normal.    Left atrium: Size was normal.    Mitral valve: There was no regurgitation. Aortic valve: There was no stenosis. There was no regurgitation. Tricuspid valve: There was trivial regurgitation. Inferior vena cava, hepatic veins: The inferior vena cava was normal in size   and course. Pericardium: A trivial pericardial effusion was identified. INDICATIONS: Atrial fibrillation. PROCEDURE: This was a routine study. The study included complete 2D imaging,   complete spectral Doppler, and color  Doppler. Systolic blood pressure was 112 mmHg, at the start of the study. Diastolic blood pressure was 72 mmHg, at the  start of the study. Images were obtained from the parasternal, apical,   subcostal, and suprasternal notch acoustic  windows. Image quality was adequate. LEFT VENTRICLE: Size was normal. Systolic function was normal by visual   assessment. Ejection fraction was estimated in  the range of 55 % to 60 %. No obvious wall motion abnormalities identified in   the views obtained. Wall thickness was  normal. DOPPLER: Left ventricular diastolic function parameters were normal   for the patient's age. RIGHT VENTRICLE: The size was normal. Systolic function was low normal.   DOPPLER: Estimated peak pressure was in the  range of 25 mmHg to 30 mmHg.     LEFT ATRIUM: Size was normal. LA volume index was 20 ml/m-sq. RIGHT ATRIUM: Size was normal.    MITRAL VALVE: There was normal leaflet separation. DOPPLER: The transmitral   velocity was within the normal range. There  was no evidence for stenosis. There was no regurgitation. AORTIC VALVE: The valve was trileaflet. Leaflets exhibited normal thickness   and normal cuspal separation. DOPPLER:  Transaortic velocity was within the normal range. There was no stenosis. There was no regurgitation. TRICUSPID VALVE: There was normal leaflet separation. DOPPLER: The   transtricuspid velocity was within the normal range. There was no evidence for tricuspid stenosis. There was trivial   regurgitation. PULMONIC VALVE: Not well visualized, but normal Doppler findings. DOPPLER:   There was no stenosis. There was trivial  regurgitation. AORTA: The root exhibited normal size. SYSTEMIC VEINS: IVC: The inferior vena cava was normal in size and course. Respirophasic changes were normal.    PERICARDIUM: A trivial pericardial effusion was identified. Insignificant   pericardial effusion and/or pericardial fat  was present.     SYSTEM MEASUREMENT TABLES    2D  AV Cusp: 1.6 cm  Ao Diam: 3 cm  LA Diam: 2.8 cm  LVOT Diam: 1.9 cm  IVSd: 0.9 cm  LVIDd: 3.9 cm  LVIDs: 2.2 cm  LVPWd: 0.9 cm  EF Biplane: 63.3 %  LAESV Index (A-L): 19.7 ml/m2  LVEF MOD A2C: 60.6 %  LVEF MOD A4C: 69.6 %  RA Area: 9.8 cm2  RV Major: 5.3 cm  RV Minor: 3.4 cm    CW  AV Vmax: 1.2 m/s  AV maxP.5 mmHg  PV Vmax: 0.9 m/s  PV maxPG: 3 mmHg  TR Vmax: 2.3 m/s  TR maxP.6 mmHg  AV VTI: 18.3 cm  AV Vmean: 0.7 m/s  AV meanP.6 mmHg    MM  TAPSE: 1.5 cm    PW  MICHAEL Vmax, Pt: 2.9 cm2  LVOT Vmax: 1.2 m/s  LVOT maxP.4 mmHg  E/E': 5.9  MV A Benton: 0.6 m/s  MV Dec Kimble: 2.4 m/s2  MV DecT: 225.9 ms  MV E Benton: 0.5 m/s  MV E/A Ratio: 0.9  MV PHT: 65.5 ms  MVA By PHT: 3.4 cm2  TAPSV: 12.1 cm/s  EXAM: CT head     INDICATION: Found on floor, altered mental status.     COMPARISON: None.     TECHNIQUE: Axial CT imaging of the head was performed without intravenous  contrast.     One or more dose reduction techniques were used on this CT: automated exposure  control, adjustment of the mAs and/or kVp according to patient's size, and  iterative reconstruction techniques. The specific techniques utilized on this CT  exam have been documented in the patient's electronic medical record.      _______________     FINDINGS:     BRAIN AND POSTERIOR FOSSA: The sulci, folia, ventricles and basal cisterns are  within normal limits for the patient?s age. There is no intracranial hemorrhage,  mass effect, or midline shift. There are no areas of abnormal parenchymal  attenuation.     EXTRA-AXIAL SPACES AND MENINGES: There are no abnormal extra-axial fluid  collections.     CALVARIUM: Intact.     SINUSES: Imaged paranasal sinuses and mastoid air cells are clear.     OTHER: None.     IMPRESSION:        1. No acute intracranial abnormality.          EXAM: CT Cervical spine     INDICATION: Cervical neck pain. Patient on the floor.     COMPARISON: No prior study.     TECHNIQUE: Axial CT imaging of the cervical spine was performed from the skull  base to the upper thoracic spine without intravenous contrast. Multiplanar  reformats were generated. One or more dose reduction techniques were used on  this CT: automated exposure control, adjustment of the mAs and/or kVp according  to patient's size, and iterative reconstruction techniques. The specific  techniques utilized on this CT exam have been documented in the patient's  electronic medical record.     _______________     FINDINGS:     VERTEBRAE AND DISCS: Coronal reformatted images demonstrate a normal appearance  to the occipital condyles. The atlantodental and atlantooccipital articulations  are normal in appearance. There is mild straightening of usual cervical  lordosis, without evidence of listhesis. Vertebral body heights are intact.  No  evidence of compression fracture. No displaced fracture. Moderate severity  spondylosis present C4-C7. No acute facet malalignment.     SPINAL CANAL AND FORAMINA: Moderate severity right-sided C4-C5 neural foraminal  narrowing. No evidence of high-grade spinal canal stenosis or neural foraminal  narrowing.     PREVERTEBRAL SOFT TISSUES: Normal     VISIBLE INTRACRANIAL CONTENTS: Unremarkable.     LUNG APICES: Patchy groundglass opacity present in the periphery of the right  upper lobe.     OTHER: None.     _______________     IMPRESSION  IMPRESSION:        1. Straightening of the usual cervical lordosis without evidence of fracture or  acute traumatic listhesis. 2. Cervical spondylosis and neuroforaminal narrowing as described. 3. Patchy groundglass opacity at the periphery of the imaged right upper lobe,  potentially reflecting pneumonitis or pneumonia given the presenting history.          Chest, single view     Indication: Coughing and chest congestion, patient found down     Comparison: 1/30/2016     Findings:  Portable upright AP view of the chest was obtained. Interval  development of asymmetric alveolar opacity throughout the majority of the right  lower lobe. No pneumothorax or pleural effusion. Left lung appears clear. Cardiac size and mediastinal contours are stable. Degenerative changes of each  shoulder girdle demonstrated without acute osseous abnormality.     IMPRESSION  Impression:  1.  Focal airspace opacity in the right lower lobe, potentially reflecting  pneumonitis or pneumonia.             Assessment/Plan:     Patient Active Problem List   Diagnosis Code    Hypertension I10    Arthritis M19.90    Hepatitis C B19.20    DJD (degenerative joint disease) of knee M17.10    GERD (gastroesophageal reflux disease) K21.9    Bipolar 1 disorder ([address] F31.9    Depression F32.9    Alcohol dependence in remission ([address] F10.21    DM2 (diabetes mellitus, type 2) ([address] E11.9    Needle phobia F40.298    Eustachian tube dysfunction H69.80    Psoriasis L40.9    Atrial fibrillation with RVR (HCC) I48.91    Rhabdomyolysis M62.82    Acute kidney injury ([address] N17.9    Acute metabolic encephalopathy M56.65    Abnormal urinalysis R82.90    Hyperammonemia (HCC) E72.20    Elevated transaminase level R74.0       A/P:    Rhabdo  -IVFs  -2/2 trauma/fall  -trend ck -improving 516->262 ->183       PAM  -resolved   -2/2 rhabdo  -IVFs      UTI  -on IVAB    AMS  -ammonia 28 today   -unclear of syncope-may 2/2 to taking higher dose of sleeping pills per patient (patient unable to name pill?) -psych following   -ECHO  -CT head negative/MRI neg   -UDS only positive for THC  --2/2 infectious and metabolic derangements  -suicide precautions  -sitter at bedside   -will need to speak with daughter in regards to the patient's psych history        A-fib with RVR  -resolved after cardizem and dig  -ekg ok  -no cp  -BNP 6K  -serial enzymes  -tele  -ECHO EF 55-60%      DM II  -corrective insulin  -hold oaa     Elevated tropinon   -0.11 likely 2/2 demand ishemia noting a-fib rvr in setting PAM  -monitor  -no CP    Elevated LFTs  -etoh abuse   -dc atorvastatin  -reduce ASA dose   -lactulose       Depression  -hold meds  -pscyh following recommendations made-do not feel patient is a suicidal risk-recommend talking to patient's daughter in regards to psych history. Will reevaluate sitter at bedside after speaking with daughter     ETOH abuse  -continue thiamine, folic acid, MVT     PNA   -IVAB       DVT prophyalxis  -scd/teds  -heparn sq  GI-protonix     Working on SNF disposition. Disposition 1-2 days.      YING [facility]  [phone]  Office:  [phone]

## 2018-06-21 NOTE — PROGRESS NOTES
1940- Bedside report received from [place], [facility]. Pt in bed awake watching tv not in distress. Pt denies pain sitter at bedside for suicide. HOB elevated, bed low and in locked position call light within reach. Pt has no iv access will try to insert one. Pt with purple areas on bilateral heels. Mepilex and prevalon boots applied. 2300- New IV inserted on the left forearm gauge 20 attempt x 1 successfully. pt tolerated well. IVF infusing. [facility] 1945-Bedside and Verbal shift change report given to  Fanny (oncoming nurse) by Erlin Terrazas RN (offgoing nurse). Report included the following information SBAR, Kardex, Intake/Output, MAR and Recent Results.

## 2018-06-21 NOTE — PROGRESS NOTES
Patient received in bed awake. Patient A&Ox 3, denies pain and discomfort. No distress noted. Sitter at bedside for suicide precautions. Frequently used items within reach. Bed locked in low position. Call bell within reach and patient verbalized understanding of use for assistance and needs. 1750 -- Per Mari Leija, needed to D/C sitter in order for patient to go to rehab tomorrow. Spoke with Dr. Hollie Reyes on the phone, OK to d/c sitter for suicide precautions (RBV).

## 2018-06-21 NOTE — ROUTINE PROCESS
Bedside and Verbal shift change report given to SHANNAN Zepeda (oncoming nurse) by Caryn Barone RN (offgoing nurse). Report included the following information SBAR, Kardex, Intake/Output, MAR and Recent Results.

## 2018-06-21 NOTE — PROGRESS NOTES
Problem: Self Care Deficits Care Plan (Adult)  Goal: *Acute Goals and Plan of Care (Insert Text)  Occupational Therapy Goals  Initiated 6/15/2018 within 7 day(s). 1.  Patient will perform grooming tasks at EOB with minimal assistance for balance. 2.  Patient will perform lower body dressing with minimal assistance. 3.  Patient will perform functional task for 8 minutes in standing with moderate assistance for balance to increase activity tolerance for ADLs. 4.  Patient will perform toilet transfers with moderate assistance. 5.  Patient will perform all aspects of toileting with moderate assistance. 6.  Patient will participate in upper extremity therapeutic exercise/activities for 8 minutes with supervision/set-up to increase BUE strength for functional transfers and ADLs. 7.  Patient will utilize energy conservation techniques during functional activities with minimal verbal cues. Outcome: Progressing Towards Goal  Occupational Therapy TREATMENT    Patient: Rafiq Bonilla (52 y.o. female)  Date: 6/21/2018  Diagnosis: Atrial fibrillation with RVR (HCC)  Rhabdomyolysis Rhabdomyolysis       Precautions: Skin  Chart, occupational therapy assessment, plan of care, and goals were reviewed. PLOF: Independent    ASSESSMENT:  Pt co-treated w/PT to maximize safety w/functional transfer training 2/2 c/o LLE pain in great toe. Pt requires increase time to demonstrate energy conservation techniques w/LB dressing tasks, donning socks. Pt/ c/o LLE pain requires assist. Pt requires 2 person assist to standing w/RW and increase time stepping to chair w/RW. Pt left in chair w/all needs within reach.   EDUCATION Pt educated on energy conservation w/LB ADL task, donning socks  Progression toward goals:  []          Improving appropriately and progressing toward goals  [x]          Improving slowly and progressing toward goals  []          Not making progress toward goals and plan of care will be adjusted     PLAN:  Patient continues to benefit from skilled intervention to address the above impairments. Continue treatment per established plan of care. Discharge Recommendations:  Inpatient Rehab  Further Equipment Recommendations for Discharge:  N/A     SUBJECTIVE:   Patient stated This feels good.     OBJECTIVE DATA SUMMARY:       Cognitive/Behavioral Status:  Neurologic State: Alert  Orientation Level: Oriented X4  Cognition: Follows commands  Safety/Judgement: Fall prevention, Decreased awareness of need for safety  Functional Mobility and Transfers for ADLs:   Bed Mobility:  Supine to Sit: Additional time; Moderate assistance   Transfers:  Sit to Stand: Moderate assistance;Assist x2  Bed to Chair: Moderate assistance (w/RW)   Balance:  Sitting: Intact  Sitting - Static: Good (unsupported)  Sitting - Dynamic: Fair (occasional)  Standing: Impaired; With support  Standing - Static: Fair  Standing - Dynamic : Fair  ADL Intervention:    Lower Body Dressing Assistance  Socks: Maximum assistance;Minimum assistance (Max A don L sock, Min A don R sock)  Leg Crossed Method Used: Yes  Position Performed: Long sitting on bed  Cues: Durene Player    Cognitive Retraining  Safety/Judgement: Fall prevention;Decreased awareness of need for safety    Therapeutic Exercises:   AROM BUE shoulder flexion, horizontal ab/adduction  AROM BUE elbow flexion/extension    Pain:  Pre Treatment:2  Post Treatment:2  Pain Scale 1: Numeric (0 - 10)  Pain Intensity 1: 2  Pain Location 1: Foot, great toe  Pain Orientation 1: Left  Pain Description 1: Aching; Sore  Pain Intervention(s) 1: Medication (see MAR)    Activity Tolerance:    Fair 2/2 c/o toe pain    Please refer to the flowsheet for vital signs taken during this treatment.   After treatment:   [x]  Patient left in no apparent distress sitting up in chair  []  Patient left in no apparent distress in bed  [x]  Call bell left within reach  [x]  Nursing notified  [x]  sitter present  []  Bed alarm activated    Jory Corea DEANA Escobar  Time Calculation: 25 mins

## 2018-06-21 NOTE — PROGRESS NOTES
Problem: Falls - Risk of  Goal: *Absence of Falls  Document Radha Fall Risk and appropriate interventions in the flowsheet. Outcome: Progressing Towards Goal  Fall Risk Interventions:  Mobility Interventions: Patient to call before getting OOB         Medication Interventions: Evaluate medications/consider consulting pharmacy    Elimination Interventions: Patient to call for help with toileting needs, Toileting schedule/hourly rounds    History of Falls Interventions: Door open when patient unattended        Problem: Pressure Injury - Risk of  Goal: *Prevention of pressure injury  Document Ryan Scale and appropriate interventions in the flowsheet.    Outcome: Progressing Towards Goal  Pressure Injury Interventions:  Sensory Interventions: Assess changes in LOC    Moisture Interventions: Absorbent underpads, Check for incontinence Q2 hours and as needed    Activity Interventions: Pressure redistribution bed/mattress(bed type)    Mobility Interventions: HOB 30 degrees or less    Nutrition Interventions: Document food/fluid/supplement intake    Friction and Shear Interventions: Foam dressings/transparent film/skin sealants, HOB 30 degrees or less

## 2018-06-21 NOTE — PROGRESS NOTES
Problem: Mobility Impaired (Adult and Pediatric)  Goal: *Acute Goals and Plan of Care (Insert Text)  Physical Therapy Goals  Initiated 6/15/2018 and to be accomplished within 7 day(s)  1. Patient will maintain eyes open for greater than 90% of session to allow for active participation in mobility training. 2.  Patient will follow 90% of commands to maximize safety and active participation in mobility training. 3.  Patient will move from supine to sit and sit to supine  in bed with supervision/set-up. 4.  Patient will maintain seated at edge of bed for 10 min with supervision/set-up to prepare for out of bed activity. 5.  Patient will perform sit to stand with minimal assistance/contact guard assist.  6.  Patient will transfer from bed to chair and chair to bed with minimal assistance/contact guard assist using the least restrictive device. 7.  Patient will ambulate with minimal assistance/contact guard assist for 25 feet with the least restrictive device. 8.  Patient will negotiate obstacles during ambulation with supervision/set-up. Outcome: Progressing Towards Goal    PHYSICAL THERAPY: Daily TREATMENT Note   INPATIENT: Medicare: Hospital Day: 9     Patient: Isaiah Pritchard (1 y.o. female)    Date: 6/21/2018  Primary Diagnosis: Atrial fibrillation with RVR (HCC)  Rhabdomyolysis    ,  ,   Precautions: Skin  WBAT    Chart, physical therapy assessment, plan of care and goals were reviewed. PLOF:Independent    ASSESSMENT:  Pt very motivated today, eager to participate in therapy today. Pt reporting pain in L foot today, during initial sit<>stand trial pt able to stand <30 seconds with verbal and tactile cues for weight shift and posture correction, limited by foot pain. Second trial pt able to stand with verbal and tactile cues, mod a for safety, amb 2 ft with mod a for balance, walker management and cues for sequencing and safety.   Pt then transferred stand>sit with mod a for safety and cues.    Progression toward goals:        Improving appropriately and progressing toward goals       PLAN:  Patient continues to benefit from skilled intervention to address the above impairments. Continue treatment per established plan of care. EDUCATION:   Education:  Patient was educated on the following topics: role of PT   Barriers to Learning/Limitations: None  Compensate with: visual, verbal, tactile, kinesthetic cues/model    Discharge Recommendations:  Inpatient Rehab  Further Equipment Recommendations for Discharge:  TBD at next level of care  Factors which may impact discharge planning: none     SUBJECTIVE:   Patient stated I want to work for you.     OBJECTIVE DATA SUMMARY:   Critical Behavior:  Neurologic State: Alert  Orientation Level: Oriented X4  Cognition: Follows commands  Safety/Judgement: Fall prevention, Decreased awareness of need for safety    G CODE:Mobility  Current  CL= 60-79%   Goal  CI= 1-19%. The severity rating is based on the [facility] Standing Balance Scale  0: Pt performs 25% or less of standing activity (Max assist) CN, 100% impaired. 1: Pt supports self with upper extremities but requires therapist assistance. Pt performs 25-50% of effort (Mod assist) CM, 80% to <100% impaired. 1+: Pt supports self with upper extremities but requires therapist assistance. Pt performs >50% effort. (Min assist). CL, 60% to <80% impaired. 2: Pt supports self independently with both upper extremities (walker, crutches, parallel bars). CL, 60% to <80% impaired. 2+: Pt support self independently with 1 upper extremity (cane, crutch, 1 parallel bar). CK, 40% to <60% impaired. 3: Pt stands without upper extremity support for up to 30 seconds. CK, 40% to <60% impaired. 3+: Pt stands without upper extremity support for 30 seconds or greater. CJ, 20% to <40% impaired. 4: Pt independently moves and returns center of gravity 1-2 inches in one plane.  CJ, 20% to <40% impaired. 4+: Pt independently moves and returns center of gravity 1-2 inches in multiple planes. CI, 1% to <20% impaired. 5: Pt independently moves and returns center of gravity in all planes greater than 2 inches. CH, 0% impaired. Functional Mobility:      Functional Status      Indep   (I)   Mod I   Super-vision   Min A   Mod A   Max A   Total A   Assist x2 Verbal cues Additional time Not tested   Comments   Rolling []  []  [] []    []    []  []  [] [] [] []    Supine to sit []  []  [] []  []  []  []  [] [] [] []    Sit to supine []  []  [] []  []  []  []  [] [] [] []    Sit to stand []  []  [] []  []  []  []  [] [] [] []    Stand to sit []  []  [] []  []  []  []  [] [] [] []    Bed to chair transfers []  []  [] []  []  []  []  [] [] [] []        Balance    Good   Fair   Poor   Unable   Not tested   Comments   Sitting static []  []  []  []  []    Sitting dynamic []  []  []  []  []    Standing static []  []  []  []  []    Standing dynamic []  []  []  []  []      Mobility/Gait:   Level of Assistance:  Moderate Assistance  Assistive Device: rolling walker  Distance Ambulated: 3 feet     Left Lower Extremity: WBAT  Right Lower Extremity: WBAT  Base of Support: center of gravity altered  Speed/Staci: shuffled and slow  Step Length: left shortened and right shortened  Swing Pattern: WFL  Stance: WFL  Gait Abnormalities: shuffling gait    Stairs:   Level of Assistance: Unable at this time    Therapeutic Exercises:         EXERCISE   Sets   Reps   Active Active Assist   Passive Self ROM   Comments   Ankle Pumps 1 10  [x] [] [] []    Quad Sets/Glut Sets   [] [] [] []    Hamstring Sets   [] [] [] []    Short Arc Quads   [] [] [] []    Heel Slides   [] [] [] []    Straight Leg Raises   [] [] [] []    Hip Abd/Add   [] [] [] []    Long Arc Quads   [] [] [] []    Seated Marching   [] [] [] []    Standing Marching   [] [] [] []       [] [] [] []        Vital Signs  Temp: 98 °F (36.7 °C)     Pulse (Heart Rate): (!) 105 BP: 126/77 (ms soni took the vitals; bryan put them in the computer)     Resp Rate: 16     O2 Sat (%): 99 %  Pain:  Pre treatment pain level:2  Post treatment pain level:3  Pain Scale 1: Numeric (0 - 10)  Pain Intensity 1: 0  Pain Location 1: Foot  Pain Orientation 1: Left  Pain Description 1: Aching; Sore  Pain Intervention(s) 1: Medication (see MAR)  Activity Tolerance:   Good      After treatment:   Patient left in no apparent distress sitting up in chair    Call bell left within reach  Nursing notified  Caregiver present      Claudia Tejeda PTA   Time Calculation: 25 mins

## 2018-06-21 NOTE — PROGRESS NOTES
[facility]U/IPR REFERRAL CONTACT NOTE  [id number] [place] [address] for Physical Rehabilitation    RE:  Barbara Ortiz WILFREDO    Referral received to review this patient's case for admission to [id number] [place] [address] for Physical Rehabilitation. Current status reviewed. Patient admitted due to AMS with work up revealing patient in a-fib with rvr, + for Rhabdo along with PAM - treatment ongoing. PT/OT/ST on board and recommending IPR for continued care. Note Psychiatry evaluation requested and completed due to syncope-may be due to taking higher dose of sleeping pills per patient. Per consult patient states that this was accidental, and adamantly and consistently denies any intentional self-harm or suicidal thoughts and showing no evidence of psychosis=based on current exam, imminent risk of harm to self or others is low. Psychiatry recommended regular assessment by staff for suicidal ideations to make sure pt's report remains consistent (suicide & psychiatric awareness/precautions observation flow sheet completed on 6/20/18 3055 to 6/21/18 8594). Will follow up in am to review status (assure free from need for sitter/chemical/other restraints for 24 hrs) and review with team.  Please be aware admission to [facility] for Physical Rehabilitation will also be contingent on insurance authorization. Thank you for this referral.  Should you have any questions please do not hesitate to call.      Sincerely,  [facility] for Physical Rehabilitation  [phone]

## 2018-06-21 NOTE — INTERDISCIPLINARY ROUNDS
IDR Summary      Patient: Tobin Lemus MRN: [id number]    Age: 59 y.o.  : 1960     Admit Diagnosis: Atrial fibrillation with RVR ([address]  Rhabdomyolysis        DIET status: Dysphagia     Lines/Tubes:   IV: YES   Needed: YES  [place]: NO  Needed:NO  Central Line: NO Needed: NO      VTE Prophylaxis: Chemical    Mobility needs: Yes     PT ordered:  YES PT eval on chart: YES    OT ordered:  YES OT eval on chart: YES      ST ordered:  NO ST eval on chart:  NO     Disposition/Care Management:  Discharge plan: SNF      Home Health ordered? NO     Recommended DME from PT/OT:      DME ordered? NO     SNF- has patient been matched? NO    Accepting bed? NO   Does patient require insurance auth?   YES        Barriers to discharge:   Financial concerns:No   PCP: Violette Crawford MD    : NO  Interventions:       LOS: 8 days     Expected days until discharge: Must be sitter free for 24 hours            Signed:     JAKE Ashraf-BC  [address]  Hospitalist Division  Pager:  [phone]  Office:  [phone]

## 2018-06-21 NOTE — PROGRESS NOTES
Problem: Dysphagia (Adult)  Goal: *Acute Goals and Plan of Care (Insert Text)  Recommendations:  Diet: Mech-soft/thin liquid   Meds: Whole in puree  Aspiration Precautions  Oral Care TID  Straw sips ok, small sips, supervision with PO    Goals:  Patient will:  1. Tolerate PO trials with 0 s/s overt distress in 4/5 trials  2. Utilize compensatory swallow strategies/maneuvers (decrease bite/sip, size/rate, alt. liq/sol) with min cues in 4/5 trials  3. Perform oral-motor/laryngeal exercises to increase oropharyngeal swallow function with min cues  4. Complete an objective swallow study (i.e., MBSS) to assess swallow integrity, r/o aspiration, and determine of safest LRD, min A        Outcome: Progressing Towards Goal  Speech language pathology dysphagia and dysarthria treatment    Patient: Chanell Penny (58 y.o. female)  Date: 6/21/2018  Diagnosis: Atrial fibrillation with RVR (HCC)  Rhabdomyolysis Rhabdomyolysis       Precautions: aspiration, Skin    ASSESSMENT:  Pt seen b/s for dysphagia and dysarthria tx. Pt awake, alert, sitting up in Blanche chair, sitter present. Pt denied odynophagia, foods sticking or coughing with meals. Pt accepted regular solid trial with mildly prolonged mastication, slow A-P transit, functional given increased time. Pt accepted thin via cup sips with timely swallow response, appropriate laryngeal elevation and no overt s/sx aspiration. Trials straws yielded same results, no overt s/sx aspiration. Rec: continue diet as ordered, pt ok to have straw sips thin. SLP will continue to follow. Dysarthria tx: Pt continued to demo slurred speech, conversational intelligibility ~80%, however, pt reports improvement since admit. Re- educated pt to S. O.S to improve conversational intelligibility. Pt able to increase intelligibility to ~90% with min visual/ verbal cues. Pt with mild left labial droop with mildly decreased ROM/ coordination.   Initiated basic labial ROM/ coordination exercises which pt was able to complete x 10 reps with min cues. Re- educated pt to compensatory speech strategies, pt verbalized understanding. SLP will continue to follow. Progression toward goals:  [x]         Improving appropriately and progressing toward goals  []         Improving slowly and progressing toward goals  []         Not making progress toward goals and plan of care will be adjusted     PLAN:  Recommendations and Planned Interventions:   continue diet as ordered, pt ok to have straw sips thin. SLP will continue to follow. Patient continues to benefit from skilled intervention to address the above impairments. Continue treatment per established plan of care. Discharge Recommendations:  Inpatient Rehab     SUBJECTIVE:   Patient stated I get anxious and it doesn't come out right [speech]. OBJECTIVE:   Cognitive and Communication Status:  Neurologic State: Alert  Orientation Level: Oriented X4  Cognition: Follows commands  Perception: Appears intact  Perseveration: No perseveration noted  Safety/Judgement: Fall prevention, Decreased awareness of need for safety  Dysphagia Treatment:  Oral Assessment:  Oral Assessment  Labial: Decreased rate  Dentition: Natural, Intact  Oral Hygiene: good  Lingual: No impairment  Velum: No impairment  Mandible: No impairment  P.O. Trials:   Patient Position: Blanche chair   Vocal quality prior to P.O.: No impairment   Consistency Presented:  Thin liquid, Solid   How Presented: Self-fed/presented, Straw, Cup/sip, Successive swallows       Bolus Acceptance: No impairment   Bolus Formation/Control: Impaired   Type of Impairment: Delayed, Mastication   Propulsion: Delayed (# of seconds)   Oral Residue: None   Initiation of Swallow: No impairment   Laryngeal Elevation: Functional   Aspiration Signs/Symptoms: None   Pharyngeal Phase Characteristics: No impairment, issues, or problems    Effective Modifications: None   Cues for Modifications: Minimal         Oral Phase Severity: Mild   Pharyngeal Phase Severity : No impairment   Oral Motor Exercises:   Labial Protrusion: Yes   Sets : 1   Reps : 10   Labial Retraction: Yes   Sets : 1   Reps : 10           \"OO-EE\": Yes   Sets : 1   Reps: 10          PAIN:  Start of Tx: 0  End of Tx: 0     After treatment:   [x]              Patient left in no apparent distress sitting up in chair  []              Patient left in no apparent distress in bed  [x]              Call bell left within reach  [x]              Nursing notified  []              Family present  [x]              Caregiver present  []              Bed alarm activated      COMMUNICATION/EDUCATION:   [x] Aspiration precautions; swallow safety; compensatory techniques  []        Patient unable to participate in education; education ongoing with staff  [x]  Posted safety precautions in patient's room.   [x] Oral-motor/laryngeal strengthening exercises      Eddie Uribe MS, CCC/SLP  Dysphagia tx: 11 minutes  Dysarthria tx: 11  minutes

## 2018-06-22 LAB
GLUCOSE BLD STRIP.AUTO-MCNC: 126 MG/DL (ref 70–110)
GLUCOSE BLD STRIP.AUTO-MCNC: 134 MG/DL (ref 70–110)
GLUCOSE BLD STRIP.AUTO-MCNC: 138 MG/DL (ref 70–110)
GLUCOSE BLD STRIP.AUTO-MCNC: 147 MG/DL (ref 70–110)

## 2018-06-22 PROCEDURE — 74011250637 HC RX REV CODE- 250/637: Performed by: PSYCHIATRY & NEUROLOGY

## 2018-06-22 PROCEDURE — 65270000029 HC RM PRIVATE

## 2018-06-22 PROCEDURE — 51798 US URINE CAPACITY MEASURE: CPT

## 2018-06-22 PROCEDURE — 74011250637 HC RX REV CODE- 250/637: Performed by: HOSPITALIST

## 2018-06-22 PROCEDURE — 92507 TX SP LANG VOICE COMM INDIV: CPT

## 2018-06-22 PROCEDURE — 74011250637 HC RX REV CODE- 250/637: Performed by: NURSE PRACTITIONER

## 2018-06-22 PROCEDURE — 97535 SELF CARE MNGMENT TRAINING: CPT

## 2018-06-22 PROCEDURE — 97168 OT RE-EVAL EST PLAN CARE: CPT

## 2018-06-22 PROCEDURE — C9113 INJ PANTOPRAZOLE SODIUM, VIA: HCPCS | Performed by: HOSPITALIST

## 2018-06-22 PROCEDURE — 82962 GLUCOSE BLOOD TEST: CPT

## 2018-06-22 PROCEDURE — 92526 ORAL FUNCTION THERAPY: CPT

## 2018-06-22 PROCEDURE — 77030038269 HC DRN EXT URIN PURWCK BARD -A

## 2018-06-22 PROCEDURE — 74011000258 HC RX REV CODE- 258: Performed by: HOSPITALIST

## 2018-06-22 PROCEDURE — 97164 PT RE-EVAL EST PLAN CARE: CPT

## 2018-06-22 PROCEDURE — 74011250636 HC RX REV CODE- 250/636: Performed by: NURSE PRACTITIONER

## 2018-06-22 PROCEDURE — 74011250636 HC RX REV CODE- 250/636: Performed by: HOSPITALIST

## 2018-06-22 RX ADMIN — DEXTROSE MONOHYDRATE AND SODIUM CHLORIDE 150 ML/HR: 5; .45 INJECTION, SOLUTION INTRAVENOUS at 00:47

## 2018-06-22 RX ADMIN — ACETAMINOPHEN 650 MG: 325 TABLET ORAL at 09:21

## 2018-06-22 RX ADMIN — LACTULOSE 20 G: 20 SOLUTION ORAL at 09:21

## 2018-06-22 RX ADMIN — PANTOPRAZOLE SODIUM 40 MG: 40 INJECTION, POWDER, FOR SOLUTION INTRAVENOUS at 12:35

## 2018-06-22 RX ADMIN — HEPARIN SODIUM 5000 UNITS: 5000 INJECTION, SOLUTION INTRAVENOUS; SUBCUTANEOUS at 12:36

## 2018-06-22 RX ADMIN — HEPARIN SODIUM 5000 UNITS: 5000 INJECTION, SOLUTION INTRAVENOUS; SUBCUTANEOUS at 04:46

## 2018-06-22 RX ADMIN — DEXTROSE MONOHYDRATE AND SODIUM CHLORIDE 150 ML/HR: 5; .45 INJECTION, SOLUTION INTRAVENOUS at 07:37

## 2018-06-22 RX ADMIN — DEXTROSE MONOHYDRATE AND SODIUM CHLORIDE 150 ML/HR: 5; .45 INJECTION, SOLUTION INTRAVENOUS at 14:53

## 2018-06-22 RX ADMIN — LACTULOSE 20 G: 20 SOLUTION ORAL at 18:02

## 2018-06-22 RX ADMIN — ACETAMINOPHEN 650 MG: 325 TABLET ORAL at 22:14

## 2018-06-22 RX ADMIN — Medication 100 MG: at 09:22

## 2018-06-22 RX ADMIN — THERA TABS 1 TABLET: TAB at 09:23

## 2018-06-22 RX ADMIN — FOLIC ACID 1 MG: 1 TABLET ORAL at 09:22

## 2018-06-22 RX ADMIN — ACETAMINOPHEN 650 MG: 325 TABLET ORAL at 04:51

## 2018-06-22 RX ADMIN — ASPIRIN 81 MG 81 MG: 81 TABLET ORAL at 09:22

## 2018-06-22 RX ADMIN — HEPARIN SODIUM 5000 UNITS: 5000 INJECTION, SOLUTION INTRAVENOUS; SUBCUTANEOUS at 20:39

## 2018-06-22 NOTE — PROGRESS NOTES
Assumed patient care. Received patient awake, alert, oriented X4. Patient denies any pain at this time. On [email or web address] going on per nasal cannula. Bed is locked and in lowest position and call bell is within reach. Not in acute distress.

## 2018-06-22 NOTE — PROGRESS NOTES
Problem: Falls - Risk of  Goal: *Absence of Falls  Document Radha Fall Risk and appropriate interventions in the flowsheet. Outcome: Progressing Towards Goal  Fall Risk Interventions:  Mobility Interventions: Patient to call before getting OOB         Medication Interventions: Patient to call before getting OOB    Elimination Interventions: Call light in reach, Patient to call for help with toileting needs    History of Falls Interventions: Door open when patient unattended        Problem: Pressure Injury - Risk of  Goal: *Prevention of pressure injury  Document Ryan Scale and appropriate interventions in the flowsheet.    Outcome: Progressing Towards Goal  Pressure Injury Interventions:  Sensory Interventions: Float heels    Moisture Interventions: Absorbent underpads, Internal/External urinary devices    Activity Interventions: Pressure redistribution bed/mattress(bed type)    Mobility Interventions: HOB 30 degrees or less    Nutrition Interventions: Document food/fluid/supplement intake    Friction and Shear Interventions: Foam dressings/transparent film/skin sealants, HOB 30 degrees or less

## 2018-06-22 NOTE — PROGRESS NOTES
[address] [place], [place] [address]ist Division           Inpatient Daily Progress Note        Patient: Antonette Garcia MRN: [id number]  CSN: [id number]    YOB: 1960  Age: 59 y.o. Sex: female    DOA: 6/13/2018 LOS:  LOS: 9 days                    Chief Complaint:  Rishi Mojuan     Interval History:    Lorry January. Marcella Martin is a 59 y.o. Female with a PMHx of HTN, arthritis, DM II, Hep C, GERD, COPD, Bipolar 1 disorder, depression, etoh dependence in remission, DJD, Eustachain tube dysunction, and psoriasis who presents to the ED for AMS noting patient was found by daughter on the floor with confusion. Daughter last saw patient 6 days prior. Patient awake but not oriented to day/time and notes generalize myalgia and doesn't remember how she got on the floor. Upon arrival, patient in a-fib with rvr and was placed on cardizem gtt and given dig with conversion. Rhabdo with PAM. Admitted for further eval.  6/20/2018- No acute events overnight. Speech is still slurred and illogical at times. Patient today stating that she thinks she may have taken too many of her sleeping pills to cause this 2/2 depression. Psych consult placed today. 6/21/2018: Psych evaluation completed. Sitter at bedside. Working on SNF disposition. 6/22/2018: No complaints. Awaiting insurance acceptance for discharge. Subjective:      \"I'm doing ok\"   No acute events overnight. Sitter at bedside. Objective:      Visit Vitals    /70 (BP 1 Location: Right arm, BP Patient Position: At rest)    Pulse 93    Temp 98.8 °F (37.1 °C)    Resp 18    Ht 5' 7\" (1.702 m)    Wt 90.3 kg (199 lb)    SpO2 97%    Breastfeeding No    BMI 31.17 kg/m2           Physical Exam:  General appearance: cooperative   Lungs: CTA   Heart: RRR, S1, S2 normal, no murmur, click, rub or gallop  Abdomen: soft, non tender, non distended. Normoactive bowel sounds.   Extremities: extremities normal, atraumatic, no cyanosis or edema  Skin: Skin color, texture, turgor normal.   Neurologic: Speech slurred, moves all ext   PSY: Mood and affect normal, appropriately behaved      Intake and Output:  Current Shift:  701 - 1900  In: 240 [P.O.:240]  Out: -   Last three shifts:  1901 -  0700  In: 2040 [P.O.:240; I.V.:1800]  Out: 3200 [Urine:3200]    Recent Results (from the past 24 hour(s))   GLUCOSE, POC    Collection Time: 18  5:01 PM   Result Value Ref Range    Glucose (POC) 133 (H) 70 - 110 mg/dL   GLUCOSE, POC    Collection Time: 18  9:16 PM   Result Value Ref Range    Glucose (POC) 163 (H) 70 - 110 mg/dL   GLUCOSE, POC    Collection Time: 18  8:53 AM   Result Value Ref Range    Glucose (POC) 126 (H) 70 - 110 mg/dL           Lab Results   Component Value Date/Time    Glucose 149 (H) 2018 04:45 AM    Glucose 117 (H) 2018 04:00 AM    Glucose 102 (H) 2018 03:57 AM    Glucose 132 (H) 06/15/2018 07:20 AM    Glucose 106 (H) 2018 08:05 AM    [facility]  [phone]    Transthoracic Echocardiogram    Patient: Elder Johnston  MRN: [id number]  [address] #: [de-[id number]]  : 1960  Age: 59 years  Gender: Female  Height: 66 in  Weight: 182.6 lb  BSA: 1.93 m-sq  BP: 112 / 72 mmHg  Study date: 2018  Status: Routine  Location: Echo lab  [facility] #: [id number]    Allergies: ACE INHIBITORS, PENICILLINS    Referring_Ordering Physician: Curt Whelan. Samuel Salcido Dr  Interpreting Physician: Mojgan Caballero MD  Interpreting Group: [email or web address]  Technologist: Chintan Newsome RVT [facility]    SUMMARY:  Left ventricle: Systolic function was normal by visual assessment. Ejection   fraction was estimated in the range of 55 %  to 60 %. No obvious wall motion abnormalities identified in the views   obtained. Left ventricular diastolic function  parameters were normal for the patient's age. Right ventricle:  The size was normal. Systolic function was low normal.    Left atrium: Size was normal.    Mitral valve: There was no regurgitation. Aortic valve: There was no stenosis. There was no regurgitation. Tricuspid valve: There was trivial regurgitation. Inferior vena cava, hepatic veins: The inferior vena cava was normal in size   and course. Pericardium: A trivial pericardial effusion was identified. INDICATIONS: Atrial fibrillation. PROCEDURE: This was a routine study. The study included complete 2D imaging,   complete spectral Doppler, and color  Doppler. Systolic blood pressure was 112 mmHg, at the start of the study. Diastolic blood pressure was 72 mmHg, at the  start of the study. Images were obtained from the parasternal, apical,   subcostal, and suprasternal notch acoustic  windows. Image quality was adequate. LEFT VENTRICLE: Size was normal. Systolic function was normal by visual   assessment. Ejection fraction was estimated in  the range of 55 % to 60 %. No obvious wall motion abnormalities identified in   the views obtained. Wall thickness was  normal. DOPPLER: Left ventricular diastolic function parameters were normal   for the patient's age. RIGHT VENTRICLE: The size was normal. Systolic function was low normal.   DOPPLER: Estimated peak pressure was in the  range of 25 mmHg to 30 mmHg. LEFT ATRIUM: Size was normal. LA volume index was 20 ml/m-sq. RIGHT ATRIUM: Size was normal.    MITRAL VALVE: There was normal leaflet separation. DOPPLER: The transmitral   velocity was within the normal range. There  was no evidence for stenosis. There was no regurgitation. AORTIC VALVE: The valve was trileaflet. Leaflets exhibited normal thickness   and normal cuspal separation. DOPPLER:  Transaortic velocity was within the normal range. There was no stenosis. There was no regurgitation. TRICUSPID VALVE: There was normal leaflet separation. DOPPLER: The   transtricuspid velocity was within the normal range.   There was no evidence for tricuspid stenosis. There was trivial   regurgitation. PULMONIC VALVE: Not well visualized, but normal Doppler findings. DOPPLER:   There was no stenosis. There was trivial  regurgitation. AORTA: The root exhibited normal size. SYSTEMIC VEINS: IVC: The inferior vena cava was normal in size and course. Respirophasic changes were normal.    PERICARDIUM: A trivial pericardial effusion was identified. Insignificant   pericardial effusion and/or pericardial fat  was present. SYSTEM MEASUREMENT TABLES    2D  AV Cusp: 1.6 cm  Ao Diam: 3 cm  LA Diam: 2.8 cm  LVOT Diam: 1.9 cm  IVSd: 0.9 cm  LVIDd: 3.9 cm  LVIDs: 2.2 cm  LVPWd: 0.9 cm  EF Biplane: 63.3 %  LAESV Index (A-L): 19.7 ml/m2  LVEF MOD A2C: 60.6 %  LVEF MOD A4C: 69.6 %  RA Area: 9.8 cm2  RV Major: 5.3 cm  RV Minor: 3.4 cm    CW  AV Vmax: 1.2 m/s  AV maxP.5 mmHg  PV Vmax: 0.9 m/s  PV maxPG: 3 mmHg  TR Vmax: 2.3 m/s  TR maxP.6 mmHg  AV VTI: 18.3 cm  AV Vmean: 0.7 m/s  AV meanP.6 mmHg    MM  TAPSE: 1.5 cm    PW  MICHAEL Vmax, Pt: 2.9 cm2  LVOT Vmax: 1.2 m/s  LVOT maxP.4 mmHg  E/E': 5.9  MV A Benton: 0.6 m/s  MV Dec [place]: 2.4 m/s2  MV DecT: 225.9 ms  MV E Benton: 0.5 m/s  MV E/A Ratio: 0.9  MV PHT: 65.5 ms  MVA By PHT: 3.4 cm2  TAPSV: 12.1 cm/s  EXAM: CT head     INDICATION: Found on floor, altered mental status.     COMPARISON: None.     TECHNIQUE: Axial CT imaging of the head was performed without intravenous  contrast.     One or more dose reduction techniques were used on this CT: automated exposure  control, adjustment of the mAs and/or kVp according to patient's size, and  iterative reconstruction techniques. The specific techniques utilized on this CT  exam have been documented in the patient's electronic medical record.      _______________     FINDINGS:     BRAIN AND POSTERIOR FOSSA: The sulci, folia, ventricles and basal cisterns are  within normal limits for the patient?s age.  There is no intracranial hemorrhage,  mass effect, or midline shift. There are no areas of abnormal parenchymal  attenuation.     EXTRA-AXIAL SPACES AND MENINGES: There are no abnormal extra-axial fluid  collections.     CALVARIUM: Intact.     SINUSES: Imaged paranasal sinuses and mastoid air cells are clear.     OTHER: None.     IMPRESSION:        1. No acute intracranial abnormality.          EXAM: CT Cervical spine     INDICATION: Cervical neck pain. Patient on the floor.     COMPARISON: No prior study.     TECHNIQUE: Axial CT imaging of the cervical spine was performed from the skull  base to the upper thoracic spine without intravenous contrast. Multiplanar  reformats were generated. One or more dose reduction techniques were used on  this CT: automated exposure control, adjustment of the mAs and/or kVp according  to patient's size, and iterative reconstruction techniques. The specific  techniques utilized on this CT exam have been documented in the patient's  electronic medical record.     _______________     FINDINGS:     VERTEBRAE AND DISCS: Coronal reformatted images demonstrate a normal appearance  to the occipital condyles. The atlantodental and atlantooccipital articulations  are normal in appearance. There is mild straightening of usual cervical  lordosis, without evidence of listhesis. Vertebral body heights are intact. No  evidence of compression fracture. No displaced fracture. Moderate severity  spondylosis present C4-C7. No acute facet malalignment.     SPINAL CANAL AND FORAMINA: Moderate severity right-sided C4-C5 neural foraminal  narrowing. No evidence of high-grade spinal canal stenosis or neural foraminal  narrowing.     PREVERTEBRAL SOFT TISSUES: Normal     VISIBLE INTRACRANIAL CONTENTS: Unremarkable.     LUNG APICES: Patchy groundglass opacity present in the periphery of the right  upper lobe.     OTHER: None.     _______________     IMPRESSION  IMPRESSION:        1.  Straightening of the usual cervical lordosis without evidence of fracture or  acute traumatic listhesis. 2. Cervical spondylosis and neuroforaminal narrowing as described. 3. Patchy groundglass opacity at the periphery of the imaged right upper lobe,  potentially reflecting pneumonitis or pneumonia given the presenting history.          Chest, single view     Indication: Coughing and chest congestion, patient found down     Comparison: 1/30/2016     Findings:  Portable upright AP view of the chest was obtained. Interval  development of asymmetric alveolar opacity throughout the majority of the right  lower lobe. No pneumothorax or pleural effusion. Left lung appears clear. Cardiac size and mediastinal contours are stable. Degenerative changes of each  shoulder girdle demonstrated without acute osseous abnormality.     IMPRESSION  Impression:  1.  Focal airspace opacity in the right lower lobe, potentially reflecting  pneumonitis or pneumonia.             Assessment/Plan:     Patient Active Problem List   Diagnosis Code    Hypertension I10    Arthritis M19.90    Hepatitis C B19.20    DJD (degenerative joint disease) of knee M17.10    GERD (gastroesophageal reflux disease) K21.9    Bipolar 1 disorder ([facility] [address] F31.9    Depression F32.9    Alcohol dependence in remission ([facility] [address] F10.21    DM2 (diabetes mellitus, type 2) ([facility] [address] E11.9    Needle phobia F40.298    Eustachian tube dysfunction H69.80    Psoriasis L40.9    Atrial fibrillation with RVR (HCC) I48.91    Rhabdomyolysis M62.82    Acute kidney injury ([address] N17.9    Acute metabolic encephalopathy X19.56    Abnormal urinalysis R82.90    Hyperammonemia (HCC) E72.20    Elevated transaminase level R74.0       A/P:    Rhabdo  -IVFs  -2/2 trauma/fall  -trend ck -improving 516->262 ->183       PAM  -resolved   -2/2 rhabdo  -IVFs      UTI  -on IVAB    AMS  -ammonia 28 today   -unclear of syncope-may 2/2 to taking higher dose of sleeping pills per patient (patient unable to name pill?) -psych following   -ECHO  -CT head negative/MRI neg   -UDS only positive for THC  --2/2 infectious and metabolic derangements  -suicide precautions  -sitter at bedside   -will need to speak with daughter in regards to the patient's psych history        A-fib with RVR  -resolved after cardizem and dig  -ekg ok  -no cp  -BNP 6K  -serial enzymes  -tele  -ECHO EF 55-60%      DM II  -corrective insulin  -hold oaa     Elevated tropinon   -0.11 likely 2/2 demand ishemia noting a-fib rvr in setting PAM  -monitor  -no CP    Elevated LFTs  -etoh abuse   -dc atorvastatin  -reduce ASA dose   -lactulose       Depression  -hold meds  -pscyh following recommendations made-do not feel patient is a suicidal risk-recommend talking to patient's daughter in regards to psych history. Will reevaluate sitter at bedside after speaking with daughter     ETOH abuse  -continue thiamine, folic acid, MVT     PNA   -IVAB      DVT prophyalxis  -scd/teds  -heparn sq  GI-protonix     Working on [employer] approval. Disposition 1-2 days.      [facility]  [phone]  Office:  [phone]

## 2018-06-22 NOTE — ROUTINE PROCESS
Bedside and Verbal shift change report given to SHANNAN Zepeda (oshncoming nurse) by Juan David Meléndez RN (offgoing nurse). Report included the folowing information SBAR, Kardex, Intake/Output, MAR and Recent Results.

## 2018-06-22 NOTE — PROGRESS NOTES
Problem: Dysphagia (Adult)  Goal: *Acute Goals and Plan of Care (Insert Text)  Recommendations:  Diet: Mech-soft/thin liquid   Meds: Whole in puree  Aspiration Precautions  Oral Care TID  Straw sips ok, small sips, supervision with PO    Goals:  Patient will:  1. Tolerate PO trials with 0 s/s overt distress in 4/5 trials  2. Utilize compensatory swallow strategies/maneuvers (decrease bite/sip, size/rate, alt. liq/sol) with min cues in 4/5 trials  3. Perform oral-motor/laryngeal exercises to increase oropharyngeal swallow function with min cues  4. Complete an objective swallow study (i.e., MBSS) to assess swallow integrity, r/o aspiration, and determine of safest LRD, min A         Outcome: Progressing Towards Goal  Speech language pathology speech & dysphagia treatment     Patient: Dameon Najera (40 y.o. female)  Date: 6/22/2018  Diagnosis: Atrial fibrillation with RVR (HCC)  Rhabdomyolysis Rhabdomyolysis       Precautions: Aspiration Skin  PLOF: Independent    ASSESSMENT:  Pt seen for speech and swallowing tx this am.    Swallowing TX:  SLP provided therapeutic snack of solid and thin liquid + straw with no overt s/s of aspiration. Pt self-fed with delayed bolus formation and A-P transit noted. Lingual residue cleared upon liquid wash. Functional hyolaryngeal execursion to palpation. Pt remains safest for Mechanical Soft and Thin liquid diet, straws acceptable. Results d/c with SHANNAN Fishmna Arm. Speech Eval:  SLP administered the [facility] Cognitive Assessment [facility] pt recieved score of 20 indicating a Mild Cognitive Impairment. Pt willingly participated, with highest scores achieved in areas of Orientation, Abstraction, and Picture Naming. Pt required  Moderate verbal cueing to reach 75% accuracy on Delayed Naming Task and Serial 7 Attention task. Full results listed below. Pt achieved 70% intelligibility during a narrative description speech sample with moderate cues to slow speech rate.  Pt recalled 3/4 speech goals independently (Loud, clear, smooth, slow), reaching 100% accuracy with max cueing. Progression toward goals:  [x]         Improving appropriately and progressing toward goals  []         Improving slowly and progressing toward goals  []         Not making progress toward goals and plan of care will be adjusted     PLAN:  Recommendations and Planned Interventions:  Mechanical Soft and Thin liquid diet, straws acceptable. Address speech needs as indicated on [facility]  Patient continues to benefit from skilled intervention to address the above impairments. Continue treatment per established plan of care. Discharge Recommendations:  Acute Rehab     SUBJECTIVE:   Patient stated It was fun! . OBJECTIVE:   Cognitive and Communication Status:  Neurologic State: Alert  Orientation Level: Oriented X4  Cognition: Follows commands, Decreased attention/concentration  Perception: Appears intact  Perseveration: No perseveration noted  Safety/Judgement: Insight into deficits  Dysphagia Treatment:  Oral Assessment:  Oral Assessment  Labial: Decreased rate  Dentition: Natural  Oral Hygiene: Fair  Lingual: Decreased rate  Velum: No impairment  Mandible: No impairment  P.O.  Trials:   Patient Position: HOB 60   Vocal quality prior to P.O.: No impairment   Consistency Presented: Solid, Thin liquid   How Presented: Self-fed/presented, Straw, Successive swallows       Bolus Acceptance: No impairment   Bolus Formation/Control: Impaired   Type of Impairment: Delayed   Propulsion: Delayed (# of seconds)   Oral Residue: Less than 10% of bolus   Initiation of Swallow: No impairment   Laryngeal Elevation: Functional   Aspiration Signs/Symptoms: None   Pharyngeal Phase Characteristics: No impairment, issues, or problems    Effective Modifications: Small sips and bites   Cues for Modifications: None        Oral Phase Severity: Mild   Pharyngeal Phase Severity : No impairment   Anthony Cognitive Assessment [facility]):     Visuo-spatial /Executive:   1. Short Trail making test 0   2. Cube copying 0   3. Drawing clock contour 1   4. Drawing clock numbers 1   5. Drawing clock hands 1     Namin. Lion 1   2. Rhinoceros 1   3. Camel 1     Attention/Concentration:   1. Read list of digits 1/2   2. Read list of letters 1   3. Serial 7s  2/3    Language:   1. Repetition 2   2. Fluency 0     Abstraction:   1. Similarities 2     Delayed Recall:   1. Recall without cues 3     Orientation:   1. Orientation questions 5/6     TOTAL SCORE: 20/30  Normal = 26-30 normal   Mild cognitive impairment = 19-25   Moderate to severe = 0-18     Comment/Areas of deficit: Delayed Recall and Fluency. Exercises:   EEEE   Sets : 1   Reps 3    Diaphragmatic Breathing x 3    PAIN:  Start of Tx: 0  End of Tx: 0       The severity rating is based on the following outcomes:  RIVER Noms Swallow Level 6   Clinical Judgement    After treatment:   []              Patient left in no apparent distress sitting up in chair  [x]              Patient left in no apparent distress in bed  [x]              Call bell left within reach  [x]              Nursing notified  []              Family present  []              Caregiver present  []              Bed alarm activated      COMMUNICATION/EDUCATION:   [x] Aspiration precautions; swallow safety; compensatory techniques  [x]        Expressive communication techniques  []        Patient unable to participate in education; education ongoing with staff  []  Posted safety precautions in patient's room.   [] Oral-motor/laryngeal strengthening exercises      Ana Lilia Calderón  Time Calculation: 31 mins  Swallow Treatment Time: 15  Speech Treatment Time: 16

## 2018-06-22 NOTE — DISCHARGE SUMMARY
2 [facility]  Hospitalist Division    Discharge Summary    Patient: Chancy Fothergill MRN: [id number]  CSN: [id number]    YOB: 1960  Age: 59 y.o. Sex: female    DOA: 6/13/2018 LOS:  LOS: 14 days   Discharge Date:      Admission Diagnoses: Atrial fibrillation with RVR ([facility]  Rhabdomyolysis    Discharge Diagnoses:    Problem List as of 6/27/2018  Date Reviewed: 6/24/2018          Codes Class Noted - Resolved    Atrial fibrillation with RVR ([facility] ICD-10-CM: I48.91  ICD-9-CM: 427.31  6/13/2018 - Present        * (Principal)Rhabdomyolysis ICD-10-CM: I55.17  ICD-9-CM: 728.88  6/13/2018 - Present        Acute metabolic encephalopathy OQK-59-HS: G93.41  ICD-9-CM: 348.31  6/13/2018 - Present        Hyperammonemia ([facility] ICD-10-CM: E72.20  ICD-9-CM: 270.6  6/13/2018 - Present        Elevated transaminase level ICD-10-CM: R74.0  ICD-9-CM: 790.4  6/13/2018 - Present        Psoriasis ICD-10-CM: L40.9  ICD-9-CM: 696.1  Unknown - Present        Eustachian tube dysfunction ICD-10-CM: H69.80  ICD-9-CM: 381.81  Unknown - Present        Needle phobia ICD-10-CM: F40.298  ICD-9-CM: 300.29  Unknown - Present    Overview Signed 2/11/2014  3:06 PM by Alejandro Corley     from prior IV drug abuse             GERD (gastroesophageal reflux disease) ICD-10-CM: K21.9  ICD-9-CM: 530.81  Unknown - Present        Bipolar 1 disorder ([facility] ICD-10-CM: F31.9  ICD-9-CM: 296.7  Unknown - Present    Overview Signed 9/6/2013 10:20 AM by Abigail Boateng     seeing Psychiatrist             Depression ICD-10-CM: F32.9  ICD-9-CM: 537  Unknown - Present        Alcohol dependence in remission [facility]) ICD-10-CM: F10.21  ICD-9-CM: 303.93  Unknown - Present    Overview Signed 9/6/2013 10:21 AM by Abigail Boateng     alcohol withdrawal discharged 05/31/2013             DM2 (diabetes mellitus, type 2) ([facility] ICD-10-CM: E11.9  ICD-9-CM: 250.00  9/6/2013 - Present        DJD (degenerative joint disease) of knee ICD-10-CM: M17.10  ICD-9-CM: 715.36  Unknown - Present        Hypertension ICD-10-CM: I10  ICD-9-CM: 401.9  Unknown - Present        Arthritis ICD-10-CM: M19.90  ICD-9-CM: 716.90  Unknown - Present    Overview Signed 4/30/2013 10:56 AM by Malcolm Vidales     knees and back             Hepatitis C ICD-10-CM: B19.20  ICD-9-CM: 070.70  Unknown - Present    Overview Signed 4/30/2013 10:56 AM by Malcolm Vidales     prior IV drug use             RESOLVED: Acute kidney injury ([facility] ICD-10-CM: N17.9  ICD-9-CM: 584.9  6/13/2018 - 6/24/2018        RESOLVED: Abnormal urinalysis ICD-10-CM: R82.90  ICD-9-CM: 791.9  6/13/2018 - 6/24/2018              Discharge Condition: Good    Discharge To: SNF    Consults: Neurology and Psychiatry    Hospital Course:   Tonny Muñoz. Lesley Fall is a 59 y.o. female with a PMHx of HTN, arthritis, DM II, Hep C, GERD, COPD, Bipolar 1 disorder, depression, etoh dependence in remission, DJD, Eustachain tube dysunction, and psoriasis who presents to the ED for AMS noting patient was found by daughter on the floor with confusion. Daughter last saw patient 6 days prior. Patient awake but not oriented to day/time and notes generalize myalgia and doesn't remember how she got on the floor. Upon arrival, patient in a-fib with rvr and was placed on cardizem gtt and given dig with conversion. Rhabdo with PAM. Admitted for further eval. IVFs given for rhabdo, PAM. PAM resolved (BUN 11 Cr 0.86 6/21/2018). Rhabdo resolved with ck improved (ck 183 6/21/2018). She was treated with IV antibiotics for urinary tract infection. Altered mental status-2/2 infectious and metabolic derangements; per patient may have taken too many of her sleeping pills-psych consulted-did not feel patient needed inpatient psych and was deemed not suicidal in any way, ammonia elevated-treated with lactulose, CT head negative, UDS positive for [facility], neurology consult -no stroke; liver disease present.  Elevated LFTs-patient with history of etoh abuse, discontinued atorvastatin and reduced ASA dose, added lactulose-normalized. Echo showed EF 55-60%. The patient's depression medications were held; however, the patient should continue to follow-up with her psych doctor outpatient. She was given thiamine, folic acid for etoh abuse. Pneumonia was treated with IVAB. The patient still displays slurred speech and trouble communicating. She is ambulating around the room with no complaints. She will be discharged to SNF. The patient should follow-up with her PCP and psych  in 1 week to discuss her recent hospitalization. Physical Exam:  General appearance: alert, cooperative, no distress, appears stated age  Head: Normocephalic, without obvious abnormality, atraumatic  Lungs: clear to auscultation bilaterally  Heart: regular rate and rhythm, S1, S2 normal, no murmur, click, rub or gallop  Abdomen: soft, non-tender. Bowel sounds normal. No masses,  no organomegaly  Extremities: extremities normal, atraumatic, no cyanosis or edema  Skin: Skin color, texture, turgor normal. No rashes or lesions  Neurologic: Grossly normal  PSY: Mood and affect normal, appropriately behaved    Significant Diagnostic Studies:   Transthoracic Echocardiogram    Patient: Nick [place]  MRN: [id number]  ACCT #: [de-identified]  : 1960  Age: 59 years  Gender: Female  Height: 66 in  Weight: 182.6 lb  BSA: 1.93 m-sq  BP: 112 / 72 mmHg  Study date: 2018  Status: Routine  Location: Echo lab  [address] #: [id number]    Allergies: ACE INHIBITORS, PENICILLINS    Referring_Ordering Physician: Ame Jones. Samuel Neumann Dr  Interpreting Physician: Mohit Maier MD  Interpreting Group: [email or web address]  Technologist: Yovani Maurer RVT [facility]    SUMMARY:  Left ventricle: Systolic function was normal by visual assessment. Ejection   fraction was estimated in the range of 55 %  to 60 %. No obvious wall motion abnormalities identified in the views   obtained.  Left ventricular diastolic function  parameters were normal for the patient's age. Right ventricle: The size was normal. Systolic function was low normal.    Left atrium: Size was normal.    Mitral valve: There was no regurgitation. Aortic valve: There was no stenosis. There was no regurgitation. Tricuspid valve: There was trivial regurgitation. Inferior vena cava, hepatic veins: The inferior vena cava was normal in size   and course. Pericardium: A trivial pericardial effusion was identified. INDICATIONS: Atrial fibrillation. PROCEDURE: This was a routine study. The study included complete 2D imaging,   complete spectral Doppler, and color  Doppler. Systolic blood pressure was 112 mmHg, at the start of the study. Diastolic blood pressure was 72 mmHg, at the  start of the study. Images were obtained from the parasternal, apical,   subcostal, and suprasternal notch acoustic  windows. Image quality was adequate. LEFT VENTRICLE: Size was normal. Systolic function was normal by visual   assessment. Ejection fraction was estimated in  the range of 55 % to 60 %. No obvious wall motion abnormalities identified in   the views obtained. Wall thickness was  normal. DOPPLER: Left ventricular diastolic function parameters were normal   for the patient's age. RIGHT VENTRICLE: The size was normal. Systolic function was low normal.   DOPPLER: Estimated peak pressure was in the  range of 25 mmHg to 30 mmHg. LEFT ATRIUM: Size was normal. LA volume index was 20 ml/m-sq. RIGHT ATRIUM: Size was normal.    MITRAL VALVE: There was normal leaflet separation. DOPPLER: The transmitral   velocity was within the normal range. There  was no evidence for stenosis. There was no regurgitation. AORTIC VALVE: The valve was trileaflet. Leaflets exhibited normal thickness   and normal cuspal separation. DOPPLER:  Transaortic velocity was within the normal range. There was no stenosis. There was no regurgitation.     TRICUSPID VALVE: There was normal leaflet separation. DOPPLER: The   transtricuspid velocity was within the normal range. There was no evidence for tricuspid stenosis. There was trivial   regurgitation. PULMONIC VALVE: Not well visualized, but normal Doppler findings. DOPPLER:   There was no stenosis. There was trivial  regurgitation. AORTA: The root exhibited normal size. SYSTEMIC VEINS: IVC: The inferior vena cava was normal in size and course. Respirophasic changes were normal.    PERICARDIUM: A trivial pericardial effusion was identified. Insignificant   pericardial effusion and/or pericardial fat  was present. SYSTEM MEASUREMENT TABLES    2D  AV Cusp: 1.6 cm  Ao Diam: 3 cm  LA Diam: 2.8 cm  LVOT Diam: 1.9 cm  IVSd: 0.9 cm  LVIDd: 3.9 cm  LVIDs: 2.2 cm  LVPWd: 0.9 cm  EF Biplane: 63.3 %  LAESV Index (A-L): 19.7 ml/m2  LVEF MOD A2C: 60.6 %  LVEF MOD A4C: 69.6 %  RA Area: 9.8 cm2  RV Major: 5.3 cm  RV Minor: 3.4 cm    CW  AV Vmax: 1.2 m/s  AV maxP.5 mmHg  PV Vmax: 0.9 m/s  PV maxPG: 3 mmHg  TR Vmax: 2.3 m/s  TR maxP.6 mmHg  AV VTI: 18.3 cm  AV Vmean: 0.7 m/s  AV meanP.6 mmHg    MM  TAPSE: 1.5 cm    PW  MICHAEL Vmax, Pt: 2.9 cm2  LVOT Vmax: 1.2 m/s  LVOT maxP.4 mmHg  E/E': 5.9  MV A [place]: 0.6 m/s  MV Dec [place]: 2.4 m/s2  MV DecT: 225.9 ms  MV E [place]: 0.5 m/s  MV E/A Ratio: 0.9  MV PHT: 65.5 ms  MVA By PHT: 3.4 cm2  TAPSV: 12.1 cm/s  EXAM: CT head      INDICATION: Found on floor, altered mental status.      COMPARISON: None.      TECHNIQUE: Axial CT imaging of the head was performed without intravenous  contrast.      One or more dose reduction techniques were used on this CT: automated exposure  control, adjustment of the mAs and/or kVp according to patient's size, and  iterative reconstruction techniques.  The specific techniques utilized on this CT  exam have been documented in the patient's electronic medical record.       _______________  Eric Myers  FINDINGS:      BRAIN AND POSTERIOR FOSSA: The sulci, folia, ventricles and basal cisterns are  within normal limits for the patient?s age. There is no intracranial hemorrhage,  mass effect, or midline shift. There are no areas of abnormal parenchymal  attenuation.      EXTRA-AXIAL SPACES AND MENINGES: There are no abnormal extra-axial fluid  collections.      CALVARIUM: Intact.      SINUSES: Imaged paranasal sinuses and mastoid air cells are clear.      OTHER: None.      IMPRESSION:          1. No acute intracranial abnormality.             EXAM: CT Cervical spine      INDICATION: Cervical neck pain. Patient on the floor.      COMPARISON: No prior study.      TECHNIQUE: Axial CT imaging of the cervical spine was performed from the skull  base to the upper thoracic spine without intravenous contrast. Multiplanar  reformats were generated. One or more dose reduction techniques were used on  this CT: automated exposure control, adjustment of the mAs and/or kVp according  to patient's size, and iterative reconstruction techniques. The specific  techniques utilized on this CT exam have been documented in the patient's  electronic medical record.      _______________  Uli Hopes  FINDINGS:      VERTEBRAE AND DISCS: Coronal reformatted images demonstrate a normal appearance  to the occipital condyles. The atlantodental and atlantooccipital articulations  are normal in appearance. There is mild straightening of usual cervical  lordosis, without evidence of listhesis. Vertebral body heights are intact. No  evidence of compression fracture. No displaced fracture. Moderate severity  spondylosis present C4-C7. No acute facet malalignment.      SPINAL CANAL AND FORAMINA: Moderate severity right-sided C4-C5 neural foraminal  narrowing.  No evidence of high-grade spinal canal stenosis or neural foraminal  narrowing.      PREVERTEBRAL SOFT TISSUES: Normal      VISIBLE INTRACRANIAL CONTENTS: Unremarkable.      LUNG APICES: Patchy groundglass opacity present in the periphery of the right  upper lobe.      OTHER: None.      _______________  Tej Ricardo  IMPRESSION  IMPRESSION:          1. Straightening of the usual cervical lordosis without evidence of fracture or  acute traumatic listhesis. 2. Cervical spondylosis and neuroforaminal narrowing as described. 3. Patchy groundglass opacity at the periphery of the imaged right upper lobe,  potentially reflecting pneumonitis or pneumonia given the presenting history.             Chest, single view      Indication: Coughing and chest congestion, patient found down      Comparison: 1/30/2016      Findings:  Portable upright AP view of the chest was obtained. Interval  development of asymmetric alveolar opacity throughout the majority of the right  lower lobe. No pneumothorax or pleural effusion. Left lung appears clear. Cardiac size and mediastinal contours are stable. Degenerative changes of each  shoulder girdle demonstrated without acute osseous abnormality.      IMPRESSION  Impression:  1. Focal airspace opacity in the right lower lobe, potentially reflecting  pneumonitis or pneumonia.                Discharge Medications:     Current Discharge Medication List   Aspirin 81 mg chewable tablet by mouth daily   Colchicine 0.6 mg tablet by mouth twice daily    CONTINUE these medications which have NOT CHANGED    Details   meloxicam (MOBIC) 15 mg tablet Take 1 Tab by mouth daily. Qty: 30 Tab, Refills: 0    Comments: Needs appt for additional.  Associated Diagnoses: Primary osteoarthritis of both knees; Chronic midline low back pain without sciatica; Degenerative disc disease, lumbar      chlorthalidone (HYGROTEN) 25 mg tablet Take  by mouth daily. folic acid (FOLVITE) 1 mg tablet Take  by mouth daily. predniSONE (DELTASONE) 20 mg tablet Take 2 tabs in AM with food for 7 days then 1 tab until gone  Qty: 28 Tab, Refills: 0    Associated Diagnoses: Primary osteoarthritis of both knees; Chronic midline low back pain without sciatica;  Degenerative disc disease, lumbar      spironolactone (ALDACTONE) 25 mg tablet Take  by mouth daily. metFORMIN (GLUCOPHAGE) 500 mg tablet Take  by mouth two (2) times daily (with meals). cyclobenzaprine (FLEXERIL) 10 mg tablet Take  by mouth three (3) times daily as needed for Muscle Spasm(s). ipratropium-albuterol (COMBIVENT RESPIMAT)  mcg/actuation inhaler Take 1 Puff by inhalation every six (6) hours as needed for Wheezing. Qty: 1 Inhaler, Refills: 11      cloNIDine HCl (CATAPRES) 0.2 mg tablet Take  by mouth two (2) times a day. HYDROcodone-acetaminophen (NORCO) 5-325 mg per tablet Take 1 Tab by mouth every four (4) hours as needed for Pain. Max Daily Amount: 6 Tabs. Qty: 12 Tab, Refills: 0      naproxen (NAPROSYN) 375 mg tablet Take 1 Tab by mouth two (2) times daily (with meals). Qty: 20 Tab, Refills: 0      conjugated estrogens (PREMARIN) 0.625 mg/gram vaginal cream Insert 0.5 g into vagina daily. Qty: 45 g, Refills: 2      hydrocortisone valerate (WEST-FELISHA) 0.2 % ointment Apply  to affected area two (2) times a day. use thin layer  Qty: 30 g, Refills: 1      Shower Chair XX clemencia Use daily with showering. Dx: djd of knee  Qty: 1 each, Refills: 0      losartan-hydrochlorothiazide (HYZAAR) 50-12.5 mg per tablet Take 1 tablet by mouth daily. Qty: 30 tablet, Refills: 5      Omeprazole delayed release (PRILOSEC D/R) 20 mg tablet Take 1 tablet by mouth daily. Qty: 30 tablet, Refills: 5      cetirizine (ZYRTEC) 10 mg tablet Take 1 Tab by mouth daily. Qty: 30 Tab, Refills: 5      traMADol (ULTRAM) 50 mg tablet Take 2 Tabs by mouth two (2) times daily as needed for Pain. Qty: 120 Tab, Refills: 2      Lancets misc Use daily or as directed for blood sugar monitoring, dx 250.00  Qty: 50 Each, Refills: 11      glucose blood VI test strips (BLOOD GLUCOSE TEST) strip Use daily or as directed for blood sugar monitoring.  .00  Qty: 50 Strip, Refills: 11      Blood-Glucose Meter monitoring kit Use as directed. Qty: 1 Kit, Refills: 0             Activity: Activity as tolerated    Diet: dental soft (soft solid)     Wound Care: None needed    Follow-up:     The patient is to follow-up with PCP and psych in 1 week to discuss her recent hospitalization. Patient to arrange.      Discharge time > 35 minutes   Gordy Menjivar NP  6/27/2018, 10:30 AM

## 2018-06-22 NOTE — PROGRESS NOTES
Problem: Self Care Deficits Care Plan (Adult)  Goal: *Acute Goals and Plan of Care (Insert Text)  Occupational Therapy Goals  Initiated 6/15/2018; re-evaluated on 6/22/2018 following one week with skilled OT. Pt has met goals 1 & 2. Updated goals listed below. 1.  Patient will perform grooming tasks at EOB with minimal assistance for balance. 2.  Patient will perform lower body dressing with minimal assistance. 3.  Patient will perform functional task for 8 minutes in standing with moderate assistance for balance to increase activity tolerance for ADLs. 4.  Patient will perform toilet transfers with moderate assistance. 5.  Patient will perform all aspects of toileting with moderate assistance. 6.  Patient will participate in upper extremity therapeutic exercise/activities for 8 minutes with supervision/set-up to increase BUE strength for functional transfers and ADLs. 7.  Patient will utilize energy conservation techniques during functional activities with minimal verbal cues. UPDATED GOALS Bernard Colbertxon MS, OTR/L; 6/22/2018)  1. Patient will perform grooming tasks while standing with supervision for balance. 2.  Patient will perform lower body dressing with supervision/set-up. 3.  Patient will perform functional task while standing for 8 minutes with stand-by assistance for balance to increase activity tolerance for ADLs. 4.  Patient will perform toilet transfers with stand-by assistance. 5.  Patient will perform all aspects of toileting with stand-by assistance. 6.  Patient will participate in upper extremity therapeutic exercise/activities with supervision/set-up for 8 minutes to maintain BUE strength for ADLs & functional transfers.   Outcome: Progressing Towards Goal  [facility]  Occupational THERAPY: RE-Assessment   INPATIENT: Medicare: Hospital Day: 10     Patient: Antonio Azevedo (2 y.o. female)    Date: 6/22/2018  Primary Diagnosis: Atrial fibrillation with RVR ([facility]  Rhabdomyolysis    ,  ,   Precautions: Falls  PLOF: Pt reports independence with ADLs PTA. ASSESSMENT:   Based on the objective data described below, the patient presents with impairments with regard to bed mobility, activity tolerance and independence in ADLs. Pt re-evaluated to determine progress towards functional goals. Pt supine on arrival, c/o 8/10 pain in bilateral feet & R toe. Agreeable to therapy. Min A for bed mobility; good/fair+ sitting balance during ADLs at EOB which is significant improvement from initial evaluation. Pt demo'ing improved activity tolerance as well. Mod A x1 to stand with RW; pt demo'ing appropriate BUE positioning during transfer & on RW in prep for [facility] transfer. Pt declining chair transfer at this time due to bilateral feet pain. Returned supine; min A to scoot towards HOB (pt able to assist with BUEs/BLEs). Prevlon boots applied for skin integrity. Needs within reach. Recommend IPR upon d/c. Recommendations for the next treatment session: ADLs in standing vs. At sink  Ms. Khari Ramsay will benefit from skilled intervention to address the above impairments. Her rehabilitation potential is considered to be Good.      EDUCATION     Education:  Patient was educated on the following topics: appropriate safety awareness; importance of mobility  Barriers to Learning/Limitations: None  Compensate with: visual, verbal, tactile, kinesthetic cues/model     PLAN OF CARE:  Problems:  Decreased ROM, Decreased strength affecting function, Decreased ADL/functioning of activities, Decreased transfer abilities and Increased pain affecting function    Recommendations and Planned Interventions:  [x]                  Self Care Training                   [x]         Therapeutic Activities  [x]                  Functional Mobility Training    []          Cognitive Retraining  [x]                  Therapeutic Exercises            [x]          Endurance Activities  [x]                  Balance Training                     []          Neuromuscular Re-ed   []                  Visual/Perceptual Training      [x]       Home Safety Training  [x]                  Patient Education                    [x]          Family Training/Education  []                  Other (comment):    Frequency/Duration: Patient will be followed by occupational therapy 3-5 times a week to address goals. Discharge Recommendations: Inpatient Rehab     Further Equipment Recommendations for Discharge: shower chairSUBJECTIVE:  Patient stated: \"Am I doing better? \"    OBJECTIVE/TREATMENT:     Past Medical History:   Diagnosis Date    Alcohol dependence in remission ([facility]     alcohol withdrawal discharged 2013    Arthritis     knees and back    Bipolar 1 disorder ([facility]     seeing Psychiatrist    [facility]     Depression     Diabetes mellitus ([facility]     DJD (degenerative joint disease) of knee     DM2 (diabetes mellitus, type 2) (HCC)     borderline    Eustachian tube dysfunction     Dr. Alison Harding GERD (gastroesophageal reflux disease)     Hepatitis C      prior IV drug abuse - Genotype 1a    Hypertension     Needle phobia     from prior IV drug abuse    Psoriasis      Past Surgical History:   Procedure Laterality Date    HX  SECTION      x 3    HX COLONOSCOPY      Dr. Chaz Parra - due for repeat     HX CYST REMOVAL      Hydrocystoma R eye removed    HX GYN      partical hysterectomy - fibroid tumors    HX HYSTERECTOMY      PARTIAL     Eval Complexity: History: MEDIUM Complexity : Expanded review of history including physical, cognitive and psychosocial  history ; Examination: MEDIUM Complexity : 3-5 performance deficits relating to physical, cognitive , or psychosocial skils that result in activity limitations and / or participation restrictions; Decision Making:MEDIUM Complexity : Patient may present with comorbidities that affect occupational performnce.  Miniml to moderate modification of tasks or assistance (eg, physical or verbal ) with assesment(s) is necessary to enable patient to complete evaluation     G CODES: Self Care  Current  CK= 40-59%   Goal  CI= 1-19%.   The severity rating is based on the Other Functional Assessment, MMT, ROM    Prior Level of Function/Home Situation:   Fully active; able to carry on all performance without restriction    Cognitive/Behavioral Status:   Neurologic State: alert   Orientation: oriented to time, place, person and situation  Cognition:   appropriate decision making, appropriate for age attention/concentration, appropriate safety awareness and following commands  follows two step commands/direction   Safety/Judgement: Awareness of environment and Fall prevention    ROM: Minimally limited (BUEs)  MMT: BUEs approx 3+/5  Coordination: BUEs WFL  Hand dominance: Right    Skin: Intact (BUEs)  Edema: None noted (BUEs)  Sensation: Intact (BUEs)    Vision/Perceptual: normal      Functional Status      Indep   Mod I   Sup. /  Set- Up    SBA   CGA   Min Assist   Mod Assist   Max assist   Total Assist   Assist x2    Additional Time   NT   Comments   Rolling []  []  []  [x]  []    []    []    []  []  []  [x]  []     Supine to sit []  []  []  []  []  [x]  []  []  []  []  []  []     Sit to supine []  []  []  []  []  [x]  []  []  []  []  [x]  []     Sit to stand []  []  []  []  []  []  [x]  []  []  []  []  []     Toilet Transfer []  []  []  []  []  []  []  []  []  []  []  [x]                     Feeding []  []  [x]  []  []  []  []  []  []  []  []  []     Grooming []  []  [x]  []  []  []  []  []  []  []  []  []     Bathing  []  []  []  []  []  []  [x]  []  []  []  []  []     UB Dressing  []  []  []  []  []  [x]  []  []  []  []  []  []     LB Dressing  []  []  []  []  []  []  [x]  []  []  []  []  []     Toileting []  []  []  []  []  []  []  [x]  []  []  []  []  purewick       Balance    Good   Fair   Poor   Unable   Comments   Sitting static []  [x]  []  []     Sitting dynamic []  [x]  []  []     Standing static []  [x]  []  []     Standing dynamic []  [x]  []  []  Fair-     ADL Intervention:   Supervision/set-up for facial hygiene & oral care at EOB to address activity tolerance, attention to task, and sitting balance. Vc's for pacing and positioning. Pt demo's significantly improved sitting balance vs. Initial evaluation. Pain:   Pre treatment: 8/10 (bilateral feet; R big toe)  Post treatment: 8/10 (same as above)  Scale: numeric  Nursing aware    Activity tolerance:  fair    COMMUNICATION/EDUCATION: Pt educated on role of OT and continued POC; she verbalized understanding. [x]         Fall prevention education was provided and the patient/caregiver indicated understanding. [x]         Patient/family have participated as able in goal setting and plan of care. [x]         Patient/family agree to work toward stated goals and plan of care. []         Patient understands intent and goals of therapy, but is neutral about his/her participation     The patients plan of care was also discussed with: Physical Therapist, Certified Occupational Therapy Assistant and Registered Nurse.      · After treatment position/precautions:   o Supine in bed  o Bed in low position  o Call light within reach  o RN notified     Recommendations for nursing: up with assist x2 to [facility] & chair  Written on communication board: yes  Verbally communicated to: Status4, RN    Thank you for this referral.  Guera Arroyo MS OTR/L  Time Calculation: 23 mins

## 2018-06-22 NOTE — PROGRESS NOTES
Problem: Falls - Risk of  Goal: *Absence of Falls  Document Radha Fall Risk and appropriate interventions in the flowsheet.    Outcome: Progressing Towards Goal  Fall Risk Interventions:  Mobility Interventions: Patient to call before getting OOB         Medication Interventions: Patient to call before getting OOB    Elimination Interventions: Call light in reach, Patient to call for help with toileting needs    History of Falls Interventions: Door open when patient unattended

## 2018-06-22 NOTE — PROGRESS NOTES
[facility]/IPR REFERRAL CONTACT NOTE  [id number] [facility]    RE: Ronald Roblestz    Current status reviewed and patient meets criteria for admission to [facility] pending authorization from [facility]. Case has been opened with insurance and is pending review. Writer will notify  of status/determination once obtained. Thank you for this referral.  Should you have any questions please do not hesitate to call.      Sincerely,  [facility]  [phone]

## 2018-06-22 NOTE — PROGRESS NOTES
Nutrition follow-up/Plan of care      RECOMMENDATIONS:     1. Mech Soft  2. Monitor weight, labs and PO intake  3. RD to follow     GOALS:     1. Ongoing: PO intake meets >75% of protein/calorie needs by 6/29  2. Ongoing: Weight Maintenance (+/- 1-2 lb) by 6/29      ASSESSMENT:     Weight status is classified as overweight per BMI of 31.2 PO intake is fair to good. Labs noted. Elevated triglyceride level (326). Nutrition recommendations listed. RD to follow. Nutrition Risk:  [] High  [] Moderate [x]  Low    SUBJECTIVE/OBJECTIVE:      Admitted with confusion and stroke workup. Patent with history of bipolar disorder, diabetes, HTN, GERD. Reviewed SLP note. Patient with moderate oropharyngeal dysphagia and recommends pureed diet and NTL. Patient just got from having tests and hasn't started lunch meal. Per RN, patient had 75% intake of breakfast this morning. Patient is a poor historian. Not appropriate for education at this time. Will attempt to provide education (high triglycerides) at follow-up. (6/19): Pt with fair to good PO intake per vitals. SLP following and (6/18) recommended mech-soft/thin liquid diet with aspiration precautions, no straws and single sips. Pt seen in room eating her lunch. Attempted to educate Pt on triglyceride levels but during discussion Pt was having a \"dizzy spell. \" Pro Rosales, who assisted Pt. Encouraged intake, left handouts and will attempt to follow up to answer any questions at later time.     (6/22): SLP following and (6/22) recommended continue diet as ordered, (Mech Soft) pt ok to have straws. Pt with improving appetite and PO intake. Seen in room finishing up her lunch; observed 85% consumed. Reports she is doing much better with the IV in her L arm and is so happy she can use a straw now. Stated she did not have any questions from the discussion/education during last visit. Will monitor.        Information Obtained from:    [x] Chart Review   [x] Patient   [] Family/Caregiver   [x] Nurse/Physician   [] Interdisciplinary Meeting/Rounds    Diet: [facility] Alt Diet  Medications: [x] Reviewed   IVF:D5 1/2 NS @150 mL/hr (612 kcal/d)  Lactulose, Lipitor, Folvite, MVI, B1  Allergies: [x] Reviewed   Encounter Diagnoses     ICD-10-CM ICD-9-CM   1. Confusion R41.0 298.9   2. Atrial flutter, unspecified type ([facility] I48.92 427.32   3. PAM (acute kidney injury) ([facility] N17.9 584.9   4.  Elevated CPK R74.8 790.5     Past Medical History:   Diagnosis Date    Alcohol dependence in remission ([facility]     alcohol withdrawal discharged 05/31/2013    Arthritis     knees and back    Bipolar 1 disorder ([facility]     seeing Psychiatrist    Chronic obstructive pulmonary disease ([facility]     Depression     Diabetes mellitus ([facility]     DJD (degenerative joint disease) of knee     DM2 (diabetes mellitus, type 2) ([facility]     borderline    Eustachian tube dysfunction     Dr. Alma Luis GERD (gastroesophageal reflux disease)     Hepatitis C      prior IV drug abuse - Genotype 1a    Hypertension     Needle phobia     from prior IV drug abuse    Psoriasis       Labs:    Lab Results   Component Value Date/Time    Sodium 142 06/19/2018 04:45 AM    Potassium 4.3 06/19/2018 04:45 AM    Chloride 108 06/19/2018 04:45 AM    CO2 29 06/19/2018 04:45 AM    Anion gap 5 06/19/2018 04:45 AM    Glucose 149 (H) 06/19/2018 04:45 AM    BUN 11 06/19/2018 04:45 AM    Creatinine 0.86 06/19/2018 04:45 AM    Calcium 8.2 (L) 06/19/2018 04:45 AM    Magnesium 3.4 (H) 06/13/2018 09:52 AM    Phosphorus 3.8 06/21/2018 04:10 AM    Albumin 1.8 (L) 06/19/2018 04:45 AM     Anthropometrics: BMI (calculated): 31.2  Last 3 Recorded Weights in this Encounter    06/14/18 0410 06/15/18 1247 06/18/18 0524   Weight: 83.3 kg (183 lb 9.6 oz) 83.3 kg (183 lb 10.3 oz) 90.3 kg (199 lb)      Ht Readings from Last 1 Encounters:   06/15/18 5' 7\" (1.702 m)     Patient Vitals for the past 100 hrs:   % Diet Eaten   06/22/18 0925 100 %   06/20/18 0922 50 % 06/19/18 1413 50 %   06/19/18 1014 75 %       Estimated Nutrition Needs: [x] MSJ  [] Other:  Calories: 1875 Kcal Based on:   [x] Actual BW    Protein:    g Based on:   [x] Actual BW    Fluid:       2000 ml Based on:   [x] Actual BW        Nutrition Problems Identified:   [] Suboptimal PO intake   [] Food Allergies  [x] Difficulty chewing/swallowing/poor dentition  [] Constipation/Diarrhea   [] Nausea/Vomiting   [] None  [] Other:     Plan:   [x] Therapeutic Diet  []  Obtained/adjusted food preferences/tolerances and/or snacks options   []  Supplements added   [x]  SLP following for feeding techniques  []  HS snack added   [x]  Modify diet texture   []  Modify diet for food allergies   [x]  Educate patient   []  Assist with menu selection   [x]  Monitor PO intake on meal rounds   [x]  Continue inpatient monitoring and intervention   []  Participated in discharge planning/Interdisciplinary rounds/Team meetings   []  Other:     Education Needs:   [] Not appropriate for teaching at this time    [x] Identified and addressed    Nutrition Monitoring and Evaluation:  [x] Continue ongoing monitoring and intervention  [] Other    Latricia Remy

## 2018-06-22 NOTE — PROGRESS NOTES
conducted a Follow up consultation and Spiritual Assessment for Marilynn Phoenix, who is a 59 y.o.,female. The  provided the following Interventions:  Continued the relationship of care and support. Listened empathically as patient related her fall in the bathroom that   by God's providence her daughter happen to pay her a visit and saw her condition and immediately brought her to the ER. Offered prayer and assurance of continued prayer on patient's behalf. Chart reviewed. The following outcomes were achieved:  Patient expressed gratitude for 's visit. Assessment:  Patient shared her desire to give her daughter the power of  for her healthcare issues. There are no further spiritual or [employer] issues which require Spiritual Care Services interventions at this time. Plan:  Chaplains will continue to follow and will provide pastoral care on an as needed/requested basis.  recommends bedside caregivers page  on duty if patient shows signs of acute spiritual or emotional distress.         Intern [address] [place] [address]   [phone]

## 2018-06-22 NOTE — PROGRESS NOTES
Problem: Pressure Injury - Risk of  Goal: *Prevention of pressure injury  Document Ryan Scale and appropriate interventions in the flowsheet.    Outcome: Progressing Towards Goal  Pressure Injury Interventions:  Sensory Interventions: Assess changes in LOC    Moisture Interventions: Absorbent underpads, Check for incontinence Q2 hours and as needed    Activity Interventions: Pressure redistribution bed/mattress(bed type)    Mobility Interventions: HOB 30 degrees or less    Nutrition Interventions: Document food/fluid/supplement intake    Friction and Shear Interventions: Foam dressings/transparent film/skin sealants, HOB 30 degrees or less

## 2018-06-22 NOTE — ROUTINE PROCESS
Bedside and Verbal shift change report given to [employer] (oncoming nurse) by roger zimmerman rn (offgoing nurse). Report given with SBAR, Kardex, Intake/Output and Recent Results.

## 2018-06-22 NOTE — PROGRESS NOTES
Received awake,alert,in no acute distress. Call light,phone in reach. Hob elevated. 1950 Tiffanie care done for urinary incontinence. Purewik applied. DTI on bilateral heels with mepilex dressing. Small,superficial open areas on bilateral buttocks noted. Mepilex applied. 6/22/18 0630 Uneventful shift.

## 2018-06-22 NOTE — PROGRESS NOTES
Problem: Mobility Impaired (Adult and Pediatric)  Goal: *Acute Goals and Plan of Care (Insert Text)  Physical Therapy Goals  Initiated 6/15/2018 and to be accomplished within 7 day(s): WILL CONTINUE WITH GOALS FOR 7 DAYS   1. Patient will maintain eyes open for greater than 90% of session to allow for active participation in mobility training. GOAL MET  2. Patient will follow 90% of commands to maximize safety and active participation in mobility training. 3.  Patient will move from supine to sit and sit to supine  in bed with supervision/set-up. GOAL MET  4. Patient will maintain seated at edge of bed for 10 min with supervision/set-up to prepare for out of bed activity. 5.  Patient will perform sit to stand with minimal assistance/contact guard assist.  6.  Patient will transfer from bed to chair and chair to bed with minimal assistance/contact guard assist using the least restrictive device. 7.  Patient will ambulate with minimal assistance/contact guard assist for 25 feet with the least restrictive device. 8.  Patient will negotiate obstacles during ambulation with supervision/set-up. Outcome: Progressing Towards Goal  PHYSICAL THERAPY: Re-evaluation  INPATIENT: Medicare: Hospital Day: 10     Patient: Heidi Reaves (67 y.o. female)    Date: 6/22/2018  Primary Diagnosis: Atrial fibrillation with RVR (HCC)  Rhabdomyolysis    ,  ,   Precautions: Skin  FWB     PLOF: I with ADLs and ambulation     ASSESSMENT:  Patient supine in bed, agreeable to participation with PT. RN Cam Hagan present in room Patient with c/o significant pain in R foot d/t gout, pain rated 7/10. Supervision for supine <> sit. Patient demonstrates fair sitting balance but unable to tolerate having feet in a dependent position, requesting to return to bed d/t severity of pain in R foot. Supervision for sit to supine. MOD A x 1 for repositioning in bed.  Patient returned to bed and positioned for comfort with needs within reach. Patient educated on strategy for bed mobility and PT plan of care; verbalizes and demonstrates understanding. Recommend d/c to inpatient rehabilitation in order to maximize tolerance to functional mobility prior to d/c home. EDUCATION:   Education:  Patient was educated on the following topics: strategy for bed mobility and PT plan of care. Barriers to Learning/Limitations: yes;  sensory deficits-vision/hearing/speech  Compensate with: visual, verbal, tactile, kinesthetic cues/model    Patient's progression toward goals since last assessment: Patient has improved her ability to complete bed mobility. Per initial assessment patient demonstrates improved sitting balance but continues to demonstrates difficulty with transfer and ambulation d/t R LE pain d/t gout. PLAN:  Goals have been updated based on progression since last assessment. Patient continues to benefit from skilled intervention to address the above impairments. Continue to follow the patient 3 - 5 times a week to address goals. Planned Interventions:  [x]     Bed Mobility Training          [x]     Neuromuscular Re-Education  [x]     Transfer Training                []    Orthotic/Prosthetic Training  [x]     Gait Training                       []     Modalities  [x]     Therapeutic Exercises       []     Edema Management/Control  [x]     Therapeutic Activities         [x]     Patient and Family Training/Education  []     Other (comment):  Discharge Recommendations: Inpatient Rehab  Further Equipment Recommendations for Discharge: rolling walker     SUBJECTIVE:   Patient stated My foot hurts so bad, it's throbbing.     OBJECTIVE DATA SUMMARY:     G CODES:  Mobility [id number] Current  CL= 60-79%  [id number] Goal  CK= 40-59%. The severity rating is based on the Other [employer] Balance Scale 2/5  [facility] Sitting Balance Scale  0: Pt performs 25% or less of sitting activity (Max assist) CN, 100% impaired.   1: Pt supports self with upper extremities but requires therapist assistance. Pt performs 25-50% of effort (Mod assist) CM, 80% to <100% impaired. 1+: Pt supports self with upper extremities but requires therapist assistance. Pt performs >50% effort. (Min assist). CL, 60% to <80% impaired. 2: Pt supports self independently with both upper extremities. CL, 60% to <80% impaired. 2+: Pt support self independently with 1 upper extremity. CK, 40% to <60% impaired. 3: Pt sits without upper extremity support for up to 30 seconds. CK, 40% to <60% impaired. 3+: Pt sits without upper extremity support for 30 seconds or greater. CJ, 20% to <40% impaired. 4: Pt moves and returns trunkal midpoint 1-2 inches in one plane. CJ, 20% to <40% impaired. 4+: Pt moves and returns trunkal midpoint 1-2 inches in multiple planes. CI, 1% to <20% impaired. 5: Pt moves and returns trunkal midpoint in all planes greater than 2 inches. CH, 0% impaired.     Critical Behavior:  Neurologic State: Alert  Orientation Level: Oriented X4  Cognition: Appropriate decision making, Follows commands  Safety/Judgement: Awareness of environment, Fall prevention    Manual Muscle Testing (LE)   Unable to assess d/t pain   Tone : normal  Sensation: NT  Range Of Motion: WFL  Functional Mobility:      Functional Status      Indep   (I)   Mod I   Super-vision   Min A   Mod A   Max A   Total A   Assist x2 Verbal cues Additional time Not tested   Comments   Rolling []  []  [] []    []    []  []  [] [] [] []    Supine to sit []  []  [x] []  []  []  []  [] [] [] []    Sit to supine []  []  [x] []  []  []  []  [] [] [] []    Sit to stand []  []  [] []  []  []  []  [] [] [] [x] D/T pain    Stand to sit []  []  [] []  []  []  []  [] [] [] [x]    Bed to chair transfers []  []  [] []  []  []  []  [] [] [] [x]        Balance    Good   Fair   Poor   Unable   Not tested   Comments   Sitting static []  [x]  []  []  [] B UE assist    Sitting dynamic []  [x]  []  []  [] Standing static []  []  []  [x]  []    Standing dynamic []  []  []  [x]  []      Pain:R foot   Pre treatment pain:  6  Post treatment pain:  7    Vital Signs  Temp: 98.9 °F (37.2 °C)     Pulse (Heart Rate): 93     BP: 120/73     Resp Rate: 18     O2 Sat (%): 96 %    Activity Tolerance:   Poor, limited by pain     Please refer to the flowsheet for vital signs taken during this treatment.   After treatment:   []  Patient left in no apparent distress sitting up in chair  [x]  Patient left in no apparent distress in bed  [x]  Call bell left within reach  [x]  Nursing notified  []  Caregiver present  []  Bed alarm activated      Rosalba Whiteside   Time Calculation: 13 mins

## 2018-06-22 NOTE — PROGRESS NOTES
Waiting for auth for ARU. Transportation envelope, short snf and pcs form in pt chart. Transportation on insurance card says call [phone] for transportation  Jose Bullard.  Nathaly  Management  [phone], beeper [phone]

## 2018-06-23 LAB
GLUCOSE BLD STRIP.AUTO-MCNC: 113 MG/DL (ref 70–110)
GLUCOSE BLD STRIP.AUTO-MCNC: 139 MG/DL (ref 70–110)
GLUCOSE BLD STRIP.AUTO-MCNC: 197 MG/DL (ref 70–110)
GLUCOSE BLD STRIP.AUTO-MCNC: 204 MG/DL (ref 70–110)

## 2018-06-23 PROCEDURE — 74011636637 HC RX REV CODE- 636/637: Performed by: NURSE PRACTITIONER

## 2018-06-23 PROCEDURE — 74011250637 HC RX REV CODE- 250/637: Performed by: HOSPITALIST

## 2018-06-23 PROCEDURE — 77030037878 HC DRSG MEPILEX >48IN BORD MOLN -B

## 2018-06-23 PROCEDURE — 74011250637 HC RX REV CODE- 250/637: Performed by: NURSE PRACTITIONER

## 2018-06-23 PROCEDURE — C9113 INJ PANTOPRAZOLE SODIUM, VIA: HCPCS | Performed by: HOSPITALIST

## 2018-06-23 PROCEDURE — 97110 THERAPEUTIC EXERCISES: CPT

## 2018-06-23 PROCEDURE — 74011250636 HC RX REV CODE- 250/636: Performed by: NURSE PRACTITIONER

## 2018-06-23 PROCEDURE — 77030038269 HC DRN EXT URIN PURWCK BARD -A

## 2018-06-23 PROCEDURE — 74011000258 HC RX REV CODE- 258: Performed by: HOSPITALIST

## 2018-06-23 PROCEDURE — 65270000029 HC RM PRIVATE

## 2018-06-23 PROCEDURE — 97530 THERAPEUTIC ACTIVITIES: CPT

## 2018-06-23 PROCEDURE — 74011250636 HC RX REV CODE- 250/636: Performed by: HOSPITALIST

## 2018-06-23 PROCEDURE — 82962 GLUCOSE BLOOD TEST: CPT

## 2018-06-23 PROCEDURE — 74011250637 HC RX REV CODE- 250/637: Performed by: PSYCHIATRY & NEUROLOGY

## 2018-06-23 PROCEDURE — 77030011256 HC DRSG MEPILEX <16IN NO BORD MOLN -A

## 2018-06-23 RX ADMIN — HEPARIN SODIUM 5000 UNITS: 5000 INJECTION, SOLUTION INTRAVENOUS; SUBCUTANEOUS at 21:49

## 2018-06-23 RX ADMIN — DEXTROSE MONOHYDRATE AND SODIUM CHLORIDE 150 ML/HR: 5; .45 INJECTION, SOLUTION INTRAVENOUS at 19:33

## 2018-06-23 RX ADMIN — HEPARIN SODIUM 5000 UNITS: 5000 INJECTION, SOLUTION INTRAVENOUS; SUBCUTANEOUS at 12:51

## 2018-06-23 RX ADMIN — ACETAMINOPHEN 650 MG: 325 TABLET ORAL at 21:52

## 2018-06-23 RX ADMIN — DEXTROSE MONOHYDRATE AND SODIUM CHLORIDE 150 ML/HR: 5; .45 INJECTION, SOLUTION INTRAVENOUS at 04:27

## 2018-06-23 RX ADMIN — DEXTROSE MONOHYDRATE AND SODIUM CHLORIDE 150 ML/HR: 5; .45 INJECTION, SOLUTION INTRAVENOUS at 12:55

## 2018-06-23 RX ADMIN — FOLIC ACID 1 MG: 1 TABLET ORAL at 09:57

## 2018-06-23 RX ADMIN — INSULIN LISPRO 4 UNITS: 100 INJECTION, SOLUTION INTRAVENOUS; SUBCUTANEOUS at 21:50

## 2018-06-23 RX ADMIN — Medication 100 MG: at 09:57

## 2018-06-23 RX ADMIN — HEPARIN SODIUM 5000 UNITS: 5000 INJECTION, SOLUTION INTRAVENOUS; SUBCUTANEOUS at 04:28

## 2018-06-23 RX ADMIN — ACETAMINOPHEN 650 MG: 325 TABLET ORAL at 09:31

## 2018-06-23 RX ADMIN — INSULIN LISPRO 2 UNITS: 100 INJECTION, SOLUTION INTRAVENOUS; SUBCUTANEOUS at 12:52

## 2018-06-23 RX ADMIN — LACTULOSE 20 G: 20 SOLUTION ORAL at 18:45

## 2018-06-23 RX ADMIN — LACTULOSE 20 G: 20 SOLUTION ORAL at 09:56

## 2018-06-23 RX ADMIN — PANTOPRAZOLE SODIUM 40 MG: 40 INJECTION, POWDER, FOR SOLUTION INTRAVENOUS at 12:49

## 2018-06-23 RX ADMIN — THERA TABS 1 TABLET: TAB at 09:57

## 2018-06-23 RX ADMIN — ASPIRIN 81 MG 81 MG: 81 TABLET ORAL at 09:56

## 2018-06-23 NOTE — PROGRESS NOTES
Problem: Mobility Impaired (Adult and Pediatric)  Goal: *Acute Goals and Plan of Care (Insert Text)  Physical Therapy Goals  Initiated 6/15/2018 and to be accomplished within 7 day(s): WILL CONTINUE WITH GOALS FOR 7 DAYS   1. Patient will maintain eyes open for greater than 90% of session to allow for active participation in mobility training. GOAL MET  2. Patient will follow 90% of commands to maximize safety and active participation in mobility training. 3.  Patient will move from supine to sit and sit to supine  in bed with supervision/set-up. GOAL MET  4. Patient will maintain seated at edge of bed for 10 min with supervision/set-up to prepare for out of bed activity. 5.  Patient will perform sit to stand with minimal assistance/contact guard assist.  6.  Patient will transfer from bed to chair and chair to bed with minimal assistance/contact guard assist using the least restrictive device. 7.  Patient will ambulate with minimal assistance/contact guard assist for 25 feet with the least restrictive device. 8.  Patient will negotiate obstacles during ambulation with supervision/set-up. Outcome: Progressing Towards Goal  physical Therapy TREATMENT    Patient: Prince Hu (45 y.o. female)  Date: 6/23/2018  Diagnosis: Atrial fibrillation with RVR (HCC)  Rhabdomyolysis Rhabdomyolysis  Precautions: Skin   Chart, physical therapy assessment, plan of care and goals were reviewed. PLOF:independent, ambulatory without AD, ETOH abuse  ASSESSMENT:  Paul to sit up at EOB. Gout pain in R great toe. 3 trials of sit to stand, VC to put wt through heel on the R foot to reduce pain. Performed side steps up to [facility] for 2ft. Seated and supine (B)LE AROM strengthening exercises performed, 10 reps each. Visitors arrived during session. Paul with LEs back to supine.     Education:heel wt bearing on the R  Progression toward goals:  []      Improving appropriately and progressing toward goals  [x] Improving slowly and progressing toward goals  []      Not making progress toward goals and plan of care will be adjusted     PLAN:  Patient continues to benefit from skilled intervention to address the above impairments. Continue treatment per established plan of care. Discharge Recommendations:  Skilled Nursing Facility  Further Equipment Recommendations for Discharge:  rolling walker     SUBJECTIVE:   Patient stated Lear Jaclyn will try.     OBJECTIVE DATA SUMMARY:   Critical Behavior:  Neurologic State: Alert  Orientation Level: Oriented X4  Cognition: Appropriate decision making  Safety/Judgement: Awareness of environment, Fall prevention  Functional Mobility Training:  Bed Mobility:  Supine to Sit: Minimum assistance  Sit to Supine: Minimum assistance  Scooting: Supervision  Transfers:  Sit to Stand: Minimum assistance; Moderate assistance  Stand to Sit: Minimum assistance  Balance:  Sitting: Intact  Sitting - Static: Good (unsupported)  Sitting - Dynamic: Fair (occasional)  Standing: Impaired; With support  Standing - Static: Fair  Standing - Dynamic : Fair (-)  Ambulation/Gait Training:  Distance (ft): 2 Feet (ft)  Assistive Device: Gait belt;Walker, rolling  Ambulation - Level of Assistance: Minimal assistance  Gait Abnormalities: Antalgic;Decreased step clearance  Base of Support: Widened  Speed/Staci: Slow;Delayed  GCODE:Mobility [id number] Current  CL= 60-79%   Goal  CK= 40-59%. The severity rating is based on the Level of Assistance required for Functional Mobility and ADLs. Therapeutic Exercises:   LAQ, hip add with pillow, AP, QS, HS, hip abd/add 10 x each (B)LEs  Pain:  Pre:7  Post:7  Pain Scale 1: Numeric (0 - 10)  Pain Intensity 1: 7  Pain Location 1: Toe (comment which one) (right)  Pain Orientation 1: Right  Pain Description 1: Aching  Pain Intervention(s) 1: Medication (see MAR)  Activity Tolerance:   Fair  Please refer to the flowsheet for vital signs taken during this treatment.   After treatment:   [] Patient left in no apparent distress sitting up in chair  [x] Patient left in no apparent distress in bed  [x] Call bell left within reach  [x] Nursing notified  [] Caregiver present  [] Bed alarm activated      Prasanth Nunez PTA   Time Calculation: 23 mins

## 2018-06-23 NOTE — PROGRESS NOTES
Received awake,alert,in no acute distress. Hob elevated. Prevalon boot on bilaterally. Call light,phone in reach. 2300 Pt had two large stool this pm.Tiffanie care done,tonok changed. 6/23/18 0600 Uneventful shift.

## 2018-06-23 NOTE — ROUTINE PROCESS
Bedside and Verbal shift change report given to VeriTweet (oncoming nurse) by roger zimmerman rn (offgoing nurse). Report given with SBAR, Kardex, Intake/Output and Recent Results.

## 2018-06-23 NOTE — PROGRESS NOTES
Progress Note      Patient: Rafiq Bonilla               Sex: female          DOA: 6/13/2018       YOB: 1960      Age:  59 y.o.        LOS:  LOS: 10 days             CHIEF COMPLAINT:  Altered mental status    Subjective:     Patient awake and talking  No distress    Objective:      Visit Vitals    /78 (BP 1 Location: Left arm, BP Patient Position: At rest)    Pulse (!) 102    Temp 98.2 °F (36.8 °C)    Resp 18    Ht 5' 7\" (1.702 m)    Wt 90.3 kg (199 lb)    SpO2 100%    Breastfeeding No    BMI 31.17 kg/m2       Physical Exam:  Gen:  No distress, no complaint  Lungs:  Clear bilaterally, no wheeze or rhonchi  Heart:  Regular rate and rhythm, no murmurs or gallops  Abdomen:  Soft, non-tender, normal bowel sounds        Lab/Data Reviewed:    Labs reviewed      Assessment/Plan     Principal Problem:    Rhabdomyolysis (6/13/2018)    Active Problems:    Bipolar 1 disorder (HCC) ()      Overview: seeing Psychiatrist      Depression ()      DM2 (diabetes mellitus, type 2) ([facility] (9/6/2013)      Atrial fibrillation with RVR ([facility] (6/13/2018)      Acute kidney injury ([facility] (6/13/2018)      Acute metabolic encephalopathy (5/91/2762)      Abnormal urinalysis (6/13/2018)      Hyperammonemia (HCC) (6/13/2018)      Elevated transaminase level (6/13/2018)        Plan:  BS control  BP control  Follow mental status  Working toward placement  DVT prophylaxis.

## 2018-06-23 NOTE — MANAGEMENT PLAN
Discharge/Transition Planning    Left message in Peter and a voicemail for [employer] at [address] inquiring about insurance auth  [id number]: Spoke with Shannon Pitts, no auth as of yet      [facility] # [phone]

## 2018-06-23 NOTE — PROGRESS NOTES
Problem: Pressure Injury - Risk of  Goal: *Prevention of pressure injury  Document Ryan Scale and appropriate interventions in the flowsheet.    Outcome: Progressing Towards Goal  Pressure Injury Interventions:  Sensory Interventions: Assess changes in LOC, Keep linens dry and wrinkle-free, Pressure redistribution bed/mattress (bed type)    Moisture Interventions: Internal/External urinary devices, Absorbent underpads    Activity Interventions: Pressure redistribution bed/mattress(bed type), PT/OT evaluation    Mobility Interventions: HOB 30 degrees or less, Pressure redistribution bed/mattress (bed type), PT/OT evaluation    Nutrition Interventions: Document food/fluid/supplement intake    Friction and Shear Interventions: HOB 30 degrees or less, Lift sheet

## 2018-06-23 NOTE — PROGRESS NOTES
Problem: Falls - Risk of  Goal: *Absence of Falls  Document Radha Fall Risk and appropriate interventions in the flowsheet. Outcome: Progressing Towards Goal  Fall Risk Interventions:  Mobility Interventions: Communicate number of staff needed for ambulation/transfer         Medication Interventions: Patient to call before getting OOB    Elimination Interventions: Call light in reach, Patient to call for help with toileting needs, Toileting schedule/hourly rounds    History of Falls Interventions:  Investigate reason for fall

## 2018-06-23 NOTE — PROGRESS NOTES
1935 - Assumed pt care from Frank Milligan, [address] [place] [address]. Pt in bed, alert and oriented x 4. Not in any form of distress. Denies pain. Frequent use items and call bell within reach. Verbalized understanding to call for assistance. Bed locked in lowest position. 2221 - Mews score is 3. Paged Dr. Lisa De Guzman. 80 - Dr. Lisa De Guzman returned call. Notified of vital signs VS: BP - 137/86, P - 112, T - 100, RR - 20. Notified that pt's pulse and respiratory rate is baseline. 0300 - No changes on pt's status. 0745 - Bedside and Verbal shift change report given to Frank Milligan RN (oncoming nurse) by Adeline Glover RN (offgoing nurse). Report included the following information SBAR, Kardex, Intake/Output, MAR and Recent Results.

## 2018-06-24 PROBLEM — R82.90 ABNORMAL URINALYSIS: Status: RESOLVED | Noted: 2018-06-13 | Resolved: 2018-06-24

## 2018-06-24 PROBLEM — N17.9 ACUTE KIDNEY INJURY (HCC): Status: RESOLVED | Noted: 2018-06-13 | Resolved: 2018-06-24

## 2018-06-24 LAB
GLUCOSE BLD STRIP.AUTO-MCNC: 125 MG/DL (ref 70–110)
GLUCOSE BLD STRIP.AUTO-MCNC: 154 MG/DL (ref 70–110)
GLUCOSE BLD STRIP.AUTO-MCNC: 217 MG/DL (ref 70–110)
GLUCOSE BLD STRIP.AUTO-MCNC: 95 MG/DL (ref 70–110)

## 2018-06-24 PROCEDURE — 74011250637 HC RX REV CODE- 250/637: Performed by: PSYCHIATRY & NEUROLOGY

## 2018-06-24 PROCEDURE — 74011000258 HC RX REV CODE- 258: Performed by: HOSPITALIST

## 2018-06-24 PROCEDURE — 74011250636 HC RX REV CODE- 250/636: Performed by: HOSPITALIST

## 2018-06-24 PROCEDURE — 74011250637 HC RX REV CODE- 250/637: Performed by: HOSPITALIST

## 2018-06-24 PROCEDURE — 82962 GLUCOSE BLOOD TEST: CPT

## 2018-06-24 PROCEDURE — 65270000029 HC RM PRIVATE

## 2018-06-24 PROCEDURE — 74011636637 HC RX REV CODE- 636/637: Performed by: NURSE PRACTITIONER

## 2018-06-24 PROCEDURE — 74011250636 HC RX REV CODE- 250/636: Performed by: NURSE PRACTITIONER

## 2018-06-24 PROCEDURE — 74011250637 HC RX REV CODE- 250/637: Performed by: NURSE PRACTITIONER

## 2018-06-24 PROCEDURE — 77030038269 HC DRN EXT URIN PURWCK BARD -A

## 2018-06-24 PROCEDURE — C9113 INJ PANTOPRAZOLE SODIUM, VIA: HCPCS | Performed by: HOSPITALIST

## 2018-06-24 RX ORDER — COLCHICINE 0.6 MG/1
0.6 TABLET ORAL 2 TIMES DAILY
Status: DISCONTINUED | OUTPATIENT
Start: 2018-06-24 | End: 2018-06-27 | Stop reason: HOSPADM

## 2018-06-24 RX ADMIN — DEXTROSE MONOHYDRATE AND SODIUM CHLORIDE 150 ML/HR: 5; .45 INJECTION, SOLUTION INTRAVENOUS at 09:28

## 2018-06-24 RX ADMIN — COLCHICINE 0.6 MG: 0.6 TABLET, FILM COATED ORAL at 14:41

## 2018-06-24 RX ADMIN — DEXTROSE MONOHYDRATE AND SODIUM CHLORIDE 150 ML/HR: 5; .45 INJECTION, SOLUTION INTRAVENOUS at 02:06

## 2018-06-24 RX ADMIN — ACETAMINOPHEN 650 MG: 325 TABLET ORAL at 09:33

## 2018-06-24 RX ADMIN — HEPARIN SODIUM 5000 UNITS: 5000 INJECTION, SOLUTION INTRAVENOUS; SUBCUTANEOUS at 22:06

## 2018-06-24 RX ADMIN — THERA TABS 1 TABLET: TAB at 09:25

## 2018-06-24 RX ADMIN — ASPIRIN 81 MG 81 MG: 81 TABLET ORAL at 09:24

## 2018-06-24 RX ADMIN — PANTOPRAZOLE SODIUM 40 MG: 40 INJECTION, POWDER, FOR SOLUTION INTRAVENOUS at 14:36

## 2018-06-24 RX ADMIN — HEPARIN SODIUM 5000 UNITS: 5000 INJECTION, SOLUTION INTRAVENOUS; SUBCUTANEOUS at 14:38

## 2018-06-24 RX ADMIN — FOLIC ACID 1 MG: 1 TABLET ORAL at 09:25

## 2018-06-24 RX ADMIN — Medication 100 MG: at 09:24

## 2018-06-24 RX ADMIN — DEXTROSE MONOHYDRATE AND SODIUM CHLORIDE 150 ML/HR: 5; .45 INJECTION, SOLUTION INTRAVENOUS at 16:45

## 2018-06-24 RX ADMIN — HEPARIN SODIUM 5000 UNITS: 5000 INJECTION, SOLUTION INTRAVENOUS; SUBCUTANEOUS at 05:09

## 2018-06-24 RX ADMIN — INSULIN LISPRO 2 UNITS: 100 INJECTION, SOLUTION INTRAVENOUS; SUBCUTANEOUS at 22:06

## 2018-06-24 RX ADMIN — ACETAMINOPHEN 650 MG: 325 TABLET ORAL at 16:44

## 2018-06-24 RX ADMIN — COLCHICINE 0.6 MG: 0.6 TABLET, FILM COATED ORAL at 17:59

## 2018-06-24 NOTE — ROUTINE PROCESS
Bedside and Verbal shift change report given to SHANNAN Bergeron (oshncoming nurse) by Natalio Barclay RN (offgoing nurse). Report included the folowing information SBAR, Kardex, Intake/Output, MAR and Recent Results.

## 2018-06-24 NOTE — PROGRESS NOTES
Progress Note      Patient: Kaelyn Carcamo               Sex: female          DOA: 6/13/2018       YOB: 1960      Age:  59 y.o.        LOS:  LOS: 11 days             CHIEF COMPLAINT:  Rhabdomyolysis improved, altered mental status improved    Subjective:     Awake and talkative  No distress    Objective:      Visit Vitals    /72 (BP 1 Location: Left arm, BP Patient Position: At rest)    Pulse (!) 104    Temp 98.9 °F (37.2 °C)    Resp 17    Ht 5' 7\" (1.702 m)    Wt 90.3 kg (199 lb)    SpO2 92%    Breastfeeding No    BMI 31.17 kg/m2       Physical Exam:  Gen:  No distress, no complaint  Lungs:  Clear bilaterally, no wheeze or rhonchi  Heart:  Regular rate and rhythm, no murmurs or gallops  Abdomen:  Soft, non-tender, normal bowel sounds        Lab/Data Reviewed:    Labs reviewed        Assessment/Plan     Principal Problem:    Rhabdomyolysis (6/13/2018)    Active Problems:    Acute metabolic encephalopathy (6/83/9167)      Bipolar 1 disorder (HCC) ()      Overview: seeing Psychiatrist      DM2 (diabetes mellitus, type 2) ([facility] (9/6/2013)      Atrial fibrillation with RVR ([facility] (6/13/2018)      Depression ()      Hyperammonemia (HCC) (6/13/2018)      Elevated transaminase level (6/13/2018)        Plan:  BS control  Continue to mobilize  BP control  Follow mental status  DVT prophylaxis.

## 2018-06-24 NOTE — PROGRESS NOTES
Assumed patient care. Received patient awake, alert, oriented X4. Patient denies pain at this time. Bed is locked and in lowest position and call bell is within reach. Not in acute distress.

## 2018-06-24 NOTE — PROGRESS NOTES
Problem: Falls - Risk of  Goal: *Absence of Falls  Document Radha Fall Risk and appropriate interventions in the flowsheet. Outcome: Progressing Towards Goal  Fall Risk Interventions:  Mobility Interventions: Communicate number of staff needed for ambulation/transfer         Medication Interventions: Patient to call before getting OOB    Elimination Interventions: Call light in reach    History of Falls Interventions: Door open when patient unattended        Problem: Pressure Injury - Risk of  Goal: *Prevention of pressure injury  Document Ryan Scale and appropriate interventions in the flowsheet.    Outcome: Progressing Towards Goal  Pressure Injury Interventions:  Sensory Interventions: Float heels    Moisture Interventions: Internal/External urinary devices    Activity Interventions: Pressure redistribution bed/mattress(bed type)    Mobility Interventions: HOB 30 degrees or less    Nutrition Interventions: Document food/fluid/supplement intake    Friction and Shear Interventions: HOB 30 degrees or less

## 2018-06-25 LAB
GLUCOSE BLD STRIP.AUTO-MCNC: 116 MG/DL (ref 70–110)
GLUCOSE BLD STRIP.AUTO-MCNC: 121 MG/DL (ref 70–110)
GLUCOSE BLD STRIP.AUTO-MCNC: 133 MG/DL (ref 70–110)
GLUCOSE BLD STRIP.AUTO-MCNC: 164 MG/DL (ref 70–110)

## 2018-06-25 PROCEDURE — 74011250637 HC RX REV CODE- 250/637: Performed by: PSYCHIATRY & NEUROLOGY

## 2018-06-25 PROCEDURE — 74011250636 HC RX REV CODE- 250/636: Performed by: HOSPITALIST

## 2018-06-25 PROCEDURE — 82962 GLUCOSE BLOOD TEST: CPT

## 2018-06-25 PROCEDURE — C9113 INJ PANTOPRAZOLE SODIUM, VIA: HCPCS | Performed by: HOSPITALIST

## 2018-06-25 PROCEDURE — 74011250637 HC RX REV CODE- 250/637: Performed by: NURSE PRACTITIONER

## 2018-06-25 PROCEDURE — 65270000029 HC RM PRIVATE

## 2018-06-25 PROCEDURE — 74011250637 HC RX REV CODE- 250/637: Performed by: HOSPITALIST

## 2018-06-25 PROCEDURE — 74011250636 HC RX REV CODE- 250/636: Performed by: NURSE PRACTITIONER

## 2018-06-25 PROCEDURE — 77030038269 HC DRN EXT URIN PURWCK BARD -A

## 2018-06-25 PROCEDURE — 77030037878 HC DRSG MEPILEX >48IN BORD MOLN -B

## 2018-06-25 PROCEDURE — 97530 THERAPEUTIC ACTIVITIES: CPT

## 2018-06-25 PROCEDURE — 74011000258 HC RX REV CODE- 258: Performed by: HOSPITALIST

## 2018-06-25 PROCEDURE — 74011636637 HC RX REV CODE- 636/637: Performed by: NURSE PRACTITIONER

## 2018-06-25 RX ORDER — PANTOPRAZOLE SODIUM 40 MG/1
40 GRANULE, DELAYED RELEASE ORAL
Status: DISCONTINUED | OUTPATIENT
Start: 2018-06-26 | End: 2018-06-27 | Stop reason: HOSPADM

## 2018-06-25 RX ADMIN — DEXTROSE MONOHYDRATE AND SODIUM CHLORIDE 150 ML/HR: 5; .45 INJECTION, SOLUTION INTRAVENOUS at 23:36

## 2018-06-25 RX ADMIN — THERA TABS 1 TABLET: TAB at 09:17

## 2018-06-25 RX ADMIN — COLCHICINE 0.6 MG: 0.6 TABLET, FILM COATED ORAL at 17:10

## 2018-06-25 RX ADMIN — DEXTROSE MONOHYDRATE AND SODIUM CHLORIDE 150 ML/HR: 5; .45 INJECTION, SOLUTION INTRAVENOUS at 07:34

## 2018-06-25 RX ADMIN — HEPARIN SODIUM 5000 UNITS: 5000 INJECTION, SOLUTION INTRAVENOUS; SUBCUTANEOUS at 12:47

## 2018-06-25 RX ADMIN — INSULIN LISPRO 2 UNITS: 100 INJECTION, SOLUTION INTRAVENOUS; SUBCUTANEOUS at 23:26

## 2018-06-25 RX ADMIN — HEPARIN SODIUM 5000 UNITS: 5000 INJECTION, SOLUTION INTRAVENOUS; SUBCUTANEOUS at 05:57

## 2018-06-25 RX ADMIN — COLCHICINE 0.6 MG: 0.6 TABLET, FILM COATED ORAL at 09:17

## 2018-06-25 RX ADMIN — Medication 100 MG: at 09:17

## 2018-06-25 RX ADMIN — FOLIC ACID 1 MG: 1 TABLET ORAL at 09:17

## 2018-06-25 RX ADMIN — PANTOPRAZOLE SODIUM 40 MG: 40 INJECTION, POWDER, FOR SOLUTION INTRAVENOUS at 10:28

## 2018-06-25 RX ADMIN — ASPIRIN 81 MG 81 MG: 81 TABLET ORAL at 09:17

## 2018-06-25 RX ADMIN — ACETAMINOPHEN 650 MG: 325 TABLET ORAL at 17:10

## 2018-06-25 RX ADMIN — HEPARIN SODIUM 5000 UNITS: 5000 INJECTION, SOLUTION INTRAVENOUS; SUBCUTANEOUS at 20:19

## 2018-06-25 NOTE — PROGRESS NOTES
ARU/IPR REFERRAL CONTACT NOTE  [id number] [facility]      RE: Shannon James  Received call from Viviana [facility] ) requesting updated therapy notes to be faxed for Medical Directors review.     Writer will notify  once obtained.   Adams Roman  Thank you for this referral.  Should you have any questions please do not hesitate to call.       Sincerely,  Amparo Georgie      [facility] [facility]  [phone]

## 2018-06-25 NOTE — PROGRESS NOTES
[facility]           Inpatient Daily Progress Note        Patient: Chancy Fothergill MRN: [id number]  CSN: [id number]    YOB: 1960  Age: 59 y.o. Sex: female    DOA: 6/13/2018 LOS:  LOS: 12 days                    Chief Complaint:  Chuyita Pian     Interval History:    Erika Ontiveros. Quita Chaney is a 59 y.o. Female with a PMHx of HTN, arthritis, DM II, Hep C, GERD, COPD, Bipolar 1 disorder, depression, etoh dependence in remission, DJD, Eustachain tube dysunction, and psoriasis who presents to the ED for AMS noting patient was found by daughter on the floor with confusion. Daughter last saw patient 6 days prior. Patient awake but not oriented to day/time and notes generalize myalgia and doesn't remember how she got on the floor. Upon arrival, patient in a-fib with rvr and was placed on cardizem gtt and given dig with conversion. Rhabdo with PAM. Admitted for further eval.  6/20/2018- No acute events overnight. Speech is still slurred and illogical at times. Patient today stating that she thinks she may have taken too many of her sleeping pills to cause this 2/2 depression. Psych consult placed today. 6/21/2018: Psych evaluation completed. Sitter at bedside. Working on SNF disposition. 6/22/2018: No complaints. Awaiting insurance acceptance for discharge. Subjective:    Awake. \"I'm doing good\"   No acute events overnight. Objective:      Visit Vitals    /71 (BP 1 Location: Left arm, BP Patient Position: At rest)    Pulse (!) 104    Temp 99.1 °F (37.3 °C)    Resp 16    Ht 5' 7\" (1.702 m)    Wt 90.3 kg (199 lb)    SpO2 94%    Breastfeeding No    BMI 31.17 kg/m2           Physical Exam:  General appearance: cooperative   Lungs: CTA   Heart: RRR, S1, S2 normal, no murmur, click, rub or gallop  Abdomen: soft, non tender, non distended. Normoactive bowel sounds.   Extremities: extremities normal, atraumatic, no cyanosis or edema  Skin: Skin color, texture, turgor normal.   Neurologic: Speech slurred, moves all ext   PSY: Mood and affect normal, appropriately behaved      Intake and Output:  Current Shift:     Last three shifts:  1901 -  0700  In: 600 [P.O.:600]  Out: 6200 [Urine:6200]    Recent Results (from the past 24 hour(s))   GLUCOSE, POC    Collection Time: 18 11:28 AM   Result Value Ref Range    Glucose (POC) 217 (H) 70 - 110 mg/dL   GLUCOSE, POC    Collection Time: 18  4:40 PM   Result Value Ref Range    Glucose (POC) 125 (H) 70 - 110 mg/dL   GLUCOSE, POC    Collection Time: 18  8:44 PM   Result Value Ref Range    Glucose (POC) 154 (H) 70 - 110 mg/dL   GLUCOSE, POC    Collection Time: 18  8:00 AM   Result Value Ref Range    Glucose (POC) 121 (H) 70 - 110 mg/dL           Lab Results   Component Value Date/Time    Glucose 149 (H) 2018 04:45 AM    Glucose 117 (H) 2018 04:00 AM    Glucose 102 (H) 2018 03:57 AM    Glucose 132 (H) 06/15/2018 07:20 AM    Glucose 106 (H) 2018 08:05 AM    [address]  [phone]    Transthoracic Echocardiogram    Patient: Tip Chaves  MRN: [id number]  [address] #: [de-identified]  : 1960  Age: 59 years  Gender: Female  Height: 66 in  Weight: 182.6 lb  BSA: 1.93 m-sq  BP: 112 / 72 mmHg  Study date: 2018  Status: Routine  Location: Echo lab  [facility] #: [id number]    Allergies: ACE INHIBITORS, PENICILLINS    Referring_Ordering Physician: Janusz Glasgow. Sameer Meredith [place] FOR CHANGE  Interpreting Physician: Le Davis MD  Interpreting Group: [address] [place] [address]  Technologist: Anthony Aranda RVT [facility]    SUMMARY:  Left ventricle: Systolic function was normal by visual assessment. Ejection   fraction was estimated in the range of 55 %  to 60 %. No obvious wall motion abnormalities identified in the views   obtained. Left ventricular diastolic function  parameters were normal for the patient's age. Right ventricle:  The size was normal. Systolic function was low normal.    Left atrium: Size was normal.    Mitral valve: There was no regurgitation. Aortic valve: There was no stenosis. There was no regurgitation. Tricuspid valve: There was trivial regurgitation. Inferior vena cava, hepatic veins: The inferior vena cava was normal in size   and course. Pericardium: A trivial pericardial effusion was identified. INDICATIONS: Atrial fibrillation. PROCEDURE: This was a routine study. The study included complete 2D imaging,   complete spectral Doppler, and color  Doppler. Systolic blood pressure was 112 mmHg, at the start of the study. Diastolic blood pressure was 72 mmHg, at the  start of the study. Images were obtained from the parasternal, apical,   subcostal, and suprasternal notch acoustic  windows. Image quality was adequate. LEFT VENTRICLE: Size was normal. Systolic function was normal by visual   assessment. Ejection fraction was estimated in  the range of 55 % to 60 %. No obvious wall motion abnormalities identified in   the views obtained. Wall thickness was  normal. DOPPLER: Left ventricular diastolic function parameters were normal   for the patient's age. RIGHT VENTRICLE: The size was normal. Systolic function was low normal.   DOPPLER: Estimated peak pressure was in the  range of 25 mmHg to 30 mmHg. LEFT ATRIUM: Size was normal. LA volume index was 20 ml/m-sq. RIGHT ATRIUM: Size was normal.    MITRAL VALVE: There was normal leaflet separation. DOPPLER: The transmitral   velocity was within the normal range. There  was no evidence for stenosis. There was no regurgitation. AORTIC VALVE: The valve was trileaflet. Leaflets exhibited normal thickness   and normal cuspal separation. DOPPLER:  Transaortic velocity was within the normal range. There was no stenosis. There was no regurgitation. TRICUSPID VALVE: There was normal leaflet separation.  DOPPLER: The   transtricuspid velocity was within the normal range. There was no evidence for tricuspid stenosis. There was trivial   regurgitation. PULMONIC VALVE: Not well visualized, but normal Doppler findings. DOPPLER:   There was no stenosis. There was trivial  regurgitation. AORTA: The root exhibited normal size. SYSTEMIC VEINS: IVC: The inferior vena cava was normal in size and course. Respirophasic changes were normal.    PERICARDIUM: A trivial pericardial effusion was identified. Insignificant   pericardial effusion and/or pericardial fat  was present. SYSTEM MEASUREMENT TABLES    2D  AV Cusp: 1.6 cm  Ao Diam: 3 cm  LA Diam: 2.8 cm  LVOT Diam: 1.9 cm  IVSd: 0.9 cm  LVIDd: 3.9 cm  LVIDs: 2.2 cm  LVPWd: 0.9 cm  EF Biplane: 63.3 %  LAESV Index (A-L): 19.7 ml/m2  LVEF MOD A2C: 60.6 %  LVEF MOD A4C: 69.6 %  RA Area: 9.8 cm2  RV Major: 5.3 cm  RV Minor: 3.4 cm    CW  AV Vmax: 1.2 m/s  AV maxP.5 mmHg  PV Vmax: 0.9 m/s  PV maxPG: 3 mmHg  TR Vmax: 2.3 m/s  TR maxP.6 mmHg  AV VTI: 18.3 cm  AV Vmean: 0.7 m/s  AV meanP.6 mmHg    MM  TAPSE: 1.5 cm    PW  MICHAEL Vmax, Pt: 2.9 cm2  LVOT Vmax: 1.2 m/s  LVOT maxP.4 mmHg  E/E': 5.9  MV A Benton: 0.6 m/s  MV Dec [place]: 2.4 m/s2  MV DecT: 225.9 ms  MV E Benton: 0.5 m/s  MV E/A Ratio: 0.9  MV PHT: 65.5 ms  MVA By PHT: 3.4 cm2  TAPSV: 12.1 cm/s  EXAM: CT head     INDICATION: Found on floor, altered mental status.     COMPARISON: None.     TECHNIQUE: Axial CT imaging of the head was performed without intravenous  contrast.     One or more dose reduction techniques were used on this CT: automated exposure  control, adjustment of the mAs and/or kVp according to patient's size, and  iterative reconstruction techniques. The specific techniques utilized on this CT  exam have been documented in the patient's electronic medical record.      _______________     FINDINGS:     BRAIN AND POSTERIOR FOSSA: The sulci, folia, ventricles and basal cisterns are  within normal limits for the patient?s age.  There is no intracranial hemorrhage,  mass effect, or midline shift. There are no areas of abnormal parenchymal  attenuation.     EXTRA-AXIAL SPACES AND MENINGES: There are no abnormal extra-axial fluid  collections.     CALVARIUM: Intact.     SINUSES: Imaged paranasal sinuses and mastoid air cells are clear.     OTHER: None.     IMPRESSION:        1. No acute intracranial abnormality.          EXAM: CT Cervical spine     INDICATION: Cervical neck pain. Patient on the floor.     COMPARISON: No prior study.     TECHNIQUE: Axial CT imaging of the cervical spine was performed from the skull  base to the upper thoracic spine without intravenous contrast. Multiplanar  reformats were generated. One or more dose reduction techniques were used on  this CT: automated exposure control, adjustment of the mAs and/or kVp according  to patient's size, and iterative reconstruction techniques. The specific  techniques utilized on this CT exam have been documented in the patient's  electronic medical record.     _______________     FINDINGS:     VERTEBRAE AND DISCS: Coronal reformatted images demonstrate a normal appearance  to the occipital condyles. The atlantodental and atlantooccipital articulations  are normal in appearance. There is mild straightening of usual cervical  lordosis, without evidence of listhesis. Vertebral body heights are intact. No  evidence of compression fracture. No displaced fracture. Moderate severity  spondylosis present C4-C7. No acute facet malalignment.     SPINAL CANAL AND FORAMINA: Moderate severity right-sided C4-C5 neural foraminal  narrowing. No evidence of high-grade spinal canal stenosis or neural foraminal  narrowing.     PREVERTEBRAL SOFT TISSUES: Normal     VISIBLE INTRACRANIAL CONTENTS: Unremarkable.     LUNG APICES: Patchy groundglass opacity present in the periphery of the right  upper lobe.     OTHER: None.     _______________     IMPRESSION  IMPRESSION:        1.  Straightening of the usual cervical lordosis without evidence of fracture or  acute traumatic listhesis. 2. Cervical spondylosis and neuroforaminal narrowing as described. 3. Patchy groundglass opacity at the periphery of the imaged right upper lobe,  potentially reflecting pneumonitis or pneumonia given the presenting history.          Chest, single view     Indication: Coughing and chest congestion, patient found down     Comparison: 1/30/2016     Findings:  Portable upright AP view of the chest was obtained. Interval  development of asymmetric alveolar opacity throughout the majority of the right  lower lobe. No pneumothorax or pleural effusion. Left lung appears clear. Cardiac size and mediastinal contours are stable. Degenerative changes of each  shoulder girdle demonstrated without acute osseous abnormality.     IMPRESSION  Impression:  1.  Focal airspace opacity in the right lower lobe, potentially reflecting  pneumonitis or pneumonia.             Assessment/Plan:     Patient Active Problem List   Diagnosis Code    Hypertension I10    Arthritis M19.90    Hepatitis C B19.20    DJD (degenerative joint disease) of knee M17.10    GERD (gastroesophageal reflux disease) K21.9    Bipolar 1 disorder ([facility] F31.9    Depression F32.9    Alcohol dependence in remission ([facility] F10.21    DM2 (diabetes mellitus, type 2) ([facility] E11.9    Needle phobia F40.298    Eustachian tube dysfunction H69.80    Psoriasis L40.9    Atrial fibrillation with RVR (HCC) I48.91    Rhabdomyolysis M62.82    Acute metabolic encephalopathy Z72.64    Hyperammonemia (HCC) E72.20    Elevated transaminase level R74.0       A/P:    Rhabdo  -IVFs  -2/2 trauma/fall  -trend ck -improving 516->262 ->183       PAM  -resolved   -2/2 rhabdo  -IVFs      UTI  -on IVAB    AMS  -ammonia normalized  -unclear of syncope-may 2/2 to taking higher dose of sleeping pills per patient (patient unable to name pill?)    -ECHO  -CT head negative/MRI neg   -UDS only positive for THC  --2/2 infectious and metabolic derangements          A-fib with RVR  -resolved after cardizem and dig  -ekg ok  -no cp  -BNP 6K  -serial enzymes  -tele  -ECHO EF 55-60%      DM II  -corrective insulin  -hold oaa     Elevated tropinon   -0.11 likely 2/2 demand ishemia noting a-fib rvr in setting PAM  -monitor  -no CP    Elevated LFTs  -etoh abuse   -dc atorvastatin  -reduce ASA dose   -lactulose       Depression  -monitor     ETOH abuse  -continue thiamine, folic acid, MVT     PNA   -resolved      DVT prophyalxis  -scd/teds  -heparn sq  GI-protonix     Working on [employer] Disposition 1-2 days.      YING [facility]  HCWQK:[phone]  Office:  [phone]

## 2018-06-25 NOTE — INTERDISCIPLINARY ROUNDS
IDR Summary      Patient: Yary Villegas MRN: [id number]    Age: 59 y.o.  : 1960     Admit Diagnosis: Atrial fibrillation with RVR ([address]  Rhabdomyolysis        DIET status: Regular     Lines/Tubes:   IV: YES   Needed: YES  [place]: NO  Needed:NO  Central Line: NO Needed: NO      VTE Prophylaxis: Chemical    Mobility needs: Yes     PT ordered:  YES PT eval on chart: YES    OT ordered:  YES OT eval on chart: YES      ST ordered:  YES ST eval on chart:  YES     Disposition/Care Management:  [address] ordered? NO     Recommended DME from PT/OT:      DME ordered? NO     SNF- has patient been matched? YES    Accepting bed? YES   Does patient require insurance auth?   YES        Barriers to discharge:   Financial concerns:No   PCP: Cam Garcia MD    : NO  Interventions:       LOS: 12 days     Expected days until discharge: Accepted to [facility] pending            Signed:     JAKE Weldon-BC  [address]  Hospitalist Division  Pager:  [phone]  Office:  [phone]

## 2018-06-25 NOTE — PROGRESS NOTES
[facility] Received patient awake, alert, oriented X4. Patient denies pain at this time. Bed is locked and in lowest position and call bell is within reach. Not in acute distress.

## 2018-06-25 NOTE — ROUTINE PROCESS
Bedside and Verbal shift change report given to sister Surjit RN (oshncoming nurse) by Troy Gilbert RN (offgoing nurse). Report included the folowing information SBAR, Kardex, Intake/Output, MAR and Recent Results.

## 2018-06-25 NOTE — PROGRESS NOTES
Lonne Savers remains pending from pts inurance despite multiple contacts from chelsea at [facility].

## 2018-06-25 NOTE — PROGRESS NOTES
ARU/IPR REFERRAL CONTACT NOTE  [id number]     RE: Lucho Angela     Request for authorization remains pending  from [address] [facility]. Addendum - 09:58  Multiple faxes/calls (requiring leaving messages on voice mail) on Friday, 6/22/18 and Monday, 6/25/18 with [employer] case management/UR requesting status of case. NO return calls or faxes reflecting status at this point.   Writer will continue to diligently work on connecting with insurance for determination and notify  once obtained.      Thank you for this referral.  Should you have any questions please do not hesitate to call.      Sincerely,  [address] [facility]  [phone]

## 2018-06-25 NOTE — PROGRESS NOTES
Pt received resting in bed. Pt denies pain. Assessment completed plan of care for the shift explained pt verbalizes understanding. HOB elevated, bed low and locked position . Call light within reach. 0730-Bedside and Verbal shift change report given to SHANNAN Smiley (oncoming nurse) by Jennifer Reid RN (offgoing nurse). Report included the following information SBAR, Kardex, Intake/Output, MAR and Recent Results.

## 2018-06-25 NOTE — PROGRESS NOTES
Problem: Mobility Impaired (Adult and Pediatric)  Goal: *Acute Goals and Plan of Care (Insert Text)  Physical Therapy Goals  Initiated 6/15/2018 and to be accomplished within 7 day(s): WILL CONTINUE WITH GOALS FOR 7 DAYS   1. Patient will maintain eyes open for greater than 90% of session to allow for active participation in mobility training. GOAL MET  2. Patient will follow 90% of commands to maximize safety and active participation in mobility training. 3.  Patient will move from supine to sit and sit to supine  in bed with supervision/set-up. GOAL MET  4. Patient will maintain seated at edge of bed for 10 min with supervision/set-up to prepare for out of bed activity. 5.  Patient will perform sit to stand with minimal assistance/contact guard assist.  6.  Patient will transfer from bed to chair and chair to bed with minimal assistance/contact guard assist using the least restrictive device. 7.  Patient will ambulate with minimal assistance/contact guard assist for 25 feet with the least restrictive device. 8.  Patient will negotiate obstacles during ambulation with supervision/set-up. Outcome: Progressing Towards Goal    PHYSICAL THERAPY: Daily TREATMENT Note   INPATIENT: Medicare: Hospital Day: 13     Patient: Malka Vivas (36 y.o. female)    Date: 6/25/2018  Primary Diagnosis: Atrial fibrillation with RVR (HCC)  Rhabdomyolysis   Precautions: Skin  Chart, physical therapy assessment, plan of care and goals were reviewed. PLOF: Independent with amb  ASSESSMENT:  Pt performed ankle pumps, heel slides supine in bed. Supine<>sit with supervision. Performed LAQ, seated marches on EOB. Good trunk control with seated TE.  Sit<>stand with mod A. VC for hand placement on ww, knee ext, and hip ext in standing. Amb 3' to chair by bedside with ww and mod A. Left pt in chair by bedside with all needs in reach. Educated on need for assistance with mobility. Verbalized understanding.  SHANNAN Scales Plan notified. Progression toward goals:        Improving appropriately and progressing toward goals     PLAN:  Patient continues to benefit from skilled intervention to address the above impairments. Continue treatment per established plan of care. EDUCATION:   Education:  Patient was educated on the following topics: cognition, bed mobility, transfers, amb, balance, safety, TE, functional mobility, posture, skin integrity  Barriers to Learning/Limitations: None  Compensate with: visual, verbal, tactile, kinesthetic cues/model    Discharge Recommendations:  Rehab  Further Equipment Recommendations for Discharge:  rolling walker  Factors which may impact discharge planning: N/A     SUBJECTIVE:   Patient stated I do my exercises.     OBJECTIVE DATA SUMMARY:   Critical Behavior:  Neurologic State: Alert  Orientation Level: Oriented to person, Oriented to place, Oriented to situation  Cognition: Follows commands, Impulsive  Safety/Judgement: Decreased insight into deficits    G CODE: Mobility  Current  CL= 60-79%   Goal  CI= 1-19%. The severity rating is based on the [employer] Balance Scale 1+/5    [employer] Balance Scale 1+/5  0: Pt performs 25% or less of standing activity (Max assist) CN, 100% impaired. 1: Pt supports self with upper extremities but requires therapist assistance. Pt performs 25-50% of effort (Mod assist) CM, 80% to <100% impaired. 1+: Pt supports self with upper extremities but requires therapist assistance. Pt performs >50% effort. (Min assist). CL, 60% to <80% impaired. 2: Pt supports self independently with both upper extremities (walker, crutches, parallel bars). CL, 60% to <80% impaired. 2+: Pt support self independently with 1 upper extremity (cane, crutch, 1 parallel bar). CK, 40% to <60% impaired. 3: Pt stands without upper extremity support for up to 30 seconds. CK, 40% to <60% impaired.   3+: Pt stands without upper extremity support for 30 seconds or greater. CJ, 20% to <40% impaired. 4: Pt independently moves and returns center of gravity 1-2 inches in one plane. CJ, 20% to <40% impaired. 4+: Pt independently moves and returns center of gravity 1-2 inches in multiple planes. CI, 1% to <20% impaired. 5: Pt independently moves and returns center of gravity in all planes greater than 2 inches. CH, 0% impaired. Functional Mobility:      Functional Status      Indep   (I)   Mod I   Super-vision   Min A   Mod A   Max A   Total A   Assist x2 Verbal cues Additional time Not tested   Comments   Rolling []  []  [] []    []    []  []  [] [] [] [x]    Supine to sit []  []  [x] []  []  []  []  [] [x] [] []    Sit to supine []  []  [] []  []  []  []  [] [] [] [x]    Sit to stand []  []  [] []  [x]  []  []  [] [x] [] []    Stand to sit []  []  [] []  [x]  []  []  [] [x] [] []    Bed to chair transfers []  []  [] []  [x]  []  []  [] [x] [] []        Balance    Good   Fair   Poor   Unable   Not tested   Comments   Sitting static [x]  []  []  []  []    Sitting dynamic [x]  []  []  []  []    Standing static []  [x]  []  []  []    Standing dynamic []  [x]  []  []  []      Mobility/Gait:   Level of Assistance: Mod assistance  Assistive Device: rolling walker  Distance Ambulated: 3 feet     Left Lower Extremity: FWB  Right Lower Extremity: FWB  Base of Support: narrowed  Speed/Staci: pace decreased (<100 feet/min), shuffled and slow    Neuro Re-Education:  Seated balance exercises    Therapeutic Exercises:    Ankle pumps 10x3\" ea  Heel slides 10x3\" ea  LAQ 10x3\"ea  Seated marches 10x3\"ea    Pain: 0/10  Pre treatment pain level: 0/10  Post treatment pain level: 0/10    Activity Tolerance:   Good     After treatment:   Patient left in no apparent distress sitting up in chair  Call bell left within reach  Nursing notified      Doe Villalobos   Time Calculation: 16 mins

## 2018-06-25 NOTE — PROGRESS NOTES
Assumed care of pt from Sister , RN pt awake in bed A&O x 4 , no distress noted and denies pain. Frequently used items and call bell within reach. Patient verbalized understanding to use call bell for any needs or assistance. Bed locked in lowest position. 1422 Orders received to renew tele.

## 2018-06-25 NOTE — PROGRESS NOTES
Problem: Falls - Risk of  Goal: *Absence of Falls  Document Radha Fall Risk and appropriate interventions in the flowsheet. Outcome: Progressing Towards Goal  Fall Risk Interventions:  Mobility Interventions: Communicate number of staff needed for ambulation/transfer, PT Consult for mobility concerns         Medication Interventions: Evaluate medications/consider consulting pharmacy    Elimination Interventions: Call light in reach    History of Falls Interventions: Door open when patient unattended        Problem: Pressure Injury - Risk of  Goal: *Prevention of pressure injury  Document Ryan Scale and appropriate interventions in the flowsheet. Outcome: Progressing Towards Goal  Pressure Injury Interventions:  Sensory Interventions: Float heels, Keep linens dry and wrinkle-free, Discuss PT/OT consult with provider, Monitor skin under medical devices, Pressure redistribution bed/mattress (bed type), Turn and reposition approx.  every two hours (pillows and wedges if needed)    Moisture Interventions: Internal/External fecal devices    Activity Interventions: Pressure redistribution bed/mattress(bed type), PT/OT evaluation    Mobility Interventions: HOB 30 degrees or less, Pressure redistribution bed/mattress (bed type), PT/OT evaluation, Float heels    Nutrition Interventions: Document food/fluid/supplement intake    Friction and Shear Interventions: HOB 30 degrees or less

## 2018-06-25 NOTE — PROGRESS NOTES
Updated pt and her dtr on status. Called charley at [facility],  To see if can accept  For snf if ARU denied by ins. She will let me know, they would also have to start an [facility].

## 2018-06-26 LAB
GLUCOSE BLD STRIP.AUTO-MCNC: 109 MG/DL (ref 70–110)
GLUCOSE BLD STRIP.AUTO-MCNC: 110 MG/DL (ref 70–110)
GLUCOSE BLD STRIP.AUTO-MCNC: 113 MG/DL (ref 70–110)
GLUCOSE BLD STRIP.AUTO-MCNC: 96 MG/DL (ref 70–110)

## 2018-06-26 PROCEDURE — 74011000258 HC RX REV CODE- 258: Performed by: HOSPITALIST

## 2018-06-26 PROCEDURE — 74011250637 HC RX REV CODE- 250/637: Performed by: HOSPITALIST

## 2018-06-26 PROCEDURE — 92526 ORAL FUNCTION THERAPY: CPT

## 2018-06-26 PROCEDURE — 74011250636 HC RX REV CODE- 250/636: Performed by: NURSE PRACTITIONER

## 2018-06-26 PROCEDURE — 97530 THERAPEUTIC ACTIVITIES: CPT

## 2018-06-26 PROCEDURE — 82962 GLUCOSE BLOOD TEST: CPT

## 2018-06-26 PROCEDURE — 74011250637 HC RX REV CODE- 250/637: Performed by: PSYCHIATRY & NEUROLOGY

## 2018-06-26 PROCEDURE — 97116 GAIT TRAINING THERAPY: CPT

## 2018-06-26 PROCEDURE — 92507 TX SP LANG VOICE COMM INDIV: CPT

## 2018-06-26 PROCEDURE — 77030038269 HC DRN EXT URIN PURWCK BARD -A

## 2018-06-26 PROCEDURE — 65270000029 HC RM PRIVATE

## 2018-06-26 PROCEDURE — 97535 SELF CARE MNGMENT TRAINING: CPT

## 2018-06-26 RX ADMIN — HEPARIN SODIUM 5000 UNITS: 5000 INJECTION, SOLUTION INTRAVENOUS; SUBCUTANEOUS at 11:39

## 2018-06-26 RX ADMIN — PANTOPRAZOLE SODIUM 40 MG: 40 GRANULE, DELAYED RELEASE ORAL at 08:48

## 2018-06-26 RX ADMIN — DEXTROSE MONOHYDRATE AND SODIUM CHLORIDE 150 ML/HR: 5; .45 INJECTION, SOLUTION INTRAVENOUS at 07:05

## 2018-06-26 RX ADMIN — HEPARIN SODIUM 5000 UNITS: 5000 INJECTION, SOLUTION INTRAVENOUS; SUBCUTANEOUS at 04:13

## 2018-06-26 RX ADMIN — Medication 100 MG: at 08:48

## 2018-06-26 RX ADMIN — THERA TABS 1 TABLET: TAB at 08:48

## 2018-06-26 RX ADMIN — FOLIC ACID 1 MG: 1 TABLET ORAL at 08:48

## 2018-06-26 RX ADMIN — HEPARIN SODIUM 5000 UNITS: 5000 INJECTION, SOLUTION INTRAVENOUS; SUBCUTANEOUS at 20:41

## 2018-06-26 RX ADMIN — ASPIRIN 81 MG 81 MG: 81 TABLET ORAL at 08:48

## 2018-06-26 RX ADMIN — COLCHICINE 0.6 MG: 0.6 TABLET, FILM COATED ORAL at 17:58

## 2018-06-26 RX ADMIN — COLCHICINE 0.6 MG: 0.6 TABLET, FILM COATED ORAL at 08:48

## 2018-06-26 NOTE — PROGRESS NOTES
Problem: Mobility Impaired (Adult and Pediatric)  Goal: *Acute Goals and Plan of Care (Insert Text)  Physical Therapy Goals  Initiated 6/15/2018 and to be accomplished within 7 day(s): WILL CONTINUE WITH GOALS FOR 7 DAYS   1. Patient will maintain eyes open for greater than 90% of session to allow for active participation in mobility training. GOAL MET  2. Patient will follow 90% of commands to maximize safety and active participation in mobility training. 3.  Patient will move from supine to sit and sit to supine  in bed with supervision/set-up. GOAL MET  4. Patient will maintain seated at edge of bed for 10 min with supervision/set-up to prepare for out of bed activity. 5.  Patient will perform sit to stand with minimal assistance/contact guard assist.  6.  Patient will transfer from bed to chair and chair to bed with minimal assistance/contact guard assist using the least restrictive device. 7.  Patient will ambulate with minimal assistance/contact guard assist for 25 feet with the least restrictive device. 8.  Patient will negotiate obstacles during ambulation with supervision/set-up. Outcome: Progressing Towards Goal  physical Therapy TREATMENT    Patient: Marilynn Phoenix (1 y.o. female)  Date: 6/26/2018  Diagnosis: Atrial fibrillation with RVR (HCC)  Rhabdomyolysis Rhabdomyolysis  Precautions: Skin   Chart, physical therapy assessment, plan of care and goals were reviewed. PLOF:independent, ETOH abuse  ASSESSMENT:  Pt sitting in chair upon entering room. Increased time and CGA needed for sit to stand. Ambulated 34ft with RW, decreased step height and length (B)LE, very slow pace, no LOB or path deviations. Returned to sitting in chair then wanted to sit on BSC, call bell in reach when finished.     Education: RW mgmt and safety  Progression toward goals:  []      Improving appropriately and progressing toward goals  [x]      Improving slowly and progressing toward goals  [] Not making progress toward goals and plan of care will be adjusted     PLAN:  Patient continues to benefit from skilled intervention to address the above impairments. Continue treatment per established plan of care. Discharge Recommendations:  Surjit Oglesby  Further Equipment Recommendations for Discharge:  rolling walker     SUBJECTIVE:   Patient stated I am doing better, I have been working on my stuff.     OBJECTIVE DATA SUMMARY:   Critical Behavior:  Neurologic State: Alert  Orientation Level: Oriented X4  Cognition: Follows commands  Safety/Judgement: Decreased insight into deficits  Functional Mobility Training:  Transfers:  Sit to Stand: Contact guard assistance; Additional time  Stand to Sit: Contact guard assistance; Additional time  Bed to Chair: Minimum assistance (w/RW)  Balance:  Sitting: Intact  Sitting - Static: Good (unsupported)  Sitting - Dynamic: Fair (occasional)  Standing: Impaired; With support  Standing - Static: Fair  Standing - Dynamic : Fair  Ambulation/Gait Training:  Distance (ft): 34 Feet (ft)  Assistive Device: Gait belt;Walker, rolling  Ambulation - Level of Assistance: Contact guard assistance  Gait Abnormalities: Decreased step clearance  Speed/Staci: Slow  Step Length: Right shortened;Left shortened  GCODE:Mobility  Current  CJ= 20-39%   Goal  CI= 1-19%. The severity rating is based on the Level of Assistance required for Functional Mobility and ADLs. Pain:  Pre:0  Post:0  Pain Scale 1: Numeric (0 - 10)  Pain Intensity 1: 0  Activity Tolerance:   Fair  Please refer to the flowsheet for vital signs taken during this treatment.   After treatment:   [x] Patient left in no apparent distress sitting up in chair  [x] Patient left in no apparent distress in bed  [x] Call bell left within reach  [] Nursing notified  [] Caregiver present  [] Bed alarm activated      Ese Caraballo PTA   Time Calculation: 16 mins

## 2018-06-26 NOTE — PROGRESS NOTES
Problem: Self Care Deficits Care Plan (Adult)  Goal: *Acute Goals and Plan of Care (Insert Text)  Occupational Therapy Goals  Initiated 6/15/2018; re-evaluated on 6/22/2018 following one week with skilled OT. Pt has met goals 1 & 2. Updated goals listed below. 1.  Patient will perform grooming tasks at EOB with minimal assistance for balance. 2.  Patient will perform lower body dressing with minimal assistance. 3.  Patient will perform functional task for 8 minutes in standing with moderate assistance for balance to increase activity tolerance for ADLs. 4.  Patient will perform toilet transfers with moderate assistance. 5.  Patient will perform all aspects of toileting with moderate assistance. 6.  Patient will participate in upper extremity therapeutic exercise/activities for 8 minutes with supervision/set-up to increase BUE strength for functional transfers and ADLs. 7.  Patient will utilize energy conservation techniques during functional activities with minimal verbal cues. UPDATED GOALS Leon Avila MS, OTR/L; 6/22/2018)  1. Patient will perform grooming tasks while standing with supervision for balance. 2.  Patient will perform lower body dressing with supervision/set-up. 3.  Patient will perform functional task while standing for 8 minutes with stand-by assistance for balance to increase activity tolerance for ADLs. 4.  Patient will perform toilet transfers with stand-by assistance. 5.  Patient will perform all aspects of toileting with stand-by assistance. 6.  Patient will participate in upper extremity therapeutic exercise/activities with supervision/set-up for 8 minutes to maintain BUE strength for ADLs & functional transfers.    Outcome: Progressing Towards Goal  Occupational Therapy TREATMENT    Patient: Antonette Garcia (36 y.o. female)  Date: 6/26/2018  Diagnosis: Atrial fibrillation with RVR ([address]  Rhabdomyolysis Rhabdomyolysis       Precautions: Skin  Chart, occupational therapy assessment, plan of care, and goals were reviewed. PLOF: Independent    ASSESSMENT:  Pt OOB seated in chair upon entry. Pt appears brighter today, motivated to participate w/therapy. Pt's decrease dynamic standing balance and poor standing tolerance require UB dressing and ADL grooming tasks performed at chair level w/set-up. BLE weakness requires Min Assist for function transfers to standing and max verbal/tactile cues for hand placement w/functional transfer to chair. EDUCATION Pt educated on importance of hand placement w/functional transfers for increase safety  Progression toward goals:  [x]          Improving appropriately and progressing toward goals  []          Improving slowly and progressing toward goals  []          Not making progress toward goals and plan of care will be adjusted     PLAN:  Patient continues to benefit from skilled intervention to address the above impairments. Continue treatment per established plan of care. Discharge Recommendations:  Inpatient Rehab  Further Equipment Recommendations for Discharge:  N/A, pt has DME     SUBJECTIVE:   Patient stated It has been a very busy morning.     OBJECTIVE DATA SUMMARY:       Cognitive/Behavioral Status:  Neurologic State: Alert  Orientation Level: Oriented X4  Cognition: Follows commands  Safety/Judgement: Decreased insight into deficits  Functional Mobility and Transfers for ADLs:   Transfers:  Sit to Stand: Minimum assistance  Bed to Chair: Minimum assistance (w/RW)  Balance:  Sitting: Intact  Sitting - Static: Good (unsupported)  Sitting - Dynamic: Fair (occasional)  Standing: Impaired; With support  Standing - Static: Fair  Standing - Dynamic : Fair  ADL Intervention:  Grooming  Brushing Teeth: Supervision/set-up    Upper Body [address] Gown: Stand-by assistance    Pain:  Pre Treatment:0  Post Treatment:0  Pain Scale 1: Numeric (0 - 10)  Pain Intensity 1: 0    Activity Tolerance:    Good    Please refer to the flowsheet for vital signs taken during this treatment.   After treatment:   [x]  Patient left in no apparent distress sitting up in chair  []  Patient left in no apparent distress in bed  [x]  Call bell left within reach  [x]  Nursing notified  []  Caregiver present  []  Bed alarm activated    DEANA Vidales  Time Calculation: 23 mins

## 2018-06-26 NOTE — ROUTINE PROCESS
Bedside and Verbal shift change report given to [address] [place] [address] (oncoming nurse) by roger zimmerman rn (offgoing nurse). Report given with SBAR, Kardex, Intake/Output and Recent Results.

## 2018-06-26 NOTE — PROGRESS NOTES
Problem: Falls - Risk of  Goal: *Absence of Falls  Document Radha Fall Risk and appropriate interventions in the flowsheet.    Outcome: Progressing Towards Goal  Fall Risk Interventions:  Mobility Interventions: Communicate number of staff needed for ambulation/transfer, Patient to call before getting OOB         Medication Interventions: Patient to call before getting OOB    Elimination Interventions: Call light in reach    History of Falls Interventions: Door open when patient unattended

## 2018-06-26 NOTE — PROGRESS NOTES
ARU/IPR REFERRAL CONTACT NOTE  [address] [facility]      RE: Moy Brambila  [facility] had to leave voice message - requesting status of request for authorization. No return call at this point.   Writer will notify  once obtained.   William Montoya  Thank you for this referral.  Should you have any questions please do not hesitate to call.       Sincerely,  Humza Mckeon      [address] [facility]  [phone]

## 2018-06-26 NOTE — PROGRESS NOTES
[facility]           Inpatient Daily Progress Note        Patient: Ally Mcmahon MRN: [id number]  CSN: [id number]    YOB: 1960  Age: 59 y.o. Sex: female    DOA: 6/13/2018 LOS:  LOS: 13 days                    Chief Complaint:  Ok Meyer     Interval History:    Davina Girma Bee is a 59 y.o. Female with a PMHx of HTN, arthritis, DM II, Hep C, GERD, COPD, Bipolar 1 disorder, depression, etoh dependence in remission, DJD, Eustachain tube dysunction, and psoriasis who presents to the ED for AMS noting patient was found by daughter on the floor with confusion. Daughter last saw patient 6 days prior. Patient awake but not oriented to day/time and notes generalize myalgia and doesn't remember how she got on the floor. Upon arrival, patient in a-fib with rvr and was placed on cardizem gtt and given dig with conversion. Rhabdo with PAM. Admitted for further eval.  6/20/2018- No acute events overnight. Speech is still slurred and illogical at times. Patient today stating that she thinks she may have taken too many of her sleeping pills to cause this 2/2 depression. Psych consult placed today. 6/21/2018: Psych evaluation completed. Sitter at bedside. Working on SNF disposition. 6/22/2018: No complaints. Awaiting insurance acceptance for discharge. Subjective:    Awake. \"I'm doing good\"   No acute events overnight. Objective:      Visit Vitals    /72 (BP 1 Location: Right arm, BP Patient Position: At rest)    Pulse 96    Temp 98.8 °F (37.1 °C)    Resp 16    Ht 5' 7\" (1.702 m)    Wt 90.3 kg (199 lb)    SpO2 95%    Breastfeeding No    BMI 31.17 kg/m2           Physical Exam:  General appearance: cooperative   Lungs: CTA   Heart: RRR, S1, S2 normal, no murmur, click, rub or gallop  Abdomen: soft, non tender, non distended. Normoactive bowel sounds.   Extremities: extremities normal, atraumatic, no cyanosis or edema  Skin: Skin color, texture, turgor normal.   Neurologic: Speech slurred, moves all ext   PSY: Mood and affect normal, appropriately behaved      Intake and Output:  Current Shift:     Last three shifts:  1901 -  0700  In: 7592.5 [P.O.:480; I.V.:7112.5]  Out: 4900 [Urine:4900]    Recent Results (from the past 24 hour(s))   GLUCOSE, POC    Collection Time: 18  8:00 AM   Result Value Ref Range    Glucose (POC) 121 (H) 70 - 110 mg/dL   GLUCOSE, POC    Collection Time: 18 11:41 AM   Result Value Ref Range    Glucose (POC) 133 (H) 70 - 110 mg/dL   GLUCOSE, POC    Collection Time: 18  4:37 PM   Result Value Ref Range    Glucose (POC) 116 (H) 70 - 110 mg/dL   GLUCOSE, POC    Collection Time: 18 10:42 PM   Result Value Ref Range    Glucose (POC) 164 (H) 70 - 110 mg/dL           Lab Results   Component Value Date/Time    Glucose 149 (H) 2018 04:45 AM    Glucose 117 (H) 2018 04:00 AM    Glucose 102 (H) 2018 03:57 AM    Glucose 132 (H) 06/15/2018 07:20 AM    Glucose 106 (H) 2018 08:05 AM    [facility]  [phone]    Transthoracic Echocardiogram    Patient: Arlene Pimentel  MRN: [id number]  [address]  [facility] #: [de-identified]  : 1960  Age: 59 years  Gender: Female  Height: 66 in  Weight: 182.6 lb  BSA: 1.93 m-sq  BP: 112 / 72 mmHg  Study date: 2018  Status: Routine  Location: Echo lab  [facility] #: [id number]    Allergies: ACE INHIBITORS, PENICILLINS    Referring_Ordering Physician: Samuel De Dr  Interpreting Physician: Donna Bruno MD  Interpreting Group: [email or web address]  Technologist: Pato Hinson RVT [facility]    SUMMARY:  Left ventricle: Systolic function was normal by visual assessment. Ejection   fraction was estimated in the range of 55 %  to 60 %. No obvious wall motion abnormalities identified in the views   obtained. Left ventricular diastolic function  parameters were normal for the patient's age.     Right ventricle: The size was normal. Systolic function was low normal.    Left atrium: Size was normal.    Mitral valve: There was no regurgitation. Aortic valve: There was no stenosis. There was no regurgitation. Tricuspid valve: There was trivial regurgitation. Inferior vena cava, hepatic veins: The inferior vena cava was normal in size   and course. Pericardium: A trivial pericardial effusion was identified. INDICATIONS: Atrial fibrillation. PROCEDURE: This was a routine study. The study included complete 2D imaging,   complete spectral Doppler, and color  Doppler. Systolic blood pressure was 112 mmHg, at the start of the study. Diastolic blood pressure was 72 mmHg, at the  start of the study. Images were obtained from the parasternal, apical,   subcostal, and suprasternal notch acoustic  windows. Image quality was adequate. LEFT VENTRICLE: Size was normal. Systolic function was normal by visual   assessment. Ejection fraction was estimated in  the range of 55 % to 60 %. No obvious wall motion abnormalities identified in   the views obtained. Wall thickness was  normal. DOPPLER: Left ventricular diastolic function parameters were normal   for the patient's age. RIGHT VENTRICLE: The size was normal. Systolic function was low normal.   DOPPLER: Estimated peak pressure was in the  range of 25 mmHg to 30 mmHg. LEFT ATRIUM: Size was normal. LA volume index was 20 ml/m-sq. RIGHT ATRIUM: Size was normal.    MITRAL VALVE: There was normal leaflet separation. DOPPLER: The transmitral   velocity was within the normal range. There  was no evidence for stenosis. There was no regurgitation. AORTIC VALVE: The valve was trileaflet. Leaflets exhibited normal thickness   and normal cuspal separation. DOPPLER:  Transaortic velocity was within the normal range. There was no stenosis. There was no regurgitation. TRICUSPID VALVE: There was normal leaflet separation.  DOPPLER: The   transtricuspid velocity was within the normal range. There was no evidence for tricuspid stenosis. There was trivial   regurgitation. PULMONIC VALVE: Not well visualized, but normal Doppler findings. DOPPLER:   There was no stenosis. There was trivial  regurgitation. AORTA: The root exhibited normal size. SYSTEMIC VEINS: IVC: The inferior vena cava was normal in size and course. Respirophasic changes were normal.    PERICARDIUM: A trivial pericardial effusion was identified. Insignificant   pericardial effusion and/or pericardial fat  was present. SYSTEM MEASUREMENT TABLES    2D  AV Cusp: 1.6 cm  Ao Diam: 3 cm  LA Diam: 2.8 cm  LVOT Diam: 1.9 cm  IVSd: 0.9 cm  LVIDd: 3.9 cm  LVIDs: 2.2 cm  LVPWd: 0.9 cm  EF Biplane: 63.3 %  LAESV Index (A-L): 19.7 ml/m2  LVEF MOD A2C: 60.6 %  LVEF MOD A4C: 69.6 %  RA Area: 9.8 cm2  RV Major: 5.3 cm  RV Minor: 3.4 cm    CW  AV Vmax: 1.2 m/s  AV maxP.5 mmHg  PV Vmax: 0.9 m/s  PV maxPG: 3 mmHg  TR Vmax: 2.3 m/s  TR maxP.6 mmHg  AV VTI: 18.3 cm  AV Vmean: 0.7 m/s  AV meanP.6 mmHg    MM  TAPSE: 1.5 cm    PW  MICHAEL Vmax, Pt: 2.9 cm2  LVOT Vmax: 1.2 m/s  LVOT maxP.4 mmHg  E/E': 5.9  MV A [place]: 0.6 m/s  MV Dec [place]: 2.4 m/s2  MV DecT: 225.9 ms  MV E [place]: 0.5 m/s  MV E/A Ratio: 0.9  MV PHT: 65.5 ms  MVA By PHT: 3.4 cm2  TAPSV: 12.1 cm/s  EXAM: CT head     INDICATION: Found on floor, altered mental status.     COMPARISON: None.     TECHNIQUE: Axial CT imaging of the head was performed without intravenous  contrast.     One or more dose reduction techniques were used on this CT: automated exposure  control, adjustment of the mAs and/or kVp according to patient's size, and  iterative reconstruction techniques.  The specific techniques utilized on this CT  exam have been documented in the patient's electronic medical record.      _______________     FINDINGS:     BRAIN AND POSTERIOR FOSSA: The sulci, folia, ventricles and basal cisterns are  within normal limits for the patient?s age. There is no intracranial hemorrhage,  mass effect, or midline shift. There are no areas of abnormal parenchymal  attenuation.     EXTRA-AXIAL SPACES AND MENINGES: There are no abnormal extra-axial fluid  collections.     CALVARIUM: Intact.     SINUSES: Imaged paranasal sinuses and mastoid air cells are clear.     OTHER: None.     IMPRESSION:        1. No acute intracranial abnormality.          EXAM: CT Cervical spine     INDICATION: Cervical neck pain. Patient on the floor.     COMPARISON: No prior study.     TECHNIQUE: Axial CT imaging of the cervical spine was performed from the skull  base to the upper thoracic spine without intravenous contrast. Multiplanar  reformats were generated. One or more dose reduction techniques were used on  this CT: automated exposure control, adjustment of the mAs and/or kVp according  to patient's size, and iterative reconstruction techniques. The specific  techniques utilized on this CT exam have been documented in the patient's  electronic medical record.     _______________     FINDINGS:     VERTEBRAE AND DISCS: Coronal reformatted images demonstrate a normal appearance  to the occipital condyles. The atlantodental and atlantooccipital articulations  are normal in appearance. There is mild straightening of usual cervical  lordosis, without evidence of listhesis. Vertebral body heights are intact. No  evidence of compression fracture. No displaced fracture. Moderate severity  spondylosis present C4-C7. No acute facet malalignment.     SPINAL CANAL AND FORAMINA: Moderate severity right-sided C4-C5 neural foraminal  narrowing. No evidence of high-grade spinal canal stenosis or neural foraminal  narrowing.     PREVERTEBRAL SOFT TISSUES: Normal     VISIBLE INTRACRANIAL CONTENTS: Unremarkable.     LUNG APICES: Patchy groundglass opacity present in the periphery of the right  upper lobe.     OTHER: None.     _______________     IMPRESSION  IMPRESSION:        1.  Straightening of the usual cervical lordosis without evidence of fracture or  acute traumatic listhesis. 2. Cervical spondylosis and neuroforaminal narrowing as described. 3. Patchy groundglass opacity at the periphery of the imaged right upper lobe,  potentially reflecting pneumonitis or pneumonia given the presenting history.          Chest, single view     Indication: Coughing and chest congestion, patient found down     Comparison: 1/30/2016     Findings:  Portable upright AP view of the chest was obtained. Interval  development of asymmetric alveolar opacity throughout the majority of the right  lower lobe. No pneumothorax or pleural effusion. Left lung appears clear. Cardiac size and mediastinal contours are stable. Degenerative changes of each  shoulder girdle demonstrated without acute osseous abnormality.     IMPRESSION  Impression:  1.  Focal airspace opacity in the right lower lobe, potentially reflecting  pneumonitis or pneumonia.             Assessment/Plan:     Patient Active Problem List   Diagnosis Code    Hypertension I10    Arthritis M19.90    Hepatitis C B19.20    DJD (degenerative joint disease) of knee M17.10    GERD (gastroesophageal reflux disease) K21.9    Bipolar 1 disorder ([facility] [address] F31.9    Depression F32.9    Alcohol dependence in remission ([facility] [address] F10.21    DM2 (diabetes mellitus, type 2) ([facility] [address] E11.9    Needle phobia F40.298    Eustachian tube dysfunction H69.80    Psoriasis L40.9    Atrial fibrillation with RVR (HCC) I48.91    Rhabdomyolysis M62.82    Acute metabolic encephalopathy H55.07    Hyperammonemia ([facility]) E72.20    Elevated transaminase level R74.0       A/P:    Rhabdo  -2/2 trauma/fall  -ck -improving 516->262 ->183   -resolved       PAM  -resolved   -2/2 rhabdo      UTI  -resolved    AMS  -resolved   -ammonia normalized  -unclear of syncope-may 2/2 to taking higher dose of sleeping pills per patient (patient unable to name pill?)    -ECHO  -CT head negative/MRI neg   -UDS only positive for THC  --2/2 infectious and metabolic derangements       A-fib with RVR  -resolved after cardizem and dig  -ekg ok  -no cp  -BNP 6K  -serial enzymes  -tele  -ECHO EF 55-60%      DM II  -corrective insulin  -hold oaa     Elevated tropinon   -0.11 likely 2/2 demand ishemia noting a-fib rvr in setting PAM  -monitor  -no CP    Elevated LFTs  -etoh abuse   -dc atorvastatin  -reduce ASA dose   -lactulose       Depression  -monitor     ETOH abuse  -continue thiamine, folic acid, MVT     PNA   -resolved      DVT prophyalxis  -scd/teds  -heparn sq  GI-protonix      D/C IV fluids   Working on [facility] [employer] approval. Disposition 1-2 days.      YING [facility]  [phone]  Office:  [phone]

## 2018-06-26 NOTE — PROGRESS NOTES
Problem: Pressure Injury - Risk of  Goal: *Prevention of pressure injury  Document Ryan Scale and appropriate interventions in the flowsheet.    Outcome: Progressing Towards Goal  Pressure Injury Interventions:  Sensory Interventions: Assess changes in LOC    Moisture Interventions: Absorbent underpads    Activity Interventions: Pressure redistribution bed/mattress(bed type)    Mobility Interventions: HOB 30 degrees or less    Nutrition Interventions: Document food/fluid/supplement intake    Friction and Shear Interventions: HOB 30 degrees or less

## 2018-06-26 NOTE — PROGRESS NOTES
Bedside and Verbal shift change report given to SHANNAN Zepeda (oncoming nurse) by Janice Pro RN (offgoing nurse). Report included the following information SBAR, Kardex, Intake/Output, MAR and Recent Results.

## 2018-06-26 NOTE — PROGRESS NOTES
UAI completed and submitted for patient as asked by  Fortunato Dubin. Nyasia Karimi.  [facility]  [phone], beeper [phone]

## 2018-06-26 NOTE — PROGRESS NOTES
conducted a Follow up consultation and Spiritual Assessment for Nam Crews, who is a 59 y.o.,female. The  provided the following Interventions:  Continued the relationship of care and support. Listened empathically. Patient expressed feelings of hope and thankfulness about her future. Patient expressed feelings of deep love for her family. There was a moment of tears after the prayer, the patient explained that her tears were because of her feelings of thankfulness to God. Offered prayer and assurance of continued prayer on patients behalf. Chart reviewed. The following outcomes were achieved:  Patient expressed gratitude for 's visit. Assessment:  There are no further spiritual or Amish issues which require Spiritual Care Services interventions at this time. Plan:  Chaplains will continue to follow and will provide pastoral care on an as needed/requested basis.  recommends bedside caregivers page  on duty if patient shows signs of acute spiritual or emotional distress.      [address] Faith Intern  Spiritual Care   [phone]

## 2018-06-26 NOTE — PROGRESS NOTES
Received awake,alert,in no acute distress. Hob elevated. Call light,phone in reach. Instructed to call for assistance for safety. 627/18 0600 Uneventful shift. Mepilex to right buttock/sacral area changed. Superficial wound applied with creamy moisture barrier. Tolerated procedure well.

## 2018-06-26 NOTE — ADT AUTH CERT NOTES
Musculoskeletal Disease GRG - Care Day 9 (6/21/2018) by Michi Meza RN        Review Entered Review Status       6/21/2018 Completed       Details              Care Day: 9 Care Date: 6/21/2018 Level of Care: Telemetry       Guideline Day 3        Level Of Care       (X) * Activity level acceptable       6/21/2018 1:08 PM EDT by Katerina Gould         Pt/OT              ( ) * Complete discharge planning              Clinical Status       (X) * Temperature status acceptable       6/21/2018 1:08 PM EDT by Katerina Gould         temp 98.0              (X) * No infection, or status acceptable       6/21/2018 1:08 PM EDT by Katerina Gould         none noted              (X) * C-reactive protein stable, declining, or not indicated       6/21/2018 1:08 PM EDT by Katerina Gould         none noted              (X) * Respiratory status acceptable       6/21/2018 1:08 PM EDT by Katerina Gould         Lungs: CTA              (X) * Neurologic status acceptable       6/21/2018 1:08 PM EDT by Katerina Gould         Speech slurred, moves all ext              (X) * Vascular, soft tissue, and wound status acceptable       6/21/2018 1:08 PM EDT by Katerina Gould         none noted              (X) * Pain and nausea absent or adequately managed       6/21/2018 1:08 PM EDT by Katerina Gould         none noted              (X) * Fracture or injury absent or status acceptable       6/21/2018 1:08 PM EDT by Katerina Gould         none noted              (X) * No bone and joint infection, or status acceptable       6/21/2018 1:08 PM EDT by Katerina Gould         none noted              (X) * Rheumatologic and vasculitic status acceptable       6/21/2018 1:08 PM EDT by Katerina Gould         none noted              ( ) * General Discharge Criteria met              Interventions       (X) * Intake acceptable       6/21/2018 1:08 PM EDT by Marco Antonio Viveros dysphagia [facility] diet              ( ) * No inpatient interventions needed              6/21/2018 1:09 PM EDT by Es Kate       Subject: Additional Clinical Information       6-21-18IM- A/P:  Wozdxv-AJRj-7/2 trauma/fall-trend ck -improving 516->262 ->183    PAM-resolved -2/2 rhabdo-IVFs    UTI-on IVAB  AMS-ammonia 28 today -unclear of syncope-may 2/2 to taking higher dose of sleeping pills per patient (patient unable to name pill?) -psych following -ECHO-CT head negative/MRI neg -UDS only positive for THC--2/2 infectious and metabolic derangements-suicide precautions-sitter at bedside -will need to speak with daughter in regards to the patient's psych history       A-fib with RVR-resolved after cardizem and dig-ekg ok-no cp-BNP 6K-serial enzymes-tele-ECHO EF 55-60%    DM II-corrective insulin-hold oaa    Elevated tropinon -0.11 likely 2/2 demand ishemia noting a-fib rvr in setting PAM-monitor-no CP  Elevated LFTs-etoh abuse -dc atorvastatin-reduce ASA dose -lactulose     Depression-hold meds-pscyh following recommendations made-do not feel patient is a suicidal risk-recommend talking to patient's daughter in regards to psych history. Will reevaluate sitter at bedside after speaking with daughter Christopher Edge abuse-continue thiamine, folic acid, MVT   PNA -IVAB     DVT prophyalxis-scd/teds-heparn sqGI-protonix                6/21/2018 1:08 PM EDT by Es Kate       Subject: Additional Clinical Information       6-21-18PT/OT/ speech- Discharge Recommendations:   Inpatient RehabIM- \"I'm doing ok\" No acute events overnight. Sitter at [employer] on SNF disposition. Disposition 1-2 days. Vitals- 126/77, 105, 98.0, 16, 99%. Abnl labs- rbc 3.78, hgb 10.2, hct 31.8Meds- tylenol 650 mg po q 6 hrs prn x 1, asa 81 mg po q d, d51/2ns 150 cc/hr iv cont, folvite 1 mg po q d, heparin 5000 u sc q 8 hrs, ssi, chronulac 20 g po bid, protonix 40 mg iv q 24 hrs, pericolace po q h, theragran po q d, b 1 po q d                                 * Milestone                  Musculoskeletal Disease GRG - Care Day 8 (6/20/2018) by Arlene Valenzuela RN        Review Entered Review Status       6/20/2018 Completed       Details              Care Day: 8 Care Date: 6/20/2018 Level of Care: Telemetry       Guideline Day 3        Level Of Care       (X) * Activity level acceptable       6/20/2018 2:23 PM EDT by Chanell Cheng         PT/OT- ambulate with assistance              ( ) * Complete discharge planning              Clinical Status       (X) * Temperature status acceptable       6/20/2018 2:23 PM EDT by Chanell Cheng         temp 97.7              ( ) * No infection, or status acceptable       (X) * C-reactive protein stable, declining, or not indicated       6/20/2018 2:23 PM EDT by Chanell Cheng         none noted              (X) * Respiratory status acceptable       6/20/2018 2:23 PM EDT by Chanell Cheng         Lungs: CTA              (X) * Neurologic status acceptable       6/20/2018 2:23 PM EDT by Carmencita Farrar   Speech slurred, moves all ext              (X) * Vascular, soft tissue, and wound status acceptable       6/20/2018 2:23 PM EDT by Chanell Cheng         none noted              (X) * Pain and nausea absent or adequately managed       6/20/2018 2:23 PM EDT by Chanell Cheng         none noted              (X) * Fracture or injury absent or status acceptable       6/20/2018 2:23 PM EDT by Chanell Cheng         none noted              (X) * No bone and joint infection, or status acceptable       6/20/2018 2:23 PM EDT by Chanell Cheng         none noted              (X) * Rheumatologic and vasculitic status acceptable       6/20/2018 2:23 PM EDT by Chanell Cheng         none noted              ( ) * General Discharge Criteria met              Interventions       (X) * Intake acceptable       6/20/2018 2:23 PM EDT by Carmencita Farrar   dysphagia [facility] diet              ( ) * No inpatient interventions needed              6/20/2018 2:24 PM EDT by Saurabh Phillips       Subject: Additional Clinical Information       6-20-18A/P:  Avfref-HZBe-5/2 trauma/fall-trend ck -improving 516->262     PAM-resolved -2/2 rhabdo-IVFs    UTI-on ivab   AMS-ammonia 28 today -unclear of syncope-may 2/2 to taking higher dose of sleeping pills per patient (patient unable to name pill?) -ECHO-CT head negative/MRI neg -UDS only positive for THC--2/2 infectious and metabolic derangements     A-fib with RVR-resolved after cardizem and dig-ekg ok-no cp-BNP 6K-serial enzymes-tele-ECHO EF 55-60%    DM II-corrective insulin-hold oaa    Elevated tropinon -0.11 likely 2/2 demand ishemia noting a-fib rvr in setting PAM-monitor-no CP  Elevated LFTs-etoh abuse -dc atorvastatin-reduce ASA dose -lactulose     [facility]  ETOH abuse-continue thiamine, folic acid, MVT   PNA -IVAB     DVT prophyalxis-scd/teds-heparn sqGI-protonix                6/20/2018 2:23 PM EDT by Saurabh Phillips       Subject: Additional Clinical Information       6-20-18\"I'm doing ok\" No acute events overnightVitals- 142/86, 94, 97.7, 16, 99%. Case mgt- Referral made to aru at [facility] spoke with chelsea miles. Meds- asa 81mg po q d, d51/2 ns 150 cc/hr iv cont, folvite 1 mg po q d, heparin 5000 u sc q 8 hrs, ssi, chronulac 20 g po  bid, protonix 40 mg iv q 24 hrs, pericolace po q hs, theragran po q d, b 1 po q d, levaquin 750 mg iv q 24 hrsPlan- scd, I/O,                                   * Milestone                  Musculoskeletal Disease GRG - Care Day 7 (6/19/2018) by Liza Cobb RN        Review Entered Review Status       6/20/2018 Completed       Details              Care Day: 7 Care Date: 6/19/2018 Level of Care: Telemetry       Guideline Day 3        Level Of Care       (X) * Activity level acceptable       6/20/2018 8:22 AM EDT by Saurabh Phillips         with assistance              ( ) * Complete discharge planning              Clinical Status       (X) * Temperature status acceptable       6/20/2018 8:22 AM EDT by Isaías Moreno         temp 97.6              ( ) * No infection, or status acceptable       (X) * C-reactive protein stable, declining, or not indicated       6/20/2018 8:22 AM EDT by Isaías Moreno         none noted              (X) * Respiratory status acceptable       6/20/2018 8:22 AM EDT by Isaías Moreno         Clear bilaterally, no wheeze or rhonchi              ( ) * Neurologic status acceptable       (X) * Vascular, soft tissue, and wound status acceptable       6/20/2018 8:22 AM EDT by Isaías Moreno         yes              (X) * Pain and nausea absent or adequately managed       6/20/2018 8:22 AM EDT by Isaías Moreno         none noted              (X) * Fracture or injury absent or status acceptable       6/20/2018 8:22 AM EDT by Isaías Moreno         none noted              (X) * No bone and joint infection, or status acceptable       6/20/2018 8:22 AM EDT by Isaías Moreno         none noted              (X) * Rheumatologic and vasculitic status acceptable       6/20/2018 8:22 AM EDT by Isaías Moreno         none noted              ( ) * General Discharge Criteria met              Interventions       (X) * Intake acceptable       6/20/2018 8:22 AM EDT by Isaías Moreno         dysphagia [facility] diet              ( ) * No inpatient interventions needed              6/20/2018 8:22 AM EDT by Isaías Moreno       Subject: Additional Clinical Information       6-19-18PT- Discharge Recommendations:SNFCase mgt- Left 2 messages for [facility] and rehab to see if can accept pt. [facility] has no beds. Called dtr ms snell she gave me other choices.  fax'd packets to admitsIM- Patient feels okayShe remains weakPlan:Following CKMobilizeBS controlWorking toward dispositionVitals- 118/73, 95, 97.6, 16, 98%. Abnl labs rbc 3.98, hgb 10.7, hct 33.7, lymph 19, mono 11, glucose 149, ca 8.2, tot prot 5.2, albumin 1.8, alt 71, ast 79, alk phos 34, ck 262Meds- asa 81 mg po q d, d51/2 ns 150 cc/hr iv cont, folvite 1 mg po q d, heparin 5000 u sc q 8 hrs, ssi, chronulac 20 g po bid, levaquin 750 mg iv q 24 hrs, protonix 40 mg iv q 24 hrs, theragran po q d, b 1 po q dPlan- iv fluids, iv abx, PT/OT, scd, cardiac monitor                                   * Milestone

## 2018-06-26 NOTE — PROGRESS NOTES
Problem: Falls - Risk of  Goal: *Absence of Falls  Document Radha Fall Risk and appropriate interventions in the flowsheet. Outcome: Progressing Towards Goal  Fall Risk Interventions:  Mobility Interventions: Communicate number of staff needed for ambulation/transfer         Medication Interventions: Patient to call before getting OOB    Elimination Interventions: Call light in reach    History of Falls Interventions: Door open when patient unattended        Problem: Pressure Injury - Risk of  Goal: *Prevention of pressure injury  Document Ryan Scale and appropriate interventions in the flowsheet.    Outcome: Progressing Towards Goal  Pressure Injury Interventions:  Sensory Interventions: Assess changes in LOC    Moisture Interventions: Absorbent underpads    Activity Interventions: Pressure redistribution bed/mattress(bed type)    Mobility Interventions: Float heels, HOB 30 degrees or less    Nutrition Interventions: Document food/fluid/supplement intake    Friction and Shear Interventions: HOB 30 degrees or less

## 2018-06-26 NOTE — PROGRESS NOTES
Received awake,alert,in no acute distress. Hob elevated. Call light,phone in reach.  6/26/1 Had a quiet night.

## 2018-06-26 NOTE — PROGRESS NOTES
Problem: Dysphagia (Adult)  Goal: *Acute Goals and Plan of Care (Insert Text)  Recommendations:  Diet: Mech-soft/thin liquid RECOMMEND UPGRADE TO SOFT SOLID  Meds: Whole in puree  Aspiration Precautions  Oral Care TID  Straw sips ok, small sips, supervision with PO    Goals:  Patient will:  1. Tolerate PO trials with 0 s/s overt distress in 4/5 trials  2. Utilize compensatory swallow strategies/maneuvers (decrease bite/sip, size/rate, alt. liq/sol) with min cues in 4/5 trials  3. Perform oral-motor/laryngeal exercises to increase oropharyngeal swallow function with min cues  4. Complete an objective swallow study (i.e., MBSS) to assess swallow integrity, r/o aspiration, and determine of safest LRD, min A    Speech Goals:  1. Patient will verbalize 3 & 4 syllable words at the phrase level with 90% intelligibility independently  2. Patient will recall speech goals (loud, clear, smooth, slow), following a 10 minute delay with mod A  3. Patient will respond to questions at the conversational level with 90% intelligibility with mod A          Outcome: Progressing Towards Goal  Speech language pathology speech & dysphagia treatment    Patient: Salty Rob (61 y.o. female)  Date: 6/26/2018  Diagnosis: Atrial fibrillation with RVR (HCC)  Rhabdomyolysis Rhabdomyolysis       Precautions: Aspiration, Skin  PLOF: Independent     ASSESSMENT:  Swallowing TX:  SLP provided therapeutic snack of solid and thin liquid + straw Pt self-fed with no overt s/s of aspiration. Delayed bolus formation and A-P transit noted. Lingual residue cleared independently with lingual sweep and liquid wash. Functional hyolaryngeal execursion to palpation. Recommend upgrade to Soft Solid  and Thin liquid diet, straws acceptable. Results d/c with RN.     Speech Eval:  Pt recalled 50% of speech goals at the start of session, reaching 100% accuracy provided maximal cueing. Pt independently achieved 70% intelligibility during a narrative speech sample. Pt verbalized 3 & 4 syllable words with 90% accuracy and minimal verbal cues. Phrase level intelligibility of 80% noted given moderate cues to slow speech rate and utilize speech goals. Following a five minute delay, pt recalled 3/4 speech goals independently (Loud, clear, smooth, slow), reaching 100% accuracy with moderate verbal/visual cueing. Progression toward goals:  [x]         Improving appropriately and progressing toward goals  []         Improving slowly and progressing toward goals  []         Not making progress toward goals and plan of care will be adjusted     PLAN:  Recommendations and Planned Interventions:  Recommend upgrade to Soft Solid/Thin liquid diet, straws acceptable. Patient continues to benefit from skilled intervention to address the above impairments. Continue treatment per established plan of care. Discharge Recommendations:  Home Health     SUBJECTIVE:   Patient stated I'm ready. OBJECTIVE:   Cognitive and Communication Status:  Neurologic State: Alert  Orientation Level: Oriented X4  Cognition: Follows commands  Perception: Appears intact  Perseveration: No perseveration noted  Safety/Judgement: Awareness of environment, Insight into deficits  Dysphagia Treatment:  Oral Assessment:  Oral Assessment  Labial: No impairment  Dentition: Natural  Oral Hygiene: Good  Lingual: Decreased rate  Velum: No impairment  Mandible: No impairment  P.O. Trials:   Patient Position: Seated in Chair   Vocal quality prior to P.O.: No impairment   Consistency Presented: Thin liquid, Solid   How Presented: Self-fed/presented, Straw       Bolus Acceptance: No impairment   Bolus Formation/Control: Impaired   Type of Impairment: Delayed   Propulsion: Delayed (# of seconds)   Oral Residue: None   Initiation of Swallow: No impairment   Laryngeal Elevation: Functional   Aspiration Signs/Symptoms: None   Pharyngeal Phase Characteristics: Double swallow   Effective Modifications:  Alternate liquids/solids   Cues for Modifications: None         Oral Phase Severity: Mild   Pharyngeal Phase Severity : Mild        PAIN:  Start of Tx: 0  End of Tx: 0       The severity rating is based on the following outcomes:  RIVER [place] Swallow Level 6    Clinical Judgement    After treatment:   [x]              Patient left in no apparent distress sitting up in chair  []              Patient left in no apparent distress in bed  [x]              Call bell left within reach  [x]              Nursing notified  []              Family present  []              Caregiver present  []              Bed alarm activated      COMMUNICATION/EDUCATION:   [x] Aspiration precautions; swallow safety; compensatory techniques  [x]        Expressive communication techniques  []        Patient unable to participate in education; education ongoing with staff  []  Posted safety precautions in patient's room.   [] Oral-motor/laryngeal strengthening exercises      Ana Lilia Calderón  Time Calculation: 20 mins  Swallow Treatment Time: 10  Speech Treatment Time: 10

## 2018-06-26 NOTE — PROGRESS NOTES
Bedside and Verbal shift change report given to Katelyn RN (oncoming nurse) by Vandana Delaney RN (offgoing nurse). Report included the following information SBAR, Kardex, Intake/Output, MAR and Recent Results.

## 2018-06-26 NOTE — INTERDISCIPLINARY ROUNDS
IDR Summary      Patient: Chancy Fothergill MRN: [id number]    Age: 59 y.o.  : 1960     Admit Diagnosis: Atrial fibrillation with RVR ([address]  Rhabdomyolysis        DIET status: Regular     Lines/Tubes:   IV: YES   Needed: YES  [place]: NO  Needed:NO  Central Line: NO Needed: NO      VTE Prophylaxis: Chemical    Mobility needs: Yes     PT ordered:  YES PT eval on chart: YES    OT ordered:  YES OT eval on chart: YES      ST ordered:  YES ST eval on chart:  YES     Disposition/Care Management:  [address]? NO     Recommended DME from PT/OT:      DME ordered? NO     SNF- has patient been matched? YES    Accepting bed? YES   Does patient require insurance auth?   YES        Barriers to discharge:   Financial concerns:No   PCP: Erica Eden MD    : NO  Interventions:       LOS: 13 days     Expected days until discharge: Accepted to [facility]- insurance [facility] pending            Signed:     JAKE Rubio-BC  [address]  Hospitalist Division  Pager:  [phone]  Office:  [phone]

## 2018-06-26 NOTE — PROGRESS NOTES
Assumed care from SHANNAN Zepeda. Pt resting in bed with no signs of pain or distress. Bed locked and in lowest position with call bell in reach.

## 2018-06-27 ENCOUNTER — HOSPITAL ENCOUNTER (INPATIENT)
Age: 58
LOS: 14 days | Discharge: HOME HEALTH | DRG: 558 | End: 2018-07-11
Attending: INTERNAL MEDICINE | Admitting: INTERNAL MEDICINE
Payer: MEDICARE

## 2018-06-27 VITALS
RESPIRATION RATE: 16 BRPM | HEIGHT: 67 IN | OXYGEN SATURATION: 95 % | BODY MASS INDEX: 31.23 KG/M2 | DIASTOLIC BLOOD PRESSURE: 84 MMHG | TEMPERATURE: 98.3 F | WEIGHT: 199 LBS | SYSTOLIC BLOOD PRESSURE: 138 MMHG | HEART RATE: 79 BPM

## 2018-06-27 DIAGNOSIS — E55.9 VITAMIN D DEFICIENCY: Chronic | ICD-10-CM

## 2018-06-27 DIAGNOSIS — M10.9 ACUTE GOUT INVOLVING TOE, UNSPECIFIED CAUSE, UNSPECIFIED LATERALITY: Chronic | ICD-10-CM

## 2018-06-27 DIAGNOSIS — I10 ESSENTIAL HYPERTENSION: Chronic | ICD-10-CM

## 2018-06-27 DIAGNOSIS — M62.82 NON-TRAUMATIC RHABDOMYOLYSIS: Primary | ICD-10-CM

## 2018-06-27 PROBLEM — E88.09 HYPOALBUMINEMIA: Chronic | Status: ACTIVE | Noted: 2018-05-25

## 2018-06-27 PROBLEM — R53.1 GENERALIZED WEAKNESS: Status: ACTIVE | Noted: 2018-06-13

## 2018-06-27 PROBLEM — Z86.69 HISTORY OF ENCEPHALOPATHY: Status: ACTIVE | Noted: 2018-06-13

## 2018-06-27 PROBLEM — Z74.09 IMPAIRED MOBILITY AND ADLS: Status: ACTIVE | Noted: 2018-06-13

## 2018-06-27 PROBLEM — Z86.79 HISTORY OF ATRIAL FIBRILLATION: Status: ACTIVE | Noted: 2018-06-13

## 2018-06-27 PROBLEM — R74.8 LOW SERUM HIGH DENSITY LIPOPROTEIN (HDL): Chronic | Status: ACTIVE | Noted: 2018-06-14

## 2018-06-27 PROBLEM — Z78.9 IMPAIRED MOBILITY AND ADLS: Status: ACTIVE | Noted: 2018-06-13

## 2018-06-27 PROBLEM — Z87.448 HISTORY OF ACUTE RENAL FAILURE: Status: ACTIVE | Noted: 2018-06-13

## 2018-06-27 LAB
GLUCOSE BLD STRIP.AUTO-MCNC: 121 MG/DL (ref 70–110)
GLUCOSE BLD STRIP.AUTO-MCNC: 87 MG/DL (ref 70–110)
GLUCOSE BLD STRIP.AUTO-MCNC: 91 MG/DL (ref 70–110)

## 2018-06-27 PROCEDURE — 82962 GLUCOSE BLOOD TEST: CPT

## 2018-06-27 PROCEDURE — 74011250636 HC RX REV CODE- 250/636: Performed by: INTERNAL MEDICINE

## 2018-06-27 PROCEDURE — 92507 TX SP LANG VOICE COMM INDIV: CPT

## 2018-06-27 PROCEDURE — 74011250637 HC RX REV CODE- 250/637: Performed by: INTERNAL MEDICINE

## 2018-06-27 PROCEDURE — 74011250637 HC RX REV CODE- 250/637: Performed by: PSYCHIATRY & NEUROLOGY

## 2018-06-27 PROCEDURE — 77030038269 HC DRN EXT URIN PURWCK BARD -A

## 2018-06-27 PROCEDURE — 97535 SELF CARE MNGMENT TRAINING: CPT

## 2018-06-27 PROCEDURE — 74011250636 HC RX REV CODE- 250/636: Performed by: NURSE PRACTITIONER

## 2018-06-27 PROCEDURE — 92526 ORAL FUNCTION THERAPY: CPT

## 2018-06-27 PROCEDURE — 65310000000 HC RM PRIVATE REHAB

## 2018-06-27 PROCEDURE — 74011250637 HC RX REV CODE- 250/637: Performed by: HOSPITALIST

## 2018-06-27 RX ORDER — COLCHICINE 0.6 MG/1
0.6 TABLET ORAL 2 TIMES DAILY
Qty: 60 TAB | Refills: 0 | Status: ON HOLD | OUTPATIENT
Start: 2018-06-27 | End: 2018-07-11 | Stop reason: CLARIF

## 2018-06-27 RX ORDER — HEPARIN SODIUM 5000 [USP'U]/ML
5000 INJECTION, SOLUTION INTRAVENOUS; SUBCUTANEOUS EVERY 8 HOURS
Status: DISCONTINUED | OUTPATIENT
Start: 2018-06-27 | End: 2018-07-11 | Stop reason: HOSPADM

## 2018-06-27 RX ORDER — GUAIFENESIN 100 MG/5ML
81 LIQUID (ML) ORAL DAILY
Qty: 30 TAB | Refills: 0 | Status: SHIPPED | OUTPATIENT
Start: 2018-06-27 | End: 2018-06-27

## 2018-06-27 RX ORDER — DOCUSATE SODIUM 100 MG/1
100 CAPSULE, LIQUID FILLED ORAL 2 TIMES DAILY
Status: DISCONTINUED | OUTPATIENT
Start: 2018-06-27 | End: 2018-06-28

## 2018-06-27 RX ORDER — COLCHICINE 0.6 MG/1
0.6 CAPSULE ORAL DAILY
Status: DISCONTINUED | OUTPATIENT
Start: 2018-06-28 | End: 2018-07-11 | Stop reason: HOSPADM

## 2018-06-27 RX ORDER — GUAIFENESIN 100 MG/5ML
81 LIQUID (ML) ORAL DAILY
Qty: 30 TAB | Refills: 0 | Status: ON HOLD | OUTPATIENT
Start: 2018-06-27 | End: 2018-07-11 | Stop reason: CLARIF

## 2018-06-27 RX ORDER — BISACODYL 5 MG
10 TABLET, DELAYED RELEASE (ENTERIC COATED) ORAL
Status: DISCONTINUED | OUTPATIENT
Start: 2018-06-27 | End: 2018-07-11 | Stop reason: HOSPADM

## 2018-06-27 RX ORDER — INSULIN LISPRO 100 [IU]/ML
INJECTION, SOLUTION INTRAVENOUS; SUBCUTANEOUS
Status: DISCONTINUED | OUTPATIENT
Start: 2018-06-27 | End: 2018-07-07

## 2018-06-27 RX ORDER — ALBUTEROL SULFATE 0.83 MG/ML
2.5 SOLUTION RESPIRATORY (INHALATION)
Status: DISCONTINUED | OUTPATIENT
Start: 2018-06-27 | End: 2018-07-11 | Stop reason: HOSPADM

## 2018-06-27 RX ORDER — LOSARTAN POTASSIUM 25 MG/1
25 TABLET ORAL DAILY
Status: DISCONTINUED | OUTPATIENT
Start: 2018-06-28 | End: 2018-07-03

## 2018-06-27 RX ORDER — MULTIVIT WITH MINERALS/HERBS
1 TABLET ORAL DAILY
Status: DISCONTINUED | OUTPATIENT
Start: 2018-06-28 | End: 2018-07-11 | Stop reason: HOSPADM

## 2018-06-27 RX ORDER — COLCHICINE 0.6 MG/1
0.6 CAPSULE ORAL 2 TIMES DAILY
Status: DISCONTINUED | OUTPATIENT
Start: 2018-06-27 | End: 2018-06-27

## 2018-06-27 RX ORDER — METFORMIN HYDROCHLORIDE 500 MG/1
500 TABLET ORAL 2 TIMES DAILY WITH MEALS
Status: DISCONTINUED | OUTPATIENT
Start: 2018-06-28 | End: 2018-06-28

## 2018-06-27 RX ORDER — PANTOPRAZOLE SODIUM 40 MG/1
40 TABLET, DELAYED RELEASE ORAL
Status: DISCONTINUED | OUTPATIENT
Start: 2018-06-28 | End: 2018-07-11 | Stop reason: HOSPADM

## 2018-06-27 RX ORDER — GUAIFENESIN 100 MG/5ML
81 LIQUID (ML) ORAL
Status: DISCONTINUED | OUTPATIENT
Start: 2018-06-28 | End: 2018-07-11 | Stop reason: HOSPADM

## 2018-06-27 RX ORDER — ACETAMINOPHEN 325 MG/1
650 TABLET ORAL
Status: DISCONTINUED | OUTPATIENT
Start: 2018-06-27 | End: 2018-07-11 | Stop reason: HOSPADM

## 2018-06-27 RX ADMIN — THERA TABS 1 TABLET: TAB at 08:35

## 2018-06-27 RX ADMIN — Medication 100 MG: at 08:36

## 2018-06-27 RX ADMIN — HEPARIN SODIUM 5000 UNITS: 5000 INJECTION, SOLUTION INTRAVENOUS; SUBCUTANEOUS at 05:25

## 2018-06-27 RX ADMIN — HEPARIN SODIUM 5000 UNITS: 5000 INJECTION, SOLUTION INTRAVENOUS; SUBCUTANEOUS at 21:18

## 2018-06-27 RX ADMIN — HEPARIN SODIUM 5000 UNITS: 5000 INJECTION, SOLUTION INTRAVENOUS; SUBCUTANEOUS at 12:07

## 2018-06-27 RX ADMIN — COLCHICINE 0.6 MG: 0.6 CAPSULE ORAL at 18:40

## 2018-06-27 RX ADMIN — FOLIC ACID 1 MG: 1 TABLET ORAL at 08:35

## 2018-06-27 RX ADMIN — ASPIRIN 81 MG 81 MG: 81 TABLET ORAL at 08:34

## 2018-06-27 RX ADMIN — COLCHICINE 0.6 MG: 0.6 TABLET, FILM COATED ORAL at 08:34

## 2018-06-27 RX ADMIN — DOCUSATE SODIUM 100 MG: 100 CAPSULE, LIQUID FILLED ORAL at 18:40

## 2018-06-27 NOTE — PROGRESS NOTES
Transportation at Discharge:  6/27/2018    Transport Company/Representative: Maryellen Jacobsen / [employer] number: [phone]  Reference number: None    Estimated pick-up time: 2:00P  Destination: [facility]    Method of Transport: Beatriz Trinidad / [place] [employer] Info:BC Medicare / [facility] NEERAJ  Authorization: NEERAJ [facility] marie [id number]    Made Transportation arrangements [facility] the [facility] [address] [facility] Specialist ext [phone]

## 2018-06-27 NOTE — ROUTINE PROCESS
Hospital [facility]                                                                        59 y.o.   female    Marnie [address]   Room: 2101/01    [facility] [address] NEURO MED  Unit Phone# : [phone] [facility]  [address] NEUR[address]  [address] [place] [address]  Dept: [phone]  Loc: [phone]                    SITUATION     Admitted:  6/13/2018         Attending Provider:  Lila Bustos MD       Consultations:  IP CONSULT TO HOSPITALIST  IP CONSULT TO NEUROLOGY  IP CONSULT TO PSYCHIATRY    PCP:  Isacc Youngblood MD   [phone]    Treatment Team: Attending Provider: Lila Bustos MD; Consulting Provider: Leola Mcburney, MD; Utilization Review: Liza Cobb RN; Physical Therapy Assistant: Divya Garcia PTA; Occupational Therapy Assistant: DEANA Nunez    Admitting Dx:  Atrial fibrillation with RVR ([facility]  Rhabdomyolysis       Principal Problem: Rhabdomyolysis    * No surgery found * of      BY: * Surgery not found *             ON: * No surgery found *                  Code Status: Full Code                Advance Directives:   Advance Care Planning 6/13/2018   Patient's Healthcare Decision Maker is: Patient declined (Legal Next of Kin remains as decision maker)   Confirm Advance Directive None   Patient Would Like to Complete Advance Directive No    (Send w/patient)   No Doesnt Have       Isolation:  There are currently no Active Isolations       MDRO: No current active infections    Pain Medications given:  Tylenol 650 mg  Last dose: 6/25 at  17:10    Special Equipment needed: no  Type of equipment:na    hemodialysis Not currently on dialysis       BACKGROUND     Allergies:   Allergies   Allergen Reactions    Ace Inhibitors Cough    Penicillins Hives       Past Medical History:   Diagnosis Date    Alcohol dependence in remission ([facility]     alcohol withdrawal discharged 05/31/2013    Arthritis     knees and back  Bipolar 1 disorder ([facility]     seeing Psychiatrist    Chronic obstructive pulmonary disease ([facility]     Depression     Diabetes mellitus ([facility]     DJD (degenerative joint disease) of knee     DM2 (diabetes mellitus, type 2) (HCC)     borderline    Eustachian tube dysfunction     Dr. 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 Emilee Santos - due for repeat     HX CYST REMOVAL      Hydrocystoma R eye removed    HX GYN      partical hysterectomy - fibroid tumors    HX HYSTERECTOMY      PARTIAL       Prescriptions Prior to Admission   Medication Sig    meloxicam (MOBIC) 15 mg tablet Take 1 Tab by mouth daily.  chlorthalidone (HYGROTEN) 25 mg tablet Take  by mouth daily.  folic acid (FOLVITE) 1 mg tablet Take  by mouth daily.  predniSONE (DELTASONE) 20 mg tablet Take 2 tabs in AM with food for 7 days then 1 tab until gone    spironolactone (ALDACTONE) 25 mg tablet Take  by mouth daily.  metFORMIN (GLUCOPHAGE) 500 mg tablet Take  by mouth two (2) times daily (with meals).  cyclobenzaprine (FLEXERIL) 10 mg tablet Take  by mouth three (3) times daily as needed for Muscle Spasm(s).  ipratropium-albuterol (COMBIVENT RESPIMAT)  mcg/actuation inhaler Take 1 Puff by inhalation every six (6) hours as needed for Wheezing.  cloNIDine HCl (CATAPRES) 0.2 mg tablet Take  by mouth two (2) times a day.  HYDROcodone-acetaminophen (NORCO) 5-325 mg per tablet Take 1 Tab by mouth every four (4) hours as needed for Pain. Max Daily Amount: 6 Tabs.  naproxen (NAPROSYN) 375 mg tablet Take 1 Tab by mouth two (2) times daily (with meals).  conjugated estrogens (PREMARIN) 0.625 mg/gram vaginal cream Insert 0.5 g into vagina daily.     hydrocortisone valerate (WEST-FELISHA) 0.2 % ointment Apply  to affected area two (2) times a day. use thin layer    Shower Chair XX clemencia Use daily with showering. Dx: djd of knee    losartan-hydrochlorothiazide (HYZAAR) 50-12.5 mg per tablet Take 1 tablet by mouth daily.  Omeprazole delayed release (PRILOSEC D/R) 20 mg tablet Take 1 tablet by mouth daily.  cetirizine (ZYRTEC) 10 mg tablet Take 1 Tab by mouth daily.  traMADol (ULTRAM) 50 mg tablet Take 2 Tabs by mouth two (2) times daily as needed for Pain.  Lancets misc Use daily or as directed for blood sugar monitoring, dx 250.00    glucose blood VI test strips (BLOOD GLUCOSE TEST) strip Use daily or as directed for blood sugar monitoring. .00    Blood-Glucose Meter monitoring kit Use as directed. Hard scripts included in transfer packet yes    Vaccinations:    Immunization History   Administered Date(s) Administered    Influenza Vaccine 09/01/2015    Pneumococcal Conjugate (PCV-13) 09/01/2015       Readmission Risks:    Known Risks: unknown        The [employer]son [employer] Index tool is an evidenced based tool that has more automatic generated information. The tool looks at many different items such as the age of the patient, how many times they were admitted in the last calendar year, current length of stay in the hospital and their diagnosis. All of these items are pulled automatically from information documented in the chart from various places and will generate a score that predicts whether a patient is at low (less than 13), medium (13-20) or high (21 or greater) risk of being readmitted.         ASSESSMENT                Temp: 98.1 °F (36.7 °C) (06/27/18 0930) Pulse (Heart Rate): 88 (06/27/18 0930)     Resp Rate: 16 (06/27/18 0930)           BP: 131/80 (06/27/18 0930)     O2 Sat (%): 95 % (06/27/18 0930)     Weight: 90.3 kg (199 lb)    Height: 5' 7\" (170.2 cm) (06/15/18 1247)       If above not within 1 hour of discharge:    6/27- 12:00- BP: 138/84  P: 79   R: 16  T: 98.3 O2 Sat: 95 %  O2: RA    Active Orders   Diet    DIET DENTAL SOFT (SOFT SOLID)         Orientation: oriented to time, place, person and situation     Active Behaviors: None                                   Active Lines/Drains: no    Urinary Status: Voiding     Last BM: Last Bowel Movement Date: 06/26/18     Skin Integrity: Wound (add Wound LDA)             Mobility: Very limited   Weight Bearing Status: NWB (Non Weight Bearing)      Gait Training  Assistive Device: Gait belt, Walker, rolling  Ambulation - Level of Assistance: Contact guard assistance  Distance (ft): 34 Feet (ft)         Lab Results   Component Value Date/Time    Glucose 149 (H) 06/19/2018 04:45 AM    Hemoglobin A1c 6.9 (H) 06/14/2018 12:08 PM    INR 1.1 06/13/2018 09:52 AM    INR 1.1 10/21/2014 09:52 AM    HGB 10.2 (L) 06/21/2018 04:10 AM    HGB 10.7 (L) 06/19/2018 04:45 AM        RECOMMENDATION     See After Visit Summary (AVS) for:  · Discharge instructions  · After [address]   · Special equipment needed (entered pre-discharge by Care Management)  · Medication Reconciliation    · Follow up Appointment(s)         Report given/sent by:  Jorje Stern RN                    Verbal report given to: Avelino Arana LPN   FAXED to:  [phone]         Estimated discharge time:  6/27/2018 at 13:00

## 2018-06-27 NOTE — PROGRESS NOTES
[facility] REFERRAL CONTACT NOTE  [facility]      RE: Iggy Escalante  Received authorization from [facility] this evening for patient admission to  IPR (6/27/18-7/1/18 to start). As long as patient remains stable we can accept tomorrow June 27 at 1400. Will need d/c summary reflecting discharge to IPR and nursing to call report to [phone] to patient leaving.   Will follow up in am with  to confirm plan.      Thank you for this referral.  Should you have any questions please do not hesitate to call.       Sincerely,  Jaya Corea      [address] [facility]  [phone]

## 2018-06-27 NOTE — IP AVS SNAPSHOT
Lance Rose 
 
 
 [address] [facility] [address] Patient: Derick Santo MRN: [id number] ZOQ:[id number] About your hospitalization You were admitted on:  June 27, 2018 You last received care in the:  [facility] 1 IP REHAB UNIT You were discharged on:  July 11, 2018 Why you were hospitalized Your primary diagnosis was:  Rhabdomyolysis Your diagnoses also included:  Essential Hypertension, Type 2 Diabetes Mellitus (Hcc), History Of Acute Renal Failure, History Of Atrial Fibrillation, History Of Encephalopathy, Generalized Weakness, Impaired Mobility And Adls, Dysphagia, Vitamin D Deficiency, Gout Follow-up Information Follow up With Details Comments Contact Info [address]ren [address] Dr. 
[address] [facility] [address] 
[phone] Andrei Daley MD On 7/19/2018 Patient has an appointment scheduled with PCP Dr.D. Charley Osullivan on July 19, 2018 @ 10:00am. [address] [address] 
[phone] Discharge Orders None A check bridgett indicates which time of day the medication should be taken. My Medications START taking these medications Instructions Each Dose to Equal  
 Morning Noon Evening Bedtime  
 amLODIPine 5 mg tablet Commonly known as:  Shireen Eagle Start taking on:  7/12/2018 Your last dose was: Your next dose is: Take 1 Tab by mouth daily. Indications: hypertension 5 mg  
    
   
   
   
  
 aspirin 81 mg chewable tablet Start taking on:  7/12/2018 Your last dose was: Your next dose is: Take 1 Tab by mouth daily (with breakfast). Indications: prevention of cerebrovascular accident 81 mg  
    
   
   
   
  
 b complex vitamins tablet Start taking on:  7/12/2018 Your last dose was: Your next dose is: Take 1 Tab by mouth daily. Indications: VITAMIN DEFICIENCY PREVENTION  
 1 Tab  
    
   
   
   
  
 cholecalciferol 5,000 unit capsule Commonly known as:  VITAMIN D3 Start taking on:  7/12/2018 Your last dose was: Your next dose is: Take 1 Cap by mouth daily. Indications: Vitamin D Deficiency 5000 Units  
    
   
   
   
  
 colchicine 0.6 mg capsule Commonly known as:  Flaco Nguyen Start taking on:  7/12/2018 Your last dose was: Your next dose is: Take 1 Cap by mouth daily. Indications: prevention of acute gout attack 0.6 mg  
    
   
   
   
  
 losartan 50 mg tablet Commonly known as:  COZAAR Start taking on:  7/12/2018 Your last dose was: Your next dose is: Take 1 Tab by mouth daily. Indications: hypertension 50 mg CHANGE how you take these medications Instructions Each Dose to Equal  
 Morning Noon Evening Bedtime  
 metFORMIN 500 mg tablet Commonly known as:  GLUCOPHAGE What changed:  when to take this Your last dose was: Your next dose is: Take 1 Tab by mouth daily (with breakfast). Indications: type 2 diabetes mellitus 500 mg CONTINUE taking these medications Instructions Each Dose to Equal  
 Morning Noon Evening Bedtime Blood-Glucose Meter monitoring kit Your last dose was: Your next dose is:    
   
   
 Use as directed. glucose blood VI test strips strip Commonly known as:  blood glucose test  
   
Your last dose was: Your next dose is:    
   
   
 Use daily or as directed for blood sugar monitoring. .00 Lancets Misc Your last dose was: Your next dose is:    
   
   
 Use daily or as directed for blood sugar monitoring, dx 250.00 Omeprazole delayed release 20 mg tablet Commonly known as:  PRILOSEC D/R Your last dose was: Your next dose is: Take 1 tablet by mouth daily. 20 mg [address] Your last dose was: Your next dose is:    
   
   
 Use daily with showering. Dx: djd of knee STOP taking these medications   
 cetirizine 10 mg tablet Commonly known as:  ZYRTEC  
   
  
 chlorthalidone 25 mg tablet Commonly known as:  HYGROTEN  
   
  
 cloNIDine HCl 0.2 mg tablet Commonly known as:  CATAPRES  
   
  
 conjugated estrogens 0.625 mg/gram vaginal cream  
Commonly known as:  PREMARIN  
   
  
 cyclobenzaprine 10 mg tablet Commonly known as:  FLEXERIL  
   
  
 folic acid 1 mg tablet Commonly known as:  Google HYDROcodone-acetaminophen 5-325 mg per tablet Commonly known as:  NORCO  
   
  
 hydrocortisone valerate 0.2 % ointment Commonly known as:  WEST-FELISHA  
   
  
 ipratropium-albuterol  mcg/actuation inhaler Commonly known as:  COMBIVENT RESPIMAT  
   
  
 losartan-hydroCHLOROthiazide 50-12.5 mg per tablet Commonly known as:  HYZAAR  
   
  
 meloxicam 15 mg tablet Commonly known as:  MOBIC  
   
  
 naproxen 375 mg tablet Commonly known as:  NAPROSYN  
   
  
 predniSONE 20 mg tablet Commonly known as:  DELTASONE  
   
  
 spironolactone 25 mg tablet Commonly known as:  ALDACTONE  
   
  
 traMADol 50 mg tablet Commonly known as:  ULTRAM  
   
  
  
  
Where to Get Your Medications These medications were sent to [facility] AT THE [place] [address]  [address] [place] [address] Phone:  [phone]  
  amLODIPine 5 mg tablet  
 aspirin 81 mg chewable tablet  
 b complex vitamins tablet  
 cholecalciferol 5,000 unit capsule  
 colchicine 0.6 mg capsule  
 losartan 50 mg tablet  
 metFORMIN 500 mg tablet Discharge Instructions DISCHARGE SUMMARY from Nurse PATIENT INSTRUCTIONS: 
 
After general anesthesia or intravenous sedation, for 24 hours or while taking prescription Narcotics: · Limit your activities · Do not drive and operate hazardous machinery · Do not make important personal or business decisions · Do  not drink alcoholic beverages · If you have not urinated within 8 hours after discharge, please contact your surgeon on call. Report the following to your surgeon: 
· Excessive pain, swelling, redness or odor of or around the surgical area · Temperature over 100.5 · Nausea and vomiting lasting longer than 4 hours or if unable to take medications · Any signs of decreased circulation or nerve impairment to extremity: change in color, persistent  numbness, tingling, coldness or increase pain · Any questions What to do at Home: 
Recommended activity: Activity as tolerated, and as directed by your physician. If you experience any of the following symptoms chest pain or difficulty breathing, please follow up with the e. 
 
*  Please give a list of your current medications to your Primary Care Provider. *  Please update this list whenever your medications are discontinued, doses are 
    changed, or new medications (including over-the-counter products) are added. *  Please carry medication information at all times in case of emergency situations. These are general instructions for a healthy lifestyle: No smoking/ No tobacco products/ Avoid exposure to second hand smoke Surgeon General's Warning:  Quitting smoking now greatly reduces serious risk to your health. Obesity, smoking, and sedentary lifestyle greatly increases your risk for illness A healthy diet, regular physical exercise & weight monitoring are important for maintaining a healthy lifestyle You may be retaining fluid if you have a history of heart failure or if you experience any of the following symptoms:  Weight gain of 3 pounds or more overnight or 5 pounds in a week, increased swelling in our hands or feet or shortness of breath while lying flat in bed. Please call your doctor as soon as you notice any of these symptoms; do not wait until your next office visit. Recognize signs and symptoms of STROKE: 
 
F-face looks uneven A-arms unable to move or move unevenly S-speech slurred or non-existent T-time-call 911 as soon as signs and symptoms begin-DO NOT go Back to bed or wait to see if you get better-TIME IS BRAIN. Warning Signs of HEART ATTACK Call 911 if you have these symptoms: 
? Chest discomfort. Most heart attacks involve discomfort in the center of the chest that lasts more than a few minutes, or that goes away and comes back. It can feel like uncomfortable pressure, squeezing, fullness, or pain. ? Discomfort in other areas of the upper body. Symptoms can include pain or discomfort in one or both arms, the back, neck, jaw, or stomach. ? Shortness of breath with or without chest discomfort. ? Other signs may include breaking out in a cold sweat, nausea, or lightheadedness. Don't wait more than five minutes to call [address]! Fast action can save your life. Calling 911 is almost always the fastest way to get lifesaving treatment. Emergency Medical Services staff can begin treatment when they arrive  up to an hour sooner than if someone gets to the hospital by car. The discharge information has been reviewed with the patient. The patient verbalized understanding. Discharge medications reviewed with the patient and appropriate educational materials and side effects teaching were provided. ___________________________________________________________________________________________________________________________________ 
 
------------------------------------------------------------------------------------------------------------ 
 
DISCHARGE INSTRUCTIONS 1. Make sure that when you request refills at the pharmacy that the refill requests are sent to your PCP (NOT to the prescriber at the [facility]) to avoid any delays in getting your medication refills. The physician at the [facility] will not able to order medications or refills after discharge -- Please understand that though we would like to help, it is simply not safe for our physician to order you a medication that we cannot monitor. 2. If any of the prescribed medications require a prior authorization, contact your Primary Care Physician or specialist to EITHER complete prior authorizations and paperwork on your behalf OR prescribe an alternative medication. -- Please understand that though we would like to help, it is simply not safe for our physician to order you a medication that we cannot monitor. 3. Over-the-counter (OTC) medications will NOT be prescribed. You need to buy these medications over-the-counter. ------------------------------------------------------------------------------------------------------------------- Patient armband removed and shredded [employer] Activation Thank you for requesting access to [employer]. Please follow the instructions below to securely access and download your online medical record. [employer] allows you to send messages to your doctor, view your test results, renew your prescriptions, schedule appointments, and more. How Do I Sign Up? 1. In your internet browser, go to [email or web address] 
2. Click on the First Time User? Click Here link in the Sign In box. You will be redirect to the New Member Sign Up page. 3. Enter your [employer] Access Code exactly as it appears below. You will not need to use this code after youve completed the sign-up process. If you do not sign up before the expiration date, you must request a new code. [employer] Access Code: [email or web address] Expires: 2018 11:27 AM (This is the date your [employer] access code will ) 4. Enter the last four digits of your Social Security Number (xxxx) and Date of Birth (mm/dd/yyyy) as indicated and click Submit. You will be taken to the next sign-up page. 5. Create a [employer] ID. This will be your [employer] login ID and cannot be changed, so think of one that is secure and easy to remember. 6. Create a [employer] password. You can change your password at any time. 7. Enter your Password Reset Question and Answer. This can be used at a later time if you forget your password. 8. Enter your e-mail address. You will receive e-mail notification when new information is available in [address] 9. Click Sign Up. You can now view and download portions of your medical record. 10. Click the Download Summary menu link to download a portable copy of your medical information. Additional Information If you have questions, please visit the Frequently Asked Questions section of the [employer] website at [email or web address] Remember, [employer] is NOT to be used for urgent needs. For medical emergencies, dial 911. [employer] Announcement We are excited to announce that we are making your provider's discharge notes available to you in [employer]. You will see these notes when they are completed and signed by the physician that discharged you from your recent hospital stay. If you have any questions or concerns about any information you see in [employer], please call the Health Information Department where you were seen or reach out to your Primary Care Provider for more information about your plan of care. Introducing [facility]! Roberto Alexandre introduces [employer] patient portal. Now you can access parts of your medical record, email your doctor's office, and request medication refills online. 1. In your internet browser, go to [email or web address] 2. Click on the First Time User? Click Here link in the Sign In box. You will see the New Member Sign Up page. 3. Enter your [employer] Access Code exactly as it appears below. You will not need to use this code after youve completed the sign-up process. If you do not sign up before the expiration date, you must request a new code. · [email or web address] Access Code: [email or web address] Expires: 9/25/2018 11:27 AM 
 
4. Enter the last four digits of your Social Security Number (xxxx) and Date of Birth (mm/dd/yyyy) as indicated and click Submit. You will be taken to the next sign-up page. 5. Create a [employer] ID. This will be your [employer] login ID and cannot be changed, so think of one that is secure and easy to remember. 6. Create a [employer] password. You can change your password at any time. 7. Enter your Password Reset Question and Answer. This can be used at a later time if you forget your password. 8. Enter your e-mail address. You will receive e-mail notification when new information is available in [address] 9. Click Sign Up. You can now view and download portions of your medical record. 10. Click the Download Summary menu link to download a portable copy of your medical information. If you have questions, please visit the Frequently Asked Questions section of the [employer] website. Remember, [employer] is NOT to be used for urgent needs. For medical emergencies, dial 911. Now available from your iPhone and Android! Introducing Tomas Resendez As a Torrie Renee patient, I wanted to make you aware of our electronic visit tool called Tomas Resendez. [facility] 24/7 allows you to connect within minutes with a medical provider 24 hours a day, seven days a week via a mobile device or tablet or logging into a secure website from your computer. You can access Tomas Resendez from anywhere in the [place].  
 
A virtual visit might be right for you when you have a simple condition and feel like you just dont want to get out of bed, or cant get away from work for an appointment, when your regular [facility] provider is not available (evenings, weekends or holidays), or when youre out of town and need minor care. Electronic visits cost only $49 and if the [facility] 24/7 provider determines a prescription is needed to treat your condition, one can be electronically transmitted to a nearby pharmacy*. Please take a moment to enroll today if you have not already done so. The enrollment process is free and takes just a few minutes. To enroll, please download the [employer] 24/7 elva to your tablet or phone, or visit [email or web address] to enroll on your computer. And, as an [address] [place] [address] patient with a [employer] account, the results of your visits will be scanned into your electronic medical record and your primary care provider will be able to view the scanned results. We urge you to continue to see your regular [facility] provider for your ongoing medical care. And while your primary care provider may not be the one available when you seek a [employer] virtual visit, the peace of mind you get from getting a real diagnosis real time can be priceless. For more information on [employer], view our Frequently Asked Questions (FAQs) at [email or web address]. Sincerely, 
 
Yung Shrestha MD 
Chief Medical Officer Arlette Gambino *:  certain medications cannot be prescribed via [employer] Providers Seen During Your Hospitalization Provider Specialty Primary office phone Isiah Tillman MD Internal Medicine [phone] Your Primary Care Physician (PCP) Primary Care Physician Office Phone Office Fax Chantal Painting [phone] You are allergic to the following Allergen Reactions Ace Inhibitors Cough Penicillins Hives Recent Documentation Height Weight Breastfeeding? BMI OB Status Smoking Status 1.651 m 91 kg No 33.38 kg/m2 Postmenopausal Current Every Day Smoker Emergency Contacts Name Discharge Info Relation Home Work Mobile [place] [address] CAREGIVER [3] Daughter [21] [phone] Patient Belongings The following personal items are in your possession at time of discharge: 
  Dental Appliances: None  Visual Aid: None      Home Medications: None   Jewelry: Body Piercing  Clothing: None    Other Valuables: None Please provide this summary of care documentation to your next provider. Signatures-by signing, you are acknowledging that this After Visit Summary has been reviewed with you and you have received a copy. Patient Signature:  ____________________________________________________________ Date:  ____________________________________________________________  
  
Rubén Dayron Provider Signature:  ____________________________________________________________ Date:  ____________________________________________________________

## 2018-06-27 NOTE — IP AVS SNAPSHOT
[address] [address] [address] Patient: Francesca Javier MRN: [id number]:[id number] A check bridgett indicates which time of day the medication should be taken. My Medications START taking these medications Instructions Each Dose to Equal  
 Morning Noon Evening Bedtime  
 amLODIPine 5 mg tablet Commonly known as:  Yasmine Spruce Start taking on:  7/12/2018 Your last dose was: Your next dose is: Take 1 Tab by mouth daily. Indications: hypertension 5 mg  
    
   
   
   
  
 aspirin 81 mg chewable tablet Start taking on:  7/12/2018 Your last dose was: Your next dose is: Take 1 Tab by mouth daily (with breakfast). Indications: prevention of cerebrovascular accident 81 mg  
    
   
   
   
  
 b complex vitamins tablet Start taking on:  7/12/2018 Your last dose was: Your next dose is: Take 1 Tab by mouth daily. Indications: VITAMIN DEFICIENCY PREVENTION  
 1 Tab  
    
   
   
   
  
 cholecalciferol 5,000 unit capsule Commonly known as:  VITAMIN D3 Start taking on:  7/12/2018 Your last dose was: Your next dose is: Take 1 Cap by mouth daily. Indications: Vitamin D Deficiency 5000 Units  
    
   
   
   
  
 colchicine 0.6 mg capsule Commonly known as:  Fanny Madhav Start taking on:  7/12/2018 Your last dose was: Your next dose is: Take 1 Cap by mouth daily. Indications: prevention of acute gout attack 0.6 mg  
    
   
   
   
  
 losartan 50 mg tablet Commonly known as:  COZAAR Start taking on:  7/12/2018 Your last dose was: Your next dose is: Take 1 Tab by mouth daily. Indications: hypertension 50 mg CHANGE how you take these medications Instructions Each Dose to Equal  
 Morning Noon Evening Bedtime metFORMIN 500 mg tablet Commonly known as:  GLUCOPHAGE What changed:  when to take this Your last dose was: Your next dose is: Take 1 Tab by mouth daily (with breakfast). Indications: type 2 diabetes mellitus 500 mg CONTINUE taking these medications Instructions Each Dose to Equal  
 Morning Noon Evening Bedtime Blood-Glucose Meter monitoring kit Your last dose was: Your next dose is:    
   
   
 Use as directed. glucose blood VI test strips strip Commonly known as:  blood glucose test  
   
Your last dose was: Your next dose is:    
   
   
 Use daily or as directed for blood sugar monitoring. .00 Lancets Misc Your last dose was: Your next dose is:    
   
   
 Use daily or as directed for blood sugar monitoring, dx 250.00 Omeprazole delayed release 20 mg tablet Commonly known as:  PRILOSEC D/R Your last dose was: Your next dose is: Take 1 tablet by mouth daily. 20 mg [address] [employer] [address] Your last dose was: Your next dose is:    
   
   
 Use daily with showering. Dx: djd of knee STOP taking these medications   
 cetirizine 10 mg tablet Commonly known as:  ZYRTEC  
   
  
 chlorthalidone 25 mg tablet Commonly known as:  HYGROTEN  
   
  
 cloNIDine HCl 0.2 mg tablet Commonly known as:  CATAPRES  
   
  
 conjugated estrogens 0.625 mg/gram vaginal cream  
Commonly known as:  PREMARIN  
   
  
 cyclobenzaprine 10 mg tablet Commonly known as:  FLEXERIL  
   
  
 folic acid 1 mg tablet Commonly known as:  Google HYDROcodone-acetaminophen 5-325 mg per tablet Commonly known as:  NORCO  
   
  
 hydrocortisone valerate 0.2 % ointment Commonly known as:  WEST-FELISHA  
   
  
 ipratropium-albuterol  mcg/actuation inhaler Commonly known as:  COMBIVENT RESPIMAT  
   
  
 losartan-hydroCHLOROthiazide 50-12.5 mg per tablet Commonly known as:  HYZAAR  
   
  
 meloxicam 15 mg tablet Commonly known as:  MOBIC  
   
  
 naproxen 375 mg tablet Commonly known as:  NAPROSYN  
   
  
 predniSONE 20 mg tablet Commonly known as:  DELTASONE  
   
  
 spironolactone 25 mg tablet Commonly known as:  ALDACTONE  
   
  
 traMADol 50 mg tablet Commonly known as:  ULTRAM  
   
  
  
  
Where to Get Your Medications These medications were sent to [facility] AT THE [place] [address]  [address] [place] [address] Phone:  [phone]  
  amLODIPine 5 mg tablet  
 aspirin 81 mg chewable tablet  
 b complex vitamins tablet  
 cholecalciferol 5,000 unit capsule  
 colchicine 0.6 mg capsule  
 losartan 50 mg tablet  
 metFORMIN 500 mg tablet

## 2018-06-27 NOTE — PROGRESS NOTES
Assisted patient with the execution of Advance Medical Directive. Placed the copy into patient's \"like paper chart. \"  See Flow Sheet for other pastoral interventions. Chaplains will continue to follow and provide pastoral care on an as needed/requested basis.       [address] Hannah Faith Intern  Spiritual Care   [phone]

## 2018-06-27 NOTE — PROGRESS NOTES
Patient received in bed awake. Patient A&Ox4, denies pain and discomfort. No distress noted. Frequently use items within reach. Bed locked in low position. Call bell within reach and Patient verbalized understanding of use for assistance and needs. 1325- Pt discharge to [address] [place] [address], accompanied by [employer] x2. No distress noted. Discharge instructions sent with Patient in her large white envelope with other paperwork. Voiced understanding of dc instructions.

## 2018-06-27 NOTE — DISCHARGE INSTRUCTIONS
DISCHARGE SUMMARY from Nurse    PATIENT INSTRUCTIONS:    What to do at [facility]:  * Recommended activity: [facility]     * If you experience any of the following symptoms as listed in your teachings, please inform your nurse and/ or doctor. *  Please give a list of your current medications to your Primary Care Provider. *  Please update this list whenever your medications are discontinued, doses are      changed, or new medications (including over-the-counter products) are added. *  Please carry medication information at all times in case of emergency situations. These are general instructions for a healthy lifestyle:    No smoking/ No tobacco products/ Avoid exposure to second hand smoke  Surgeon General's Warning:  Quitting smoking now greatly reduces serious risk to your health. Obesity, smoking, and sedentary lifestyle greatly increases your risk for illness    A healthy diet, regular physical exercise & weight monitoring are important for maintaining a healthy lifestyle    You may be retaining fluid if you have a history of heart failure or if you experience any of the following symptoms:  Weight gain of 3 pounds or more overnight or 5 pounds in a week, increased swelling in our hands or feet or shortness of breath while lying flat in bed. Please call your doctor as soon as you notice any of these symptoms; do not wait until your next office visit. Recognize signs and symptoms of STROKE:    F-face looks uneven    A-arms unable to move or move unevenly    S-speech slurred or non-existent    T-time-call 911 as soon as signs and symptoms begin-DO NOT go       Back to bed or wait to see if you get better-TIME IS BRAIN. Warning Signs of HEART ATTACK     Call 911 if you have these symptoms:   Chest discomfort. Most heart attacks involve discomfort in the center of the chest that lasts more than a few minutes, or that goes away and comes back.  It can feel like uncomfortable pressure, squeezing, fullness, or pain.  Discomfort in other areas of the upper body. Symptoms can include pain or discomfort in one or both arms, the back, neck, jaw, or stomach.  Shortness of breath with or without chest discomfort.  Other signs may include breaking out in a cold sweat, nausea, or lightheadedness. Don't wait more than five minutes to call 911 - MINUTES MATTER! Fast action can save your life. Calling 911 is almost always the fastest way to get lifesaving treatment. Emergency Medical Services staff can begin treatment when they arrive -- up to an hour sooner than if someone gets to the hospital by car. Patient discharged without removing armband and transfered to another healthcare acute, sub acute , or extended care facility. Informed of privacy risks if armband lost or stolen     The discharge information has been reviewed with the patient. The patient verbalized understanding. Discharge medications reviewed with the patient and appropriate educational materials and side effects teaching were provided.   ___________________________________________________________________________________________________________________________________

## 2018-06-27 NOTE — PROGRESS NOTES
Problem: Self Care Deficits Care Plan (Adult)  Goal: *Acute Goals and Plan of Care (Insert Text)  Occupational Therapy Goals  Initiated 6/15/2018; re-evaluated on 6/22/2018 following one week with skilled OT. Pt has met goals 1 & 2. Updated goals listed below. 1.  Patient will perform grooming tasks at EOB with minimal assistance for balance. 2.  Patient will perform lower body dressing with minimal assistance. 3.  Patient will perform functional task for 8 minutes in standing with moderate assistance for balance to increase activity tolerance for ADLs. 4.  Patient will perform toilet transfers with moderate assistance. 5.  Patient will perform all aspects of toileting with moderate assistance. 6.  Patient will participate in upper extremity therapeutic exercise/activities for 8 minutes with supervision/set-up to increase BUE strength for functional transfers and ADLs. 7.  Patient will utilize energy conservation techniques during functional activities with minimal verbal cues. UPDATED GOALS Leon Avila MS, OTR/L; 6/22/2018)  1. Patient will perform grooming tasks while standing with supervision for balance. 2.  Patient will perform lower body dressing with supervision/set-up. 3.  Patient will perform functional task while standing for 8 minutes with stand-by assistance for balance to increase activity tolerance for ADLs. 4.  Patient will perform toilet transfers with stand-by assistance. 5.  Patient will perform all aspects of toileting with stand-by assistance. 6.  Patient will participate in upper extremity therapeutic exercise/activities with supervision/set-up for 8 minutes to maintain BUE strength for ADLs & functional transfers.    Outcome: Progressing Towards Goal  Occupational Therapy TREATMENT    Patient: Antonette Garcia (38 y.o. female)  Date: 6/27/2018  Diagnosis: Atrial fibrillation with RVR ([address]  Rhabdomyolysis Rhabdomyolysis       Precautions: Skin  Chart, occupational therapy assessment, plan of care, and goals were reviewed. PLOF: Independent    ASSESSMENT:  Pt request use bathroom upon entry, seen for functional transfer training. Verbal cues for hand placement w/functional transfer to standing and and verbal/tactile cues for RW mgt w/functional transfer to [facility]. Pt continues w/decrease dynamic standing balance requiring CGA and support of RW for bladder hygiene and Max Assist w/clothing mgt. Pt returned to EOB, performing ADL grooming tasks and UB dressing ADL. Pt returns to supine in prep for transport to ARU. Pt anxious to begin therapy at ARU. EDUCATION Pt educated on safety w/RW and functional transfers  Progression toward goals:  [x]          Improving appropriately and progressing toward goals  []          Improving slowly and progressing toward goals  []          Not making progress toward goals and plan of care will be adjusted     PLAN:  Patient continues to benefit from skilled intervention to address the above impairments. Continue treatment per established plan of care. Discharge Recommendations:  Inpatient Rehab  Further Equipment Recommendations for Discharge:  N/A, pt has DME     SUBJECTIVE:   Patient stated I like you, you're funny.     OBJECTIVE DATA SUMMARY:       Cognitive/Behavioral Status:  Neurologic State: Alert  Orientation Level: Oriented X4  Cognition: Follows commands  Safety/Judgement: Awareness of environment, Insight into deficits  Functional Mobility and Transfers for ADLs:   Bed Mobility:  Supine to Sit: Minimum assistance; Additional time  Sit to Supine: Additional time;Stand-by assistance   Transfers:  Sit to Stand: Contact guard assistance   Toilet Transfer : Minimum assistance (EOB <--> BSC xfer w/RW)   Balance:  Sitting: Intact  Sitting - Static: Good (unsupported)  Sitting - Dynamic: Fair (occasional)  Standing: With support; Impaired  Standing - Static: Fair  Standing - Dynamic : Fair  ADL Intervention:  Grooming  Washing Hands: Supervision/set-up  Brushing Teeth: Supervision/set-up    Upper Body Dressing Assistance  Hospital Gown: Supervision/ set-up    Toileting  Toileting Assistance: Moderate assistance  Bladder Hygiene: Contact guard assistance  Clothing Management: Maximum assistance    Pain:  Pre Treatment:0  Post Treatment:0  Pain Scale 1: Numeric (0 - 10)  Pain Intensity 1: 0    Activity Tolerance:    Good    Please refer to the flowsheet for vital signs taken during this treatment.   After treatment:   []  Patient left in no apparent distress sitting up in chair  [x]  Patient left in no apparent distress in bed  [x]  Call bell left within reach  [x]  Nursing notified  []  Caregiver present  []  Bed alarm activated    DEANA Vidales  Time Calculation: 27 mins

## 2018-06-27 NOTE — IP AVS SNAPSHOT
Summary of Care Report The Summary of Care report has been created to help improve care coordination. Users with access to [employer] or [employer] [address] (Web-based application) may access additional patient information including the Discharge Summary. If you are not currently a [address] user and need more information, please call the number listed below in the [place] [address] section and ask to be connected with Medical Records. Facility Information Name Address Phone [place] [address] [facility] [address] [phone] Patient Information Patient Name Sex SHONA Zarate ([id number]) Female 1960 Discharge Information Admitting Provider Service Area Unit Chalino Rhodes MD / [address] / [phone] Discharge Provider Discharge Date/Time Discharge Disposition Destination (none) 2018 Morning (Pending) [facility] (none) Patient Language Language ENGLISH [13] Hospital Problems as of 2018  Reviewed: 2018 10:31 AM by Chalino Rhodes MD  
  
  
  
 Class Noted - Resolved Last Modified POA Active Problems History of atrial fibrillation  2018 - Present 2018 by Chalino Rhodes MD Yes Entered by John Marquez NP Overview Signed 2018 10:23 PM by Chalino Rhodes MD  
   Atrial fibrillation with rapid ventricular response * (Principal)Rhabdomyolysis  2018 - Present 2018 by Chalino Rhodes MD Yes Entered by John Marquez NP History of encephalopathy  2018 - Present 2018 by Chalino Rhodes MD Yes Entered by Chalino Rhodes MD  
  Overview Signed 2018 10:21 PM by Chalino Rhodes MD  
   Acute metabolic encephalopathy Essential hypertension (Chronic)  Unknown - Present 2018 by Chalino Rhodes MD Yes Entered by Kiya Colunga MD  
  Type 2 diabetes mellitus [facility]) (Chronic)  Unknown - Present 6/27/2018 by Kiay Colunga MD Yes Entered by Kiya Colunga MD  
  Overview Signed 6/27/2018 10:18 PM by Kiya Colunga MD  
   HbA1c (6/14/2018) = 6.9 History of acute renal failure  6/13/2018 - Present 6/27/2018 by Kiya Colunga, MD Yes Entered by Kiya Colunga, MD  
  Generalized weakness  6/13/2018 - Present 6/27/2018 by Kiya Colunga, MD Yes Entered by Kiya Colunga MD  
  Impaired mobility and ADLs  6/13/2018 - Present 6/27/2018 by Kiya Colunga, MD Yes Entered by Kiya Colunga, MD  
  Dysphagia  6/14/2018 - Present 6/28/2018 by Kiya Colunga, MD Yes Entered by Kiya Colunga MD  
  Vitamin D deficiency (Chronic)  6/28/2018 - Present 6/28/2018 by Kiya Colunga, MD Yes Entered by Kiya Colunga MD  
  Overview Signed 6/28/2018  1:12 PM by Kiya Colunga MD  
   Vitamin D 25-Hydroxy (6/28/2018) = 13.5 Gout (Chronic)  Unknown - Present 6/28/2018 by Kiya Colunga MD Yes Entered by Kiya Colunga MD  
  
Non-Hospital Problems as of 7/11/2018  Reviewed: 7/11/2018 10:31 AM by Kiya Colunga MD  
  
  
  
 Class Noted - Resolved Last Modified Active Problems Chronic hepatitis C virus infection ([facility] [address] (Chronic)  Unknown - Present 6/27/2018 by Kiya Colunga MD  
  Entered by Ruben Jackman Overview Signed 4/30/2013 10:56 AM by Ruben Jackman  
   prior IV drug use Bipolar 1 disorder ([facility] [address] (Chronic)  Unknown - Present 6/27/2018 by Kiya Colunga MD  
  Entered by Ruben Jackman Overview Signed 9/6/2013 10:20 AM by Ruben Jackman  
   seeing Psychiatrist 
  
  
  Depression (Chronic)  Unknown - Present 6/27/2018 by Kiya Colunga MD  
  Entered by Ruben Jackman Alcohol dependence in remission [facility])  Unknown - Present 9/6/2013 by Ruben Jackman Entered by Ruben Jackman Overview Signed 9/6/2013 10:21 AM by Faby Boyd  
   alcohol withdrawal discharged 05/31/2013 Needle phobia  Unknown - Present 2/11/2014 by Marcelo Kaye. Terrence Hammans Entered by Marcelo Kaye. Terrence Hammans Overview Signed 2/11/2014  3:06 PM by Marcelo Kaye. Terrence Hammans  
   from prior IV drug abuse Eustachian tube dysfunction  Unknown - Present 1/21/2015 by Marcelo Kaye. Terrence Hammans Entered by Marcelo Kaye. Terrence Hammans Psoriasis (Chronic)  Unknown - Present 6/27/2018 by Nora Mata MD  
  Entered by Marcelo Kaye. Terrence Hammans Primary osteoarthritis involving multiple joints (Chronic)  Unknown - Present 6/27/2018 by Nora Mata MD  
  Entered by Nora Mata MD  
  Overview Signed 6/27/2018 10:15 PM by Nora Mata MD  
   knees and back Gastroesophageal reflux disease (Chronic)  Unknown - Present 6/27/2018 by Nora Mata MD  
  Entered by Nora Mata MD  
  Chronic obstructive pulmonary disease [facility]) (Chronic)  Unknown - Present 6/27/2018 by Nora Mata MD  
  Entered by Nora Mata MD  
  Hypoalbuminemia (Chronic)  5/25/2018 - Present 6/27/2018 by Nora Mata MD  
  Entered by Nora Mata MD  
  Low serum high density lipoprotein (HDL) (Chronic)  6/14/2018 - Present 6/27/2018 by Nora Mata MD  
  Entered by Nora Mata MD  
  Overview Signed 6/27/2018 10:30 PM by Nora Mata MD  
   HDL (6/14/2018) = 12 Obesity, Class I, BMI 30-34.9 (Chronic)  Unknown - Present 6/27/2018 by Nora Mata MD  
  Entered by Nora Mata MD  
  
You are allergic to the following Allergen Reactions Ace Inhibitors Cough Penicillins Hives Current Discharge Medication List  
  
START taking these medications Dose & Instructions Dispensing Information Comments  
 amLODIPine 5 mg tablet Commonly known as:  Theresa Salle Start taking on:  7/12/2018 Dose:  5 mg Take 1 Tab by mouth daily. Indications: hypertension Quantity:  15 Tab Refills:  0  
   
 aspirin 81 mg chewable tablet Start taking on:  7/12/2018 Dose:  81 mg Take 1 Tab by mouth daily (with breakfast). Indications: prevention of cerebrovascular accident Quantity:  15 Tab Refills:  0  
   
 b complex vitamins tablet Start taking on:  7/12/2018 Dose:  1 Tab Take 1 Tab by mouth daily. Indications: VITAMIN DEFICIENCY PREVENTION Quantity:  15 Tab Refills:  0  
   
 cholecalciferol 5,000 unit capsule Commonly known as:  VITAMIN D3 Start taking on:  7/12/2018 Dose:  5000 Units Take 1 Cap by mouth daily. Indications: Vitamin D Deficiency Quantity:  15 Cap Refills:  0  
   
 colchicine 0.6 mg capsule Commonly known as:  Cooper Kar Start taking on:  7/12/2018 Dose:  0.6 mg Take 1 Cap by mouth daily. Indications: prevention of acute gout attack Quantity:  15 Cap Refills:  0  
   
 losartan 50 mg tablet Commonly known as:  COZAAR Start taking on:  7/12/2018 Dose:  50 mg Take 1 Tab by mouth daily. Indications: hypertension Quantity:  15 Tab Refills:  0 CONTINUE these medications which have CHANGED Dose & Instructions Dispensing Information Comments  
 metFORMIN 500 mg tablet Commonly known as:  GLUCOPHAGE What changed:  when to take this Dose:  500 mg Take 1 Tab by mouth daily (with breakfast). Indications: type 2 diabetes mellitus Quantity:  15 Tab Refills:  0 CONTINUE these medications which have NOT CHANGED Dose & Instructions Dispensing Information Comments Blood-Glucose Meter monitoring kit Use as directed. Quantity:  1 Kit Refills:  0  
   
 glucose blood VI test strips strip Commonly known as:  blood glucose test  
 Use daily or as directed for blood sugar monitoring. .00 Quantity:  50 Strip Refills:  11 Lancets Misc Use daily or as directed for blood sugar monitoring, dx 250.00 Quantity:  50 Each Refills:  11 Omeprazole delayed release 20 mg tablet Commonly known as:  PRILOSEC D/R Dose:  20 mg Take 1 tablet by mouth daily. Quantity:  30 tablet Refills:  5 [address] [employer] Use daily with showering. Dx: djd of knee Quantity:  1 each Refills:  0 STOP taking these medications Comments  
 cetirizine 10 mg tablet Commonly known as:  ZYRTEC  
   
   
 chlorthalidone 25 mg tablet Commonly known as:  HYGROTEN  
   
   
 cloNIDine HCl 0.2 mg tablet Commonly known as:  CATAPRES  
   
   
 conjugated estrogens 0.625 mg/gram vaginal cream  
Commonly known as:  PREMARIN  
   
   
 cyclobenzaprine 10 mg tablet Commonly known as:  FLEXERIL  
   
   
 folic acid 1 mg tablet Commonly known as:  [employer] HYDROcodone-acetaminophen 5-325 mg per tablet Commonly known as:  NORCO  
   
   
 hydrocortisone valerate 0.2 % ointment Commonly known as:  WEST-FELISHA  
   
   
 ipratropium-albuterol  mcg/actuation inhaler Commonly known as:  COMBIVENT RESPIMAT  
   
   
 losartan-hydroCHLOROthiazide 50-12.5 mg per tablet Commonly known as:  HYZAAR  
   
   
 meloxicam 15 mg tablet Commonly known as:  MOBIC  
   
   
 naproxen 375 mg tablet Commonly known as:  NAPROSYN  
   
   
 predniSONE 20 mg tablet Commonly known as:  DELTASONE  
   
   
 spironolactone 25 mg tablet Commonly known as:  ALDACTONE  
   
   
 traMADol 50 mg tablet Commonly known as:  ULTRAM  
   
   
  
  
  
Current Immunizations Name Date Influenza Vaccine 9/1/2015 Pneumococcal Conjugate (PCV-13) 9/1/2015 Follow-up Information Follow up With Details Comments Contact Info [address] 
[address] [facility] [address] 
[phone]  Pooja Lee MD On 7/19/2018 Patient has an appointment scheduled with PCP Dr.D. Shanelle Thomas on July 19, 2018 @ 10:00am. [address] [facility] [address] 
[phone] Discharge Instructions DISCHARGE SUMMARY from Nurse PATIENT INSTRUCTIONS: 
 
After general anesthesia or intravenous sedation, for 24 hours or while taking prescription Narcotics: · Limit your activities · Do not drive and operate hazardous machinery · Do not make important personal or business decisions · Do  not drink alcoholic beverages · If you have not urinated within 8 hours after discharge, please contact your surgeon on call. Report the following to your surgeon: 
· Excessive pain, swelling, redness or odor of or around the surgical area · Temperature over 100.5 · Nausea and vomiting lasting longer than 4 hours or if unable to take medications · Any signs of decreased circulation or nerve impairment to extremity: change in color, persistent  numbness, tingling, coldness or increase pain · Any questions What to do at Home: 
Recommended activity: Activity as tolerated, and as directed by your physician. If you experience any of the following symptoms chest pain or difficulty breathing, please follow up with the e. 
 
*  Please give a list of your current medications to your Primary Care Provider. *  Please update this list whenever your medications are discontinued, doses are 
    changed, or new medications (including over-the-counter products) are added. *  Please carry medication information at all times in case of emergency situations. These are general instructions for a healthy lifestyle: No smoking/ No tobacco products/ Avoid exposure to second hand smoke Surgeon General's Warning:  Quitting smoking now greatly reduces serious risk to your health. Obesity, smoking, and sedentary lifestyle greatly increases your risk for illness A healthy diet, regular physical exercise & weight monitoring are important for maintaining a healthy lifestyle You may be retaining fluid if you have a history of heart failure or if you experience any of the following symptoms:  Weight gain of 3 pounds or more overnight or 5 pounds in a week, increased swelling in our hands or feet or shortness of breath while lying flat in bed. Please call your doctor as soon as you notice any of these symptoms; do not wait until your next office visit. Recognize signs and symptoms of STROKE: 
 
F-face looks uneven A-arms unable to move or move unevenly S-speech slurred or non-existent T-time-call 911 as soon as signs and symptoms begin-DO NOT go Back to bed or wait to see if you get better-TIME IS BRAIN. Warning Signs of HEART ATTACK Call 911 if you have these symptoms: 
? Chest discomfort. Most heart attacks involve discomfort in the center of the chest that lasts more than a few minutes, or that goes away and comes back. It can feel like uncomfortable pressure, squeezing, fullness, or pain. ? Discomfort in other areas of the upper body. Symptoms can include pain or discomfort in one or both arms, the back, neck, jaw, or stomach. ? Shortness of breath with or without chest discomfort. ? Other signs may include breaking out in a cold sweat, nausea, or lightheadedness. Don't wait more than five minutes to call [address]! Fast action can save your life. Calling 911 is almost always the fastest way to get lifesaving treatment. Emergency Medical Services staff can begin treatment when they arrive  up to an hour sooner than if someone gets to the hospital by car. The discharge information has been reviewed with the patient. The patient verbalized understanding. Discharge medications reviewed with the patient and appropriate educational materials and side effects teaching were provided.  
___________________________________________________________________________ ________________________________________________________ 
 
------------------------------------------------------------------------------------------------------------ 
 
DISCHARGE INSTRUCTIONS 1. Make sure that when you request refills at the pharmacy that the refill requests are sent to your PCP (NOT to the prescriber at the [facility]) to avoid any delays in getting your medication refills. The physician at the [facility] will not able to order medications or refills after discharge -- Please understand that though we would like to help, it is simply not safe for our physician to order you a medication that we cannot monitor. 2. If any of the prescribed medications require a prior authorization, contact your Primary Care Physician or specialist to EITHER complete prior authorizations and paperwork on your behalf OR prescribe an alternative medication. -- Please understand that though we would like to help, it is simply not safe for our physician to order you a medication that we cannot monitor. 3. Over-the-counter (OTC) medications will NOT be prescribed. You need to buy these medications over-the-counter. ------------------------------------------------------------------------------------------------------------------- Patient armband removed and shredded MyChart Activation Thank you for requesting access to [employer]. Please follow the instructions below to securely access and download your online medical record. [employer] allows you to send messages to your doctor, view your test results, renew your prescriptions, schedule appointments, and more. How Do I Sign Up? 1. In your internet browser, go to [email or web address] 
2. Click on the First Time User? Click Here link in the Sign In box. You will be redirect to the New Member Sign Up page. 3. Enter your [employer] Access Code exactly as it appears below.  You will not need to use this code after youve completed the sign-up process. If you do not sign up before the expiration date, you must request a new code. [employer] Access Code: [email or web address] Expires: 2018 11:27 AM (This is the date your [employer] access code will ) 4. Enter the last four digits of your Social Security Number (xxxx) and Date of Birth (mm/dd/yyyy) as indicated and click Submit. You will be taken to the next sign-up page. 5. Create a [employer] ID. This will be your [employer] login ID and cannot be changed, so think of one that is secure and easy to remember. 6. Create a [employer] password. You can change your password at any time. 7. Enter your Password Reset Question and Answer. This can be used at a later time if you forget your password. 8. Enter your e-mail address. You will receive e-mail notification when new information is available in [address]. 9. Click Sign Up. You can now view and download portions of your medical record. 10. Click the Download Summary menu link to download a portable copy of your medical information. Additional Information If you have questions, please visit the Frequently Asked Questions section of the [employer] website at [email or web address] Remember, [employer] is NOT to be used for urgent needs. For medical emergencies, dial 911. Chart Review Routing History No Routing History on File

## 2018-06-27 NOTE — PROGRESS NOTES
1788:  Pt refusing PT upon entering room stating I want to rest until I leave. Pickup set for 13:00 to ARU.

## 2018-06-27 NOTE — PROGRESS NOTES
Problem: Falls - Risk of  Goal: *Absence of Falls  Document Radha Fall Risk and appropriate interventions in the flowsheet.    Outcome: Progressing Towards Goal  Fall Risk Interventions:  Mobility Interventions: Communicate number of staff needed for ambulation/transfer         Medication Interventions: Patient to call before getting OOB    Elimination Interventions: Call light in reach, Toileting schedule/hourly rounds    History of Falls Interventions: Door open when patient unattended

## 2018-06-27 NOTE — PROGRESS NOTES
ARU at [facility] has received auth, pt set for 2pm transport, will notify pt and dtr.

## 2018-06-27 NOTE — PROGRESS NOTES
Problem: Dysphagia (Adult)  Goal: *Acute Goals and Plan of Care (Insert Text)  Recommendations:  [facility]thin liquid RECOMMEND UPGRADE TO SOFT SOLID  Meds: Whole in puree  Aspiration Precautions  Oral Care TID  Straw sips ok, small sips, supervision with PO    Goals:  Patient will:  1. Tolerate PO trials with 0 s/s overt distress in 4/5 trials  2. Utilize compensatory swallow strategies/maneuvers (decrease bite/sip, size/rate, alt. liq/sol) with min cues in 4/5 trials  3. Perform oral-motor/laryngeal exercises to increase oropharyngeal swallow function with min cues  4. Complete an objective swallow study (i.e., MBSS) to assess swallow integrity, r/o aspiration, and determine of safest LRD, min A    Speech Goals:  1. Patient will verbalize 3 & 4 syllable words at the phrase level with 90% intelligibility independently  2. Patient will recall speech goals (loud, clear, smooth, slow), following a 10 minute delay with mod A  3. Patient will respond to questions at the conversational level with 90% intelligibility with mod A          Outcome: Progressing Towards Goal  Speech language pathology speech & dysphagia treatment    Patient: Pawan Osborn (68 y.o. female)  Date: 6/27/2018  Diagnosis: Atrial fibrillation with RVR (HCC)  Rhabdomyolysis Rhabdomyolysis       Precautions: Skin     ASSESSMENT:  SLP observed with skilled meal assessment, solid and thin liquid + straw. Pt self-fed with no overt s/s of aspiration. Delayed bolus formation and A-P transit continues. Lingual residue cleared independently with lingual sweep and liquid wash. Functional hyolaryngeal execursion to palpation. Continue Soft Solid  and Thin liquid diet. Discussed with interdisciplinary rounds.      Speech Tx  Pt recalled 50% of speech goals at the start of session, reaching 100% accuracy provided maximal cueing. Pt independently achieved 70% intelligibility during a narrative speech sample.  Pt verbalized 3 & 4 syllable words with 90% accuracy and minimal verbal cues. Phrase level intelligibility of 80% noted given moderate cues to slow speech rate and utilize speech goals. Progression toward goals:  []         Improving appropriately and progressing toward goals  [x]         Improving slowly and progressing toward goals  []         Not making progress toward goals and plan of care will be adjusted     PLAN:  Recommendations and Planned Interventions:  Patient continues to benefit from skilled intervention to address the above impairments. Continue treatment per established plan of care upon admission to acute rehab. Discharge Recommendations:  Inpatient Rehab     SUBJECTIVE:   Patient stated thank you. OBJECTIVE:   Cognitive and Communication Status:  Neurologic State: Alert  Orientation Level: Oriented X4  Cognition: Follows commands  Perception: Appears intact  Perseveration: No perseveration noted  Safety/Judgement: Awareness of environment, Insight into deficits  Dysphagia Treatment:  Oral Assessment:  Oral Assessment  Labial: No impairment  Dentition: Natural  Oral Hygiene: Good  Lingual: Decreased rate  Velum: No impairment  Mandible: No impairment  P.O. Trials:   Patient Position: Seated in Chair   Vocal quality prior to P.O.: No impairment   Consistency Presented: Thin liquid, Solid   How Presented: Self-fed/presented, Straw       Bolus Acceptance: No impairment   Bolus Formation/Control: Impaired   Type of Impairment: Delayed   Propulsion: Delayed (# of seconds)   Oral Residue: None   Initiation of Swallow: No impairment   Laryngeal Elevation: Functional   Aspiration Signs/Symptoms: None   Pharyngeal Phase Characteristics: Double swallow   Effective Modifications:  Alternate liquids/solids   Cues for Modifications: None         Oral Phase Severity: Mild   Pharyngeal Phase Severity : Mild   Oral Motor Exercises:     PAIN:  Start of Tx: 0  End of Tx: 0     After treatment:   []              Patient left in no apparent distress sitting up in chair  [x]              Patient left in no apparent distress in bed  [x]              Call bell left within reach  [x]              Nursing notified  []              Family present  []              Caregiver present  []              Bed alarm activated      COMMUNICATION/EDUCATION:   [x] Aspiration precautions; swallow safety; compensatory techniques  [x]        Expressive communication techniques  []        Patient unable to participate in education; education ongoing with staff  []  Posted safety precautions in patient's room.   [] Oral-motor/laryngeal strengthening exercises      ALLISON Green  Time Calculation: 20 mins  Swallow Treatment Time: 10  Speech Treatment Time: 10

## 2018-06-27 NOTE — ROUTINE PROCESS
Verbal shift change report received from [facility] @ [facility]  given to Jaziel Newsome LPN(receiving  nurse). Report given with SBAR, Kardex, Intake/Output and Recent Results.

## 2018-06-27 NOTE — ACP (ADVANCE CARE PLANNING)
Assisted patient with [employer] Directive. Copies were made for patient's chart. Original copy given to patient along with two copies for daughters.     Spiritual Care Department   Intern Bam Parsons  [phone]

## 2018-06-27 NOTE — PROGRESS NOTES
Problem: Pressure Injury - Risk of  Goal: *Prevention of pressure injury  Document Ryan Scale and appropriate interventions in the flowsheet.    Outcome: Progressing Towards Goal  Pressure Injury Interventions:  Sensory Interventions: Assess changes in LOC    Moisture Interventions: Absorbent underpads    Activity Interventions: Pressure redistribution bed/mattress(bed type)    Mobility Interventions: Float heels    Nutrition Interventions: Document food/fluid/supplement intake    Friction and Shear Interventions: HOB 30 degrees or less

## 2018-06-27 NOTE — ROUTINE PROCESS
Bedside and Verbal shift change report given to Dania Rod (oncoming nurse) by Roddy Cowden hermoso,rn (offgoing nurse). Report given with SBAR, Kardex, Intake/Output and Recent Results.

## 2018-06-27 NOTE — ROUTINE PROCESS
1630 4 eyes admission assessment completed with Susan Bills LPN . Stage 2 pressure ulcer 2cm X 3cm on right buttocks and deep tissue injury to bilateral heels wound Consult was ordered. 1800 Pt. Sitting up in bed eating dinner. No complaints.

## 2018-06-27 NOTE — PROGRESS NOTES
Transport set for 1pm, mike from ARU requested she be there by 2pm. Pcs completed, envelope in nurses station. Notified pt, her dtr ms Anjali aSlguero NP and dr Leno Wallace. Plan acute in rehab at [facility].

## 2018-06-28 PROBLEM — R13.10 DYSPHAGIA: Status: ACTIVE | Noted: 2018-06-28

## 2018-06-28 PROBLEM — E55.9 VITAMIN D DEFICIENCY: Chronic | Status: ACTIVE | Noted: 2018-06-28

## 2018-06-28 PROBLEM — R13.10 DYSPHAGIA: Status: ACTIVE | Noted: 2018-06-14

## 2018-06-28 LAB
25(OH)D3 SERPL-MCNC: 13.5 NG/ML (ref 30–100)
ANION GAP SERPL CALC-SCNC: 6 MMOL/L (ref 3–18)
BASOPHILS # BLD: 0 K/UL (ref 0–0.06)
BASOPHILS NFR BLD: 1 % (ref 0–2)
BUN SERPL-MCNC: 10 MG/DL (ref 7–18)
BUN/CREAT SERPL: 11 (ref 12–20)
CALCIUM SERPL-MCNC: 9 MG/DL (ref 8.5–10.1)
CHLORIDE SERPL-SCNC: 109 MMOL/L (ref 100–108)
CO2 SERPL-SCNC: 28 MMOL/L (ref 21–32)
CREAT SERPL-MCNC: 0.9 MG/DL (ref 0.6–1.3)
DIFFERENTIAL METHOD BLD: ABNORMAL
EOSINOPHIL # BLD: 0.2 K/UL (ref 0–0.4)
EOSINOPHIL NFR BLD: 3 % (ref 0–5)
ERYTHROCYTE [DISTWIDTH] IN BLOOD BY AUTOMATED COUNT: 14.2 % (ref 11.6–14.5)
GLUCOSE BLD STRIP.AUTO-MCNC: 104 MG/DL (ref 70–110)
GLUCOSE BLD STRIP.AUTO-MCNC: 151 MG/DL (ref 70–110)
GLUCOSE BLD STRIP.AUTO-MCNC: 81 MG/DL (ref 70–110)
GLUCOSE BLD STRIP.AUTO-MCNC: 98 MG/DL (ref 70–110)
GLUCOSE BLD STRIP.AUTO-MCNC: 99 MG/DL (ref 70–110)
GLUCOSE SERPL-MCNC: 83 MG/DL (ref 74–99)
HCT VFR BLD AUTO: 32.6 % (ref 35–45)
HGB BLD-MCNC: 10.3 G/DL (ref 12–16)
LYMPHOCYTES # BLD: 2.4 K/UL (ref 0.9–3.6)
LYMPHOCYTES NFR BLD: 53 % (ref 21–52)
MAGNESIUM SERPL-MCNC: 1.7 MG/DL (ref 1.6–2.6)
MCH RBC QN AUTO: 26.4 PG (ref 24–34)
MCHC RBC AUTO-ENTMCNC: 31.6 G/DL (ref 31–37)
MCV RBC AUTO: 83.6 FL (ref 74–97)
MONOCYTES # BLD: 0.4 K/UL (ref 0.05–1.2)
MONOCYTES NFR BLD: 10 % (ref 3–10)
NEUTS SEG # BLD: 1.5 K/UL (ref 1.8–8)
NEUTS SEG NFR BLD: 33 % (ref 40–73)
PLATELET # BLD AUTO: 310 K/UL (ref 135–420)
PMV BLD AUTO: 10.6 FL (ref 9.2–11.8)
POTASSIUM SERPL-SCNC: 3.9 MMOL/L (ref 3.5–5.5)
RBC # BLD AUTO: 3.9 M/UL (ref 4.2–5.3)
SODIUM SERPL-SCNC: 143 MMOL/L (ref 136–145)
URATE SERPL-MCNC: 5.7 MG/DL (ref 2.6–7.2)
WBC # BLD AUTO: 4.5 K/UL (ref 4.6–13.2)

## 2018-06-28 PROCEDURE — 96125 COGNITIVE TEST BY HC PRO: CPT

## 2018-06-28 PROCEDURE — 83735 ASSAY OF MAGNESIUM: CPT | Performed by: INTERNAL MEDICINE

## 2018-06-28 PROCEDURE — 97165 OT EVAL LOW COMPLEX 30 MIN: CPT

## 2018-06-28 PROCEDURE — 97530 THERAPEUTIC ACTIVITIES: CPT

## 2018-06-28 PROCEDURE — 97162 PT EVAL MOD COMPLEX 30 MIN: CPT

## 2018-06-28 PROCEDURE — 65310000000 HC RM PRIVATE REHAB

## 2018-06-28 PROCEDURE — 97116 GAIT TRAINING THERAPY: CPT

## 2018-06-28 PROCEDURE — 77030011256 HC DRSG MEPILEX <16IN NO BORD MOLN -A

## 2018-06-28 PROCEDURE — 97535 SELF CARE MNGMENT TRAINING: CPT

## 2018-06-28 PROCEDURE — 80048 BASIC METABOLIC PNL TOTAL CA: CPT | Performed by: INTERNAL MEDICINE

## 2018-06-28 PROCEDURE — 82962 GLUCOSE BLOOD TEST: CPT

## 2018-06-28 PROCEDURE — 92522 EVALUATE SPEECH PRODUCTION: CPT

## 2018-06-28 PROCEDURE — 74011250636 HC RX REV CODE- 250/636: Performed by: INTERNAL MEDICINE

## 2018-06-28 PROCEDURE — 84550 ASSAY OF BLOOD/URIC ACID: CPT | Performed by: INTERNAL MEDICINE

## 2018-06-28 PROCEDURE — 36415 COLL VENOUS BLD VENIPUNCTURE: CPT | Performed by: INTERNAL MEDICINE

## 2018-06-28 PROCEDURE — 82306 VITAMIN D 25 HYDROXY: CPT | Performed by: INTERNAL MEDICINE

## 2018-06-28 PROCEDURE — 85025 COMPLETE CBC W/AUTO DIFF WBC: CPT | Performed by: INTERNAL MEDICINE

## 2018-06-28 PROCEDURE — 96105 ASSESSMENT OF APHASIA: CPT

## 2018-06-28 PROCEDURE — 74011250637 HC RX REV CODE- 250/637: Performed by: INTERNAL MEDICINE

## 2018-06-28 PROCEDURE — 97542 WHEELCHAIR MNGMENT TRAINING: CPT

## 2018-06-28 RX ORDER — CHOLECALCIFEROL (VITAMIN D3) 125 MCG
5000 CAPSULE ORAL DAILY
Status: DISCONTINUED | OUTPATIENT
Start: 2018-06-29 | End: 2018-07-11 | Stop reason: HOSPADM

## 2018-06-28 RX ORDER — METFORMIN HYDROCHLORIDE 500 MG/1
500 TABLET ORAL
Status: DISCONTINUED | OUTPATIENT
Start: 2018-06-29 | End: 2018-07-09

## 2018-06-28 RX ADMIN — Medication 50000 UNITS: at 14:00

## 2018-06-28 RX ADMIN — METFORMIN HYDROCHLORIDE 500 MG: 500 TABLET, FILM COATED ORAL at 08:29

## 2018-06-28 RX ADMIN — Medication 1 TABLET: at 08:29

## 2018-06-28 RX ADMIN — HEPARIN SODIUM 5000 UNITS: 5000 INJECTION, SOLUTION INTRAVENOUS; SUBCUTANEOUS at 06:43

## 2018-06-28 RX ADMIN — HEPARIN SODIUM 5000 UNITS: 5000 INJECTION, SOLUTION INTRAVENOUS; SUBCUTANEOUS at 22:00

## 2018-06-28 RX ADMIN — COLCHICINE 0.6 MG: 0.6 CAPSULE ORAL at 08:29

## 2018-06-28 RX ADMIN — HEPARIN SODIUM 5000 UNITS: 5000 INJECTION, SOLUTION INTRAVENOUS; SUBCUTANEOUS at 14:06

## 2018-06-28 RX ADMIN — LOSARTAN POTASSIUM 25 MG: 25 TABLET ORAL at 08:29

## 2018-06-28 RX ADMIN — ASPIRIN 81 MG CHEWABLE TABLET 81 MG: 81 TABLET CHEWABLE at 08:29

## 2018-06-28 RX ADMIN — PANTOPRAZOLE SODIUM 40 MG: 40 TABLET, DELAYED RELEASE ORAL at 06:43

## 2018-06-28 NOTE — PROGRESS NOTES
NUTRITION    Nutrition Consult: General Nutrition Management & Supplements     RECOMMENDATIONS / PLAN:     - No nutrition intervention indicated at this time. Will re-screen as appropriate. NUTRITION INTERVENTIONS & DIAGNOSIS:     Nutrition Diagnosis:  No nutrition diagnosis at this time    ASSESSMENT:     Pt reported good appetite and meal intake PTA and since admission. Denied having any concerns at time of visit. Average po intake adequate to meet patients estimated nutritional needs:   [x] Yes     [] No   [] Unable to determine at this time    Diet: DIET DIABETIC CONSISTENT CARB Soft Solids; 3-4 GM (FLORENTIN); No Conc. Sweets      Food Allergies:  None known   Current Appetite:   [x] Good     [] Fair     [] Poor     [] Other:  Appetite/meal intake prior to admission:   [x] Good     [] Fair     [] Poor     [] Other:  Feeding Limitations:  [] Swallowing difficulty    [] Chewing difficulty    [] Other:  Current Meal Intake: Patient Vitals for the past 100 hrs:   % Diet Eaten   06/28/18 1315 100 %   06/28/18 0900 50 %   06/27/18 1756 50 %       BM:  6/27  Skin Integrity:  No pressure injury or wound noted  Edema:  1+ LLE  Pertinent Medications: Reviewed: bowel regimen, SSI, vitamin B complex. Vitamin D3 (ordered), metformin (ordered)    Recent Labs      06/28/18   0619   NA  143   K  3.9   CL  109*   CO2  28   GLU  83   BUN  10   CREA  0.90   CA  9.0   MG  1.7       Intake/Output Summary (Last 24 hours) at 06/28/18 1603  Last data filed at 06/28/18 1315   Gross per 24 hour   Intake              680 ml   Output                0 ml   Net              680 ml       Anthropometrics:  Ht Readings from Last 1 Encounters:   06/27/18 5' 5\" (1.651 m)     Last 3 Recorded Weights in this Encounter    06/27/18 1600   Weight: 91 kg (200 lb 9.9 oz)     Body mass index is 33.38 kg/(m^2). Weight History:  Pt denied experiencing any recent changes in weight PTA.  Net wt loss of 9 lb x 1.5 years PTA per chart hx    Weight Metrics 6/27/2018 6/18/2018 2/26/2018 2/15/2017 7/7/2016 5/9/2016 12/11/2015   Weight 200 lb 9.9 oz 199 lb 193 lb 209 lb 215 lb 208 lb 189 lb   BMI 33.38 kg/m2 31.17 kg/m2 31.15 kg/m2 33.73 kg/m2 34.72 kg/m2 33.59 kg/m2 30.52 kg/m2        Admitting Diagnosis: Rhabdomyolisis  Rhabdomyolysis  Pertinent PMHx: alcohol dependence in remission, COPD, depression, dysphagia, HTN, GERD, gout, acute renal failure, DM    Education Needs:        [x] None identified  [] Identified - Not appropriate at this time  []  Identified and addressed - refer to education log  Learning Limitations:   [x] None identified  [] Identified    Cultural, Anabaptist & ethnic food preferences:  [x] None identified    [] Identified and addressed     ESTIMATED NUTRITION NEEDS:     Calories: 9819-1184 kcal (MSJx1.2-1.3) based on  [x] Actual BW: 91 kg     [] IBW   Protein: 73-91 gm (0.8-1 gm/kg) based on  [x] Actual BW      [] IBW   Fluid: 1 mL/kcal     MONITORING & EVALUATION:     Nutrition Goal(s):   1. Po intake of meals will meet >75% of patient estimated nutritional needs within the next 7 days.   Outcome:  [] Met/Ongoing    []  Not Met    [x] New/Initial Goal     Monitoring:   [x] Food and beverage intake   [x] Diet order   [x] Nutrition-focused physical findings   [x] Treatment/therapy   [] Weight   [] Enteral nutrition intake        Previous Recommendations (for follow-up assessments only):     []   Implemented       []   Not Implemented (RD to address)      [] No Longer Appropriate     [] No Recommendation Made     Discharge Planning:  Diabetic diet; consistency per SLP   [x] Participated in care planning, discharge planning, & interdisciplinary rounds as appropriate      Ingrid Arevalo, [address]   Pager: [phone]

## 2018-06-28 NOTE — WOUND CARE
Physical Exam   Musculoskeletal:        Legs:  Seen by wound care per consult skin assessment performed at this time. Tissue injury listed above noted during skin assessment. Treatment plan explained to Hoang Garcia RN and Pt agreeable to continue current treatment. Mepilex silicone protective dressings to be applied, heel protector boots to be used while in bed. No wound care education provided to pt at this time. No wound care intervention required at this time. Plan of care reviewed with nursing. Will turn over care to nursing staff at this time  Mk Leahy, Wound Care Department

## 2018-06-28 NOTE — INTERDISCIPLINARY ROUNDS
[facility]  [address] [place] [address], [address]    INPATIENT REHABILITATION  PRE-TEAM CONFERENCE SUMMARY     Date of Conference: 6/29/18    Name: Kathleen Carrion Age / Sex: 59 y.o. / female   CSN: [id number] MRN: [id number]   Admit Date: 6/27/2018 Length of Stay: 1 days     Primary Rehabilitation Diagnosis  1. Generalized Weakness with Impaired Mobility and ADLs  2. Rhabdomyolysis     Comorbidities   Chronic hepatitis C virus infection  B18.2    Bipolar 1 disorder  F31.9    Depression F32.9    Alcohol dependence in remission  F10.21    Needle phobia F40.298    Eustachian tube dysfunction H69.80    Psoriasis L40.9    History of atrial fibrillation Z86.79    History of encephalopathy Z86.69    Primary osteoarthritis involving multiple joints M15.0    Essential hypertension I10    Gastroesophageal reflux disease K21.9    Type 2 diabetes mellitus  E11.9    Chronic obstructive pulmonary disease  J44.9    History of acute renal failure Z87.448    Hypoalbuminemia E88.09    Low serum high density lipoprotein (HDL) R74.8    Obesity, Class I, BMI 30-34.9 E66.9    Dysphagia R13.10    Vitamin D deficiency E55. 9          Therapy:     FIM SCORES Initial Assessment Weekly Progress Assessment 6/28/2018   Eating Functional Level: 7  Comments: (I) for self feeding of breakfast meal. No reports from pt about difficulty with cutting, bringing food to mouth, or swallowing. Functional Level: 7  Comments: (I) for self feeding of breakfast meal. No reports from pt about difficulty with cutting, bringing food to mouth, or swallowing. Swallowing     Grooming 5  5 Setup   Bathing 4  4 Min A      Upper Body Dressing Functional Level: 4  Items Applied/Steps Completed: Pullover (4 steps)  Comments: Supervision to doff pullover shirt and Min A to don pullover shirt. Not challenged with bra due to pt not having in her personal possession.    Functional Level: 4  Items Applied/Steps Completed: Pullover (4 steps)  Comments: Supervision to [employer] shirt and Min A to don pullover shirt. Not challenged with bra due to pt not having in her personal possession. Lower Body Dressing Functional Level: 4  Items Applied/Steps Completed: Elastic waist pants (3 steps), Underpants (3 steps)  Comments: SBA to doff pants and underwear. Min A to don pants and underwear. Functional Level: 4  Items Applied/Steps Completed: Elastic waist pants (3 steps), Underpants (3 steps)  Comments: SBA to doff pants and underwear. Min A to don pants and underwear. Toileting Functional Level: 0  Comments: Min A for clothing mgt. Supervision for full pericare hygiene. Functional Level: 0  Comments: Min A for clothing mgt. Supervision for full pericare hygiene. Bladder - level of assist 2     Bladder - accident frequency score 5     Bowel - level of assist 1     Bowel - accident frequency score 6     Toilet Transfer Esmeralda Toilet Transfer Score: 4  Comments: Min A secondary to require vcs and tactile cues for proper hand placement. Tendency to pull up from seat surface vs push from arm rest.   Toilet Transfer Score: 4  Comments: Min A secondary to require vcs and tactile cues for proper hand placement. Tendency to pull up from seat surface vs push from arm rest.   Tub/Shower Transfer Esmeralda Tub or Shower Type: Tub/Shower combination  Tub/Shower Transfer Score: 4  Comments: Min A for safety cues as well as utilization of proper hand placement with transfers. Tendency to want to pull up from seated position to stand. Education provided about proper hand placement. Tub or Shower Type: Tub/Shower combination  Tub/Shower Transfer Score: 4  Comments: Min A for safety cues as well as utilization of proper hand placement with transfers. Tendency to want to pull up from seated position to stand. Education provided about proper hand placement.    Comprehension Primary Mode of Comprehension: Auditory  Score: 5 Auditory  5   Expression Primary Mode of Expression: Verbal  Score: 5 Verbal  5   Social Interaction Score: 4 4   Problem Solving Score: 4 4   Memory Score: 3 3     FIM SCORES Initial Assessment Weekly Progress Assessment 6/28/2018   Bed/Chair/Wheelchair Transfers Transfer Type: SPT without device (stand step w/out AD, with gait belt)  Other: toilet transfer with min A with use of grab bar  Transfer Assistance : 3 (Moderate assistance ) (w/out AD; min A with RW)  Sit to Stand Assistance: Minimal assistance  Car Transfers: Not tested  Car Type: n/a Transfer Type: SPT without device (stand step w/out AD, with gait belt)  Other: toilet transfer with min A with use of grab bar  Transfer Assistance : 3 (Moderate assistance ) (w/out AD; min A with RW)  Sit to Stand Assistance: Minimal assistance  Car Transfers: Not tested  Car Type: n/a   Moderate assistance for stand step transfers without AD   Bed Mobility Rolling Right Stand by assistance   Rolling Left Stand by assistance   Supine to Sit Minimal assistance   Sit to Stand Minimal assistance   Sit to Supine Minimal assistance    Rolling Right   Stand by assistance   Rolling Left   Stand by assistance   Supine to Sit   Minimal assistance   Sit to Stand   Minimal assistance   Sit to Supine   Minimal assistance      Locomotion (W/C) Moderate assistance Function 3  Setup Assistance       Locomotion (W/C distance) 55 Feet 55 Feet   Locomotion (Walk) Minimal assistance Minimal assistance  Walker, rolling;Gait belt (w/c follow)   Locomotion (Walk dist.) 18 Feet (ft) 18 Feet (ft)   Steps/Stairs Steps/Stairs Ambulated (#): 0  Level of Assist : 0 (Not tested) (deferred due to dizziness with gait training)           Nursing:     Neuro:   A&O x__4__                   Respiratory:   [x] WNL   [] O2   [] LPM ______   Other:  Peripheral Vascular:   [x] TEDS present   [] Edema present ____ Grade   Cardiac:   [x] WNL   [] Other  Genitourinary:   [x] continent   [] incontinent   [] diallo Abdominal _____6/28/18__ LBM  GI: ____diabetic___ Diet ___thin__ Liquids _____ tube feeds  Musculoskeletal: ____ ROM Transfers _____ Assistive Device Used  __min__ Level of Assistance  Skin Integumentary:   [] Intact   [x] Not Intact   __________Preventative Measures  Details______________________________________________________________  Pain: [x] Controlled   [] Not Controlled   Pain Meds:   [] Scheduled   [x] PRN        Registered Dietitian / Nutrition:     Patient Vitals for the past 100 hrs:   % Diet Eaten   06/28/18 1315 100 %   06/28/18 0900 50 %   06/27/18 1756 50 %     Pt reported good appetite and meal intake PTA and since admission. Denied having any concerns at time of visit. Supplements:          [] Yes   [x] No      Amount of supplement consumed: not applicable       Intake/Output Summary (Last 24 hours) at 06/28/18 1612  Last data filed at 06/28/18 1315   Gross per 24 hour   Intake              680 ml   Output                0 ml   Net              680 ml                                Last bowel movement: 6/27      Interdisciplinary Team Goals:     1. Goal Encourage pt to perform stand step transfer with or without AD with minimal assistance and minimal verbal cues for safety    Barrier  Impaired strength, Impaired endurance, Impaired balance    Intervention  Strength and balance training     2. Goal  OTR/L: Pt to perform LB dressing (ie pants) CGA with DME as needed to improve performance with self care. Barrier  Strength, coordination, safety    Intervention  ADL retraining, TE, TA     3. Goal  Patient will produce phrases and short sentences with 90% accuracy/intelligibility. Barrier  Moderate dysarthria, mild cognitive-linguistic impairment    Intervention  Speech drill, oromotor exercises, cognitive retraining.      4. Goal  Nursing:By discharge patient will verbalize knowledge of a normal blood sugar, and will know the s/s and treatment of hyper/hypoglycemia episodes    Barrier Education, knowledge, lack of support    Intervention  medication exercise,blood sugar checks     5. Goal      Barrier      Intervention       6. Goal  Po intake of meals will meet >75% of patient estimated nutritional needs within the next 7 days. Outcome:  [] Met/Ongoing    []  Not Met    [x] New/Initial Goal     Barrier  none known     Intervention  meals/snacks: modified composition       Disposition / Discharge Planning: Follow-up therapy services:  tbd   DME recommendations:  tbd   Estimated discharge date:  tbd   Discharge Location:  home         Electronic Signatures:      Signature Date Signed   Physical Therapist    Monico Balderrama PT, DPT  6/28/18   Occupational Therapist    Sonia Rider, OTR/L 6/29/2018   Speech Therapist    Tejas Abreu, [id number] [facility]  6/29/18   Recreational Therapist    Ang Holloway, [facility] 6/28/18   Nursing    [address] [facility], RN  6/28/18   Dietitian    Shantell Unger, [address]  6/28/18   Clinical Psychologist   Stacie Dutton, PhD 6/28/18    Physician    Marilu Simmons MD   6/28/2018        Belvie Bloch, LUIS MIGUEL  6/28/18         The above information has been reviewed with the patient in a language that they can understand. Opportunity for comments and questions has been provided and a signed attestation has been scanned into the \"media tab\" of the EMR.       Patient Signature: ______________________________________________________    Date Signed: __________________________________________________________

## 2018-06-28 NOTE — REHAB NOTE
Bedside and Verbal shift change report given to MITUL RN (oncoming nurse) by Doylene Meigs, LPN (offgoing nurse). Report included the following information SBAR, Kardex, MAR and Recent Results.     SITUATION:    Code Status: Full Code  Reason for Admission: Rhabdomyolisis   Rhabdomyolysis    [facility] day: 1   Problem List:       Hospital Problems  Date Reviewed: 6/28/2018          Codes Class Noted POA    Vitamin D deficiency (Chronic) ICD-10-CM: E55.9  ICD-9-CM: 268.9  6/28/2018 Yes    Overview Signed 6/28/2018  1:12 PM by Dolores Lomax MD     Vitamin D 25-Hydroxy (6/28/2018) = 13.5              Gout (Chronic) ICD-10-CM: M10.9  ICD-9-CM: 274.9  Unknown Yes        Essential hypertension (Chronic) ICD-10-CM: I10  ICD-9-CM: 401.9  Unknown Yes        Type 2 diabetes mellitus (HCC) (Chronic) ICD-10-CM: E11.9  ICD-9-CM: 250.00  Unknown Yes    Overview Signed 6/27/2018 10:18 PM by Dolores Lomax MD     HbA1c (6/14/2018) = 6.9             Dysphagia ICD-10-CM: R13.10  ICD-9-CM: 787.20  6/14/2018 Yes        History of atrial fibrillation ICD-10-CM: Z86.79  ICD-9-CM: V12.59  6/13/2018 Yes    Overview Signed 6/27/2018 10:23 PM by Dolores Lomax MD     Atrial fibrillation with rapid ventricular response             * (Principal)Rhabdomyolysis ICD-10-CM: M62.82  ICD-9-CM: 728.88  6/13/2018 Yes        History of encephalopathy ICD-10-CM: Z86.69  ICD-9-CM: V12.49  6/13/2018 Yes    Overview Signed 6/27/2018 10:21 PM by Dolores Lomax MD     Acute metabolic encephalopathy             History of acute renal failure ICD-10-CM: Z87.448  ICD-9-CM: V13.09  6/13/2018 Yes        Generalized weakness ICD-10-CM: R53.1  ICD-9-CM: 780.79  6/13/2018 Yes        Impaired mobility and ADLs ICD-10-CM: Z74.09  ICD-9-CM: 799.89  6/13/2018 Yes              BACKGROUND:    Past Medical History:   Past Medical History:   Diagnosis Date    Alcohol dependence in remission ([facility] [address]     alcohol withdrawal discharged 05/31/2013    [facility]ipolar [facility] ([facility]     Chronic hepatitis C virus infection ([facility]     prior IV drug use;  Genotype 1a     Chronic obstructive pulmonary disease ([facility]     Depression     Dysphagia 6/14/2018    Essential hypertension     Eustachian tube dysfunction     Dr. Windy Corrigan Gastroesophageal reflux disease     Gout     History of acute renal failure 6/13/2018    History of atrial fibrillation 6/13/2018    Atrial fibrillation with rapid ventricular response    History of encephalopathy 8/05/8519    Acute metabolic encephalopathy    Low serum high density lipoprotein (HDL) 6/14/2018    HDL (6/14/2018) = 12    Needle phobia     from prior IV drug abuse    Obesity, Class I, BMI 30-34.9     Primary osteoarthritis involving multiple joints     knees and back    Psoriasis     Type 2 diabetes mellitus (HCC)     HbA1c (6/14/2018) = 6.9    Vitamin D deficiency 6/28/2018    Vitamin D 25-Hydroxy (6/28/2018) = 13.5          Patient taking anticoagulants yes     ASSESSMENT:    Changes in Assessment Throughout Shift: none     Patient has Central Line: no Reasons if yes:    Patient has Weston Cath: no Reasons if yes:       Last Vitals:     Vitals:    06/28/18 0900 06/28/18 1040 06/28/18 1130 06/28/18 1521   BP: 135/85 128/81 136/86 (!) 160/95   Pulse: 83 79  89   Resp:    20   Temp:    97.5 °F (36.4 °C)   SpO2:  95%  96%   Weight:       Height:            IV and DRAINS (will only show if present)         WOUND (if present)   Wound Type:  Pressure wound buttocks and heels   Dressing present Dressing Present : Yes   Wound Concerns/Notes:  none     PAIN    Pain Assessment    Pain Intensity 1: 0 (06/28/18 1200)              Patient Stated Pain Goal: 0  o Interventions for Pain:  See flowsheets  o Intervention effective: yes  o Time of last intervention: see flowsheets   o Reassessment Completed: yes      Last 3 Weights:  Last 3 Recorded Weights in this Encounter    06/27/18 1600   Weight: 91 kg (200 lb 9.9 oz)     Weight change:  INTAKE/OUPUT    Current Shift: 06/28 0701 - 06/28 1900  In: 480 [P.O.:480]  Out: -     Last three shifts: 06/26 1901 - 06/28 0700  In: 680 [P.O.:680]  Out: 800 [Urine:800]     LAB RESULTS     Recent Labs      06/28/18 0619   WBC  4.5*   HGB  10.3*   HCT  32.6*   PLT  310        Recent Labs      06/28/18 0619   NA  143   K  3.9   GLU  83   BUN  10   CREA  0.90   CA  9.0   MG  1.7       RECOMMENDATIONS AND DISCHARGE PLANNING     1. Pending tests/procedures/ Plan of Care or Other Needs: labs    2. Discharge plan for patient and Needs/Barriers: TBD    3. Estimated Discharge Date: TBD Posted on Whiteboard in Patients Room: no      4. The patient's care plan was reviewed with the oncoming nurse. \"HEALS\" SAFETY CHECK      Fall Risk    Total Score: 4    Safety Measures: Safety Measures: Bed/Chair alarm on, Bed/Chair-Wheels locked, Bed in low position, Caregiver at bedside, Call light within reach, Family at bedside    A safety check occurred in the patient's room between off going nurse and oncoming nurse listed above. The safety check included the below items  Area Items   H  High Alert Medications - Verify all high alert medication drips (heparin, PCA, etc.)   E  Equipment - Suction is set up for ALL patients (with jamiker)  - Red plugs utilized for all equipment (IV pumps, etc.)  - WOWs wiped down at end of shift.  - Room stocked with oxygen, suction, and other unit-specific supplies   A  Alarms - Bed alarm is set for fall risk patients  - Ensure chair alarm is in place and activated if patient is up in a chair   L  Lines - Check IV for any infiltration  - Weston bag is empty if patient has a Weston   - Tubing and IV bags are labeled   S  Safety   - Room is clean, patient is clean, and equipment is clean. - Hallways are clear from equipment besides carts.    - Fall bracelet on for fall risk patients  - Ensure room is clear and free of clutter  - Suction is set up for ALL patients (with may)  - Hallways are clear from equipment besides carts.    - Isolation precautions followed, supplies available outside room, sign posted     Hema Garcia LPN

## 2018-06-28 NOTE — PROGRESS NOTES
Problem: Neurolinguistics Impaired (Adult)  Goal: *Speech Goal: (INSERT TEXT)  Long term goals:  1. Patient will use strategies of slowed reate, over-articulation and increased loudness to improve speech production, supervision. 2. Patient will be 100% intelligible in casual conversation, supervision. 3. Patient will recall 3 words after 5 minutes in short term memory task, supervision. 4. Patient will perform functional problem solving and reasoning tasks with 90% accuracy. Short term goals  (by 7/5/18):  1. Patient will use strategies of slowed reate, over-articulation and increased loudness to improve speech production, mod-min cues. 2. Patient will be 90% intelligible in production of polysyllabic words and phrases, min cues-supervision. 3. Patient will be 80% intelligible in casual conversation, min cues for use of strategies. 4. Patient will recall 3 words after 5 minutes in short term memory task, mod-min assist.  4. Patient will perform functional problem solving and reasoning tasks with 75-85% accuracy. Speech LAnguage Pathology evaluation    Patient: Ethan Shipman (25 y.o. female)  Date: 6/28/2018  Primary Diagnosis: Rhabdomyolisis  Rhabdomyolysis        Precautions:   Aspiration, Fall    SUBJECTIVE:   Patient stated I want to be able to write better.     OBJECTIVE:     Past Medical History:   Diagnosis Date    Alcohol dependence in remission ([facility] [address]     alcohol withdrawal discharged 05/31/2013    Bipolar 1 disorder ([facility]     Chronic hepatitis C virus infection ([facility] [address] [facility]     prior IV drug use;  Genotype 1a     Chronic obstructive pulmonary disease ([facility] [address] [facility]     Depression     Dysphagia 6/14/2018    Essential hypertension     Eustachian tube dysfunction     Dr. Sheri Dixon Gastroesophageal reflux disease     Gout     History of acute renal failure 6/13/2018    History of atrial fibrillation 6/13/2018    Atrial fibrillation with rapid ventricular response    History of encephalopathy 3/63/5131    Acute metabolic encephalopathy    Low serum high density lipoprotein (HDL) 2018    HDL (2018) = 12    Needle phobia     from prior IV drug abuse    Obesity, Class I, BMI 30-34.9     Primary osteoarthritis involving multiple joints     knees and back    Psoriasis     Type 2 diabetes mellitus (HCC)     HbA1c (2018) = 6.9    Vitamin D deficiency 2018    Vitamin D 25-Hydroxy (2018) = 13.5      Past Surgical History:   Procedure Laterality Date    HX  SECTION      x 3    HX COLONOSCOPY      Dr. Jorge Levo - due for repeat     HX CYST REMOVAL      Hydrocystoma R eye removed    HX PARTIAL HYSTERECTOMY      for fibroid tumors     Prior Level of Function/Home Situation:   Home Situation  Home Environment: Apartment  # Steps to Enter: 0 (elevator to 9th floor apt)  One/Two Story Residence: One story  Living Alone: Yes  Support Systems: Child(shira)  Patient Expects to be Discharged to[de-identified] Apartment  Current DME Used/Available at Home: None  Tub or Shower Type: Tub/Shower combination  Mental Status:  Neurologic State: Alert  Orientation Level: Oriented X4  Cognition: Appropriate for age attention/concentration, Follows commands  Perception: Appears intact  Perseveration: No perseveration noted  Safety/Judgement: Awareness of environment  Motor Speech:  Oral-Motor Structure/Motor Speech  Face: No impairment  Labial: No impairment  Dentition: Natural  Oral Hygiene: Good  Lingual: Decreased rate  Velum: No impairment  Mandible: No impairment  Apraxic Characteristics: None  Dysarthric Characteristics: Imprecise  Intelligibility: Impaired  Word Intelligibility (%): 100 %  Phrase Intelligibility (%): 100 %  Sentence Intelligibility (%): 90 %  Conversation Intelligibility (%): 80 %  Intonation: Limited at times  Rate: Rapid  Overall Impairment Severity: Mild-moderate  Compensatory Strategies for Motor Speech: Slowed rate, over-articulation, increased loudness  Language Comprehension and Expression:  Auditory Comprehension  Auditory Impairment: No   Verbal Expression  Primary Mode of Expression: Verbal  Initiation: No impairment  Automatic Speech Task: No impairment  Repetition: No impairment  Naming: No impairment  Sentence Completion: No impairment  Sentence Formulation: No impairment  Conversation: Dysarthric  Effective Techniques: Decreased rate;Phrasing  Overall Impairment: Mild-moderate  Cueing type: Verbal  Cueing amount: Min-mod  Reading Comprehension  Visual Impairment: No impairment  Pre-Morbid Reading Status: Literate  Scanning/Tracking : No impairment  Words : Yes  Sentence: No Impairment  Paragraph :  Impaired  Oral Reading: No impairment  Interfering Components: Attention to detail  Effective Techniques: Large print  Overall Impairment Severity: Minimal  Written Expression  Pre-Morbid Dominant Hand: Right  Pre-Morbid Writing Skills: WFL  Legibility : Decreased legibility;Uses dominant hand  Dictation : No impairment  Formulation: No impairment  Interfering Components:  (Motor difficulty, decreased legibility)  Effective Techniques: Language cues  Overall Impairment Severity: Moderate  Neuro-Linguistics:  Verbal Reasoning Tasks: Impaired  Verbal Problem Solving: Impaired  Verbal Organization: No Impairment  Thought Organization: Occasional difficulty  Mathematical:  (DNT)  Memory: Impaired  Attention : No Impairement  Pragmatics:  Pragmatics Impairment: Tangential;Verbose  Pragmatics Impairment Severity: Mild  Voice:  Mildly strained, appropriate ptich       Pain:  Pain Scale 1: Numeric (0 - 10)  Pain Intensity 1: 0     After treatment:   [x]              Patient left in no apparent distress sitting up in chair  []              Patient left in no apparent distress in bed  [x]              Call bell left within reach  []              Nursing notified  []              Caregiver present  []              Bed alarm activated    ASSESSMENT:   Based on the objective data described below, the patient presents with mild-moderate dysarthria an mild cognitive-linguit=stic deficits secondary to encephalopathy. .    Patient will benefit from skilled intervention to address the above impairments. Patients rehabilitation potential is considered to be Good  Factors which may influence rehabilitation potential include:   []              None noted  [x]              Mental ability/status  [x]              Medical condition  [x]              Home/family situation and support systems  [x]              Safety awareness  []              Pain tolerance/management  []              Other:     PLAN:   Recommendations and Planned Interventions:  SLP will follow to address dysarthria and cognition per the above care plan. Frequency/Duration: Patient will be followed by speech-language pathology 1-2 times per day/4-7 days per week to address goals. Discharge Recommendations: To Be Determined    Estimated LOS: 3 Weeks    COMMUNICATION/EDUCATION:   [x]  Patient/family have participated as able in goal setting and plan of care. [x]  Patient/family agree to work toward stated goals and plan of care. []  Patient understands intent and goals of therapy, but is neutral about his/her participation. []  Patient is unable to participate in goal setting and plan of care.     Thank you for this referral.  Jimfritz Gutierrez, SLP  Time Calculation: 60 mins

## 2018-06-28 NOTE — PROGRESS NOTES
06/28/18 [facility] successfully processed and  faxed to [address] [place] [address]. I left message on dtr Ms Chandana's voice mail that it was processed. Willa Ramsey.  [address] [facility]  [phone], beeper [phone]

## 2018-06-28 NOTE — H&P
[facility] PHYSICAL REHABILITATION  [address] [place] [address], [address]     INPATIENT REHABILITATION  HISTORY AND PHYSICAL  (Post Admission Physician Evaluation)    Name: Boom Kennedy CSN: [id number]   Age: 59 y.o. MRN: [id number]   Sex: female Admit Date: 6/27/2018     PCP: Dr. Shayla Garrett      Primary Rehab Impairment Category (SOULEYMANE): Neurological    Impairment Group Label: Neuromuscular disorders    Etiologic Diagnosis: Rhabdomyolysis      Subjective:     Patient seen and examined. History of the Present Illness: The patient is a 68-year-old, right-handed, Black female with multiple medical comorbidities who was admitted to [facility] on 6/13/2018 due to confusion. The patient was apparently well until the morning of admission, the patient was found by daughter to be confused and on the living room floor. The patient was brought to the [facility] Emergency Department for further evaluation. Excerpt from the ED Provider Note by Leonardo Manzo NP (reviewed and attested by Dr. Franck Virgen):  Maliha Veras is a 59 y.o. female with a PMHX HTN, Arthritis, DM type 2, Hepatitis C, GERD, COPD, Bipolar 1 Disorder, Depression, Alcohol Dependence in Remission, DJD, Eustachian Tube Dysfunction, and Psoriasis arrived to ER via EMS from home for medical evaluation. Daughter reports patient was found on living room floor this am and confused. Daughter reports the last time she had spoke or seen her mother was six days ago. Patient currently alert to person, place, and location. Daughter reports patient had ripped off the labels on her prescription bottles . Patient not alert to time or day. Patient states, \"not sure what happened, I cant remember why I was on the floor. \"  Patient c/o neck pain and generalize bodyache. Patient smkoes less than 1/2 pack of cigarettes per day.   Patient denies fever, chills, headache, dizziness, visual disturbance, CP, Palpitations, SOB, abdominal pain, N/V/D, flank pain, back pain, urinary sx's, or any other concerns. \"    12-lead EKG (6/13/2018, 8:30 AM) showed atrial fibrillation with rapid ventricular response at 154 bpm.     CT scan of the head (6/13/2018) showed no acute intracranial abnormality. CT scan of the cervical spine (6/13/2018) showed straightening of the usual cervical lordosis without evidence of fracture or acute traumatic listhesis; cervical spondylosis and neuroforaminal narrowing; patchy groundglass opacity at the periphery of the imaged right upper lobe, potentially reflecting pneumonitis or pneumonia given the presenting history. For the AF with RVR, patient was given Digoxin 500 mcg IV x 1 dose and she was started on a Diltiazem drip. CK (6/13/2018) = 6954. BUN/Creatinine (6/13/2018) = 128/2. 42. Mg (6/13/2018) = 3.4. WBC count (6/13/2018) = 9.8. Lactic acid (6/13/2018) = 2.7. Urine toxicology (6/13/2018) was positive for THC. ALT was slightly elevated at 78. AST was elevated at 282. ALP was normal at 46. Repeat EKG at the ER (6/13/2018, 12:45 PM) showed conversion of the rhythm to sinus tachycardia at  105 bpm.    The patient was admitted under the service of the [address] [place] [address] (Dr. Raúl Morales). Patient was started on intravenous fluids. Patient was also empirically started on Levofloxacin. Metformin and all antihypertensive medications were put on hold. Patient was started on Aspirin 325 mg PO once daily and Atorvastatin 80 mg PO q HS. Unfractionated heparin, SCDs and EFRAÍN stockings were used for DVT prophylaxis. MRI of the brain (6/14/2018) showed mild atrophy and minimal chronic small vessel changes; otherwise, unremarkable evaluation; specifically, no acute intracranial abnormalities are identified. Neurology consult (Dr. Manuelito Mckeon) was called for evaluation and comanagement.  Dr. Manuelito Mckeon did not feel the patient had a stroke. Aspirin dose was decreased to 81 mg PO once daily. Atorvastatin dose was decreased (and eventually discontinued). Impression was \"encephalopathy\". Ammonia level (6/15/2018) = 53. Patient was started on Lactulose. TelePsychiatry consult (Dr. Scottie Nobles) was called for evaluation and comanagement. Impression was \"unspecified delirium, resolving\". Patient's mental status/cognition had continued to improve during her hospital stay. The patient had remained hemodynamically stable but due to the above events, the patient was noted to be generally weak and with impaired mobility and ADLs. Patient was felt to be a good candidate for acute inpatient rehabilitation. Upon evaluation by Physical Therapy and Occupational Therapy, the patient was recommended for acute inpatient rehabilitation. The patient was discharged and was subsequently admitted to the [facility] for intensive rehabilitation to help recover strength, function and mobility.       Past Medical History:  Past Medical History:   Diagnosis Date    [facility]     alcohol withdrawal discharged 05/31/2013    Bipolar 1 disorder ([facility]     Chronic hepatitis C virus infection ([facility]     prior IV drug use;  Genotype 1a     [facility] pulmonary disease [facility]     Depression     Dysphagia 6/14/2018    Essential hypertension     Eustachian tube dysfunction     Dr. Tony Huston Gastroesophageal reflux disease     Gout     History of acute renal failure 6/13/2018    History of atrial fibrillation 6/13/2018    Atrial fibrillation with rapid ventricular response    History of encephalopathy 2/93/6036    Acute metabolic encephalopathy    Low serum high density lipoprotein (HDL) 6/14/2018    HDL (6/14/2018) = 12    Needle phobia     from prior IV drug abuse    Obesity, Class I, BMI 30-34.9     Primary osteoarthritis involving multiple joints     knees and back    Psoriasis     Type [facility] diabetes mellitus ([facility]     HbA1c (2018) = 6.9       Past Surgical History:  Past Surgical History:   Procedure Laterality Date    HX  SECTION      x 3    HX COLONOSCOPY      Dr. Jorge Levo - due for repeat     HX CYST REMOVAL      Hydrocystoma R eye removed    HX PARTIAL HYSTERECTOMY      for fibroid tumors       Allergies: Allergies   Allergen Reactions    Ace Inhibitors Cough    Penicillins Hives       Social History: The patient is single, lives alone on the 9th floor in a 9-story apartment with an elevator in [place], [place]. She smokes 8-10 cigarettes per day, drinks 2-3 cans of beer about 2 times a month and smokes marijuana. Family History: Both parents are . Family History   Problem Relation Age of Onset    MS Mother     Heart Attack Mother        Transfer Medications (from the transfer summary prepared by Dr. Natalee Ventura):    Prior to Admission Medications   Prescriptions Last Dose Informant Patient Reported? Taking? Blood-Glucose Meter monitoring kit 2018 at Unknown time  No Yes   Sig: Use as directed. HYDROcodone-acetaminophen (NORCO) 5-325 mg per tablet Not Taking at Unknown time  No No   Sig: Take 1 Tab by mouth every four (4) hours as needed for Pain. Max Daily Amount: 6 Tabs. Lancets misc 2018 at Unknown time  No Yes   Sig: Use daily or as directed for blood sugar monitoring, dx 250.00   Omeprazole delayed release (PRILOSEC D/R) 20 mg tablet Not Taking at Unknown time  No No   Sig: Take 1 tablet by mouth daily. Shower Chair XX clemencia Unknown at Unknown time  No No   Sig: Use daily with showering. Dx: djd of knee   aspirin 81 mg chewable tablet 2018 at 0900  No Yes   Sig: Take 1 Tab by mouth daily. cetirizine (ZYRTEC) 10 mg tablet Unknown at Unknown time  No No   Sig: Take 1 Tab by mouth daily. chlorthalidone (HYGROTEN) 25 mg tablet Unknown at Unknown time  Yes No   Sig: Take  by mouth daily.    cloNIDine HCl (CATAPRES) 0.2 mg tablet Unknown at 0900  Yes No   Sig: Take  by mouth two (2) times a day. colchicine 0.6 mg tablet 6/27/2018 at 0900  No Yes   Sig: Take 1 Tab by mouth two (2) times a day. conjugated estrogens (PREMARIN) 0.625 mg/gram vaginal cream Not Taking at Unknown time  No No   Sig: Insert 0.5 g into vagina daily. cyclobenzaprine (FLEXERIL) 10 mg tablet Not Taking at Unknown time  Yes No   Sig: Take  by mouth three (3) times daily as needed for Muscle Spasm(s). folic acid (FOLVITE) 1 mg tablet 6/27/2018 at Unknown time  Yes Yes   Sig: Take  by mouth daily. glucose blood VI test strips (BLOOD GLUCOSE TEST) strip 6/27/2018 at Unknown time  No Yes   Sig: Use daily or as directed for blood sugar monitoring. .00   hydrocortisone valerate (WEST-FELISHA) 0.2 % ointment Not Taking at Unknown time  No No   Sig: Apply  to affected area two (2) times a day. use thin layer   ipratropium-albuterol (COMBIVENT RESPIMAT)  mcg/actuation inhaler Not Taking at Unknown time  No No   Sig: Take 1 Puff by inhalation every six (6) hours as needed for Wheezing. losartan-hydrochlorothiazide (HYZAAR) 50-12.5 mg per tablet Unknown at Unknown time  No No   Sig: Take 1 tablet by mouth daily. meloxicam (MOBIC) 15 mg tablet Unknown at Unknown time  No No   Sig: Take 1 Tab by mouth daily. metFORMIN (GLUCOPHAGE) 500 mg tablet 5/27/2018 at Unknown time  Yes Yes   Sig: Take  by mouth two (2) times daily (with meals). naproxen (NAPROSYN) 375 mg tablet Unknown at Unknown time  No No   Sig: Take 1 Tab by mouth two (2) times daily (with meals). predniSONE (DELTASONE) 20 mg tablet Unknown at Unknown time  No No   Sig: Take 2 tabs in AM with food for 7 days then 1 tab until gone   spironolactone (ALDACTONE) 25 mg tablet Not Taking at Unknown time  Yes No   Sig: Take  by mouth daily. traMADol (ULTRAM) 50 mg tablet Not Taking at Unknown time  No No   Sig: Take 2 Tabs by mouth two (2) times daily as needed for Pain. Facility-Administered Medications: None       Review Of Systems:   CONSTITUTIONAL: No weight loss. EYES: No blurred vision and no eye discharge. ENT: No nasal discharge. No ear pain. CARDIOVASCULAR: No chest pain and no diaphoresis. RESPIRATORY: No cough, no hemoptysis. GI: No vomiting, no diarrhea   : No urinary frequency and no dysuria. MUSCULOSKELETAL: No muscle pains. SKIN: No rashes. NEURO: No dizziness, no numbness. ENDOCRINE: No polyphagia and no polydipsia. HEMATOLOGY: No bleeding tendencies. Objective:     Vital Signs:  Patient Vitals for the past 24 hrs:   BP Temp Pulse Resp SpO2 Height Weight   06/28/18 0900 135/85 - 83 - - - -   06/28/18 0802 161/89 98.1 °F (36.7 °C) 78 20 96 % - -   06/27/18 2200 142/88 98.2 °F (36.8 °C) 80 17 99 % - -   06/27/18 1600 145/89 98.6 °F (37 °C) 79 20 93 % 5' 5\" (1.651 m) 91 kg (200 lb 9.9 oz)   06/27/18 1438 143/84 98.3 °F (36.8 °C) 77 19 96 % 5' 5\" (1.651 m) -        Body mass index is 33.38 kg/(m^2). Physical Examination:  GENERAL SURVEY: Patient is awake, alert, oriented x 3, sitting comfortably on the chair, not in acute respiratory distress. HEENT: pink palpebral conjunctivae, anicteric sclerae, no nasoaural discharge, moist oral mucosa  NECK: supple, no jugular venous distention, no palpable lymph nodes  CHEST/LUNGS: symmetrical chest expansion, good air entry, clear breath sounds  HEART: adynamic precordium, good S1 S2, no S3, regular rhythm, no murmurs  ABDOMEN: obese, bowel sounds appreciated, soft, non-tender  EXTREMITIES: pink nailbeds, no edema, full and equal pulses, no calf tenderness   NEUROLOGICAL EXAM: The patient is awake, alert and oriented x3, able to answer questions fairly appropriately, able to follow 1 and 2 step commands. Able to tell time from the wall clock. Cranial nerves II-XII are grossly intact. No gross sensory deficit. Motor strength is 4/5 on BUE, 4/5 on BLE.       Current Medications:  Current Facility-Administered Medications   Medication Dose Route Frequency    [START ON 6/29/2018] metFORMIN (GLUCOPHAGE) tablet 500 mg  500 mg Oral DAILY WITH BREAKFAST    acetaminophen (TYLENOL) tablet 650 mg  650 mg Oral Q4H PRN    docusate sodium (COLACE) capsule 100 mg  100 mg Oral BID    bisacodyl (DULCOLAX) tablet 10 mg  10 mg Oral Q48H PRN    insulin lispro (HUMALOG) injection   SubCUTAneous TIDAC    aspirin chewable tablet 81 mg  81 mg Oral DAILY WITH BREAKFAST    vitamin B complex tablet  1 Tab Oral DAILY    pantoprazole (PROTONIX) tablet 40 mg  40 mg Oral ACB    heparin (porcine) injection 5,000 Units  5,000 Units SubCUTAneous Q8H    albuterol (PROVENTIL VENTOLIN) nebulizer solution 2.5 mg  2.5 mg Nebulization Q4H PRN    losartan (COZAAR) tablet 25 mg  25 mg Oral DAILY    colchicine (MITIGARE) capsule 0.6 mg  0.6 mg Oral DAILY       Functional Assessment:     Occupational Therapy   Prior Level of Function  Pre-Admission Screen  Post-Admission Evaluation   Eating   Independent Eating   Supervision Eating  Functional Level: 7   Grooming   Independent Grooming   Supervision Grooming  Functional Level: 5   Upper Body Dressing   Independent Upper Body Dressing   Supervision Upper Body Dressing  Functional Level: 4   Lower Body Dressing   Independent Lower Body Dressing   Maximal Assist Lower Body Dressing  Functional Level: 4   Bladder Management   Independent Bladder Management   Supervision Toileting  Functional Level: 0   Bowel Management   Independent Bowel Management   Supervision      Physical Therapy   Prior Level of Function  Pre-Admission Screen  Post-Admission Evaluation   Ambulation   Independent Ambulation   Maximal Assist Gait  Distance (ft): 18 Feet (ft)  Assistive Device: Walker, rolling, Gait belt (w/c follow)   Bed Mobility   Independent Bed Mobility   Supervision Bed/Mat Mobility  Rolling Right : 5 (Stand-by assistance)  Rolling Left : 5 (Stand-by assistance)  Supine to Sit : 4 (Minimal assistance)  Sit to Supine : 4 (Minimal assistance)   Supine to Sit   Independent Supine to Sit   Supervision Bed/Mat Mobility  Rolling Right : 5 (Stand-by assistance)  Rolling Left : 5 (Stand-by assistance)  Supine to Sit : 4 (Minimal assistance)  Sit to Supine : 4 (Minimal assistance)   Sit to Stand   Independent Sit to Stand   Minimal Assist Bed/Mat Mobility  Rolling Right : 5 (Stand-by assistance)  Rolling Left : 5 (Stand-by assistance)  Supine to Sit : 4 (Minimal assistance)  Sit to Supine : 4 (Minimal assistance)   Bed/Chair Transfers   Independent Bed/Chair Transfers   Minimal Assist Transfers  Transfer Type: SPT without device (stand step w/out AD, with gait belt)  Other: toilet transfer with min A with use of grab bar  Transfer Assistance : 3 (Moderate assistance ) (w/out AD; min A with RW)  Sit to Stand Assistance: Minimal assistance  Car Transfers: Not tested  Car Type: n/a   Toilet Transfers   Independent Toilet Transfers   Minimal Assist Toilet Transfers  Toilet Transfer Score: 4     Speech and Language Pathology  Post-Admission Evaluation     Comprehension (Native Language)  Primary Mode of Comprehension: Auditory  Score: 5     Expression (Native Language)  Primary Mode of Expression: Verbal  Score: 5     Social Interaction/Pragmatics  Score: 4     Problem Solving  Score: 4     Memory  Score: 3       Legend:   7 - Independent   6 - Modified Independent   5 - Standby Assistance / Supervision / Set-up   4 - Minimum Assistance / Contact Guard Assistance   3 - Moderate Assistance   2 - Maximum Assistance   1 - Total Assistance / Dependent       Labs on Admission:  Recent Results (from the past 24 hour(s))   GLUCOSE, POC    Collection Time: 06/27/18  5:16 PM   Result Value Ref Range    Glucose (POC) 91 70 - 110 mg/dL   GLUCOSE, POC    Collection Time: 06/28/18  2:42 AM   Result Value Ref Range    Glucose (POC) 151 (H) 70 - 614 mg/dL   METABOLIC PANEL, BASIC    Collection Time: 06/28/18  6:19 AM   Result Value Ref Range    Sodium 143 136 - 145 mmol/L    Potassium 3.9 3.5 - 5.5 mmol/L    Chloride 109 (H) 100 - 108 mmol/L    CO2 28 21 - 32 mmol/L    Anion gap 6 3.0 - 18 mmol/L    Glucose 83 74 - 99 mg/dL    BUN 10 7.0 - 18 MG/DL    Creatinine 0.90 0.6 - 1.3 MG/DL    BUN/Creatinine ratio 11 (L) 12 - 20      GFR est AA >60 >60 ml/min/1.73m2    GFR est non-AA >60 >60 ml/min/1.73m2    Calcium 9.0 8.5 - 10.1 MG/DL   CBC WITH AUTOMATED DIFF    Collection Time: 06/28/18  6:19 AM   Result Value Ref Range    WBC 4.5 (L) 4.6 - 13.2 K/uL    RBC 3.90 (L) 4.20 - 5.30 M/uL    HGB 10.3 (L) 12.0 - 16.0 g/dL    HCT 32.6 (L) 35.0 - 45.0 %    MCV 83.6 74.0 - 97.0 FL    MCH 26.4 24.0 - 34.0 PG    MCHC 31.6 31.0 - 37.0 g/dL    RDW 14.2 11.6 - 14.5 %    PLATELET 571 398 - 808 K/uL    MPV 10.6 9.2 - 11.8 FL    NEUTROPHILS 33 (L) 40 - 73 %    LYMPHOCYTES 53 (H) 21 - 52 %    MONOCYTES 10 3 - 10 %    EOSINOPHILS 3 0 - 5 %    BASOPHILS 1 0 - 2 %    ABS. NEUTROPHILS 1.5 (L) 1.8 - 8.0 K/UL    ABS. LYMPHOCYTES 2.4 0.9 - 3.6 K/UL    ABS. MONOCYTES 0.4 0.05 - 1.2 K/UL    ABS. EOSINOPHILS 0.2 0.0 - 0.4 K/UL    ABS. BASOPHILS 0.0 0.0 - 0.06 K/UL    DF AUTOMATED     MAGNESIUM    Collection Time: 06/28/18  6:19 AM   Result Value Ref Range    Magnesium 1.7 1.6 - 2.6 mg/dL   VITAMIN D, 25 HYDROXY    Collection Time: 06/28/18  6:19 AM   Result Value Ref Range    Vitamin D 25-Hydroxy 13.5 (L) 30 - 100 ng/mL   GLUCOSE, POC    Collection Time: 06/28/18  7:35 AM   Result Value Ref Range    Glucose (POC) 81 70 - 110 mg/dL   GLUCOSE, POC    Collection Time: 06/28/18 11:36 AM   Result Value Ref Range    Glucose (POC) 99 70 - 110 mg/dL       Estimated Glomerular Filtration Rate:  On admission, estimated GFR based on a Creatinine of 0.90 mg/dl:   Using CKD-EPI = 81.1 mL/min/1.73m2   Using MDRD = 82.4 mL/min/1.73m2      Assessment:     Primary Rehabilitation Diagnosis  1.  Impaired Mobility and ADLs  2. Rhabdomyolysis    Comorbidities   Chronic hepatitis C virus infection  B18.2    Bipolar 1 disorder  F31.9    Depression F32.9    Alcohol dependence in remission  F10.21    Needle phobia F40.298    Eustachian tube dysfunction H69.80    Psoriasis L40.9    History of atrial fibrillation Z86.79    History of encephalopathy Z86.69    Primary osteoarthritis involving multiple joints M15.0    Essential hypertension I10    Gastroesophageal reflux disease K21.9    Type 2 diabetes mellitus  E11.9    Chronic obstructive pulmonary disease  J44.9    History of acute renal failure Z87.448    Hypoalbuminemia E88.09    Low serum high density lipoprotein (HDL) R74.8    Obesity, Class I, BMI 30-34.9 E66.9    Dysphagia R13.10    Vitamin D deficiency E55.9    Gout M10.9       Willingness to participate in the program: Good      Rehabilitation Potential: Good      Plan:     1. Medical Issues being followed closely:    [x]  Fall and safety precautions     []  Wound Care     [x]  Bowel and Bladder Function     [x]  Fluid Electrolyte and Nutrition Balance     []  Pain Control      2. Issues that 24 hour rehabilitation nursing is following:    [x]  Fall and safety precautions     []  Wound Care     [x]  Bowel and Bladder Function     [x]  Fluid Electrolyte and Nutrition Balance     []  Pain Control      [x]  Assistance with and education on in-room safety with transfers to and from the bed, wheelchair, toilet and shower. 3. Acute rehabilitation plan of care:    [x]  Patient to be evaluated and treated by:           [x]  Physical Therapy           [x]  Occupational Therapy           []  Speech Therapy     []  Hold Rehab until further notice     5. Medications:    [x]  MAR Reviewed     [x]  Continue Present Medications     6. DVT Prophylaxis:      []  Lovenox     [x]  Unfractionated Heparin     []  Coumadin     []  NOAC     [x]  EFRAÍN Stockings     [x]  Sequential Compression Device     []  None     7.  Rehabilitation program and expectations from patient, as well as medical issues discussed with the patient. REHABILITATION PLAN:    1. The patient is being admitted to a comprehensive acute inpatient rehabilitation program consisting of at least 180 minutes a day, 5 out of 7 days a week of  combined physical and occupational therapy, and close supervision by a physician with special training and experience in rehabilitation medicine. 2. The patient's prognosis for significant practical improvement within a reasonable period of time appears to be good. 3. The estimated length of stay is 12 days. 4. The patient/family has a good understanding of our discharge process. The patient has potential to make improvement and is in need of at least two of the following multidisciplinary therapies including but not limited to physical, occupational, speech, nutritional services, wound care, prosthetics and orthotics. The patient is expected to be able to return to home with home health therapy and family support. 5. Given the patient's multiple co-morbidities and risk for further medical complications, rehabilitation services could not be safely provided at a lower level of care such as a skilled nursing facility. 6. Physical therapy for therapeutic exercise, progressive mobility, gait training, transfer training, bed mobility training, patient and family education, and wheelchair mobility training. Physical therapy goals to address - extremity function, range of motion, balance, safety awareness, independence in transfers, activity tolerance, independence in bed mobility, and independence in ambulation. 7. Occupational therapy for self-care home management, transfer training, therapeutic exercise, activity, wheelchair mobility training. Occupational therapy goals to address - extremity function, cognition, balance, activity tolerance, independence in functional transfers, range of motion, safety awareness, independence in ADL  and independence in home management skills.   8. Specialized 24 hour rehabilitation nursing care for bowel and bladder retraining, disease management, pain management, pressure ulcer prevention and management per policy, education on pressure relief techniques, embolism prevention, nutrition management, hydration management, transfer training and   medication distribution. 9. Nutrition and Dietary services will be obtained for assessment of adequate calorie needs, hydration and calorie counts as appropriate. 10. Therapeutic recreation for leisure skills. 6. Rehab psychology for coping skills. 12. Social work services for patient and family counseling and safe discharge planning. 13. I will be in charge of the inpatient rehab program. Full details of the inpatient rehab program will be outlined in the initial team conference. REHABILITATION GOALS:  Improve functional and activities of daily living skills in order to return back to independent living with family support. MEDICAL PLAN:  > Rhabdomyolysis; History of acute renal failure; History of atrial fibrillation with rapid ventricular response;  History of encephalopathy   > CK (6/13/2018, on admission to [facility]) = 6954   > CK (6/21/2018) = 183   > BUN/Creatinine (6/13/2018, on admission to [facility]) = 128/2.42   > BUN/Creatinine (6/19/2018) = 11/0.86   > BUN/Creatinine (6/28/2018, on admission to [facility]) = 10/0.90     > Dysphagia   > Speech and Language Pathology to evaluate and treat     > Gout   > Patient stated she has gout and that during her stay at [facility], she had pain great toes of both feet   > On 6/24/2018, patient was started on Colchicine 0.6 mg PO BID   > Patient stated the pain has improved significantly   > Decrease Colchicine from 0.6 mg PO BID to 0.6 mg PO once daily     > Essential hypertension   > Prior to admission to [facility], patient was on:    > Chlorthalidone 25 mg PO once daily    > Spironolactone 25 mg PO once daily    > Clonidine 0.2 mg PO BID    > Losartan-HCTZ 50-12.5 1 tab PO once daily   > During the patient's stay at [facility], all above antihypertensive medications were put on hold   > Upon discharge from [facility], all above antihypertensives were resumed as per discharge summary   > Losartan 25 mg PO once daily (9AM)    > Type 2 diabetes mellitus   > Prior to admission to [facility], patient was on Metformin 500 mg PO BID with meals   > HbA1c (6/14/2018) = 6.9   > During the patient's stay at [facility], Metformin was put on hold (re: acute renal failure)   > Upon discharge from [facility], resumed Metformin 500 mg PO BID with meals as per discharge summary   > Metformin 500 mg PO once daily with breakfast   > Humalog insulin sliding scale SC TID AC only    > Vitamin D Deficiency   > Vitamin D 25-Hydroxy (6/28/2018) = 13.5   > Cholecalciferol 50,000 units PO x 1 dose today   > Cholecalciferol 5,000 units PO once daily (starting tomorrow)     > Patient stated she had 4 bowel movements since this AM   > Discontinue Docusate sodium 100 mg PO BID      PRECAUTIONS:   1. Safety/fall precautions. 2. Deep venous thrombosis precautions. POTENTIAL BARRIERS TO DISCHARGE:   1. Risk for falls. 2. The patient lives alone. 3. Family limited in ability to provide constant care. RELEVANT CHANGES SINCE PREADMISSION SCREENING: I have compared the patients medical and functional status at the time of the pre-admission screening and on this post-admission evaluation. The preadmission screen and findings from therapy evaluations both support my post admission physician evaluation, deeming this patient to be an appropriate candidate for the IRF. The patient requires multidisciplinary treatment, physician oversight and intensive therapy not provided at a lower level of care.      By signing this document, I acknowledge that I have personally performed a full physical examination on this patient within 24 hours of admission to this inpatient rehabilitation facility and have determined the patient to be able to tolerate the above course of treatment at an intensive level for a reasonable period of time. I will be completing a detailed individualized plan of care for this patient by day #4 of the patients stay based upon the Pre-Admission Screen, the Post-Admission Evaluation, and the therapy evaluations.       Signed:    Jerri Case MD    June 28, 2018

## 2018-06-28 NOTE — REHAB NOTE
Bedside and Verbal shift change report given to Vida Bosworth LPN (oncoming nurse) by Corrinne Browner, RN (offgoing nurse). Report included the following information SBAR, Kardex, MAR and Recent Results.     SITUATION:    Code Status: Full Code   Reason for Admission: Rhabdomyolisis   Rhabdomyolysis    [facility] day: 1   Problem List:       Hospital Problems  Date Reviewed: 6/24/2018          Codes Class Noted POA    Essential hypertension (Chronic) ICD-10-CM: I10  ICD-9-CM: 401.9  Unknown Yes        Type 2 diabetes mellitus ([facility] (Chronic) ICD-10-CM: E11.9  ICD-9-CM: 250.00  Unknown Yes    Overview Signed 6/27/2018 10:18 PM by Suly Mayorga MD     HbA1c (6/14/2018) = 6.9             History of atrial fibrillation ICD-10-CM: Z86.79  ICD-9-CM: V12.59  6/13/2018 Yes    Overview Signed 6/27/2018 10:23 PM by Suly Mayorga MD     Atrial fibrillation with rapid ventricular response             * (Principal)Rhabdomyolysis ICD-10-CM: M62.82  ICD-9-CM: 728.88  6/13/2018 Yes        History of encephalopathy ICD-10-CM: Z86.69  ICD-9-CM: V12.49  6/13/2018 Yes    Overview Signed 6/27/2018 10:21 PM by Suly Mayorga MD     Acute metabolic encephalopathy             History of acute renal failure ICD-10-CM: Z87.448  ICD-9-CM: V13.09  6/13/2018 Yes        Generalized weakness ICD-10-CM: R53.1  ICD-9-CM: 780.79  6/13/2018 Yes        Impaired mobility and ADLs ICD-10-CM: Z74.09  ICD-9-CM: 799.89  6/13/2018 Yes              BACKGROUND:    Past Medical History:   Past Medical History:   Diagnosis Date    Alcohol dependence in remission ([facility]     alcohol withdrawal discharged 05/31/2013    Bipolar 1 disorder ([facility].)     Chronic hepatitis C virus infection ([facility].)     prior IV drug use;  Genotype 1a     Chronic obstructive pulmonary disease ([facility].)     Depression     Essential hypertension     Eustachian tube dysfunction     Dr. Karma Kendall Gastroesophageal reflux disease     History of acute renal failure 6/13/2018    History of atrial fibrillation 6/13/2018    Atrial fibrillation with rapid ventricular response    History of encephalopathy 9/48/3652    Acute metabolic encephalopathy    Low serum high density lipoprotein (HDL) 6/14/2018    HDL (6/14/2018) = 12    Needle phobia     from prior IV drug abuse    Obesity, Class I, BMI 30-34.9     Primary osteoarthritis involving multiple joints     knees and back    Psoriasis     Type 2 diabetes mellitus (HCC)     HbA1c (6/14/2018) = 6.9         Patient taking anticoagulants yes     ASSESSMENT:    Changes in Assessment Throughout Shift: none     Patient has Central Line: no Reasons if yes:    Patient has Weston Cath: no Reasons if yes:       Last Vitals:     Vitals:    06/27/18 1438 06/27/18 1600 06/27/18 2200   BP: 143/84 145/89 142/88   Pulse: 77 79 80   Resp: 19 20 17   Temp: 98.3 °F (36.8 °C) 98.6 °F (37 °C) 98.2 °F (36.8 °C)   SpO2: 96% 93% 99%   Weight:  91 kg (200 lb 9.9 oz)    Height: 5' 5\" (1.651 m) 5' 5\" (1.651 m)         IV and DRAINS (will only show if present)         WOUND (if present)   Wound Type:  Pressure wound buttocks and heels   Dressing present Dressing Present : Yes   Wound Concerns/Notes:  none     PAIN    Pain Assessment    Pain Intensity 1: 0 (06/28/18 0400)              Patient Stated Pain Goal: 0  o Interventions for Pain:  See flowsheets  o Intervention effective: yes  o Time of last intervention: see flowsheets   o Reassessment Completed: yes      Last 3 Weights:  Last 3 Recorded Weights in this Encounter    06/27/18 1600   Weight: 91 kg (200 lb 9.9 oz)     Weight change:      INTAKE/OUPUT    Current Shift:      Last three shifts: 06/26 1901 - 06/28 0700  In: 680 [P.O.:680]  Out: 800 [Urine:800]     LAB RESULTS   No results for input(s): WBC, HGB, HCT, PLT, HGBEXT, HCTEXT, PLTEXT in the last 72 hours. No results for input(s): NA, K, GLU, BUN, CREA, CA, MG, INR in the last 72 hours.     No lab exists for component: PT, PTT, INREXT    RECOMMENDATIONS AND DISCHARGE PLANNING     1. Pending tests/procedures/ Plan of Care or Other Needs: labs    2. Discharge plan for patient and Needs/Barriers: TBD    3. Estimated Discharge Date: TBD Posted on Whiteboard in Patients Room: no      4. The patient's care plan was reviewed with the oncoming nurse. \"HEALS\" SAFETY CHECK      Fall Risk    Total Score: 4    Safety Measures: Safety Measures: Bed/Chair alarm on, Bed/Chair-Wheels locked, Bed in low position, Call light within reach    A safety check occurred in the patient's room between off going nurse and oncoming nurse listed above. The safety check included the below items  Area Items   H  High Alert Medications - Verify all high alert medication drips (heparin, PCA, etc.)   E  Equipment - Suction is set up for ALL patients (with may)  - Red plugs utilized for all equipment (IV pumps, etc.)  - WOWs wiped down at end of shift.  - Room stocked with oxygen, suction, and other unit-specific supplies   A  Alarms - Bed alarm is set for fall risk patients  - Ensure chair alarm is in place and activated if patient is up in a chair   L  Lines - Check IV for any infiltration  - Weston bag is empty if patient has a Weston   - Tubing and IV bags are labeled   S  Safety   - Room is clean, patient is clean, and equipment is clean. - Hallways are clear from equipment besides carts. - Fall bracelet on for fall risk patients  - Ensure room is clear and free of clutter  - Suction is set up for ALL patients (with may)  - Hallways are clear from equipment besides carts.    - Isolation precautions followed, supplies available outside room, sign posted     Wong Sneed RN

## 2018-06-28 NOTE — PROGRESS NOTES
met with patient, completed the initial Spiritual Assessment of the patient, and offered Pastoral Care, see flow sheets for interventions. Patient talked about her Lova Greulich bucky and that it is important to her well being. Pastoral support provided with prayer. Patient does not have any Taoism/cultural needs that will affect patients preferences in health care. Chart reviewed. Chaplains will continue to follow and will provide pastoral care on an as needed/as requested basis. Dallas Rose MDiv.   Board Certified [employer] [phone]

## 2018-06-28 NOTE — PROGRESS NOTES
Problem: Self Care Deficits Care Plan (Adult)  Goal: *Therapy Goal (Edit Goal, Insert Text)  Occupational Therapy Goals   Long Term Goals  Initiated 2018 and to be accomplished within 2 week(s)  1. Pt will perform self-feeding with (I). 2. Pt will perform grooming with (I) to MI.  3. Pt will perform UB bathing with MI.  4. Pt will perform LB bathing with MI.  5. Pt will perform tub/shower transfer with MI.   6. Pt will perform UB dressing with MI.  7. Pt will perform LB dressing with MI.  8. Pt will perform toileting task with MI.  9. Pt will perform toilet transfer with MI.  8. Pt will perform IADL home mgt (ie laundry, laundry folding, medication mgt) with MI.  11. Pt will perform IADL meal prep with MI. Short Term Goals   Initiated 2018 and to be accomplished within 7 day(s) 2018  1. Pt will perform self-feeding with (I). 2. Pt will perform grooming with (I) to MI.  3. Pt will perform UB bathing with Supervision. 4. Pt will perform LB bathing with Supervision. 5. Pt will perform tub/shower transfer with Supervision . 6. Pt will perform UB dressing with Setup. 7. Pt will perform LB dressing with Supervision. 8. Pt will perform toileting task with Supervision. 9. Pt will perform toilet transfer with Supervision. 10. Pt will perform IADL home mgt (ie laundry, laundry folding, medication mgt) with Supervision. 11. Pt will perform IADL meal prep with Supervision. Occupational Therapy EVALUATION    Patient: Luis Swain 59 y.o.   Date: 2018  Primary Diagnosis: Rhabdomyolisis  Rhabdomyolysis    Patient identified with name and : Yes    Past Medical History:   Past Medical History:   Diagnosis Date    [facility] ([facility]     alcohol withdrawal discharged 2013    [facility] 1 disorder ([facility]     Chronic hepatitis C virus infection ([facility]     prior IV drug use;  Genotype 1a     Chronic obstructive pulmonary disease ([facility]     Depression     Dysphagia 6/14/2018    Essential hypertension     Eustachian tube dysfunction     Dr. Morgan Mckeon Gastroesophageal reflux disease     History of acute renal failure 6/13/2018    History of atrial fibrillation 6/13/2018    Atrial fibrillation with rapid ventricular response    History of encephalopathy 1/76/7616    Acute metabolic encephalopathy    Low serum high density lipoprotein (HDL) 6/14/2018    HDL (6/14/2018) = 12    Needle phobia     from prior IV drug abuse    Obesity, Class I, BMI 30-34.9     Primary osteoarthritis involving multiple joints     knees and back    Psoriasis     Type 2 diabetes mellitus (HCC)     HbA1c (6/14/2018) = 6.9    Vitamin D deficiency 6/28/2018    Vitamin D 25-Hydroxy (6/28/2018) = 13.5      Precautions: Fall risk; pressure injury risk    Time In: 1032  Time Out[de-identified] 1130    Pain:  Pt reports 0/10 pain or discomfort prior to treatment. Pt reports 0/10 pain or discomfort post treatment. SUBJECTIVE:   Patient stated I am an independent person.     OBJECTIVE DATA SUMMARY:   Initiation of therapy pt seated in therapy gym in no apparent distress. Therapist provided introduction and purpose of skilled OT evaluation. Pt receptive to the information and agreeable to the evaluation.      [x]  Right hand dominant   []  Left hand dominant    Therapy Diagnosis:   Difficulty with ADLs  [x]     Difficulty with functional transfers  [x]     Difficulty with ambulation  [x]     Difficulty with IADLs  [x]       Problem List:    Decreased strength B UE  [x]     Decreased strength trunk/core  []     Decreased AROM   []     Decreased endurance  [x]     Decreased balance sitting  []     Decreased balance standing  [x]     Pain   []     Decreased PROM  []       Functional Limitations:   Decreased independence with ADL  [x]     Decreased independence with functional transfers  [x]     Decreased independence with ambulation  [x]     Decreased independence with IADL  [x]       Previous Functional Level: Pre-Morbid Level of Function: (I) for ADLs    Home Environment: Home Situation  Home Environment: Apartment  # Steps to Enter: 0 (elevator)  One/Two Story Residence: Other (Comment) (elevator)  Living Alone: Yes  Support Systems: Family member(s)  Patient Expects to be Discharged to[de-identified] Apartment  Current DME Used/Available at Home: None  Tub or Shower Type: Tub/Shower combination    Barriers to Learning/Limitations: yes;  language, sensory deficits-vision/hearing/speech and physical  Compensate with: visual, verbal, tactile, kinesthetic cues/model    Outcome Measures:      MMT Initial Assessment   Right Upper Extremity  Left Upper Extremity    UE AROM WNL WNL   Shoulder flexion 4/5 MMT 4/5 MMT   Shoulder extension 3+/5 MMT 3+/5 MMT   Shoulder ABDuction 3/5 MMT 3/5 MMT   Shoulder ADDUction 3/5 MMT 3/5 MMT   Elbow Flexion 3+/5 MMT 3+/5 MMT   Elbow Extension 3/5 MMT 3/5 MMT   Wrist Extension/Flexion WNL WNL   [employer] [facility] [employer] WFL   0/5 No palpable muscle contraction  1/5 Palpable muscle contraction, no joint movement  2-/5 Less than full range of motion in gravity eliminated position  2/5 Able to complete full range of motion in gravity eliminated position  2+/5 Able to initiate movement against gravity  3-/5 More than half but not full range of motion against gravity  3/5 Able to complete full range of motion against gravity  3+/5 Completes full range of motion against gravity with minimal resistance  4-/5 Completes full range of motion against gravity with minimal resistance  4/5 Completes full range of motion against gravity with moderate resistance  5/5 Completes full range of motion against gravity with maximum resistance    Sensation: Impaired  Coordination: Impaired for FM coordination. Intact for gross motor coordination. Difficulty with opposition and isolating individual digits.      FIM SCORES Initial Assessment   Bladder - level of assist 2   Bladder - accident frequency score 5   Bowel - level of assist 1   Bowel - accident frequency score 6   Please see [facility] Interdisciplinary Eval: Coordination/Balance Section for details regarding FIM score description. COGNITION/PERCEPTION Initial Assessment   Premorbid Reading Status     Premorbid Writing Status     Arousal/Alertness  Generalized responses   Orientation Level Oriented X4   Visual Fields  Intact   Praxis  Intact for ideational, ideomotor, and constructional praxis. Body Scheme  Intact     COMPREHENSION MODE Initial Assessment   Primary Mode of Comprehension Auditory   Hearing Aide  None   Corrective Lenses  None in pt's personal possession   Score 5     EXPRESSION Initial Assessment   Primary Mode of Expression Verbal   Score 5   Comments  Dysarthria with speech. Slurred speech intermittently. SOCIAL INTERACTION/PRAGMATICS Initial Assessment   Score 5   Comments       PROBLEM SOLVING Initial Assessment   Score 4   Comments       MEMORY Initial Assessment   Score 4   Comments       EATING Initial Assessment   Functional Level    Comments (I) for self feeding of breakfast meal. No reports from pt about difficulty with cutting, bringing food to mouth, or swallowing. GROOMING Initial Assessment   Functional Level 5    Oral Hygiene FIM:5    Tasks completed by patient Washed face, Washed hands   Comments Setup for grooming seated at sink as well as in shower stall on tub bench. Per report of PT, patient brushed her teeth earlier. BATHING Initial Assessment   Functional Level 4   Body parts patient bathed Abdomen, Arm, left, Arm, right, Buttocks, Chest, Tiffanie area, Thigh, left, Thigh, right   Comments Right lower leg and left lower leg washed/dried by pt. Require min assistance to wash and dry bilateral feet. Completed bathing in shower stall seated on tub transfer bench. TUB/SHOWER TRANSFER INDEPENDENCE Initial Assessment   Score 4   Comments Min A for safety cues as well as utilization of proper hand placement with transfers. Tendency to want to pull up from seated position to stand. Education provided about proper hand placement. UPPER BODY DRESSING/UNDRESSING Initial Assessment   Functional Level 4   Items applied/Steps completed Pullover (4 steps)   Comments Supervision to doff pullover shirt and Min A to don pullover shirt. Not challenged with bra due to pt not having in her personal possession. LOWER BODY DRESSING/UNDRESSING Initial Assessment   Functional Level 4    Sock and/or Shoe Management FIM: 4   Items applied/Steps completed Elastic waist pants (3 steps), Underpants (3 steps)   Comments SBA to doff pants and underwear. Min A to don pants and underwear. Doff socks SBA. TOILETING Initial Assessment   Functional Level 4   Comments Min A for clothing mgt. Supervision for full pericare hygiene. TOILET TRANSFER INDEPENDENCE Initial Assessment   Transfer score 4   Comments Min A secondary to require vcs and tactile cues for proper hand placement. Tendency to pull up from seat surface vs push from arm rest.     INSTRUMENTAL ADL Initial Assessment (PLOF)   Meal preparation Independent   Homemaking Independent   Medicine Management Independent   Financial Management Independent        ASSESSMENT :  Based on the objective data described below, the patient presents with difficulty with self care ADLs, complex IADLs, endurance, UE strength, UE coordination, and functional transfers. Patient is A&Ox3, pleasant and cooperative. She reside alone in a high rise handicap apartment on the 9th floor with elevator access. She does not take the steps. She never takes the stairs in her apartment building. Pt has good family support with x3 daughters in the [place] area. Pt desire to reside back in her home environment by herself with daughters checking in on her routinely. Did not utilize DME (ie cane, walker, or wc) prior to hospital admission. Difficulty with speech and occasion slurred speech. Patient not working on disability.  Does not drive with support to and from MD appts via lettrs. Patient voided twice during session with loose runny stool. RN informed about findings. Noticed bilateral soft areas on heel of feet as well as a patch of skin loss on right inferior portion of buttocks. All areas covered appropriately. Wound RN entered the room as OT exited. Call bell within reach with pt seated safely in wc. Pt would benefit from skilled occupational therapy in order to improve independent functional mobility/ADLs,/IADLs within the home. Interventions may include range of motion (AROM, PROM B UE), motor function (B UE/ strengthening/coordination), activity tolerance (vitals, oxygen saturation levels), balance training, ADL/IADL training and functional transfer training. Please see IRC; Interdisciplinary Eval, Care Plan, and Patient Education for further information regarding occupational therapy examination and plan of care. PLAN :  Recommendations and Planned Interventions:  [x]               Self Care Training                   [x]        Therapeutic Activities  [x]               Functional Mobility Training    [x]        Cognitive Retraining  [x]               Therapeutic Exercises            [x]        Endurance Activities  [x]               Balance Training                     [x]        Neuromuscular Re-Education  []               Visual/Perceptual Training      [x]   Home Safety Training  [x]               Patient Education                    [x]        Family Training/Education  []               Other (comment):    Frequency/Duration: Patient will be followed by occupational therapy 1-2 times per day/4-7 days per week to address goals. Discharge Recommendations: To Be Determined  Further Equipment Recommendations for Discharge: to be determined     Please refer to the flowsheet for vital signs taken during this treatment.   After treatment:   [] Patient left in no apparent distress sitting up in chair  [] Patient left in no apparent distress in bed  [] Call bell left within reach  [] Nursing notified  [] Caregiver present  [] Bed alarm activated    COMMUNICATION/EDUCATION:   [] Home safety education was provided and the patient/caregiver indicated understanding. [] Patient/family have participated as able in goal setting and plan of care. [] Patient/family agree to work toward stated goals and plan of care. [] Patient understands intent and goals of therapy, but is neutral about his/her participation. [] Patient is unable to participate in goal setting and plan of care. Order received from MD for occupational therapy services and chart reviewed. Pt to be seen 5 times per week for 3 hours of total therapy per day for 2 weeks.   Thank you for the referral.    Thank you for this referral.  Alexandria Liang OTR/JUAN CARLOS

## 2018-06-28 NOTE — PROGRESS NOTES
Problem: Mobility Impaired (Adult and Pediatric)  Goal: *Acute Goals and Plan of Care (Insert Text)  Physical Therapy Goals  STG's  Initiated 2018 and to be accomplished within 7 day(s)  1. Patient will move from supine to sit and sit to supine  and roll side to side in bed with supervision/set-up. 2.  Patient will transfer from bed to chair and chair to bed with contact guard assist using the least restrictive device. 3.  Patient will perform sit to stand with contact guard assist.  4.  Patient will ambulate with minimal assistance/contact guard assist for 75 feet with the least restrictive device. 5.  Patient will propel w/c 150 ft on level surfaces with supervision for mobility on unit. LTG's  Initiated 2018 and to be accomplished within 14 day(s)  1. Patient will move from supine to sit and sit to supine , scoot up and down and roll side to side in bed with independence. 2.  Patient will transfer from bed to chair and chair to bed with modified independence using the least restrictive device. 3.  Patient will perform sit to stand with modified independence. 4.  Patient will ambulate with modified independence for 150 feet with the least restrictive device. 5.  Patient will ascend/descend 3 stairs with bilateral handrail(s) with supervision/set-up. physical Therapy EVALUATION    Patient: Suyapa Quintana (5 y.o. female)  Date: 2018  Diagnosis: Rhabdomyolisis  Rhabdomyolysis Rhabdomyolysis  Precautions:    Chart, physical therapy assessment, plan of care and goals were reviewed. Time In: 1027  Time Out: 1030  Patient Seen For: Balance activities;Gait training;Patient education; Therapeutic exercise;Transfer training; Wheelchair mobility  Pain:  Pt pain was reported as  0/10 pre-treatment. Pt pain was reported as 0/10 post-treatment.   Intervention: none indicated    Patient identified with name and : yes    SUBJECTIVE:     Patient c/o dizziness, stating My head is swimming\" after sitting up to EOB, transferrring to w/c, and again after gait training. Pt noted this dizziness improved with seated rest break. Pt c/o \"my toes feel numb, the last 2 little toes. But I can feel you touching me. \"     OBJECTIVE DATA SUMMARY:     Past Medical History:   Diagnosis Date    Alcohol dependence in remission ([facility]     alcohol withdrawal discharged 2013    Bipolar 1 disorder ([facility]     Chronic hepatitis C virus infection ([facility]     prior IV drug use;  Genotype 1a     Chronic obstructive pulmonary disease ([facility]     Depression     Dysphagia 2018    Essential hypertension     Eustachian tube dysfunction     Dr. Sheppard Favorite Gastroesophageal reflux disease     Gout     History of acute renal failure 2018    History of atrial fibrillation 2018    Atrial fibrillation with rapid ventricular response    History of encephalopathy     Acute metabolic encephalopathy    Low serum high density lipoprotein (HDL) 2018    HDL (2018) = 12    Needle phobia     from prior IV drug abuse    Obesity, Class I, BMI 30-34.9     Primary osteoarthritis involving multiple joints     knees and back    Psoriasis     Type 2 diabetes mellitus (HCC)     HbA1c (2018) = 6.9    Vitamin D deficiency 2018    Vitamin D 25-Hydroxy (2018) = 13.5      Past Surgical History:   Procedure Laterality Date    HX  SECTION      x 3    HX COLONOSCOPY      Dr. Nelson Cousin - due for repeat     HX CYST REMOVAL      Hydrocystoma R eye removed    HX PARTIAL HYSTERECTOMY      for fibroid tumors       Therapy Diagnosis:   Difficulty with bed mobility  [x]     Difficulty with functional transfers  [x]     Difficulty with ambulation  [x]     Difficulty with stair negotiations  [x]       Problem List:    Decreased strength B LE  [x]     Decreased strength trunk/core  [x]     Decreased AROM   [x]     Decreased PROM  []    Decreased endurance  [x]     Decreased balance sitting  [x]     Decreased balance standing  [x]     Pain   []     Slow ambulation velocity  [x]    Decreased coordination  [x]    Decreased safety awareness  [x]      Functional Limitations:   Decreased independence with bed mobility  [x]     Decreased independence with functional transfers  [x]     Decreased independence with ambulation  [x]     Decreased independence with stair negotiation  [x]       Previous Functional Level: pt reports being independent with all mobility without AD, Indep with ADL's, and has no DME at home prior    Home Environment: Home Environment: Apartment  # Steps to Enter: 0 (elevator)  One/Two Story Residence: Other (Comment) (elevator)  Living Alone: Yes  Support Systems: Family member(s)  Patient Expects to be Discharged to[de-identified] Apartment  Current DME Used/Available at Home: None  Tub or Shower Type: Tub/Shower combination    Barriers to Learning/Limitations: None  Compensate with: visual, verbal, tactile, kinesthetic cues/model       Objective: pt seen for evaluation, as well as w/c mobility training on level surfaces, transfers training without AD and with RW support, balance training sitting EOB and standing without AD, gait training with RW and w/c follow with gait belt donned, monitoring of vitals due to c/o dizziness, transfers training on/off toilet, and pt education.    Vitals sitting in w/c after transfer to w/c from bed: /85, HR 83; sitting in w/c immediately after gait training: /84, HR 89    Outcome Measures: FIMs/MMT     MMT Initial Assessment   Right Lower Extremity Left Lower Extremity   Hip Flexion 3+ 3+   Knee Extension 3+ 3+   Knee Flexion 4- 4-   Ankle Dorsiflexion 4- 4-   0/5 No palpable muscle contraction  1/5 Palpable muscle contraction, no joint movement  2-/5 Less than full range of motion in gravity eliminated position  2/5 Able to complete full range of motion in gravity eliminated position  2+/5 Able to initiate movement against gravity  3-/5 More than half but not full range of motion against gravity  3/5 Able to complete full range of motion against gravity  3+/5 Completes full range of motion against gravity with minimal resistance  4-/5 Completes full range of motion against gravity with minimal-moderate resistance  4/5 Completes full range of motion against gravity with moderate resistance  4+/5 Completes full range of motion against gravity with moderate-maximum resistance  5/5 Completes full range of motion against gravity with maximum resistance     AROM: generally decreased, functional B LE's; PROM WFL's B LE's    FIM SCORES Initial Assessment   Bed/Chair/Wheelchair Transfers Transfer Type: SPT without device (stand step w/out AD, with gait belt)  Other: toilet transfer with min A with use of grab bar  Transfer Assistance : 3 (Moderate assistance ) (w/out AD; min A with RW)  Sit to Stand Assistance: Minimal assistance  Car Transfers: Not tested  Car Type: n/a   Wheelchair Mobility Able to Propel (ft): 50 feet  Functional Level: 2  Curbs/Ramps Assist Required (FIM Score): 0 (Not tested)  Wheelchair Setup Assist Required : 2 (Maximal assistance)  Wheelchair Management: Manages left brake, Manages right brake (with verbal cues and demonstration)   Walking [place] 4 (Minimal assistance)   Steps/Stairs Steps/Stairs Ambulated (#): 0  Level of Assist : 0 (Not tested) (deferred due to dizziness with gait training)   PRIMARY MODE OF LOCOMOTION: w/c at eval, ambulation at d/c  Please see [facility] Interdisciplinary Eval: Coordination/Balance Section for details regarding FIM score description.     BED/CHAIR/WHEELCHAIR TRANSFERS Initial Assessment   Rolling Right 5 (Stand-by assistance)   Rolling Left 5 (Stand-by assistance)   Supine to Sit 4 (Minimal assistance)   Sit to Stand Minimal assistance   Sit to Supine 4 (Minimal assistance)   Transfer Assist Score Transfer Type: SPT without device (stand step w/out AD, with gait belt)  Other: toilet transfer with min A with use of grab bar  Transfer Assistance : 3 (Moderate assistance ) (w/out AD; min A with RW)  Sit to Stand Assistance: Minimal assistance  Car Transfers: Not tested  Car Type: n/a   Transfer Type SPT without device (stand step w/out AD, with gait belt)   Comments mod A for stand step transfer w/out AD, due to balance issues and weakness in LE's; min A with support of RW   Car Transfer Not tested   Car Type n/a       WHEELCHAIR MOBILITY/MANAGEMENT Initial Assessment   Able to Propel 50 feet   Functional Level 2   Curbs/ramps assistance required 0 (Not tested)   Wheelchair set up assistance required 2 (Maximal assistance)   Wheelchair management Manages left brake, Manages right brake (with verbal cues and demonstration)         WALKING INDEPENDENCE Initial Assessment   Assistive device Walker, rolling, Gait belt (w/c follow)   Ambulation assistance - level surface 4 (Minimal assistance)   Distance 18 Feet (ft)   Functional Level 1   Comments pt able to ambulate with min A  and w/c follow with RW, but <50 ft due to fatigue and dizziness   Ambulation assistance - unlevel surface  not tested       STEPS/STAIRS Initial Assessment   Steps/Stairs ambulated Steps/Stairs Ambulated (#): 0  Level of Assist : 0 (Not tested) (deferred due to dizziness with gait training)   Rail Use     Functional Level 0   Comments not tested due to issues with dizziness with gait training   Curbs/Ramps  not tested     Therapeutic Exercises:   Extremity: Both  Exercise Type #1: Seated lower extremity strengthening (AROM: marches, LAQ's)  Sets Performed: 2  Reps Performed: 10  Level of Assist: Stand-by assistance    ASSESSMENT :  Based on the objective data described below, the patient presents with decreased strength and AROM B LE's, impaired balance sitting and standing, decreased activity tolerance, decreased safety awareness, and impaired functional mobility to include bed mobility, transfers, and gait.   Pt noted having dizziness (\"head swimming\") with change in position to include sitting up to EOB, after standing and transferring to w/c, and then again with gait training. Pt noted this improved with seated rest breaks. BP checked and seemed to remain stable. Pt cooperative and motivated to work with PT. Patient will benefit from skilled intervention to address the above impairments. Patients rehabilitation potential is considered to be Good  Factors which may influence rehabilitation potential include:   []         None noted  []         Mental ability/status  [x]         Medical condition  []         Home/family situation and support systems  []         Safety awareness  []         Pain tolerance/management  []         Other:      PLAN :  Please refer to POC for short term and long term goals. Order received from MD for physical therapy services and chart reviewed. Pt to be seen 5 times per week for 3 hours of total therapy per day for 2 weeks. Thank you for the referral.    Pt would benefit from skilled physical therapy in order to improve independent functional mobility within the home. Interventions may include range of motion (AROM, PROM B LE/trunk), motor function (B LE/trunk strengthening/coordination), activity tolerance (vitals, oxygen saturation levels), bed mobility training, balance activities, gait training (progressive ambulation program), and functional transfer training. Discharge Recommendations: TBD  Further Equipment Recommendations for Discharge: TBD       Activity Tolerance:   Good, pt without c/o pain, nausea, or SOB; pt did note episodes of dizziness during session. BP taken and remained stable (see above in objectives section)  Please refer to the flowsheet for vital signs taken during this treatment. After treatment:   Patient left sitting up in w/c, in rehab gym with supervision, while awaiting OT evaluation.     COMMUNICATION/EDUCATION:   [x]         Fall prevention education was provided and the patient/caregiver indicated understanding. [x]         Patient/family have participated as able in goal setting and plan of care. [x]         Patient/family agree to work toward stated goals and plan of care. []         Patient understands intent and goals of therapy, but is neutral about his/her participation. []         Patient is unable to participate in goal setting and plan of care.     Thank you for this referral.  Moise Yao, PT  6/28/2018

## 2018-06-29 LAB
GLUCOSE BLD STRIP.AUTO-MCNC: 100 MG/DL (ref 70–110)
GLUCOSE BLD STRIP.AUTO-MCNC: 82 MG/DL (ref 70–110)
GLUCOSE BLD STRIP.AUTO-MCNC: 91 MG/DL (ref 70–110)
GLUCOSE BLD STRIP.AUTO-MCNC: 92 MG/DL (ref 70–110)

## 2018-06-29 PROCEDURE — 97530 THERAPEUTIC ACTIVITIES: CPT

## 2018-06-29 PROCEDURE — 82962 GLUCOSE BLOOD TEST: CPT

## 2018-06-29 PROCEDURE — 74011250637 HC RX REV CODE- 250/637: Performed by: INTERNAL MEDICINE

## 2018-06-29 PROCEDURE — 74011250636 HC RX REV CODE- 250/636: Performed by: INTERNAL MEDICINE

## 2018-06-29 PROCEDURE — 65310000000 HC RM PRIVATE REHAB

## 2018-06-29 PROCEDURE — 92507 TX SP LANG VOICE COMM INDIV: CPT

## 2018-06-29 PROCEDURE — 92610 EVALUATE SWALLOWING FUNCTION: CPT

## 2018-06-29 RX ADMIN — HEPARIN SODIUM 5000 UNITS: 5000 INJECTION, SOLUTION INTRAVENOUS; SUBCUTANEOUS at 14:52

## 2018-06-29 RX ADMIN — HEPARIN SODIUM 5000 UNITS: 5000 INJECTION, SOLUTION INTRAVENOUS; SUBCUTANEOUS at 06:49

## 2018-06-29 RX ADMIN — PANTOPRAZOLE SODIUM 40 MG: 40 TABLET, DELAYED RELEASE ORAL at 06:49

## 2018-06-29 RX ADMIN — ACETAMINOPHEN 650 MG: 325 TABLET, FILM COATED ORAL at 22:21

## 2018-06-29 RX ADMIN — HEPARIN SODIUM 5000 UNITS: 5000 INJECTION, SOLUTION INTRAVENOUS; SUBCUTANEOUS at 21:39

## 2018-06-29 RX ADMIN — ASPIRIN 81 MG CHEWABLE TABLET 81 MG: 81 TABLET CHEWABLE at 09:37

## 2018-06-29 RX ADMIN — Medication 1 TABLET: at 09:37

## 2018-06-29 RX ADMIN — METFORMIN HYDROCHLORIDE 500 MG: 500 TABLET ORAL at 09:37

## 2018-06-29 RX ADMIN — CHOLECALCIFEROL CAP 125 MCG (5000 UNIT) 5000 UNITS: 125 CAP at 09:37

## 2018-06-29 RX ADMIN — COLCHICINE 0.6 MG: 0.6 CAPSULE ORAL at 09:37

## 2018-06-29 RX ADMIN — LOSARTAN POTASSIUM 25 MG: 25 TABLET ORAL at 09:37

## 2018-06-29 NOTE — CONSULTS
[facility] PSYCHOLOGICAL SCREENING    Assessment Initiated:  June 29, 2018    Rehab Diagnosis:  Rhabdomyolysis and Probable Encephalopathy    Pertinent Physical/Psychiatric History:     Patient Active Problem List   Diagnosis Code    Chronic hepatitis C virus infection ([facility] B18.2    Bipolar 1 disorder ([facility] F31.9    Depression F32.9    Alcohol dependence in remission ([facility] F10.21    Needle phobia F40.298    Eustachian tube dysfunction H69.80    Psoriasis L40.9    History of atrial fibrillation Z86.79    Rhabdomyolysis M62.82    History of encephalopathy Z86.69    Primary osteoarthritis involving multiple joints M15.0    Essential hypertension I10    Gastroesophageal reflux disease K21.9    Type 2 diabetes mellitus ([facility] E11.9    Chronic obstructive pulmonary disease (HCC) J44.9    History of acute renal failure Z87.448    Hypoalbuminemia E88.09    Low serum high density lipoprotein (HDL) R74.8    Generalized weakness R53.1    Impaired mobility and ADLs Z74.09    Obesity, Class I, BMI 30-34.9 E66.9    Dysphagia R13.10    Vitamin D deficiency E55.9    Gout M10.9       Patient acknowledges remote psychiatric services and possible past diagnosis for Bipolar Disorder but no treatment in as many as ten years nor does she take any psychotropic medication. Patient does admit to current feelings of anxiety and depression with medical circumstances. Patient may smoke one half pack of cigarettes a day, consume several beers, perhaps twice and month and will use marijuana \"to relax and for arthritis. \"      OBJECTIVE  GENERAL OBSERVATIONS  Willingness to participate in program: [x] good   [] fair [] indifferent [] poor    General Appearance:  Patient observed casually and appropriately dressed and groomed and sitting upright in wheelchair in room, not in initial distress but becoming tearful as she describes her recovery at hand.     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 [employer] Affiliation:  [facility]    Admission Assessment  Discharge Status   [x] alert  [] lethargic  [] difficult to arouse  [] fluctuating  [] other: Level of Consciousness [x] alert  [] lethargic  [] difficult to arouse  [] fluctuating  [] other:   [x] person  [x] place  [x] time  [x] situation Oriented [x] person  [x] place  [x] time  [x] situation   [x] within normal limits  [] impaired       [] mild        [] moderate        [] severe Attention [x] within normal limits  [] impaired       [] mild        [] moderate        [] severe   [x] within normal limits  [x] impaired       [x] mild        [] moderate        [] severe Memory [x] within normal limits  [] impaired       [] mild        [] moderate        [] severe   [] appropriate to situation  [x] depressed  [x] anxious  [x] angry   [x] fearful  [x] emotionally labile  [] other:  Mood [x] appropriate to situation  [] depressed  [] anxious  [] angry   [] fearful  [] emotionally labile  [] other:   [x] appropriate  [] flat  [] inappropriate to content of speech Affect [x] appropriate  [] flat  [] inappropriate to content of speech   [x] appropriate  [] aggressive/agitated  [] withdrawn  [] inappropriate  [] other: Behavior [x] appropriate  [] aggressive/agitated  [] withdrawn  [] inappropriate  [] other:   [] good  [x] limited  [] denial  [] none Insight Into Illness [x] good  [] limited  [] denial  [] none   [x] intact  [x] impaired       [x] mild        [] moderate        [] severe       Describe: Patient will benefit from cues, prompts and redirection to maximize her attention, problem solving, reasoning and carry over.  Cognition [x] intact  [] impaired       [] mild        [] moderate        [] severe       Describe:    [] coping  [x] demonstrates poor adjustment  [] undetermined       As evidenced by: Patient is highly emotional during interview and acknowledges feelings of distress. Patient Adjustment to Disability [x] coping  [] demonstrates poor adjustment  [] undetermined       As evidenced by: Much improved mood and affect in comparison to admission presentation. [] coping  [] demonstrates poor adjustment  [x] undetermined      As evidenced by: Not available on evaluation and suggests her children have too many other responsibilities. Family Adjustment to Disability [x] coping  [] demonstrates poor adjustment  [] undetermined      As evidenced by:      ASSESSMENT  Clinical Impression:  Patient is a 59year old, single, retired on [address] (having worked in housekeeping, [employer] and private duty), [address] female; she is residing alone in ninth floor apartment in [place], obviously with elevator access. Patient has two children residing locally as well as another female whom she describes as [place] a daughter;\" but, patient suggests that all three are full time employed and she \"doesn't want to burden them. \"  On evaluation, patient seems to have limited insight about her immediate medical circumstances and reasons for rehabilitation. She insists that she had been entirely independent prior to hospitalization and actually becomes overwhelmed with emotion as she describes her fear of \"being a burden to others. \"  She insists that she expects to return to her own residence and resume independently as able. She has limited insight about the parameters of her recovery as well as the therapeutic milieu and will benefit from further education to increase her insight and help her to identify realistic goals for herself. Emotionally, patient is initially calm on contact but becomes increasingly distressed and tearful as she identifies her fear about illness and \"becoming a burden. \"  She insists that she was entirely independent and \"suddenly everything turned upside down. \"  When seen briefly yesterday on ARU, she was almost buoyant in mood whereas, today, she is obviously somewhat depressed. Importantly, patient acknowledged that she was supposedly diagnosed with Bipolar Disorder possibly ten years ago by the [employer] in [place] but refused recommendation for Abilify. She reportedly has not received any psychiatric services in years and insists that she recently had no problems with mood nor behavior. She is not wanting to consider discussion for consideration of psychotropic medication now but will be monitored for emotional difficulties while on ARU. Between yesterday and today (on contact), patient obviously does reveal some variability in mood. Cognitively, there is suggestion that patient may have developed encephalopathy and some cognitive difficulties are evident. She will receive cognitive retraining to maximize her attention, problem solving, reasoning and carry over of treatment information. 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 Patient Strengths:  Alert, pleasant, cooperative and motivated to improve. Patient Preferences:  Patient expects to return to her apartment. Rehab Potential:  Good    Educational Needs: Under each heading list the specific items in which the patient or family will need education/training.  Example: hip precautions, use of walker, ADL equipment, neglect, judgment, adjustment, etc.     Special considerations or accommodations for teaching:  [x] Yes     [] No     [] NA  If Yes, explain: Emotional, cognitive and speech sequelae to illness Discharge Status    Completed Demonstrated/ Verbalized Understanding    Yes No Yes No   Info regarding disability: Limited insight about illness and recovery [x] [] [x] []   Adjustment: Increased dependency [x] [] [x] [] Cognition: Possible sequelae to encephalopathy [x] [] [x] []   Other: History of mood disorder and situational distress [x] [] [x] []   Other: Decreased self esteem [x] [] [x] []   If education not completed, explain: [] [] [] []     PLAN  Problem: Limited insight about recovery  Long Term Goal: Increase insight about recovery  Intervention: Patient education  At Discharge - LTG Achieved: [x] Yes [] No If not achieved, explain:    Problem: Forced dependency  Long Term Goal: Accept increased dependency  Intervention: Patient education and support   At Discharge - LTG Achieved: [x] Yes [] No If not achieved, explain:    Problem: Cognitive sequelae  Long Term Goal: Maximize attention, problem solving and recall  Intervention: Cues, prompts and redirection  At Discharge - LTG Achieved: [x] Yes [] No If not achieved, explain:    Problem: History of mood disorder  Long Term Goal: Mood stability in recovery  Intervention: Support ; r/o Rx  At Discharge - LTG Achieved: [x] Yes [] No If not achieved, explain:    Problem: Decreased self esteem  Long Term Goal: Improve self esteem and self concept  Intervention: Positive reinforcement, ego support and support   At Discharge - LTG Achieved: [x] Yes [] No If not achieved, explain:    Risa Hunt, [facility]  6/29/2018 9:58 AM    DISCHARGE STATUS    Clinical Impressions: Patient made excellent progress in therapy program on ARU and was able to discharge to home with continued outpatient services recommended to her. Patient insisted on admission that while diagnosed with Bipolar Disorder at least ten years prior, that she hadn't received psychiatric services for many years and was coping satisfactorily. She was certainly distressed on admission and during early phases of treatment but eventually stabilized. By discharge, patient is calm and composed and feeling gratified with her progress.   Patient not requiring intervention with psychotropic medication while hospitalized and was further evaluated by Psychiatry and found stable.   Patient was educated about safety and pace in her recovery post hospital.    Follow-up Services Recommended Purpose                 Costa Mobley, PHD  Discharge Date/Time:

## 2018-06-29 NOTE — ROUTINE PROCESS
SHIFT CHANGE NOTE FOR [facility]    Bedside and Verbal shift change report given to KAYCEE, RN, Rn (oncoming nurse) by Cecille Darling, Rn (offgoing nurse). Report included the following information SBAR, Kardex, MAR and Recent Results.     Situation:   Code Status: Full Code   Reason for Admission: Rhabdomyolysis  Hospital Day: 2   Problem List:   Hospital Problems  Date Reviewed: 6/28/2018          Codes Class Noted POA    Vitamin D deficiency (Chronic) ICD-10-CM: E55.9  ICD-9-CM: 268.9  6/28/2018 Yes    Overview Signed 6/28/2018  1:12 PM by Dolores Lomax MD     Vitamin D 25-Hydroxy (6/28/2018) = 13.5              Gout (Chronic) ICD-10-CM: M10.9  ICD-9-CM: 274.9  Unknown Yes        Essential hypertension (Chronic) ICD-10-CM: I10  ICD-9-CM: 401.9  Unknown Yes        Type 2 diabetes mellitus ([facility] (Chronic) ICD-10-CM: E11.9  ICD-9-CM: 250.00  Unknown Yes    Overview Signed 6/27/2018 10:18 PM by Dolores Lomax MD     HbA1c (6/14/2018) = 6.9             Dysphagia ICD-10-CM: R13.10  ICD-9-CM: 787.20  6/14/2018 Yes        History of atrial fibrillation ICD-10-CM: Z86.79  ICD-9-CM: V12.59  6/13/2018 Yes    Overview Signed 6/27/2018 10:23 PM by Dolores Lomax MD     Atrial fibrillation with rapid ventricular response             * (Principal)Rhabdomyolysis ICD-10-CM: M62.82  ICD-9-CM: 728.88  6/13/2018 Yes        History of encephalopathy ICD-10-CM: Z86.69  ICD-9-CM: V12.49  6/13/2018 Yes    Overview Signed 6/27/2018 10:21 PM by Dolores Lomax MD     Acute metabolic encephalopathy             History of acute renal failure ICD-10-CM: Z87.448  ICD-9-CM: V13.09  6/13/2018 Yes        Generalized weakness ICD-10-CM: R53.1  ICD-9-CM: 780.79  6/13/2018 Yes        Impaired mobility and ADLs ICD-10-CM: Z74.09  ICD-9-CM: 799.89  6/13/2018 Yes              Background:   Past Medical History:   Past Medical History:   Diagnosis Date    Alcohol dependence in remission ([facility]     alcohol withdrawal discharged 05/31/2013    Bipolar 1 disorder ([facility]     Chronic hepatitis C virus infection ([facility]     prior IV drug use;  Genotype 1a     Chronic obstructive pulmonary disease ([facility]     Depression     Dysphagia 6/14/2018    Essential hypertension     Eustachian tube dysfunction     Dr. Estela Bowen Gastroesophageal reflux disease     Gout     History of acute renal failure 6/13/2018    History of atrial fibrillation 6/13/2018    Atrial fibrillation with rapid ventricular response    History of encephalopathy 7/72/2380    Acute metabolic encephalopathy    Low serum high density lipoprotein (HDL) 6/14/2018    HDL (6/14/2018) = 12    Needle phobia     from prior IV drug abuse    Obesity, Class I, BMI 30-34.9     Primary osteoarthritis involving multiple joints     knees and back    Psoriasis     Type 2 diabetes mellitus (HCC)     HbA1c (6/14/2018) = 6.9    Vitamin D deficiency 6/28/2018    Vitamin D 25-Hydroxy (6/28/2018) = 13.5       Patient taking anticoagulants yes HEPARIN, ASA   Patient has a defibrillator: no     Assessment:   Changes in Assessment throughout shift: none     Patient has central line: no Reasons if yes: Insertion date Last dressing date:   Patient has Weston Cath: no Reasons if yes:     Insertion date:     Last Vitals:     Vitals:    06/28/18 1040 06/28/18 1130 06/28/18 1521 06/28/18 2200   BP: 128/81 136/86 (!) 160/95 (!) 140/93   Pulse: 79  89 82   Resp:   20 18   Temp:   97.5 °F (36.4 °C) 100.1 °F (37.8 °C)   SpO2: 95%  96% 95%   Weight:       Height:            PAIN    Pain Assessment    Pain Intensity 1: (P) 0 (06/29/18 0327) Pain Intensity 1: 2 (12/29/14 1105)      Pain Location 1: Abdomen      Pain Intervention(s) 1: Medication (see MAR)  Patient Stated Pain Goal: (P) 0 Patient Stated Pain Goal: 0  o Intervention effective: yes    o Other actions taken for pain: turn reposition rest     Skin Assessment  Skin color Skin Color: Appropriate for ethnicity  Condition/Temperature Skin Condition/Temp: Warm, Dry  Integrity Skin Integrity: Wound (add Wound LDA) (bilat heel, sacrum)  Turgor Turgor: Non-tenting  Weekly Pressure Ulcer Documentation  Pressure  Injury Documentation: Pressure Injury Noted-See Wound LDA to Document  Wound Prevention & Protection Methods  Orientation of wound Orientation of Wound Prevention: Posterior  Location of Prevention Location of Wound Prevention: Buttocks, Heel  Dressing Present Dressing Present : Yes  Dressing Status Dressing Status: Intact  Wound Offloading Wound Offloading (Prevention Methods): Bed, pressure reduction mattress, Elevate heels, Pillows, Repositioning, Heel boots, Turning     INTAKE/OUPUT    Date 06/28/18 0700 - 06/29/18 0659 06/29/18 0700 - 06/30/18 0659   Shift 8876-3965 8975-8156 24 Hour Total 1417-0244 8360-7067 24 Hour Total   I  N  T  A  K  E   P. O. 720 120 840         P. O. 720 120 840       Shift Total  (mL/kg) 720  (7.9) 120  (1.3) 840  (9.2)      O  U  T  P  U  T   Urine  (mL/kg/hr)            Urine Occurrence(s) 5 x 2 x 7 x       Stool            Stool Occurrence(s) 3 x 0 x 3 x       Shift Total  (mL/kg)          120 840      Weight (kg) 91 91 91 91 91 91       Recommendations:  1. Patient needs and requests: Assist with ADLS,     2. Diet: Dental soft with thin liquids    3. Pending tests/procedures: none     4. Functional Level/Equipment: 1 person assist    5. Estimated Discharge Date: TBD Posted on Whiteboard in Patients Room: [facility] Safety Check    A safety check occurred in the patient's room between off going nurse and oncoming nurse listed above.     The safety check included the below items  Area Items   H  High Alert Medications - Verify all high alert medication drips (heparin, PCA, etc.)   E  Equipment - Suction is set up for ALL patients (with yanker)  - Red plugs utilized for all equipment (IV pumps, etc.)  - WOWs wiped down at end of shift.  - Room stocked with oxygen, suction, and other unit-specific supplies   A  Alarms - Bed alarm is set for fall risk patients  - Ensure chair alarm is in place and activated if patient is up in a chair   L  Lines - Check IV for any infiltration  - Weston bag is empty if patient has a Weston   - Tubing and IV bags are labeled   S  Safety   - Room is clean, patient is clean, and equipment is clean. - Hallways are clear from equipment besides carts. - Fall bracelet on for fall risk patients  - Ensure room is clear and free of clutter  - Suction is set up for ALL patients (with jamiker)  - Hallways are clear from equipment besides carts.    - Isolation precautions followed, supplies available outside room, sign posted

## 2018-06-29 NOTE — PROGRESS NOTES
[facility]  Progress Note    Patient: Crow Huertas Age: 59 y.o. : 1960 MR#: [id number] SSN: [id number]  Date/Time: 2018    Subjective:     Patient lying in bed in NAD, awake, alert    Assessment/Plan:     Primary Rehabilitation Diagnosis  1.  Impaired Mobility and ADLs  2. Rhabdomyolysis     Comorbidities   Chronic hepatitis C virus infection  B18.2    Bipolar 1 disorder  F31.9    Depression F32.9    Alcohol dependence in remission  F10.21    Needle phobia F40.298    Eustachian tube dysfunction H69.80    Psoriasis L40.9    History of atrial fibrillation Z86.79    History of encephalopathy Z86.69    Primary osteoarthritis involving multiple joints M15.0    Essential hypertension I10    Gastroesophageal reflux disease K21.9    Type 2 diabetes mellitus  E11.9    Chronic obstructive pulmonary disease  J44.9    History of acute renal failure Z87.448    Hypoalbuminemia E88.09    Low serum high density lipoprotein (HDL) R74.8    Obesity, Class I, BMI 30-34.9 E66.9    Dysphagia R13.10    Vitamin D deficiency E55.9    Gout M10.9      PLAN    PT, OT  Monitor BP , on losartan  On metformin       Case discussed with:  [x]Patient  []Family  []Nursing  []Case Management  DVT Prophylaxis:  []Lovenox  [x]Hep SQ  []SCDs  []Coumadin   []On Heparin gtt    Objective:   VS:   Visit Vitals    /76 (BP 1 Location: Right arm, BP Patient Position: At rest)    Pulse 72    Temp 96.6 °F (35.9 °C)    Resp 16    Ht 5' 5\" (1.651 m)    Wt 91 kg (200 lb 9.9 oz)    SpO2 96%    Breastfeeding No    BMI 33.38 kg/m2      Tmax/24hrs: Temp (24hrs), Av.4 °F (36.9 °C), Min:96.6 °F (35.9 °C), Max:100.1 °F (37.8 °C)    Input/Output:   Intake/Output Summary (Last 24 hours) at 18 5680  Last data filed at 18 1804   Gross per 24 hour   Intake             1320 ml   Output              600 ml   Net              720 ml       General:  Awake, alert  Cardiovascular:  S1S2+, RRR  Pulmonary:  CTA b/l  GI:  Soft, BS+, NT, ND  Extremities:  No edema      Labs:    Recent Results (from the past 24 hour(s))   GLUCOSE, POC    Collection Time: 06/28/18  9:31 PM   Result Value Ref Range    Glucose (POC) 98 70 - 110 mg/dL   GLUCOSE, POC    Collection Time: 06/29/18  7:14 AM   Result Value Ref Range    Glucose (POC) 82 70 - 110 mg/dL   GLUCOSE, POC    Collection Time: 06/29/18 11:29 AM   Result Value Ref Range    Glucose (POC) 100 70 - 110 mg/dL   GLUCOSE, POC    Collection Time: 06/29/18  3:26 PM   Result Value Ref Range    Glucose (POC) 92 70 - 110 mg/dL         Signed By: Lydia Middleton MD     June 29, 2018

## 2018-06-29 NOTE — ROUTINE PROCESS
0800 Pt. Awake sitting up in bed no change in assessment no signs of distress pt. Reported to be feeling fine. 0930 pt. Sitting up in chair eating breakfast.  1200 pt. With therapy. 1330 able to transfer by wheelchair . 1500 no change in assessment. 1800 Pt. Sitting up in bed eating dinner.

## 2018-06-29 NOTE — PROGRESS NOTES
[x] Psychology  [] Social Work [] Recreational Therapy    INTERVENTION  UNITS/TIME OF SERVICE   Assessment June 29, 2018   Supportive Counseling    Orientation    Discharge Planning    Resource Linkage              Progress/Current Status    Patient seen for Psychological Evaluation as requested on admission to ARU by  [facility]. Patient found sitting upright in wheelchair after breakfast and in no apparent distress. However, she displayed considerable distressed thought and feeling during conversation and described that she feels overwhelmed by immediate circumstances. She indicated that she was entirely independent prior to onset of illness and now \"doesn't want to be a burden to anyone. \"  Parenthetically, she indicated that her two children (and another, close family friend considered \"a daughter\") are employed and unable to assist her. She reports past psychiatric services but nothing in many years and insists that she is not requiring psychotropic medication. Patient will be monitored for emotional and/or behavioral difficulties while on ARU and encouraged to persevere as well as identify realistic goals for herself in recovery.     Risa Hunt, PHD 6/29/2018 9:50 AM

## 2018-06-29 NOTE — PROGRESS NOTES
Problem: Self Care Deficits Care Plan (Adult)  Goal: *Therapy Goal (Edit Goal, Insert Text)  Occupational Therapy Goals   Long Term Goals  Initiated 2018 and to be accomplished within 2 week(s)  1. Pt will perform self-feeding with (I). 2. Pt will perform grooming with (I) to MI.  3. Pt will perform UB bathing with MI.  4. Pt will perform LB bathing with MI.  5. Pt will perform tub/shower transfer with MI.   6. Pt will perform UB dressing with MI.  7. Pt will perform LB dressing with MI.  8. Pt will perform toileting task with MI.  9. Pt will perform toilet transfer with MI.  8. Pt will perform IADL home mgt (ie laundry, laundry folding, medication mgt) with MI.  11. Pt will perform IADL meal prep with MI. Short Term Goals   Initiated 2018 and to be accomplished within 7 day(s) 2018  1. Pt will perform self-feeding with (I). 2. Pt will perform grooming with (I) to MI.  3. Pt will perform UB bathing with Supervision. 4. Pt will perform LB bathing with Supervision. 5. Pt will perform tub/shower transfer with Supervision . 6. Pt will perform UB dressing with Setup. 7. Pt will perform LB dressing with Supervision. 8. Pt will perform toileting task with Supervision. 9. Pt will perform toilet transfer with Supervision. 10. Pt will perform IADL home mgt (ie laundry, laundry folding, medication mgt) with Supervision. 11. Pt will perform IADL meal prep with Supervision. Occupational Therapy TREATMENT    Patient: Krzysztof Sykes   59 y.o. Patient identified with name and : yes    Date: 2018    First Tx Session  Time In: 1030  Time Out[de-identified] 1130      Diagnosis: Rhabdomyolisis  Rhabdomyolysis   Precautions: Aspiration, Fall  Chart, occupational therapy assessment, plan of care, and goals were reviewed. Pain:  Pt reports 0/10 pain or discomfort prior to treatment. Pt reports 0/10 pain or discomfort post treatment.    Intervention Provided: NA      SUBJECTIVE:   Patient stated I moved to [place] in December.     OBJECTIVE DATA SUMMARY:     THERAPEUTIC ACTIVITY Daily Assessment    Pt participated in tabletop activity for sequencing task. Pt assembled 24 piece puzzle. Pt required cuing to sort pieces by edge/non-edge pieces before starting task. Pt did incorporate picture on box to help assemble pieces. Pt required extra time to complete task. Pt used elevated pegboard to perform task seated w/c level to challenge dynamic sitting balance ant to increase core strength. Pt placed large pegs onto board, following predetermined pattern. Pt placed one row with right UE and the next with left UE, alternating each row. Pt was required to sit up in w/c and reach across/away from midline to place pegs. Pt required several rest breaks to complete task 2/2 fatigue in BUEs. MOBILITY/TRANSFERS Daily Assessment    Pt self-propelled w/c from room to therapy gym with Mod/Max A. Pt required verbal/tactile cuing for proper hand placement to propel w/c through hallway. Pt required several rest breaks. ASSESSMENT:  Pt actively participated in treatment session. Pt stated that she required reading glasses and that they were at home. Recommended she let a family member know so they could bring them in. Pt demonstrates decrease in BUE strength and endurance. Progression toward goals:  [x]          Improving appropriately and progressing toward goals  []          Improving slowly and progressing toward goals  []          Not making progress toward goals and plan of care will be adjusted     PLAN:  Patient continues to benefit from skilled intervention to address the above impairments. Continue treatment per established plan of care. Discharge Recommendations: To Be Determined  Further Equipment Recommendations for Discharge:  TBD     Activity Tolerance:  Fair      Estimated LOS:TBD    Please refer to the flowsheet for vital signs taken during this treatment.   After treatment:   [x]  Patient left in no apparent distress sitting up in chair with CNA present in room  []  Patient left in no apparent distress in bed  [x]  Call bell left within reach  []  Nursing notified  []  Caregiver present  []  Bed alarm activated      DEANA Iniguez/JUAN CARLOS

## 2018-06-29 NOTE — PROGRESS NOTES
Problem: Neurolinguistics Impaired (Adult)  Goal: *Speech Goal: (INSERT TEXT)  Long term goals:  1. Patient will use strategies of slowed reate, over-articulation and increased loudness to improve speech production, supervision. 2. Patient will be 100% intelligible in casual conversation, supervision. 3. Patient will recall 3 words after 5 minutes in short term memory task, supervision. 4. Patient will perform functional problem solving and reasoning tasks with 90% accuracy. 5. Patient will write single words and short sentences with 90% accuracy and legibility. Short term goals  (by 18):  1. Patient will use strategies of slowed reate, over-articulation and increased loudness to improve speech production, mod-min cues. 2. Patient will be 90% intelligible in production of polysyllabic words and phrases, min cues-supervision. 3. Patient will be 80% intelligible in casual conversation, min cues for use of strategies. 4. Patient will recall 3 words after 5 minutes in short term memory task, mod-min assist.  5. Patient will perform functional problem solving and reasoning tasks with 75-85% accuracy. 6. Patient will write single words with 75-80% accuracy and legibility. Speech LAnguage Pathology bedside swallow evaluation    Patient: Be Fitch (48 y.o. female)  Date: 2018  Primary Diagnosis: Rhabdomyolisis  Rhabdomyolysis        Precautions:   Aspiration, Fall    SUBJECTIVE:   Patient stated I was just talking to the [facility].     OBJECTIVE:     Past Medical History:   Diagnosis Date    Alcohol dependence in remission ([facility]     alcohol withdrawal discharged 2013    Bipolar 1 disorder ([facility]     Chronic hepatitis C virus infection ([facility]     prior IV drug use;  Genotype 1a     Chronic obstructive pulmonary disease ([facility]     Depression     Dysphagia 2018    Essential hypertension     Eustachian tube dysfunction     Dr. Bray  Gastroesophageal reflux disease     Gout  History of acute renal failure 2018    History of atrial fibrillation 2018    Atrial fibrillation with rapid ventricular response    History of encephalopathy     Acute metabolic encephalopathy    Low serum high density lipoprotein (HDL) 2018    HDL (2018) = 12    Needle phobia     from prior IV drug abuse    Obesity, Class I, BMI 30-34.9     Primary osteoarthritis involving multiple joints     knees and back    Psoriasis     Type 2 diabetes mellitus (HCC)     HbA1c (2018) = 6.9    Vitamin D deficiency 2018    Vitamin D 25-Hydroxy (2018) = 13.5      Past Surgical History:   Procedure Laterality Date    HX  SECTION      x 3    HX COLONOSCOPY      Dr. Allan Tidwell - due for repeat     HX CYST REMOVAL      Hydrocystoma R eye removed    HX PARTIAL HYSTERECTOMY      for fibroid tumors     Prior Level of Function/Home Situation:   Home Situation  Home Environment: Apartment  # Steps to Enter: 0 (elevator to 9th floor apt)  One/Two Story Residence: One story  Living Alone: Yes  Support Systems: Child(shira)  Patient Expects to be Discharged to[de-identified] Apartment  Current DME Used/Available at Home: None  Tub or Shower Type: Tub/Shower combination  Diet prior to admission: Soft with thin liquids  Current Diet:  Soft with thin liquids   Cognitive and Communication Status:  Neurologic State: Alert  Orientation Level: Oriented X4  Cognition: Appropriate for age attention/concentration, Follows commands  Perception: Appears intact  Perseveration: No perseveration noted  Safety/Judgement: Awareness of environment  Oral Assessment:  Oral Assessment  Labial: No impairment  Dentition: Natural  Oral Hygiene: Good  Lingual: Decreased rate  Velum: No impairment  Mandible: No impairment  P.O. Trials:  Patient Position: Seated  Vocal quality prior to P.O.: Strain  Consistency Presented: Puree; Solid; Thin liquid  How Presented: Self-fed/presented     Bolus Acceptance: No impairment  Bolus Formation/Control: Impaired  Type of Impairment: Delayed  Propulsion: Delayed (# of seconds)  Oral Residue: None  Initiation of Swallow: No impairment  Laryngeal Elevation: Functional  Aspiration Signs/Symptoms: None  Pharyngeal Phase Characteristics: Double swallow  Effective Modifications: None  Cues for Modifications: None     Oral Phase Severity: Mild  Pharyngeal Phase Severity : No impairment  Pain:  No reports of pain prior to or after the session. After treatment:   [x]            Patient left in no apparent distress sitting up in chair  []            Patient left in no apparent distress in bed  [x]            Call bell left within reach  [x]            Nursing notified  []            Caregiver present  []            Bed alarm activated    COMMUNICATION/EDUCATION:   []            Posted safety precautions in patient's room. []            Patient/family have participated as able in goal setting and plan of care. [x]            Patient/family agree to work toward stated goals and plan of care (as above). []            Patient understands intent and goals of therapy, but is neutral about his/her participation. []            Patient is unable to participate in goal setting and plan of care. ASSESSMENT:   Based on the objective data described above, the patient presents with functional oral and pharyngeal swallow. Patient will benefit from skilled intervention to address speech and cognitive impairments.   Patients rehabilitation potential is considered to be Good  Factors which may influence rehabilitation potential include:   []            None noted  []            Mental ability/status  [x]            Medical condition  [x]            Home/family situation and support systems  [x]            Safety awareness  []            Pain tolerance/management  []            Other:    PLAN:   Recommendations and Planned Interventions:  Mrs. Guerda Rodriguez demonstrates fully functional oral and pharyngeal swallow. SLP will not actively follow for dysphagia; available as needed. Frequency/Duration: Patient will be followed by speech-language pathology 1-2 times per day/4-7 days per week to address speech goals. Discharge Recommendations: To Be Determined    Thank you for this referral.  Trinh Ferrari SLP  Time calculation:  13 Minutes      Speech language pathology treatment    Patient: Taina Staples (3 y.o. female)  Date: 2018  Diagnosis: Rhabdomyolisis  Rhabdomyolysis Rhabdomyolysis       SUBJECTIVE:   Patient stated I prefer to eat by myself. OBJECTIVE:   Mental Status:  Mrs. Malcolm Mosqueda was awake, alert and cooperative. Treatment & Interventions:  Patient was seen for two speech therapy sessions. The following treatment tasks were presented: Motor Speech:   3 syllable words: 90% accuracy  Phrase length:  90% accuracy, affect generally appropriate  Oral coordination ex: Min-mod assist    Neuro-Linguistics:   Orientation:  Modified Independent  Recent memory: Supervision  Recall 3 words: Supervision  Analyze alternatives: 100% accuracy  Abstract reasonin% appropriate    Response & Tolerance to Activities:  Mrs. Malcolm Mosqueda is very motivated to regain function so that she can resume her prior lifestyle. Is aware of her deficits, working hard to over come them.     Pain:  No reports of pain during today's sessions     After treatment:   [x]       Patient left in no apparent distress sitting up in chair  []       Patient left in no apparent distress in bed  [x]       Call bell left within reach  [x]       Nursing notified  []       Caregiver present  []       Bed alarm activated    ASSESSMENT:   Progression toward goals:  [x]       Improving appropriately and progressing toward goals  []       Improving slowly and progressing toward goals  []       Not making progress toward goals and plan of care will be adjusted    PLAN:   Patient continues to benefit from skilled intervention to address the above impairments. Continue treatment per established plan of care. Discharge Recommendations:   To Be Determined    Estimated LOS: 3-4 weeks    Sadaf Juan, SLP  Time calculation:  45 Minutes

## 2018-06-30 LAB
GLUCOSE BLD STRIP.AUTO-MCNC: 114 MG/DL (ref 70–110)
GLUCOSE BLD STRIP.AUTO-MCNC: 124 MG/DL (ref 70–110)
GLUCOSE BLD STRIP.AUTO-MCNC: 80 MG/DL (ref 70–110)
GLUCOSE BLD STRIP.AUTO-MCNC: 96 MG/DL (ref 70–110)

## 2018-06-30 PROCEDURE — 74011250636 HC RX REV CODE- 250/636: Performed by: INTERNAL MEDICINE

## 2018-06-30 PROCEDURE — 82962 GLUCOSE BLOOD TEST: CPT

## 2018-06-30 PROCEDURE — 74011250637 HC RX REV CODE- 250/637: Performed by: INTERNAL MEDICINE

## 2018-06-30 PROCEDURE — 97535 SELF CARE MNGMENT TRAINING: CPT

## 2018-06-30 PROCEDURE — 77030037875 HC DRSG MEPILEX <16IN BORD MOLN -A

## 2018-06-30 PROCEDURE — 97530 THERAPEUTIC ACTIVITIES: CPT

## 2018-06-30 PROCEDURE — 92507 TX SP LANG VOICE COMM INDIV: CPT

## 2018-06-30 PROCEDURE — 97110 THERAPEUTIC EXERCISES: CPT

## 2018-06-30 PROCEDURE — 65310000000 HC RM PRIVATE REHAB

## 2018-06-30 RX ADMIN — CHOLECALCIFEROL CAP 125 MCG (5000 UNIT) 5000 UNITS: 125 CAP at 08:53

## 2018-06-30 RX ADMIN — ACETAMINOPHEN 650 MG: 325 TABLET, FILM COATED ORAL at 22:13

## 2018-06-30 RX ADMIN — METFORMIN HYDROCHLORIDE 500 MG: 500 TABLET ORAL at 08:54

## 2018-06-30 RX ADMIN — HEPARIN SODIUM 5000 UNITS: 5000 INJECTION, SOLUTION INTRAVENOUS; SUBCUTANEOUS at 22:12

## 2018-06-30 RX ADMIN — ASPIRIN 81 MG CHEWABLE TABLET 81 MG: 81 TABLET CHEWABLE at 08:53

## 2018-06-30 RX ADMIN — PANTOPRAZOLE SODIUM 40 MG: 40 TABLET, DELAYED RELEASE ORAL at 06:57

## 2018-06-30 RX ADMIN — HEPARIN SODIUM 5000 UNITS: 5000 INJECTION, SOLUTION INTRAVENOUS; SUBCUTANEOUS at 15:52

## 2018-06-30 RX ADMIN — COLCHICINE 0.6 MG: 0.6 CAPSULE ORAL at 08:53

## 2018-06-30 RX ADMIN — LOSARTAN POTASSIUM 25 MG: 25 TABLET ORAL at 08:53

## 2018-06-30 RX ADMIN — HEPARIN SODIUM 5000 UNITS: 5000 INJECTION, SOLUTION INTRAVENOUS; SUBCUTANEOUS at 06:13

## 2018-06-30 RX ADMIN — Medication 1 TABLET: at 08:53

## 2018-06-30 NOTE — ROUTINE PROCESS
[facility]    Bedside and Verbal shift change report given to Nirav Greenwood LPN (oncoming nurse) by Paige Lomax Rn (offgoing nurse). Report included the following information SBAR, Kardex, MAR and Recent Results.     Situation:   Code Status: Full Code   Reason for Admission: Rhabdomyolysis  Hospital Day: 2   Problem List:   Hospital Problems  Date Reviewed: 6/28/2018          Codes Class Noted POA    Vitamin D deficiency (Chronic) ICD-10-CM: E55.9  ICD-9-CM: 268.9  6/28/2018 Yes    Overview Signed 6/28/2018  1:12 PM by Danilo Galvan MD     Vitamin D 25-Hydroxy (6/28/2018) = 13.5              Gout (Chronic) ICD-10-CM: M10.9  ICD-9-CM: 274.9  Unknown Yes        Essential hypertension (Chronic) ICD-10-CM: I10  ICD-9-CM: 401.9  Unknown Yes        Type 2 diabetes mellitus ([facility] (Chronic) ICD-10-CM: E11.9  ICD-9-CM: 250.00  Unknown Yes    Overview Signed 6/27/2018 10:18 PM by Danilo Galvan MD     HbA1c (6/14/2018) = 6.9             Dysphagia ICD-10-CM: R13.10  ICD-9-CM: 787.20  6/14/2018 Yes        History of atrial fibrillation ICD-10-CM: Z86.79  ICD-9-CM: V12.59  6/13/2018 Yes    Overview Signed 6/27/2018 10:23 PM by Danilo Galvan MD     Atrial fibrillation with rapid ventricular response             * (Principal)Rhabdomyolysis ICD-10-CM: M62.82  ICD-9-CM: 728.88  6/13/2018 Yes        History of encephalopathy ICD-10-CM: Z86.69  ICD-9-CM: V12.49  6/13/2018 Yes    Overview Signed 6/27/2018 10:21 PM by Danilo Galvan MD     Acute metabolic encephalopathy             History of acute renal failure ICD-10-CM: Z87.448  ICD-9-CM: V13.09  6/13/2018 Yes        Generalized weakness ICD-10-CM: R53.1  ICD-9-CM: 780.79  6/13/2018 Yes        Impaired mobility and ADLs ICD-10-CM: Z74.09  ICD-9-CM: 799.89  6/13/2018 Yes              Background:   Past Medical History:   Past Medical History:   Diagnosis Date    Alcohol dependence in remission ([facility]     alcohol withdrawal discharged 05/31/2013    Bipolar 1 disorder ([facility]     Chronic hepatitis C virus infection ([facility]     prior IV drug use;  Genotype 1a     Chronic obstructive pulmonary disease ([facility]     Depression     Dysphagia 6/14/2018    Essential hypertension     Eustachian tube dysfunction     Dr. Yong Marinelli Gastroesophageal reflux disease     Gout     History of acute renal failure 6/13/2018    History of atrial fibrillation 6/13/2018    Atrial fibrillation with rapid ventricular response    History of encephalopathy 7/82/2444    Acute metabolic encephalopathy    Low serum high density lipoprotein (HDL) 6/14/2018    HDL (6/14/2018) = 12    Needle phobia     from prior IV drug abuse    Obesity, Class I, BMI 30-34.9     Primary osteoarthritis involving multiple joints     knees and back    Psoriasis     Type 2 diabetes mellitus (HCC)     HbA1c (6/14/2018) = 6.9    Vitamin D deficiency 6/28/2018    Vitamin D 25-Hydroxy (6/28/2018) = 13.5       Patient taking anticoagulants yes HEPARIN, ASA   Patient has a defibrillator: no     Assessment:   Changes in Assessment throughout shift: none     Patient has central line: no Reasons if yes: Insertion date Last dressing date:   Patient has Weston Cath: no Reasons if yes:     Insertion date:     Last Vitals:     Vitals:    06/28/18 1521 06/28/18 2200 06/29/18 0814 06/29/18 1525   BP: (!) 160/95 (!) 140/93 147/86 128/76   Pulse: 89 82 74 72   Resp: 20 18 20 16   Temp: 97.5 °F (36.4 °C) 100.1 °F (37.8 °C) 98.4 °F (36.9 °C) 96.6 °F (35.9 °C)   SpO2: 96% 95% 96% 96%   Weight:       Height:            PAIN    Pain Assessment    Pain Intensity 1: 0 (06/29/18 2002) Pain Intensity 1: 2 (12/29/14 1105)      Pain Location 1: Abdomen      Pain Intervention(s) 1: Medication (see MAR)  Patient Stated Pain Goal: 0 Patient Stated Pain Goal: 0  o Intervention effective: yes    o Other actions taken for pain: turn reposition rest     Skin Assessment  Skin color Skin Color: Appropriate for ethnicity  Condition/Temperature Skin Condition/Temp: Warm  Integrity Skin Integrity: Wound (add Wound LDA) (subhash heels and sacrum)  Turgor Turgor: Non-tenting  Weekly Pressure Ulcer Documentation  Pressure  Injury Documentation: Pressure Injury Noted-See Wound LDA to Document  Wound Prevention & Protection Methods  Orientation of wound Orientation of Wound Prevention: Posterior  Location of Prevention Location of Wound Prevention: Buttocks, Heel  Dressing Present Dressing Present : Yes  Dressing Status Dressing Status: Intact  Wound Offloading Wound Offloading (Prevention Methods): Bed, pressure redistribution/air     INTAKE/OUPUT    Date 06/28/18 1900 - 06/29/18 0659 06/29/18 0700 - 06/30/18 0659   Shift 2535-3752 24 Hour Total 9010-3304 4663-2317 24 Hour Total   I  N  T  A  K  E   P.O.   1440      P. O.   1440    Shift Total  (mL/kg) 120  (1.3) 840  (9.2) 1440  (15.8)  1440  (15.8)   O  U  T  P  U  T   Urine  (mL/kg/hr)   600  (0.5)  600      Urine Voided   600  600      Urine Occurrence(s) 3 x 8 x 1 x  1 x    Stool           Stool Occurrence(s) 0 x 3 x       Shift Total  (mL/kg)   600  (6.6)  600  (6.6)    840 840  840   Weight (kg) 91 91 91 91 91       Recommendations:  1. Patient needs and requests: Assist with ADLS,     2. Diet: Dental soft with thin liquids    3. Pending tests/procedures: none     4. Functional Level/Equipment: 1 person assist    5. Estimated Discharge Date: TBD Posted on Whiteboard in Patients Room: no     [facility] Safety Check    A safety check occurred in the patient's room between off going nurse and oncoming nurse listed above.     The safety check included the below items  Area Items   H  High Alert Medications - Verify all high alert medication drips (heparin, PCA, etc.)   E  Equipment - Suction is set up for ALL patients (with yanker)  - Red plugs utilized for all equipment (IV pumps, etc.)  - WOWs wiped down at end of shift.  - Room stocked with oxygen, suction, and other unit-specific supplies   A  Alarms - Bed alarm is set for fall risk patients  - Ensure chair alarm is in place and activated if patient is up in a chair   L  Lines - Check IV for any infiltration  - Weston bag is empty if patient has a Weston   - Tubing and IV bags are labeled   S  Safety   - Room is clean, patient is clean, and equipment is clean. - Hallways are clear from equipment besides carts. - Fall bracelet on for fall risk patients  - Ensure room is clear and free of clutter  - Suction is set up for ALL patients (with may)  - Hallways are clear from equipment besides carts.    - Isolation precautions followed, supplies available outside room, sign posted

## 2018-06-30 NOTE — PROGRESS NOTES
Progress Note    Patient: Michael Linder MRN: [id number]  SSN: [id number]    YOB: 1960  Age: 59 y.o. Sex: female      Admit Date: 6/27/2018    LOS: 3 days     Subjective:     Patient with diabetes admitted due to rhabdomyalysis. No acute problems. Objective:     Vitals:    06/29/18 0814 06/29/18 1525 06/29/18 2212 06/30/18 0800   BP: 147/86 128/76 129/86 139/87   Pulse: 74 72 74 61   Resp: 20 16 18 18   Temp: 98.4 °F (36.9 °C) 96.6 °F (35.9 °C) 100 °F (37.8 °C) 97.9 °F (36.6 °C)   SpO2: 96% 96% 96% 95%   Weight:       Height:            Intake and Output:  Current Shift: 06/30 0701 - 06/30 1900  In: 120 [P.O.:120]  Out: -   Last three shifts: 06/28 1901 - 06/30 0700  In: 1560 [P.O.:1560]  Out: 600 [Urine:600]    Physical Exam:   GENERAL: alert, cooperative, no distress, appears stated age  LUNG: clear to auscultation bilaterally  HEART: regular rate and rhythm, S1, S2 normal, no murmur, click, rub or gallop    Lab/Data Review: All lab results for the last 24 hours reviewed. Glucose 114    Assessment:     Principal Problem:    Rhabdomyolysis (6/13/2018)    Active Problems:    History of atrial fibrillation (6/13/2018)      Overview: Atrial fibrillation with rapid ventricular response      History of encephalopathy (6/13/2018)      Overview: Acute metabolic encephalopathy      Essential hypertension ()      Type 2 diabetes mellitus ([facility] ()      Overview: HbA1c (6/14/2018) = 6.9      History of acute renal failure (6/13/2018)      Generalized weakness (6/13/2018)      Impaired mobility and ADLs (6/13/2018)      Dysphagia (6/14/2018)      Vitamin D deficiency (6/28/2018)      Overview: Vitamin D 25-Hydroxy (6/28/2018) = 13.5       Gout ()        Plan:     Continue present management.     Signed By: Chas Patrick MD     June 30, 2018

## 2018-06-30 NOTE — PROGRESS NOTES
Problem: Self Care Deficits Care Plan (Adult)  Goal: *Therapy Goal (Edit Goal, Insert Text)  Occupational Therapy Goals   Long Term Goals  Initiated 2018 and to be accomplished within 2 week(s)  1. Pt will perform self-feeding with (I). 2. Pt will perform grooming with (I) to MI.  3. Pt will perform UB bathing with MI.  4. Pt will perform LB bathing with MI.  5. Pt will perform tub/shower transfer with MI.   6. Pt will perform UB dressing with MI.  7. Pt will perform LB dressing with MI.  8. Pt will perform toileting task with MI.  9. Pt will perform toilet transfer with MI.  8. Pt will perform IADL home mgt (ie laundry, laundry folding, medication mgt) with MI.  11. Pt will perform IADL meal prep with MI. Short Term Goals   Initiated 2018 and to be accomplished within 7 day(s) 2018  1. Pt will perform self-feeding with (I). 2. Pt will perform grooming with (I) to MI.  3. Pt will perform UB bathing with Supervision. 4. Pt will perform LB bathing with Supervision. 5. Pt will perform tub/shower transfer with Supervision . 6. Pt will perform UB dressing with Setup. 7. Pt will perform LB dressing with Supervision. 8. Pt will perform toileting task with Supervision. 9. Pt will perform toilet transfer with Supervision. 10. Pt will perform IADL home mgt (ie laundry, laundry folding, medication mgt) with Supervision. 11. Pt will perform IADL meal prep with Supervision. Occupational Therapy TREATMENT    Patient: Maurilio Johnson   59 y.o. Patient identified with name and : yes    Date: 2018    First Tx Session  Time In: 1032  Time Out[de-identified] 8304    Diagnosis: Rhabdomyolisis  Rhabdomyolysis   Precautions: Aspiration, Fall  Chart, occupational therapy assessment, plan of care, and goals were reviewed. Pain:  Pt reports 4/10 pain or discomfort prior to treatment. Pt reports 4/10 pain or discomfort post treatment.    Intervention Provided: NA      SUBJECTIVE:   Patient reported increased tension in B hands, reports waking up to \"gripping the remote for dear life\" and has a hard time grading her . OBJECTIVE DATA SUMMARY: Pt received in supine in bed and agreeable to OT session. During session, Pt became emotional about frustrations and current limitations. Pt able to recover after short break and continued with OT session. Pt requested to use bathroom at end of session. Pt educated on letting OT know earlier so we can assist, as Toileting is part of OT. Pt reported understanding. Pt left on beside commode with callball and all needs within reach. RN and CNA notified of Pt location and situation. THERAPEUTIC ACTIVITY Daily Assessment   FM coordination, in hand manipulation, FM skills for buttoning/lacing/tieing for ADL tasks Pt able to use B Hands to complete buttoning, lacing and tieing on dressing boards. Pt completed task with no difficulties. 9 Hole   6/30/18   Right 37s, 33s   Left 1:07, 45s    increased difficulty with Left hand FM skills with most difficulty resulting from attempts at picking up pegs for assessment. Pt reports no sensation issues in either hand. Pt tasked with B coordination task, pt to string one bead with alternating hands. Pt able to complete task with increased time and required multiple rest breaks due to muscle fatigue in Left Shoulder. Task started with min difficulty in right hand, Mod in left and decreased with difficulty overall as task progressed. THERAPEUTIC EXERCISE Daily Assessment   PROM Left UE, B  strength PROM- shoulder flexion, shoulder abduction, elbow flexion/extention with shoulder flexion, shoulder abduction-elbow extended  Pt used b hands to manipulate medium theraputty to reveal and remove various sized objects.         TOILETING Daily Assessment    Toileting  Toileting Assistance (FIM Score): 3 (Moderate assistance ) (Assistance with balace for pants down, Cueing to initiate)  Adaptive Equipment: Other (comment) (Bed side commode)       MOBILITY/TRANSFERS Daily Assessment    Functional Transfers  Toilet Transfer : Stand pivot transfer without device  Amount of Assistance Required: 3 (Moderate assistance)       ASSESSMENT:  Pt motivated to improve overall function. Decreased FM,coordination skills in Left hand with decreased strength in Left UE while performing tasks. Pt educated on HEP for AROM of Left UE to assist with decreasing reported tightness. Progression toward goals:  [x]          Improving appropriately and progressing toward goals  []          Improving slowly and progressing toward goals  []          Not making progress toward goals and plan of care will be adjusted     PLAN:  Patient continues to benefit from skilled intervention to address the above impairments. Continue treatment per established plan of care. Discharge Recommendations: To Be Determined  Further Equipment Recommendations for Discharge:  TBD     Activity Tolerance:  Fair-      Estimated LOS:TBD    Please refer to the flowsheet for vital signs taken during this treatment.   After treatment:   [x]  Patient left in no apparent distress sitting on Bed Side Commode   []  Patient left in no apparent distress in bed  [x]  Call bell left within reach  [x]  Nursing notified  []  Caregiver present  []  Bed alarm activated        DEANA Jang/JUAN CARLOS

## 2018-06-30 NOTE — PROGRESS NOTES
Problem: Neurolinguistics Impaired (Adult)  Goal: *Speech Goal: (INSERT TEXT)  Long term goals:  1. Patient will use strategies of slowed reate, over-articulation and increased loudness to improve speech production, supervision. 2. Patient will be 100% intelligible in casual conversation, supervision. 3. Patient will recall 3 words after 5 minutes in short term memory task, supervision. 4. Patient will perform functional problem solving and reasoning tasks with 90% accuracy. 5. Patient will write single words and short sentences with 90% accuracy and legibility. Short term goals  (by 7/5/18):  1. Patient will use strategies of slowed reate, over-articulation and increased loudness to improve speech production, mod-min cues. 2. Patient will be 90% intelligible in production of polysyllabic words and phrases, min cues-supervision. 3. Patient will be 80% intelligible in casual conversation, min cues for use of strategies. 4. Patient will recall 3 words after 5 minutes in short term memory task, mod-min assist.  5. Patient will perform functional problem solving and reasoning tasks with 75-85% accuracy. 6. Patient will write single words with 75-80% accuracy and legibility. Speech language pathology treatment    Patient: Eugene Malloy (53 y.o. female)  Date: 6/30/2018  Diagnosis: Rhabdomyolisis  Rhabdomyolysis Rhabdomyolysis       SUBJECTIVE:   Patient stated I didn't know if I would see you today or not. OBJECTIVE:   Mental Status:  Mrs. Carley Dixon was awake and alert during this morning's session. Treatment & Interventions:   Patient was seen for a thirty minute speech therapy session this morning at her bedside. The following treatment tasks were presented:   Motor Speech:   4 syllable words:  85% accuracy  Functional phrases:  100% intelligible, cues for prosody  Conversation:   Intelligible to the SLP, prosody at times difficult for patient    Neuro-Linguistics: Orientation:   Supervision  Recent memory:  Supervision    Response & Tolerance to Activities:  As usual, Mrs. Jane Garcia was alert and motivated in this morning's session. Pain:  Pain Scale 1: Numeric (0 - 10)  No report of pain     After treatment:   []       Patient left in no apparent distress sitting up in chair  [x]       Patient left in no apparent distress in bed  [x]       Call bell left within reach  []       Nursing notified  []       Caregiver present  []       Bed alarm activated    ASSESSMENT:   Progression toward goals:  [x]       Improving appropriately and progressing toward goals  []       Improving slowly and progressing toward goals  []       Not making progress toward goals and plan of care will be adjusted    PLAN:   Patient continues to benefit from skilled intervention to address the above impairments. Continue treatment per established plan of care. Discharge Recommendations:   To Be Determined    Estimated LOS: Through 7/11/18    ALLISON Jackson  Time Calculation: 30 mins

## 2018-06-30 NOTE — PROGRESS NOTES
Problem: Pressure Injury - Risk of  Goal: *Prevention of pressure injury  Document Ryan Scale and appropriate interventions in the flowsheet. Outcome: Progressing Towards Goal  Pressure Injury Interventions:  Sensory Interventions: Pressure redistribution bed/mattress (bed type)    Moisture Interventions: Absorbent underpads    Activity Interventions: Increase time out of bed, Pressure redistribution bed/mattress(bed type)    Mobility Interventions: Pressure redistribution bed/mattress (bed type)    Nutrition Interventions: Document food/fluid/supplement intake    Friction and Shear Interventions: Apply protective barrier, creams and emollients               Problem: Falls - Risk of  Goal: *Absence of Falls  Document Radha Fall Risk and appropriate interventions in the flowsheet.    Outcome: Progressing Towards Goal  Fall Risk Interventions:  Mobility Interventions: Bed/chair exit alarm, Patient to call before getting OOB    Mentation Interventions: Bed/chair exit alarm    Medication Interventions: Bed/chair exit alarm, Patient to call before getting OOB    Elimination Interventions: Bed/chair exit alarm, Call light in reach, Patient to call for help with toileting needs    History of Falls Interventions: Bed/chair exit alarm

## 2018-06-30 NOTE — ROUTINE PROCESS
SHIFT CHANGE NOTE FOR [facility]    Bedside and Verbal shift change report given to Madiha Helton LPN (oncoming nurse) by Shanique Hart LPN (offgoing nurse). Report included the following information SBAR, Kardex, MAR and Recent Results.     Situation:   Code Status: Full Code   Reason for Admission: Rhabdomyolysis  Hospital Day: 2   Problem List:   Hospital Problems  Date Reviewed: 6/28/2018          Codes Class Noted POA    Vitamin D deficiency (Chronic) ICD-10-CM: E55.9  ICD-9-CM: 268.9  6/28/2018 Yes    Overview Signed 6/28/2018  1:12 PM by Nelda Arauz MD     Vitamin D 25-Hydroxy (6/28/2018) = 13.5              Gout (Chronic) ICD-10-CM: M10.9  ICD-9-CM: 274.9  Unknown Yes        Essential hypertension (Chronic) ICD-10-CM: I10  ICD-9-CM: 401.9  Unknown Yes        Type 2 diabetes mellitus ([facility] (Chronic) ICD-10-CM: E11.9  ICD-9-CM: 250.00  Unknown Yes    Overview Signed 6/27/2018 10:18 PM by Nelda Arauz MD     HbA1c (6/14/2018) = 6.9             Dysphagia ICD-10-CM: R13.10  ICD-9-CM: 787.20  6/14/2018 Yes        History of atrial fibrillation ICD-10-CM: Z86.79  ICD-9-CM: V12.59  6/13/2018 Yes    Overview Signed 6/27/2018 10:23 PM by Nelda Arauz MD     Atrial fibrillation with rapid ventricular response             * (Principal)Rhabdomyolysis ICD-10-CM: M62.82  ICD-9-CM: 728.88  6/13/2018 Yes        History of encephalopathy ICD-10-CM: Z86.69  ICD-9-CM: V12.49  6/13/2018 Yes    Overview Signed 6/27/2018 10:21 PM by Nelda Arauz MD     Acute metabolic encephalopathy             History of acute renal failure ICD-10-CM: Z87.448  ICD-9-CM: V13.09  6/13/2018 Yes        Generalized weakness ICD-10-CM: R53.1  ICD-9-CM: 780.79  6/13/2018 Yes        Impaired mobility and ADLs ICD-10-CM: Z74.09  ICD-9-CM: 799.89  6/13/2018 Yes              Background:   Past Medical History:   Past Medical History:   Diagnosis Date    [facility] dependence in remission ([facility]     alcohol withdrawal discharged 05/31/2013    Bipolar 1 disorder [facility])     Chronic hepatitis C virus infection ([facility]     prior IV drug use;  Genotype 1a     Chronic obstructive pulmonary disease ([facility]     Depression     Dysphagia 6/14/2018    Essential hypertension     Eustachian tube dysfunction     Dr. Danitza Pak Gastroesophageal reflux disease     Gout     History of acute renal failure 6/13/2018    History of atrial fibrillation 6/13/2018    Atrial fibrillation with rapid ventricular response    History of encephalopathy 7/42/0304    Acute metabolic encephalopathy    Low serum high density lipoprotein (HDL) 6/14/2018    HDL (6/14/2018) = 12    Needle phobia     from prior IV drug abuse    Obesity, Class I, BMI 30-34.9     Primary osteoarthritis involving multiple joints     knees and back    Psoriasis     Type 2 diabetes mellitus (HCC)     HbA1c (6/14/2018) = 6.9    Vitamin D deficiency 6/28/2018    Vitamin D 25-Hydroxy (6/28/2018) = 13.5       Patient taking anticoagulants yes HEPARIN, ASA   Patient has a defibrillator: no     Assessment:   Changes in Assessment throughout shift: none     Patient has central line: no Reasons if yes: Insertion date Last dressing date:   Patient has Weston Cath: no Reasons if yes:     Insertion date:     Last Vitals:     Vitals:    06/28/18 1521 06/28/18 2200 06/29/18 0814 06/29/18 1525   BP: (!) 160/95 (!) 140/93 147/86 128/76   Pulse: 89 82 74 72   Resp: 20 18 20 16   Temp: 97.5 °F (36.4 °C) 100.1 °F (37.8 °C) 98.4 °F (36.9 °C) 96.6 °F (35.9 °C)   SpO2: 96% 95% 96% 96%   Weight:       Height:            PAIN    Pain Assessment    Pain Intensity 1: 0 (06/29/18 2002) Pain Intensity 1: 2 (12/29/14 1105)      Pain Location 1: Abdomen      Pain Intervention(s) 1: Medication (see MAR)  Patient Stated Pain Goal: 0 Patient Stated Pain Goal: 0  o Intervention effective: yes    o Other actions taken for pain: turn reposition rest     Skin Assessment  Skin color Skin Color: Appropriate for ethnicity  Condition/Temperature Skin Condition/Temp: Warm  Integrity Skin Integrity: Wound (add Wound LDA) (subhash heels and sacrum)  Turgor Turgor: Non-tenting  Weekly Pressure Ulcer Documentation  Pressure  Injury Documentation: Pressure Injury Noted-See Wound LDA to Document  Wound Prevention & Protection Methods  Orientation of wound Orientation of Wound Prevention: Posterior  Location of Prevention Location of Wound Prevention: Buttocks, Heel  Dressing Present Dressing Present : Yes  Dressing Status Dressing Status: Intact  Wound Offloading Wound Offloading (Prevention Methods): Bed, pressure redistribution/air     INTAKE/OUPUT    Date 06/28/18 1900 - 06/29/18 0659 06/29/18 0700 - 06/30/18 0659   Shift 4873-4607 24 Hour Total 7302-9390 0398-3976 24 Hour Total   I  N  T  A  K  E   P.O.   1200      P. O.   1200    Shift Total  (mL/kg) 120  (1.3) 840  (9.2) 1200  (13.2)  1200  (13.2)   O  U  T  P  U  T   Urine  (mL/kg/hr)   600  (0.5)  600      Urine Voided   600  600      Urine Occurrence(s) 3 x 8 x 1 x  1 x    Stool           Stool Occurrence(s) 0 x 3 x       Shift Total  (mL/kg)   600  (6.6)  600  (6.6)    840 600  600   Weight (kg) 91 91 91 91 91       Recommendations:  1. Patient needs and requests: Assist with ADLS,     2. Diet: Dental soft with thin liquids    3. Pending tests/procedures: none     4. Functional Level/Equipment: 1 person assist    5. Estimated Discharge Date: TBD Posted on Whiteboard in Patients Room: no     [facility] Safety Check    A safety check occurred in the patient's room between off going nurse and oncoming nurse listed above.     The safety check included the below items  Area Items   H  High Alert Medications - Verify all high alert medication drips (heparin, PCA, etc.)   E  Equipment - Suction is set up for ALL patients (with jamiker)  - Red plugs utilized for all equipment (IV pumps, etc.)  - WOWs wiped down at end of shift.  - Room stocked with oxygen, suction, and other unit-specific supplies   A  Alarms - Bed alarm is set for fall risk patients  - Ensure chair alarm is in place and activated if patient is up in a chair   L  Lines - Check IV for any infiltration  - Weston bag is empty if patient has a Weston   - Tubing and IV bags are labeled   S  Safety   - Room is clean, patient is clean, and equipment is clean. - Hallways are clear from equipment besides carts. - Fall bracelet on for fall risk patients  - Ensure room is clear and free of clutter  - Suction is set up for ALL patients (with jamiker)  - Hallways are clear from equipment besides carts.    - Isolation precautions followed, supplies available outside room, sign posted

## 2018-06-30 NOTE — INTERDISCIPLINARY ROUNDS
[facility] PHYSICAL REHABILITATION  [address] [place] [address], [address]    INPATIENT REHABILITATION  TEAM CONFERENCE SUMMARY     Date of Conference: 6/29/18    Name: Krzysztof Sykes Age / Sex: 59 y.o. / female   CSN: [id number] MRN: [id number]   Admit Date: 6/27/2018 Length of Stay: 2 days     Primary Rehabilitation Diagnosis  1. Generalized Weakness with Impaired Mobility and ADLs  2. Rhabdomyolysis     Comorbidities   Chronic hepatitis C virus infection  B18.2    Bipolar 1 disorder  F31.9    Depression F32.9    Alcohol dependence in remission  F10.21    Needle phobia F40.298    Eustachian tube dysfunction H69.80    Psoriasis L40.9    History of atrial fibrillation Z86.79    History of encephalopathy Z86.69    Primary osteoarthritis involving multiple joints M15.0    Essential hypertension I10    Gastroesophageal reflux disease K21.9    Type 2 diabetes mellitus  E11.9    Chronic obstructive pulmonary disease  J44.9    History of acute renal failure Z87.448    Hypoalbuminemia E88.09    Low serum high density lipoprotein (HDL) R74.8    Obesity, Class I, BMI 30-34.9 E66.9    Dysphagia R13.10    Vitamin D deficiency E55. 9          Therapy:     FIM SCORES Initial Assessment Weekly Progress Assessment 6/29/2018   Eating Functional Level: 7  Comments: (I) for self feeding of breakfast meal. No reports from pt about difficulty with cutting, bringing food to mouth, or swallowing. Functional Level: 7  Comments: (I) for self feeding of breakfast meal. No reports from pt about difficulty with cutting, bringing food to mouth, or swallowing. Swallowing     Grooming 5  5 Setup   Bathing 4  4 Min A      Upper Body Dressing Functional Level: 4  Items Applied/Steps Completed: Pullover (4 steps)  Comments: Supervision to doff pullover shirt and Min A to don pullover shirt. Not challenged with bra due to pt not having in her personal possession.    Functional Level: 4  Items Applied/Steps Completed: Pullover (4 steps)  Comments: Supervision to [employer] shirt and Min A to don pullover shirt. Not challenged with bra due to pt not having in her personal possession. Lower Body Dressing Functional Level: 4  Items Applied/Steps Completed: Elastic waist pants (3 steps), Underpants (3 steps)  Comments: SBA to doff pants and underwear. Min A to don pants and underwear. Functional Level: 4  Items Applied/Steps Completed: Elastic waist pants (3 steps), Underpants (3 steps)  Comments: SBA to doff pants and underwear. Min A to don pants and underwear. Toileting Functional Level: 0  Comments: Min A for clothing mgt. Supervision for full pericare hygiene. Functional Level: 0  Comments: Min A for clothing mgt. Supervision for full pericare hygiene. Bladder - level of assist 2 1   Bladder - accident frequency score 5 6   Bowel - level of assist 1 2   Bowel - accident frequency score 6     Toilet Transfer [place] Toilet Transfer Score: 4  Comments: Min A secondary to require vcs and tactile cues for proper hand placement. Tendency to pull up from seat surface vs push from arm rest.   Toilet Transfer Score: 4  Comments: Min A secondary to require vcs and tactile cues for proper hand placement. Tendency to pull up from seat surface vs push from arm rest.   Tub/Shower Transfer [place] Tub or Shower Type: Tub/Shower combination  Tub/Shower Transfer Score: 4  Comments: Min A for safety cues as well as utilization of proper hand placement with transfers. Tendency to want to pull up from seated position to stand. Education provided about proper hand placement. Tub or Shower Type: Tub/Shower combination  Tub/Shower Transfer Score: 4  Comments: Min A for safety cues as well as utilization of proper hand placement with transfers. Tendency to want to pull up from seated position to stand. Education provided about proper hand placement.    Comprehension Primary Mode of Comprehension: Auditory  Score: 5 Auditory  5   Expression Primary Mode of Expression: Verbal  Score: 5 Verbal  5   Social Interaction Score: 4 4   Problem Solving Score: 4 4   Memory Score: 3 3     FIM SCORES Initial Assessment Weekly Progress Assessment 6/29/2018   Bed/Chair/Wheelchair Transfers Transfer Type: SPT without device (stand step w/out AD, with gait belt)  Other: toilet transfer with min A with use of grab bar  Transfer Assistance : 3 (Moderate assistance ) (w/out AD; min A with RW)  Sit to Stand Assistance: Minimal assistance  Car Transfers: Not tested  Car Type: n/a     Moderate assistance for stand step transfers without AD   Bed Mobility Rolling Right Stand by assistance   Rolling Left Stand by assistance   Supine to Sit Minimal assistance   Sit to Stand Minimal assistance   Sit to Supine Minimal assistance    Rolling Right   Stand by assistance   Rolling Left   Stand by assistance   Supine to Sit   Minimal assistance   Sit to Stand   Minimal assistance   Sit to Supine   Minimal assistance      Locomotion (W/C) Moderate assistance Function 3  Setup Assistance       Locomotion (W/C distance) 55 Feet 55 Feet   Locomotion (Walk) Minimal assistance Minimal assistance      Locomotion (Walk dist.) 18 Feet (ft)     Steps/Stairs Steps/Stairs Ambulated (#): 0  Level of Assist : 0 (Not tested) (deferred due to dizziness with gait training)           Nursing:     Neuro:   A&O x__4__                   Respiratory:   [x] WNL   [] O2   [] LPM ______   Other:  Peripheral Vascular:   [x] TEDS present   [] Edema present ____ Grade   Cardiac:   [x] WNL   [] Other  Genitourinary:   [x] continent   [] incontinent   [] diallo  Abdominal _____6/28/18__ LBM  GI: ____diabetic___ Diet ___thin__ Liquids _____ tube feeds  Musculoskeletal: ____ ROM Transfers _____ Assistive Device Used  __min__ Level of Assistance  Skin Integumentary:   [] Intact   [x] Not Intact   __________Preventative Measures  Details______________________________________________________________  Pain: [x] Controlled   [] Not Controlled   Pain Meds:   [] Scheduled   [x] PRN        Registered Dietitian / Nutrition:     Patient Vitals for the past 100 hrs:   % Diet Eaten   06/29/18 1804 75 %   06/29/18 1302 100 %   06/29/18 1100 100 %   06/28/18 1756 75 %   06/28/18 1315 100 %   06/28/18 0900 50 %   06/27/18 1756 50 %     Pt reported good appetite and meal intake PTA and since admission. Denied having any concerns at time of visit. Supplements:          [] Yes   [x] No      Amount of supplement consumed: not applicable       Intake/Output Summary (Last 24 hours) at 06/29/18 2109  Last data filed at 06/29/18 1804   Gross per 24 hour   Intake             1560 ml   Output              600 ml   Net              960 ml                                Last bowel movement: 6/27      Interdisciplinary Team Goals:     1. Goal Encourage pt to perform stand step transfer with or without AD with minimal assistance and minimal verbal cues for safety    Barrier  Impaired strength, Impaired endurance, Impaired balance    Intervention  Strength and balance training     2. Goal  OTR/L: Pt to perform LB dressing (ie pants) CGA with DME as needed to improve performance with self care. Barrier  Strength, coordination, safety    Intervention  ADL retraining, TE, TA     3. Goal  Patient will produce phrases and short sentences with 90% accuracy/intelligibility. Barrier  Moderate dysarthria, mild cognitive-linguistic impairment    Intervention  Speech drill, oromotor exercises, cognitive retraining. 4. Goal  Nursing:By discharge patient will verbalize knowledge of a normal blood sugar, and will know the s/s and treatment of hyper/hypoglycemia episodes    Barrier  Education, knowledge, lack of support    Intervention  medication exercise,blood sugar checks     5. Goal      Barrier      Intervention       6.  Goal  Po intake of meals will meet >75% of patient estimated nutritional needs within the next 7 days. Outcome:  [] Met/Ongoing    []  Not Met    [x] New/Initial Goal     Barrier  none known     Intervention  meals/snacks: modified composition       Disposition / Discharge Planning: Follow-up therapy services:  tbd   DME recommendations:  tbd   Estimated discharge date:  tbd   Discharge Location:  home         Electronic Signatures: The team conference was attended by the following-     Signature    Physical Therapist    Susy Perez DPT    Occupational Therapist    Dorsi Perez, OTR/L    Speech Therapist    Shara Gutierrez, [address] [place] [address] Therapist    Haroldo Pan, [address] [place] [address], RN    Dietitian        Clinical Psychologist       Physician           Stefany Drew, MSW            The above information has been reviewed with the patient in a language that they can understand. Opportunity for comments and questions has been provided and a signed attestation has been scanned into the \"media tab\" of the EMR.       Patient Signature: ______________________________________________________    Date Signed: __________________________________________________________

## 2018-06-30 NOTE — ROUTINE PROCESS
SHIFT CHANGE NOTE FOR [facility]    Bedside and Verbal shift change report given to Koby Calixto LPN (oncoming nurse) by Feliciano Guzmán LPN   (offgoing nurse). Report included the following information SBAR, Kardex, MAR and Recent Results.     Situation:   Code Status: Full Code   Reason for Admission: Rhabdomyolysis  Hospital Day: 3   Problem List:   Hospital Problems  Date Reviewed: 6/28/2018          Codes Class Noted POA    Vitamin D deficiency (Chronic) ICD-10-CM: E55.9  ICD-9-CM: 268.9  6/28/2018 Yes    Overview Signed 6/28/2018  1:12 PM by Pili Chang MD     Vitamin D 25-Hydroxy (6/28/2018) = 13.5              Gout (Chronic) ICD-10-CM: M10.9  ICD-9-CM: 274.9  Unknown Yes        Essential hypertension (Chronic) ICD-10-CM: I10  ICD-9-CM: 401.9  Unknown Yes        Type 2 diabetes mellitus ([facility] (Chronic) ICD-10-CM: E11.9  ICD-9-CM: 250.00  Unknown Yes    Overview Signed 6/27/2018 10:18 PM by Pili Chang MD     HbA1c (6/14/2018) = 6.9             Dysphagia ICD-10-CM: R13.10  ICD-9-CM: 787.20  6/14/2018 Yes        History of atrial fibrillation ICD-10-CM: Z86.79  ICD-9-CM: V12.59  6/13/2018 Yes    Overview Signed 6/27/2018 10:23 PM by Pili Chang MD     Atrial fibrillation with rapid ventricular response             * (Principal)Rhabdomyolysis ICD-10-CM: M62.82  ICD-9-CM: 728.88  6/13/2018 Yes        History of encephalopathy ICD-10-CM: Z86.69  ICD-9-CM: V12.49  6/13/2018 Yes    Overview Signed 6/27/2018 10:21 PM by Pili Chang MD     Acute metabolic encephalopathy             History of acute renal failure ICD-10-CM: Z87.448  ICD-9-CM: V13.09  6/13/2018 Yes        Generalized weakness ICD-10-CM: R53.1  ICD-9-CM: 780.79  6/13/2018 Yes        Impaired mobility and ADLs ICD-10-CM: Z74.09  ICD-9-CM: 799.89  6/13/2018 Yes              Background:   Past Medical History:   Past Medical History:   Diagnosis Date    Alcohol dependence in remission ([facility]     alcohol withdrawal discharged 05/31/2013   [facility] Bipolar 1 disorder (HCC)     Chronic hepatitis C virus infection ([facility]     prior IV drug use;  Genotype 1a     Chronic obstructive pulmonary disease ([facility]     Depression     Dysphagia 6/14/2018    Essential hypertension     Eustachian tube dysfunction     Dr. Ron Palomares Gastroesophageal reflux disease     Gout     History of acute renal failure 6/13/2018    History of atrial fibrillation 6/13/2018    Atrial fibrillation with rapid ventricular response    History of encephalopathy 7/81/0593    Acute metabolic encephalopathy    Low serum high density lipoprotein (HDL) 6/14/2018    HDL (6/14/2018) = 12    Needle phobia     from prior IV drug abuse    Obesity, Class I, BMI 30-34.9     Primary osteoarthritis involving multiple joints     knees and back    Psoriasis     Type 2 diabetes mellitus (HCC)     HbA1c (6/14/2018) = 6.9    Vitamin D deficiency 6/28/2018    Vitamin D 25-Hydroxy (6/28/2018) = 13.5       Patient taking anticoagulants yes HEPARIN, ASA   Patient has a defibrillator: no     Assessment:   Changes in Assessment throughout shift: none     Patient has central line: no Reasons if yes: Insertion date Last dressing date:   Patient has Weston Cath: no Reasons if yes:     Insertion date:     Last Vitals:     Vitals:    06/29/18 1525 06/29/18 2212 06/30/18 0800 06/30/18 1546   BP: 128/76 129/86 139/87 134/83   Pulse: 72 74 61 70   Resp: 16 18 18 18   Temp: 96.6 °F (35.9 °C) 100 °F (37.8 °C) 97.9 °F (36.6 °C) 100 °F (37.8 °C)   SpO2: 96% 96% 95% 93%   Weight:       Height:            PAIN    Pain Assessment    Pain Intensity 1: 0 (06/30/18 1600) Pain Intensity 1: 2 (12/29/14 1105)    Pain Location 1: Head Pain Location 1: Abdomen    Pain Intervention(s) 1: Medication (see MAR) Pain Intervention(s) 1: Medication (see MAR)  Patient Stated Pain Goal: 0 Patient Stated Pain Goal: 0  o Intervention effective: yes    o Other actions taken for pain: turn reposition rest     Skin Assessment  Skin color Skin Color: Appropriate for ethnicity  Condition/Temperature Skin Condition/Temp: Dry, Warm  Integrity Skin Integrity: Intact  Turgor Turgor: Non-tenting  Weekly Pressure Ulcer Documentation  Pressure  Injury Documentation: No Pressure Injury Noted-Pressure Ulcer Prevention Initiated (abrasion per wound care nurse)  Wound Prevention & Protection Methods  Orientation of wound Orientation of Wound Prevention: Posterior  Location of Prevention Location of Wound Prevention: Buttocks, Sacrum/Coccyx  Dressing Present Dressing Present : Yes  Dressing Status Dressing Status: Intact  Wound Offloading Wound Offloading (Prevention Methods): Bed, pressure redistribution/air, Repositioning, Pillows, Elevate heels, Chair cushion     INTAKE/OUPUT    Date 06/29/18 0700 - 06/30/18 0659 06/30/18 0700 - 07/01/18 0659   Shift 5949-2821 0501-3218 24 Hour Total 5744-8297 4925-6131 24 Hour Total   I  N  T  A  K  E   P.O. 1440  1440 320  320      P. O. 1440  1440 320  320    Shift Total  (mL/kg) 1440  (15.8)  1440  (15.8) 320  (3.5)  320  (3.5)   O  U  T  P  U  T   Urine  (mL/kg/hr) 600  (0.5)  600  (0.3)         Urine Voided 600  600         Urine Occurrence(s) 1 x 5 x 6 x 6 x  6 x    Stool            Stool Occurrence(s)  1 x 1 x 2 x  2 x    Shift Total  (mL/kg) 600  (6.6)  600  (6.6)        840 320  320   Weight (kg) 91 91 91 91 91 91       Recommendations:  1. Patient needs and requests: Assist with ADLS,     2. Diet: Dental soft with thin liquids    3. Pending tests/procedures: none     4. Functional Level/Equipment: 1 person assist    5. Estimated Discharge Date: TBD Posted on Whiteboard in Patients Room: no     [facility] Safety Check    A safety check occurred in the patient's room between off going nurse and oncoming nurse listed above.     The safety check included the below items  Area Items   H  High Alert Medications - Verify all high alert medication drips (heparin, PCA, etc.)   E  Equipment - Suction is set up for ALL patients (with yanker)  - Red plugs utilized for all equipment (IV pumps, etc.)  - WOWs wiped down at end of shift.  - Room stocked with oxygen, suction, and other unit-specific supplies   A  Alarms - Bed alarm is set for fall risk patients  - Ensure chair alarm is in place and activated if patient is up in a chair   L  Lines - Check IV for any infiltration  - Weston bag is empty if patient has a Weston   - Tubing and IV bags are labeled   S  Safety   - Room is clean, patient is clean, and equipment is clean. - Hallways are clear from equipment besides carts. - Fall bracelet on for fall risk patients  - Ensure room is clear and free of clutter  - Suction is set up for ALL patients (with may)  - Hallways are clear from equipment besides carts.    - Isolation precautions followed, supplies available outside room, sign posted

## 2018-07-01 LAB
GLUCOSE BLD STRIP.AUTO-MCNC: 100 MG/DL (ref 70–110)
GLUCOSE BLD STRIP.AUTO-MCNC: 104 MG/DL (ref 70–110)
GLUCOSE BLD STRIP.AUTO-MCNC: 92 MG/DL (ref 70–110)
GLUCOSE BLD STRIP.AUTO-MCNC: 97 MG/DL (ref 70–110)

## 2018-07-01 PROCEDURE — 82962 GLUCOSE BLOOD TEST: CPT

## 2018-07-01 PROCEDURE — 65310000000 HC RM PRIVATE REHAB

## 2018-07-01 PROCEDURE — 74011250637 HC RX REV CODE- 250/637: Performed by: INTERNAL MEDICINE

## 2018-07-01 PROCEDURE — 74011250636 HC RX REV CODE- 250/636: Performed by: INTERNAL MEDICINE

## 2018-07-01 RX ADMIN — ASPIRIN 81 MG CHEWABLE TABLET 81 MG: 81 TABLET CHEWABLE at 09:01

## 2018-07-01 RX ADMIN — COLCHICINE 0.6 MG: 0.6 CAPSULE ORAL at 09:02

## 2018-07-01 RX ADMIN — METFORMIN HYDROCHLORIDE 500 MG: 500 TABLET ORAL at 09:02

## 2018-07-01 RX ADMIN — HEPARIN SODIUM 5000 UNITS: 5000 INJECTION, SOLUTION INTRAVENOUS; SUBCUTANEOUS at 13:50

## 2018-07-01 RX ADMIN — HEPARIN SODIUM 5000 UNITS: 5000 INJECTION, SOLUTION INTRAVENOUS; SUBCUTANEOUS at 22:02

## 2018-07-01 RX ADMIN — HEPARIN SODIUM 5000 UNITS: 5000 INJECTION, SOLUTION INTRAVENOUS; SUBCUTANEOUS at 06:23

## 2018-07-01 RX ADMIN — Medication 1 TABLET: at 09:02

## 2018-07-01 RX ADMIN — CHOLECALCIFEROL CAP 125 MCG (5000 UNIT) 5000 UNITS: 125 CAP at 09:01

## 2018-07-01 RX ADMIN — ACETAMINOPHEN 650 MG: 325 TABLET, FILM COATED ORAL at 22:02

## 2018-07-01 RX ADMIN — LOSARTAN POTASSIUM 25 MG: 25 TABLET ORAL at 09:01

## 2018-07-01 RX ADMIN — PANTOPRAZOLE SODIUM 40 MG: 40 TABLET, DELAYED RELEASE ORAL at 07:30

## 2018-07-01 NOTE — ROUTINE PROCESS
[facility]    Bedside and Verbal shift change report given to Shane Hernandez LPN (oncoming nurse) by Doylene Meigs, LPN   (offgoing nurse). Report included the following information SBAR, Kardex, MAR and Recent Results.     Situation:   Code Status: Full Code   Reason for Admission: Rhabdomyolysis  Hospital Day: 4   Problem List:   Hospital Problems  Date Reviewed: 6/28/2018          Codes Class Noted POA    Vitamin D deficiency (Chronic) ICD-10-CM: E55.9  ICD-9-CM: 268.9  6/28/2018 Yes    Overview Signed 6/28/2018  1:12 PM by Dolores Lomax MD     Vitamin D 25-Hydroxy (6/28/2018) = 13.5              Gout (Chronic) ICD-10-CM: M10.9  ICD-9-CM: 274.9  Unknown Yes        Essential hypertension (Chronic) ICD-10-CM: I10  ICD-9-CM: 401.9  Unknown Yes        [facility] (Chronic) ICD-10-CM: E11.9  ICD-9-CM: 250.00  Unknown Yes    Overview Signed 6/27/2018 10:18 PM by Dolores Lomax MD     HbA1c (6/14/2018) = 6.9             Dysphagia ICD-10-CM: R13.10  ICD-9-CM: 787.20  6/14/2018 Yes        History of atrial fibrillation ICD-10-CM: Z86.79  ICD-9-CM: V12.59  6/13/2018 Yes    Overview Signed 6/27/2018 10:23 PM by Dolores Lomax MD     Atrial fibrillation with rapid ventricular response             * (Principal)Rhabdomyolysis ICD-10-CM: M62.82  ICD-9-CM: 728.88  6/13/2018 Yes        History of encephalopathy ICD-10-CM: Z86.69  ICD-9-CM: V12.49  6/13/2018 Yes    Overview Signed 6/27/2018 10:21 PM by Dolores Lomax MD     Acute metabolic encephalopathy             History of acute renal failure ICD-10-CM: Z87.448  ICD-9-CM: V13.09  6/13/2018 Yes        Generalized weakness ICD-10-CM: R53.1  ICD-9-CM: 780.79  6/13/2018 Yes        Impaired mobility and ADLs ICD-10-CM: Z74.09  ICD-9-CM: 799.89  6/13/2018 Yes              Background:   Past Medical History:   Past Medical History:   Diagnosis Date    [facility] dependence in remission ([facility]     alcohol withdrawal discharged 05/31/2013   Sven Ma Bipolar 1 disorder (HCC)     Chronic hepatitis C virus infection ([facility]     prior IV drug use;  Genotype 1a     Chronic obstructive pulmonary disease ([facility]     Depression     Dysphagia 6/14/2018    Essential hypertension     Eustachian tube dysfunction     Dr. Fito Lin Gastroesophageal reflux disease     Gout     History of acute renal failure 6/13/2018    History of atrial fibrillation 6/13/2018    Atrial fibrillation with rapid ventricular response    History of encephalopathy 4/96/3310    Acute metabolic encephalopathy    Low serum high density lipoprotein (HDL) 6/14/2018    HDL (6/14/2018) = 12    Needle phobia     from prior IV drug abuse    Obesity, Class I, BMI 30-34.9     Primary osteoarthritis involving multiple joints     knees and back    Psoriasis     Type 2 diabetes mellitus (HCC)     HbA1c (6/14/2018) = 6.9    Vitamin D deficiency 6/28/2018    Vitamin D 25-Hydroxy (6/28/2018) = 13.5       Patient taking anticoagulants yes HEPARIN, ASA   Patient has a defibrillator: no     Assessment:   Changes in Assessment throughout shift: none     Patient has central line: no Reasons if yes: Insertion date Last dressing date:   Patient has Weston Cath: no Reasons if yes:     Insertion date:     Last Vitals:     Vitals:    06/30/18 0800 06/30/18 1546 06/30/18 2100 07/01/18 0749   BP: 139/87 134/83 140/88 156/87   Pulse: 61 70 86 63   Resp: 18 18 20 20   Temp: 97.9 °F (36.6 °C) 100 °F (37.8 °C) 98.6 °F (37 °C) 98.4 °F (36.9 °C)   SpO2: 95% 93% 96% 97%   Weight:       Height:            PAIN    Pain Assessment    Pain Intensity 1: 0 (07/01/18 1200) Pain Intensity 1: 2 (12/29/14 1105)    Pain Location 1: Generalized, Foot Pain Location 1: Abdomen    Pain Intervention(s) 1: Medication (see MAR) Pain Intervention(s) 1: Medication (see MAR)  Patient Stated Pain Goal: 0 Patient Stated Pain Goal: 0  o Intervention effective: yes    o Other actions taken for pain: turn reposition rest     Skin Assessment  Skin color Skin Color: Appropriate for ethnicity  Condition/Temperature Skin Condition/Temp: Dry, Warm  Integrity Skin Integrity: Intact  Turgor Turgor: Non-tenting  Weekly Pressure Ulcer Documentation  Pressure  Injury Documentation: No Pressure Injury Noted-Pressure Ulcer Prevention Initiated (abrasion per wound care nurse)  Wound Prevention & Protection Methods  Orientation of wound Orientation of Wound Prevention: Posterior  Location of Prevention Location of Wound Prevention: Buttocks, Sacrum/Coccyx  Dressing Present Dressing Present : Yes  Dressing Status Dressing Status: Intact  Wound Offloading Wound Offloading (Prevention Methods): Bed, pressure redistribution/air, Repositioning, Pillows, Elevate heels, Chair cushion     INTAKE/OUPUT    Date 06/30/18 0700 - 07/01/18 0659 07/01/18 0700 - 07/02/18 0659   Shift 6510-1620 5207-2569 24 Hour Total 9454-6821 0394-9245 24 Hour Total   I  N  T  A  K  E   P. O. 560  560 440  440      P. O. 560  560 440  440    Shift Total  (mL/kg) 560  (6.2)  560  (6.2) 440  (4.8)  440  (4.8)   O  U  T  P  U  T   Urine  (mL/kg/hr)            Urine Occurrence(s) 8 x 2 x 10 x 6 x  6 x    Stool            Stool Occurrence(s) 2 x 0 x 2 x 0 x  0 x    Shift Total  (mL/kg)           560 440  440   Weight (kg) 91 91 91 91 91 91       Recommendations:  1. Patient needs and requests: Assist with ADLS,     2. Diet: Dental soft with thin liquids    3. Pending tests/procedures: none     4. Functional Level/Equipment: 1 person assist    5. Estimated Discharge Date: TBD Posted on Whiteboard in Patients Room: no     HEALS Safety Check    A safety check occurred in the patient's room between off going nurse and oncoming nurse listed above.     The safety check included the below items  Area Items   H  High Alert Medications - Verify all high alert medication drips (heparin, PCA, etc.)   E  Equipment - Suction is set up for ALL patients (with yanker)  - Red plugs utilized for all equipment (IV pumps, etc.)  - WOWs wiped down at end of shift.  - Room stocked with oxygen, suction, and other unit-specific supplies   A  Alarms - Bed alarm is set for fall risk patients  - Ensure chair alarm is in place and activated if patient is up in a chair   L  Lines - Check IV for any infiltration  - Weston bag is empty if patient has a Weston   - Tubing and IV bags are labeled   S  Safety   - Room is clean, patient is clean, and equipment is clean. - Hallways are clear from equipment besides carts. - Fall bracelet on for fall risk patients  - Ensure room is clear and free of clutter  - Suction is set up for ALL patients (with yanker)  - Hallways are clear from equipment besides carts.    - Isolation precautions followed, supplies available outside room, sign posted

## 2018-07-01 NOTE — PROGRESS NOTES
Progress Note    Patient: Suyapa Quintana MRN: [id number]  SSN: [id number]    YOB: 1960  Age: 59 y.o. Sex: female      Admit Date: 6/27/2018    LOS: 4 days     Subjective:     Patient with rhabdomyalysis admitted for therapy. No acute problems    Objective:     Vitals:    06/30/18 0800 06/30/18 1546 06/30/18 2100 07/01/18 0749   BP: 139/87 134/83 140/88 156/87   Pulse: 61 70 86 63   Resp: 18 18 20 20   Temp: 97.9 °F (36.6 °C) 100 °F (37.8 °C) 98.6 °F (37 °C) 98.4 °F (36.9 °C)   SpO2: 95% 93% 96% 97%   Weight:       Height:            Intake and Output:  Current Shift: 07/01 0701 - 07/01 1900  In: 240 [P.O.:240]  Out: -   Last three shifts: 06/29 1901 - 07/01 0700  In: 560 [P.O.:560]  Out: -     Physical Exam:   GENERAL: alert, cooperative, no distress, appears stated age  LUNG: clear to auscultation bilaterally  HEART: regular rate and rhythm, S1, S2 normal, no murmur, click, rub or gallop    Lab/Data Review: All lab results for the last 24 hours reviewed. Glucose 92    Assessment:     Principal Problem:    Rhabdomyolysis (6/13/2018)    Active Problems:    History of atrial fibrillation (6/13/2018)      Overview: Atrial fibrillation with rapid ventricular response      History of encephalopathy (6/13/2018)      Overview: Acute metabolic encephalopathy      Essential hypertension ()      [facility] ()      Overview: HbA1c (6/14/2018) = 6.9      History of acute renal failure (6/13/2018)      Generalized weakness (6/13/2018)      Impaired mobility and ADLs (6/13/2018)      Dysphagia (6/14/2018)      Vitamin D deficiency (6/28/2018)      Overview: Vitamin D 25-Hydroxy (6/28/2018) = 13.5       Gout ()        Plan:     Continue present management.     Signed By: Aden Murray MD     July 1, 2018

## 2018-07-02 LAB
GLUCOSE BLD STRIP.AUTO-MCNC: 113 MG/DL (ref 70–110)
GLUCOSE BLD STRIP.AUTO-MCNC: 93 MG/DL (ref 70–110)
GLUCOSE BLD STRIP.AUTO-MCNC: 95 MG/DL (ref 70–110)
GLUCOSE BLD STRIP.AUTO-MCNC: 99 MG/DL (ref 70–110)
HCT VFR BLD AUTO: 34.6 % (ref 35–45)
HGB BLD-MCNC: 10.9 G/DL (ref 12–16)
PLATELET # BLD AUTO: 344 K/UL (ref 135–420)

## 2018-07-02 PROCEDURE — 97535 SELF CARE MNGMENT TRAINING: CPT

## 2018-07-02 PROCEDURE — 85018 HEMOGLOBIN: CPT | Performed by: INTERNAL MEDICINE

## 2018-07-02 PROCEDURE — 92507 TX SP LANG VOICE COMM INDIV: CPT

## 2018-07-02 PROCEDURE — 36415 COLL VENOUS BLD VENIPUNCTURE: CPT | Performed by: INTERNAL MEDICINE

## 2018-07-02 PROCEDURE — 97542 WHEELCHAIR MNGMENT TRAINING: CPT

## 2018-07-02 PROCEDURE — 97530 THERAPEUTIC ACTIVITIES: CPT

## 2018-07-02 PROCEDURE — 85049 AUTOMATED PLATELET COUNT: CPT | Performed by: INTERNAL MEDICINE

## 2018-07-02 PROCEDURE — 74011250637 HC RX REV CODE- 250/637: Performed by: INTERNAL MEDICINE

## 2018-07-02 PROCEDURE — 77030011256 HC DRSG MEPILEX <16IN NO BORD MOLN -A

## 2018-07-02 PROCEDURE — 82962 GLUCOSE BLOOD TEST: CPT

## 2018-07-02 PROCEDURE — 97116 GAIT TRAINING THERAPY: CPT

## 2018-07-02 PROCEDURE — 65310000000 HC RM PRIVATE REHAB

## 2018-07-02 PROCEDURE — 74011250636 HC RX REV CODE- 250/636: Performed by: INTERNAL MEDICINE

## 2018-07-02 RX ADMIN — ACETAMINOPHEN 650 MG: 325 TABLET, FILM COATED ORAL at 17:21

## 2018-07-02 RX ADMIN — Medication 1 TABLET: at 09:29

## 2018-07-02 RX ADMIN — METFORMIN HYDROCHLORIDE 500 MG: 500 TABLET ORAL at 09:29

## 2018-07-02 RX ADMIN — CHOLECALCIFEROL CAP 125 MCG (5000 UNIT) 5000 UNITS: 125 CAP at 09:29

## 2018-07-02 RX ADMIN — PANTOPRAZOLE SODIUM 40 MG: 40 TABLET, DELAYED RELEASE ORAL at 06:45

## 2018-07-02 RX ADMIN — HEPARIN SODIUM 5000 UNITS: 5000 INJECTION, SOLUTION INTRAVENOUS; SUBCUTANEOUS at 13:10

## 2018-07-02 RX ADMIN — HEPARIN SODIUM 5000 UNITS: 5000 INJECTION, SOLUTION INTRAVENOUS; SUBCUTANEOUS at 06:45

## 2018-07-02 RX ADMIN — HEPARIN SODIUM 5000 UNITS: 5000 INJECTION, SOLUTION INTRAVENOUS; SUBCUTANEOUS at 22:00

## 2018-07-02 RX ADMIN — COLCHICINE 0.6 MG: 0.6 CAPSULE ORAL at 09:29

## 2018-07-02 RX ADMIN — ASPIRIN 81 MG CHEWABLE TABLET 81 MG: 81 TABLET CHEWABLE at 09:29

## 2018-07-02 RX ADMIN — LOSARTAN POTASSIUM 25 MG: 25 TABLET ORAL at 09:29

## 2018-07-02 NOTE — PROGRESS NOTES
Problem: Neurolinguistics Impaired (Adult)  Goal: *Speech Goal: (INSERT TEXT)  Long term goals:  1. Patient will use strategies of slowed reate, over-articulation and increased loudness to improve speech production, supervision. 2. Patient will be 100% intelligible in casual conversation, supervision. 3. Patient will recall 3 words after 5 minutes in short term memory task, supervision. 4. Patient will perform functional problem solving and reasoning tasks with 90% accuracy. 5. Patient will write single words and short sentences with 90% accuracy and legibility. Short term goals  (by 7/5/18):  1. Patient will use strategies of slowed reate, over-articulation and increased loudness to improve speech production, mod-min cues. 2. Patient will be 90% intelligible in production of polysyllabic words and phrases, min cues-supervision. 3. Patient will be 80% intelligible in casual conversation, min cues for use of strategies. 4. Patient will recall 3 words after 5 minutes in short term memory task, mod-min assist.  5. Patient will perform functional problem solving and reasoning tasks with 75-85% accuracy. 6. Patient will write single words with 75-80% accuracy and legibility. Speech language pathology treatment    Patient: Jorge A Acevedo (25 y.o. female)  Date: 7/2/2018  Diagnosis: Rhabdomyolisis  Rhabdomyolysis Rhabdomyolysis       SUBJECTIVE:   Patient stated I have fun in here. OBJECTIVE:   Mental Status:  Mrs. Fior Alfaro was awake, alert and cooperative in today's sessions. Treatment & Interventions:   Patient was seen for two speech therapy sessions, morning and afternoon. The following treatment tasks were presented:   Motor Speech:   Breathing for speech:  Presented; mod cues for implementation  Sentence productions: 100% intelligible      Min-mod cues for prosody    Neuro-Linguistics:   Orientation:   Supervision  Recent memory:  Supervision  ID places to avoid:  90% appropriate responses  Open ended questions; 90-95% good responses      Min-mod cues for prosody    Response & Tolerance to Activities:  Mrs. Jody Ellison is very motivated to improve her speech production and verbalizes this in every session. Pain:  Pain Scale 1: Numeric (0 - 10)     Pain Description 1: Aching; Intermittent (\" it's my gout cutting up\")  After treatment:   [x]       Patient left in no apparent distress sitting up in chair  []       Patient left in no apparent distress in bed  [x]       Call bell left within reach  [x]       Nursing notified  []       Caregiver present  []       Bed alarm activated    ASSESSMENT:   Progression toward goals:  []       Improving appropriately and progressing toward goals  [x]       Improving slowly and progressing toward goals  []       Not making progress toward goals and plan of care will be adjusted    PLAN:   Patient continues to benefit from skilled intervention to address the above impairments. Continue treatment per established plan of care. Discharge Recommendations:   To Be Determined    Estimated LOS: Through 7/11/18    ALLISON Rascon  Time Calculation:  75 Minutes

## 2018-07-02 NOTE — ROUTINE PROCESS
SHIFT CHANGE NOTE FOR [facility]    Bedside and Verbal shift change report given to 40[address] Dylon Stanley (oncoming nurse) by Jr Carbajal LPN   (offgoing nurse). Report included the following information SBAR, Kardex, MAR and Recent Results.     Situation:   Code Status: Full Code   Reason for Admission: Rhabdomyolysis  Hospital Day: 4   Problem List:   Hospital Problems  Date Reviewed: 6/28/2018          Codes Class Noted POA    Vitamin D deficiency (Chronic) ICD-10-CM: E55.9  ICD-9-CM: 268.9  6/28/2018 Yes    Overview Signed 6/28/2018  1:12 PM by Murray Bush MD     Vitamin D 25-Hydroxy (6/28/2018) = 13.5              Gout (Chronic) ICD-10-CM: M10.9  ICD-9-CM: 274.9  Unknown Yes        Essential hypertension (Chronic) ICD-10-CM: I10  ICD-9-CM: 401.9  Unknown Yes        [facility] diabetes [facility] ([facility] (Chronic) ICD-10-CM: E11.9  ICD-9-CM: 250.00  Unknown Yes    Overview Signed 6/27/2018 10:18 PM by Murray Bush MD     HbA1c (6/14/2018) = 6.9             Dysphagia ICD-10-CM: R13.10  ICD-9-CM: 787.20  6/14/2018 Yes        History of atrial fibrillation ICD-10-CM: Z86.79  ICD-9-CM: V12.59  6/13/2018 Yes    Overview Signed 6/27/2018 10:23 PM by Murray Bush MD     Atrial fibrillation with rapid ventricular response             * (Principal)Rhabdomyolysis ICD-10-CM: M62.82  ICD-9-CM: 728.88  6/13/2018 Yes        History of encephalopathy ICD-10-CM: Z86.69  ICD-9-CM: V12.49  6/13/2018 Yes    Overview Signed 6/27/2018 10:21 PM by Murray Bush MD     Acute metabolic encephalopathy             History of acute renal failure ICD-10-CM: Z87.448  ICD-9-CM: V13.09  6/13/2018 Yes        Generalized weakness ICD-10-CM: R53.1  ICD-9-CM: 780.79  6/13/2018 Yes        Impaired mobility and ADLs ICD-10-CM: Z74.09  ICD-9-CM: 799.89  6/13/2018 Yes              Background:   Past Medical History:   Past Medical History:   Diagnosis Date    [facility] dependence [facility]     alcohol withdrawal discharged 05/31/2013    Bipolar 1 disorder ([facility]     Chronic hepatitis C virus infection ([facility]     prior IV drug use;  Genotype 1a     Chronic obstructive pulmonary disease ([facility]     Depression     Dysphagia 6/14/2018    Essential hypertension     Eustachian tube dysfunction     Dr. Yong Marinelli Gastroesophageal reflux disease     Gout     History of acute renal failure 6/13/2018    History of atrial fibrillation 6/13/2018    Atrial fibrillation with rapid ventricular response    History of encephalopathy 6/53/5407    Acute metabolic encephalopathy    Low serum high density lipoprotein (HDL) 6/14/2018    HDL (6/14/2018) = 12    Needle phobia     from prior IV drug abuse    Obesity, Class I, BMI 30-34.9     Primary osteoarthritis involving multiple joints     knees and back    Psoriasis     Type 2 diabetes mellitus (HCC)     HbA1c (6/14/2018) = 6.9    Vitamin D deficiency 6/28/2018    Vitamin D 25-Hydroxy (6/28/2018) = 13.5       Patient taking anticoagulants yes HEPARIN, ASA   Patient has a defibrillator: no     Assessment:   Changes in Assessment throughout shift: none     Patient has central line: no Reasons if yes: Insertion date Last dressing date:   Patient has Weston Cath: no Reasons if yes:     Insertion date:     Last Vitals:     Vitals:    06/30/18 2100 07/01/18 0749 07/01/18 1611 07/01/18 2100   BP: 140/88 156/87 (!) 149/92 149/86   Pulse: 86 63 81 80   Resp: 20 20 18 20   Temp: 98.6 °F (37 °C) 98.4 °F (36.9 °C) 97.5 °F (36.4 °C) 97.6 °F (36.4 °C)   SpO2: 96% 97% 95% 96%   Weight:       Height:            PAIN    Pain Assessment    Pain Intensity 1: 0 (07/01/18 2000) Pain Intensity 1: 2 (12/29/14 1105)    Pain Location 1: Generalized, Foot Pain Location 1: Abdomen    Pain Intervention(s) 1: Medication (see MAR) Pain Intervention(s) 1: Medication (see MAR)  Patient Stated Pain Goal: 0 Patient Stated Pain Goal: 0  o Intervention effective: yes    o Other actions taken for pain: turn reposition rest     Skin Assessment  Skin color Skin Color: Appropriate for ethnicity  Condition/Temperature Skin Condition/Temp: Dry, Warm  Integrity Skin Integrity: Intact  Turgor Turgor: Non-tenting  Weekly Pressure Ulcer Documentation  Pressure  Injury Documentation: No Pressure Injury Noted-Pressure Ulcer Prevention Initiated  Wound Prevention & Protection Methods  Orientation of wound Orientation of Wound Prevention: Posterior  Location of Prevention Location of Wound Prevention: Sacrum/Coccyx  Dressing Present Dressing Present : Yes  Dressing Status Dressing Status: Intact  Wound Offloading Wound Offloading (Prevention Methods): Bed, pressure redistribution/air, Repositioning, Pillows, Elevate heels, Chair cushion     INTAKE/OUPUT    Date 06/30/18 1900 - 07/01/18 0659 07/01/18 0700 - 07/02/18 0659   Shift 5994-3836 24 Hour Total 9732-7304 5506-3033 24 Hour Total   I  N  T  A  K  E   P. O.  560 680  680      P. O.  560 680  680    Shift Total  (mL/kg)  560  (6.2) 680  (7.5)  680  (7.5)   O  U  T  P  U  T   Urine  (mL/kg/hr)           Urine Occurrence(s) 2 x 10 x 11 x 1 x 12 x    Stool           Stool Occurrence(s) 0 x 2 x 1 x 0 x 1 x    Shift Total  (mL/kg)        NET  560 680  680   Weight (kg) 91 91 91 91 91       Recommendations:  1. Patient needs and requests: Assist with ADLS,     2. Diet: Dental soft with thin liquids    3. Pending tests/procedures: none     4. Functional Level/Equipment: 1 person assist    5. Estimated Discharge Date: TBD Posted on Whiteboard in Patients Room: no     [facility] Safety Check    A safety check occurred in the patient's room between off going nurse and oncoming nurse listed above.     The safety check included the below items  Area Items   H  High Alert Medications - Verify all high alert medication drips (heparin, PCA, etc.)   E  Equipment - Suction is set up for ALL patients (with yanker)  - Red plugs utilized for all equipment (IV pumps, etc.)  - WOWs wiped down at end of shift.  - Room stocked with oxygen, suction, and other unit-specific supplies   A  Alarms - Bed alarm is set for fall risk patients  - Ensure chair alarm is in place and activated if patient is up in a chair   L  Lines - Check IV for any infiltration  - Weston bag is empty if patient has a Weston   - Tubing and IV bags are labeled   S  Safety   - Room is clean, patient is clean, and equipment is clean. - Hallways are clear from equipment besides carts. - Fall bracelet on for fall risk patients  - Ensure room is clear and free of clutter  - Suction is set up for ALL patients (with may)  - Hallways are clear from equipment besides carts.    - Isolation precautions followed, supplies available outside room, sign posted

## 2018-07-02 NOTE — PROGRESS NOTES
Problem: Self Care Deficits Care Plan (Adult)  Goal: *Therapy Goal (Edit Goal, Insert Text)  Occupational Therapy Goals   Long Term Goals  Initiated 2018 and to be accomplished within 2 week(s)  1. Pt will perform self-feeding with (I). 2. Pt will perform grooming with (I) to MI.  3. Pt will perform UB bathing with MI.  4. Pt will perform LB bathing with MI.  5. Pt will perform tub/shower transfer with MI.   6. Pt will perform UB dressing with MI.  7. Pt will perform LB dressing with MI.  8. Pt will perform toileting task with MI.  9. Pt will perform toilet transfer with MI.  8. Pt will perform IADL home mgt (ie laundry, laundry folding, medication mgt) with MI.  11. Pt will perform IADL meal prep with MI. Short Term Goals   Initiated 2018 and to be accomplished within 7 day(s) 2018  1. Pt will perform self-feeding with (I). 2. Pt will perform grooming with (I) to MI.  3. Pt will perform UB bathing with Supervision. 4. Pt will perform LB bathing with Supervision. 5. Pt will perform tub/shower transfer with Supervision . 6. Pt will perform UB dressing with Setup. 7. Pt will perform LB dressing with Supervision. 8. Pt will perform toileting task with Supervision. 9. Pt will perform toilet transfer with Supervision. 10. Pt will perform IADL home mgt (ie laundry, laundry folding, medication mgt) with Supervision. 11. Pt will perform IADL meal prep with Supervision. Occupational Therapy TREATMENT    Patient: Michael Linder   59 y.o. Patient identified with name and : yes    Date: 2018    First Tx Session  Time In: 10:00  Time Out[de-identified] 11:00        Diagnosis: Rhabdomyolisis  Rhabdomyolysis   Precautions: Aspiration, Fall  Chart, occupational therapy assessment, plan of care, and goals were reviewed. Pain:  Pt reports 0/10 pain or discomfort prior to treatment. Pt reports 0/10 pain or discomfort post treatment. Intervention Provided: Pt denied pain.       SUBJECTIVE: Patient stated I never had any of these problems before.     OBJECTIVE DATA SUMMARY:       At end of session, pt requested to use toilet. Due to end of treatment session nursing notified. Pt educated on requesting toileting earlier in session to allow OT to work on this with her. IADL Daily Assessment   Pt performed medication management with am/pm organizer and 4 medication bottles. Pt demonstrated MI with med management requiring assistance to read labels due to fine print and no glasses. Pt educated on asking pharmacy to print large labels for medicine to allow her to read. Pt performed opening bottles/transferring medications with no errors. Pt then performed re-organizing pills in bottles with L hand using tip pinch to increase FM coordination with 2 instances of dropping pills. Pt performed laundry folding activity in stance/sitting down with CGA to SBA increasing challenge with 1 # wrist weights on BUE to strengthen UB. Pt required one seated rest break during activity. Pt performed side stepping/drawer management in kitchen with RW and CGA with ed on smaller steps and keeping AD on floor at all times. MOBILITY/TRANSFERS Daily Assessment   Pt performed w/c management with BUE and BLE throughout therapy gym and in hallway with min A due to decreased ability to navigate around obstacles. Pt performed to increase independence in mobility and functional endurance for self care. ASSESSMENT:  Pt demonstrates great motivation and is improving with FM coordination of L hand. Pt demonstrates good safety awareness with medication management, however, requires more practice utilizing RW in home environment.    Progression toward goals:  [x]          Improving appropriately and progressing toward goals  []          Improving slowly and progressing toward goals  []          Not making progress toward goals and plan of care will be adjusted     PLAN:  Patient continues to benefit from skilled intervention to address the above impairments. Continue treatment per established plan of care. Discharge Recommendations:  Home Health  Further Equipment Recommendations for Discharge:  rolling walker, possible transport chair and N/A     Activity Tolerance:  Fair + while seated and Fair while standing/ambulating. Estimated LOS:2 weeks    Please refer to the flowsheet for vital signs taken during this treatment. After treatment:   [x]  Patient left in no apparent distress sitting up in chair   []  Patient left in no apparent distress in bed  []  Call bell left within reach  []  Nursing notified  [x]  Caregiver present  []  Bed alarm activated    COMMUNICATION/EDUCATION:   [x] Home safety education was provided and the patient/caregiver indicated understanding. [] Patient/family have participated as able in goal setting and plan of care. [] Patient/family agree to work toward stated goals and plan of care. [] Patient understands intent and goals of therapy, but is neutral about his/her participation. [] Patient is unable to participate in goal setting and plan of care.       Erica Solorzano, OT

## 2018-07-02 NOTE — ROUTINE PROCESS
SHIFT CHANGE NOTE FOR [place]    Bedside and Verbal shift change report given to Darian Falk (oncoming nurse) by John Paul Nj, Rian (offgoing nurse). Report included the following information SBAR, Kardex, MAR and Recent Results.     Situation:   Code Status: Full Code   Reason for Admission: Rhabdomyolysis  Hospital Day: 5   Problem List:   Hospital Problems  Date Reviewed: 6/28/2018          Codes Class Noted POA    Vitamin D deficiency (Chronic) ICD-10-CM: E55.9  ICD-9-CM: 268.9  6/28/2018 Yes    Overview Signed 6/28/2018  1:12 PM by Fatou Gudino MD     Vitamin D 25-Hydroxy (6/28/2018) = 13.5              Gout (Chronic) ICD-10-CM: M10.9  ICD-9-CM: 274.9  Unknown Yes        Essential hypertension (Chronic) ICD-10-CM: I10  ICD-9-CM: 401.9  Unknown Yes        Type 2 diabetes mellitus ([facility] (Chronic) ICD-10-CM: E11.9  ICD-9-CM: 250.00  Unknown Yes    Overview Signed 6/27/2018 10:18 PM by Fatou Gudino MD     HbA1c (6/14/2018) = 6.9             Dysphagia ICD-10-CM: R13.10  ICD-9-CM: 787.20  6/14/2018 Yes        History of atrial fibrillation ICD-10-CM: Z86.79  ICD-9-CM: V12.59  6/13/2018 Yes    Overview Signed 6/27/2018 10:23 PM by Fatou Gudino MD     Atrial fibrillation with rapid ventricular response             * (Principal)Rhabdomyolysis ICD-10-CM: M62.82  ICD-9-CM: 728.88  6/13/2018 Yes        History of encephalopathy ICD-10-CM: Z86.69  ICD-9-CM: V12.49  6/13/2018 Yes    Overview Signed 6/27/2018 10:21 PM by Fatou Gudino MD     Acute metabolic encephalopathy             History of acute renal failure ICD-10-CM: Z87.448  ICD-9-CM: V13.09  6/13/2018 Yes        Generalized weakness ICD-10-CM: R53.1  ICD-9-CM: 780.79  6/13/2018 Yes        Impaired mobility and ADLs ICD-10-CM: Z74.09  ICD-9-CM: 799.89  6/13/2018 Yes              Background:   Past Medical History:   Past Medical History:   Diagnosis Date    Alcohol dependence in remission ([facility]     alcohol withdrawal discharged 05/31/2013    Bipolar 1 disorder ([facility]     Chronic hepatitis C virus infection ([facility]     prior IV drug use;  Genotype 1a     Chronic obstructive pulmonary disease ([facility]     Depression     Dysphagia 6/14/2018    Essential hypertension     Eustachian tube dysfunction     Dr. Ariane Marroquin Gastroesophageal reflux disease     Gout     History of acute renal failure 6/13/2018    History of atrial fibrillation 6/13/2018    Atrial fibrillation with rapid ventricular response    History of encephalopathy 9/13/2899    Acute metabolic encephalopathy    Low serum high density lipoprotein (HDL) 6/14/2018    HDL (6/14/2018) = 12    Needle phobia     from prior IV drug abuse    Obesity, Class I, BMI 30-34.9     Primary osteoarthritis involving multiple joints     knees and back    Psoriasis     Type 2 diabetes mellitus (HCC)     HbA1c (6/14/2018) = 6.9    Vitamin D deficiency 6/28/2018    Vitamin D 25-Hydroxy (6/28/2018) = 13.5       Patient taking anticoagulants yes    Patient has a defibrillator: no     Assessment:   Changes in Assessment throughout shift: none     Patient has central line: no Reasons if yes: Insertion date: Last dressing date:   Patient has Weston Cath: no Reasons if yes:     Insertion date:     Last Vitals:     Vitals:    07/01/18 2100 07/02/18 0815 07/02/18 0929 07/02/18 1625   BP: 149/86 146/79 144/84 147/85   Pulse: 80 (!) 55 63 76   Resp: 20 16  16   Temp: 97.6 °F (36.4 °C) 98.5 °F (36.9 °C)  99.1 °F (37.3 °C)   SpO2: 96% 96%  95%   Weight:       Height:            PAIN    Pain Assessment    Pain Intensity 1: 0 (07/02/18 1751) Pain Intensity 1: 2 (12/29/14 1105)    Pain Location 1: Foot (Left foot great toe) Pain Location 1: Abdomen    Pain Intervention(s) 1: Medication (see MAR), Repositioned, Rest, Position Pain Intervention(s) 1: Medication (see MAR)  Patient Stated Pain Goal: 0 Patient Stated Pain Goal: 0  o Intervention effective: no    o Other actions taken for pain:      Skin Assessment  Skin color Skin Color: Appropriate for ethnicity  Condition/Temperature Skin Condition/Temp: Warm, Dry  Integrity Skin Integrity: Intact  Turgor Turgor: Non-tenting  Weekly Pressure Ulcer Documentation  Pressure  Injury Documentation: No Pressure Injury Noted-Pressure Ulcer Prevention Initiated  Wound Prevention & Protection Methods  Orientation of wound Orientation of Wound Prevention: Posterior  Location of Prevention Location of Wound Prevention: Buttocks, Sacrum/Coccyx  Dressing Present Dressing Present : Yes  Dressing Status Dressing Status: Intact  Wound Offloading Wound Offloading (Prevention Methods): Bed, pressure reduction mattress, Turning, Repositioning, Wheelchair, Chair cushion     INTAKE/OUPUT    Date 07/01/18 0700 - 07/02/18 0659 07/02/18 0700 - 07/03/18 0659   Shift 5485-6169 0272-6984 24 Hour Total 6583-8040 8802-3860 24 Hour Total   I  N  T  A  K  E   P. O. 680  680 720  720      P. O. 680  680 720  720    Shift Total  (mL/kg) 680  (7.5)  680  (7.5) 720  (7.9)  720  (7.9)   O  U  T  P  U  T   Urine  (mL/kg/hr)            Urine Occurrence(s) 11 x 3 x 14 x 2 x  2 x    Stool            Stool Occurrence(s) 1 x 0 x 1 x 0 x  0 x    Shift Total  (mL/kg)           680 720  720   Weight (kg) 91 91 91 91 91 91       Recommendations:  1. Patient needs and requests:Supervised assist with transfers, setup, pain control    2. Diet: Dental soft with thin liquida    3. Pending tests/procedures: none     4. Functional Level/Equipment: 1 person, supervised assist    5. Estimated Discharge Date: 7/11 Posted on Whiteboard in Patients Room: yes     HEALS Safety Check    A safety check occurred in the patient's room between off going nurse and oncoming nurse listed above.     The safety check included the below items  Area Items   H  High Alert Medications - Verify all high alert medication drips (heparin, PCA, etc.)   E  Equipment - Suction is set up for ALL patients (with may)  - Red plugs utilized for all equipment (IV pumps, etc.)  - WOWs wiped down at end of shift.  - Room stocked with oxygen, suction, and other unit-specific supplies   A  Alarms - Bed alarm is set for fall risk patients  - Ensure chair alarm is in place and activated if patient is up in a chair   L  Lines - Check IV for any infiltration  - Weston bag is empty if patient has a Weston   - Tubing and IV bags are labeled   S  Safety   - Room is clean, patient is clean, and equipment is clean. - Hallways are clear from equipment besides carts. - Fall bracelet on for fall risk patients  - Ensure room is clear and free of clutter  - Suction is set up for ALL patients (with yanker)  - Hallways are clear from equipment besides carts.    - Isolation precautions followed, supplies available outside room, sign posted

## 2018-07-02 NOTE — PROGRESS NOTES
Problem: Pressure Injury - Risk of  Goal: *Prevention of pressure injury  Document Ryan Scale and appropriate interventions in the flowsheet. Outcome: Progressing Towards Goal  Pressure Injury Interventions:  Sensory Interventions: Assess changes in LOC    Moisture Interventions: Absorbent underpads    Activity Interventions: Increase time out of bed, Pressure redistribution bed/mattress(bed type)    Mobility Interventions: HOB 30 degrees or less    Nutrition Interventions: Document food/fluid/supplement intake    Friction and Shear Interventions: Apply protective barrier, creams and emollients               Problem: Falls - Risk of  Goal: *Absence of Falls  Document Radha Fall Risk and appropriate interventions in the flowsheet.    Outcome: Progressing Towards Goal  Fall Risk Interventions:  Mobility Interventions: Bed/chair exit alarm, Patient to call before getting OOB    Mentation Interventions: Bed/chair exit alarm    Medication Interventions: Bed/chair exit alarm, Patient to call before getting OOB    Elimination Interventions: Bed/chair exit alarm, Call light in reach, Patient to call for help with toileting needs    History of Falls Interventions: Bed/chair exit alarm

## 2018-07-02 NOTE — PROGRESS NOTES
Problem: Mobility Impaired (Adult and Pediatric)  Goal: *Acute Goals and Plan of Care (Insert Text)  Physical Therapy Goals  STG's  Initiated 2018 and to be accomplished within 7 day(s)  1. Patient will move from supine to sit and sit to supine  and roll side to side in bed with supervision/set-up. 2.  Patient will transfer from bed to chair and chair to bed with contact guard assist using the least restrictive device. 3.  Patient will perform sit to stand with contact guard assist.  4.  Patient will ambulate with minimal assistance/contact guard assist for 75 feet with the least restrictive device. 5.  Patient will propel w/c 150 ft on level surfaces with supervision for mobility on unit. LTG's  Initiated 2018 and to be accomplished within 14 day(s)  1. Patient will move from supine to sit and sit to supine , scoot up and down and roll side to side in bed with independence. 2.  Patient will transfer from bed to chair and chair to bed with modified independence using the least restrictive device. 3.  Patient will perform sit to stand with modified independence. 4.  Patient will ambulate with modified independence for 150 feet with the least restrictive device. 5.  Patient will ascend/descend 3 stairs with bilateral handrail(s) with supervision/set-up. physical Therapy TREATMENT    Patient: Yury Chavez (19 y.o. female)  Date: 2018  Diagnosis: Rhabdomyolisis  Rhabdomyolysis Rhabdomyolysis  Precautions: Aspiration, Fall  Chart, physical therapy assessment, plan of care and goals were reviewed. Time In: 1330  Time Out: 1430  Patient Seen For: Balance activities;Gait training;Patient education;Transfer training; Wheelchair mobility  Pain:  Pt pain was reported as 0/10 pre-treatment. Pt pain was reported as 0/10 post-treatment. Intervention: NA    Patient identified with name and : yes    SUBJECTIVE:     Pt reports, \"my legs feel heavy. \"    OBJECTIVE DATA SUMMARY: Objective:     TRANSFERS Daily Assessment    Sit to Stand Assistance: Minimal assistance   Pt utilizes B UEs for support and requires minimal assistance for ant and up weight shift with sit to stand and for slow controlled descent with stand to sit. Patient participated in sit <> stand training w/c <> RW in front of mirror for visual feedback with mod verbal and intermittent manual cues for ant and up weight shift with sit to stand and for increased hip flexion for slow controlled descent with stand to sit. GAIT Daily Assessment    Amount of Assistance: 3 (Moderate assistance/Minimal assistance)  Distance (ft): 10 Feet (ft)/35 Feet (ft)  Assistive Device:  (none)/RW  Pt requires min to mod assist for balance and weight shifting with ambulation without AD due to functional B LE weakness with pt ambulating with decreased B foot clearance and step length. Pt requires minimal assistance for balance and safe RW management with ambulation with RW with pt demonstrating fwd flexed/rounded shoulder posture with B UE weight bearing on RW. BALANCE Daily Assessment    Sitting - Static: Good (unsupported)  Sitting - Dynamic: Fair (occasional)  Standing - Static: Poor  Standing - Dynamic : Impaired       WHEELCHAIR MOBILITY Daily Assessment    Able to Propel (ft): 186 feet  Functional Level: 5   Pt requires minimal-moderate verbal cues for safe and efficient w/c propulsion. ASSESSMENT:  Pt is progressing well from evaluation requiring decreased physical assistance with functional mobility. However, pt requires increased rest breaks due to fatigue and muscle weakness. Progression toward goals:  [x]      Improving appropriately and progressing toward goals  []      Improving slowly and progressing toward goals  []      Not making progress toward goals and plan of care will be adjusted     PLAN:  Patient continues to benefit from skilled intervention to address the above impairments.   Continue treatment per established plan of care. Discharge Recommendations:  Home Health with 24 hour supervision/supplemental assistance versus SNF  Further Equipment Recommendations for Discharge:  TBD     Estimated LOS: 2-3 weeks    Activity Tolerance:   Fair  Please refer to the flowsheet for vital signs taken during this treatment. After treatment:   Patient left being assisted by rehab technician from gym to room in w/gary White PT, DPT  7/2/2018

## 2018-07-03 LAB
APPEARANCE UR: CLEAR
BACTERIA URNS QL MICRO: NEGATIVE /HPF
BILIRUB UR QL: NEGATIVE
COLOR UR: YELLOW
EPITH CASTS URNS QL MICRO: NORMAL /LPF (ref 0–5)
GLUCOSE BLD STRIP.AUTO-MCNC: 105 MG/DL (ref 70–110)
GLUCOSE BLD STRIP.AUTO-MCNC: 83 MG/DL (ref 70–110)
GLUCOSE BLD STRIP.AUTO-MCNC: 86 MG/DL (ref 70–110)
GLUCOSE BLD STRIP.AUTO-MCNC: 93 MG/DL (ref 70–110)
GLUCOSE UR STRIP.AUTO-MCNC: NEGATIVE MG/DL
HGB UR QL STRIP: NEGATIVE
KETONES UR QL STRIP.AUTO: NEGATIVE MG/DL
LEUKOCYTE ESTERASE UR QL STRIP.AUTO: NEGATIVE
NITRITE UR QL STRIP.AUTO: NEGATIVE
PH UR STRIP: 6.5 [PH] (ref 5–8)
PROT UR STRIP-MCNC: NEGATIVE MG/DL
RBC #/AREA URNS HPF: NEGATIVE /HPF (ref 0–5)
SP GR UR REFRACTOMETRY: 1.01 (ref 1–1.03)
UROBILINOGEN UR QL STRIP.AUTO: 0.2 EU/DL (ref 0.2–1)
WBC URNS QL MICRO: NEGATIVE /HPF (ref 0–4)

## 2018-07-03 PROCEDURE — 97110 THERAPEUTIC EXERCISES: CPT

## 2018-07-03 PROCEDURE — 74011250637 HC RX REV CODE- 250/637: Performed by: INTERNAL MEDICINE

## 2018-07-03 PROCEDURE — 74011250636 HC RX REV CODE- 250/636: Performed by: INTERNAL MEDICINE

## 2018-07-03 PROCEDURE — 97116 GAIT TRAINING THERAPY: CPT

## 2018-07-03 PROCEDURE — 92507 TX SP LANG VOICE COMM INDIV: CPT

## 2018-07-03 PROCEDURE — 97535 SELF CARE MNGMENT TRAINING: CPT

## 2018-07-03 PROCEDURE — 65310000000 HC RM PRIVATE REHAB

## 2018-07-03 PROCEDURE — 97530 THERAPEUTIC ACTIVITIES: CPT

## 2018-07-03 PROCEDURE — 82962 GLUCOSE BLOOD TEST: CPT

## 2018-07-03 PROCEDURE — 81001 URINALYSIS AUTO W/SCOPE: CPT | Performed by: INTERNAL MEDICINE

## 2018-07-03 RX ORDER — LOSARTAN POTASSIUM 50 MG/1
50 TABLET ORAL DAILY
Status: DISCONTINUED | OUTPATIENT
Start: 2018-07-04 | End: 2018-07-07

## 2018-07-03 RX ORDER — LOSARTAN POTASSIUM 25 MG/1
25 TABLET ORAL ONCE
Status: COMPLETED | OUTPATIENT
Start: 2018-07-03 | End: 2018-07-03

## 2018-07-03 RX ADMIN — ASPIRIN 81 MG CHEWABLE TABLET 81 MG: 81 TABLET CHEWABLE at 09:13

## 2018-07-03 RX ADMIN — COLCHICINE 0.6 MG: 0.6 CAPSULE ORAL at 09:14

## 2018-07-03 RX ADMIN — CHOLECALCIFEROL CAP 125 MCG (5000 UNIT) 5000 UNITS: 125 CAP at 09:14

## 2018-07-03 RX ADMIN — HEPARIN SODIUM 5000 UNITS: 5000 INJECTION, SOLUTION INTRAVENOUS; SUBCUTANEOUS at 14:00

## 2018-07-03 RX ADMIN — LOSARTAN POTASSIUM 25 MG: 25 TABLET ORAL at 17:17

## 2018-07-03 RX ADMIN — METFORMIN HYDROCHLORIDE 500 MG: 500 TABLET ORAL at 09:14

## 2018-07-03 RX ADMIN — HEPARIN SODIUM 5000 UNITS: 5000 INJECTION, SOLUTION INTRAVENOUS; SUBCUTANEOUS at 22:05

## 2018-07-03 RX ADMIN — PANTOPRAZOLE SODIUM 40 MG: 40 TABLET, DELAYED RELEASE ORAL at 05:48

## 2018-07-03 RX ADMIN — LOSARTAN POTASSIUM 25 MG: 25 TABLET ORAL at 09:14

## 2018-07-03 RX ADMIN — HEPARIN SODIUM 5000 UNITS: 5000 INJECTION, SOLUTION INTRAVENOUS; SUBCUTANEOUS at 05:52

## 2018-07-03 RX ADMIN — Medication 1 TABLET: at 14:00

## 2018-07-03 NOTE — PROGRESS NOTES
Problem: Mobility Impaired (Adult and Pediatric)  Goal: *Acute Goals and Plan of Care (Insert Text)  Physical Therapy Goals  STG's  Initiated 6/28/2018 and to be accomplished within 7 day(s)  1. Patient will move from supine to sit and sit to supine  and roll side to side in bed with supervision/set-up. 2.  Patient will transfer from bed to chair and chair to bed with contact guard assist using the least restrictive device. 3.  Patient will perform sit to stand with contact guard assist.  4.  Patient will ambulate with minimal assistance/contact guard assist for 75 feet with the least restrictive device. 5.  Patient will propel w/c 150 ft on level surfaces with supervision for mobility on unit. LTG's  Initiated 6/28/2018 and to be accomplished within 14 day(s)  1. Patient will move from supine to sit and sit to supine , scoot up and down and roll side to side in bed with independence. 2.  Patient will transfer from bed to chair and chair to bed with modified independence using the least restrictive device. 3.  Patient will perform sit to stand with modified independence. 4.  Patient will ambulate with modified independence for 150 feet with the least restrictive device. 5.  Patient will ascend/descend 3 stairs with bilateral handrail(s) with supervision/set-up. physical Therapy TREATMENT    Patient: Luis Swain (77 y.o. female)  Date: 7/3/2018  Diagnosis: Rhabdomyolisis  Rhabdomyolysis Rhabdomyolysis  Precautions: Aspiration, Fall  Chart, physical therapy assessment, plan of care and goals were reviewed. Time In: 1130  Time Out: 1200  Patient Seen For: Balance activities; Patient education;Transfer training;Gait training  Time In: 1330  Time Out: 1430  Patient Seen For: Balance activities; Patient education;Transfer training; Therapeutic exercises  Pain:  Pt pain was reported as 0/10 pre-treatment. Pt pain was reported as 0/10 post-treatment.   Intervention: NA  Pt reports, \"it's not sore, just uncomfortable,\" re: B foot discomfort. During afternoon session, pt reports momentary left knee pain in standing which she states improved with rest but does not rate and declines offer to speak with nursing. Patient identified with name and : yes    SUBJECTIVE:     Pt notes, \"it feels like I'm walking on balloons,\" re: B feet with pt noting swelling/edema. OBJECTIVE DATA SUMMARY:   Objective:     Pt measured for and provided with EFRAÍN Hose to address B LE edema and reduce risk for blood clot. TRANSFERS Daily Assessment    Other: stand step with RW  Transfer Assistance : 4 (Minimal assistance)  Sit to Stand Assistance: Minimal assistance   Pt requires CGA for balance with functional transfers and minimal manual cues for ant and up weight shift with sit to stand and for increased hip flexion for slow controlled descent with stand to sit. Pt utilizes B hands for assistance with sit to stand and stand to sit. Pt requires CGA for balance and safety with intermittent verbal and manual cues for weight shifting with stand step transfers with RW. Pt requires mod manual cues for decreased fwd flexed posture with B UE weight bearing on RW. GAIT Daily Assessment    Amount of Assistance: 4 (Minimal assistance)  Distance (ft): 18 Feet (ft) x 1 trial, 50 Feet (ft) x 1 trial  Assistive Device: Walker, rolling  Pt requires CGA to minimal assistance for balance and safety with ambulation with RW with pt demonstrating progressively increased fwd flexed posture with fatigue with decreased B foot clearance  Requiring manual and verbal cues for upright posture and decreased B UE weight bearing.        BALANCE Daily Assessment    Sitting - Static: Good (unsupported)  Sitting - Dynamic: Fair (occasional)  Standing - Static: Poor  Standing - Dynamic : Impaired       THERAPEUTIC EXERCISES Daily Assessment    Seated LE Therapeutic Exercises:  2 Sets of 15 Repetitions:  Red Theraband Resisted Hip Abd/ER  Isometric Hip Add x 5\" Holds  3 Sets of 10 Repetitions  1# right and left LAQ     Pt participated in static standing activity to promote improved postural awareness and functional strengthening with pt standing for one trial of 3 minutes 45 seconds and one trial of 5 minutes 45 seconds with close supervision to CGA for balance and min manual cues for neutral posture. ASSESSMENT:  Pt is progressing well with functional mobility requiring decreased physical assistance for functional mobility and static standing. Progression toward goals:  [x]      Improving appropriately and progressing toward goals  []      Improving slowly and progressing toward goals  []      Not making progress toward goals and plan of care will be adjusted     PLAN:  Patient continues to benefit from skilled intervention to address the above impairments. Continue treatment per established plan of care. Discharge Recommendations:  Home Health with 24 hour assistance versus SNF  Further Equipment Recommendations for Discharge:  rolling walker and TBD     Estimated LOS: 2-3 weeks    Activity Tolerance:   Good  Please refer to the flowsheet for vital signs taken during this treatment. After treatment:   Patient left after first treatment session in care of LEBLANC. Patient left after second treatment session.       Alda Hernandez PT, DPT  7/3/2018

## 2018-07-03 NOTE — PROGRESS NOTES
Problem: Self Care Deficits Care Plan (Adult)  Goal: *Therapy Goal (Edit Goal, Insert Text)  Occupational Therapy Goals   Long Term Goals  Initiated 2018 and to be accomplished within 2 week(s)  1. Pt will perform self-feeding with (I). 2. Pt will perform grooming with (I) to MI.  3. Pt will perform UB bathing with MI.  4. Pt will perform LB bathing with MI.  5. Pt will perform tub/shower transfer with MI.   6. Pt will perform UB dressing with MI.  7. Pt will perform LB dressing with MI.  8. Pt will perform toileting task with MI.  9. Pt will perform toilet transfer with MI.  8. Pt will perform IADL home mgt (ie laundry, laundry folding, medication mgt) with MI.  11. Pt will perform IADL meal prep with MI. Short Term Goals   Initiated 2018 and to be accomplished within 7 day(s) 2018  1. Pt will perform self-feeding with (I). 2. Pt will perform grooming with (I) to MI.  3. Pt will perform UB bathing with Supervision. 4. Pt will perform LB bathing with Supervision. 5. Pt will perform tub/shower transfer with Supervision . 6. Pt will perform UB dressing with Setup. 7. Pt will perform LB dressing with Supervision. 8. Pt will perform toileting task with Supervision. 9. Pt will perform toilet transfer with Supervision. 10. Pt will perform IADL home mgt (ie laundry, laundry folding, medication mgt) with Supervision. 11. Pt will perform IADL meal prep with Supervision. Occupational Therapy TREATMENT    Patient: Katrina Harrison   59 y.o. Patient identified with name and : yes    Date: 7/3/2018    First Tx Session  Time In: 1200  Time Out[de-identified] 7420    Second Tx Session  Time In: 1430  Time Out[de-identified] 3418    Diagnosis: Rhabdomyolisis  Rhabdomyolysis   Precautions: Aspiration, Fall  Chart, occupational therapy assessment, plan of care, and goals were reviewed. Pain:  Pt reports 0/10 pain or discomfort prior to treatment. Pt reports 0/10 pain or discomfort post treatment.    Intervention Provided: NA      SUBJECTIVE:   Patient stated They wore me out.   Referring to physical therapy. OBJECTIVE DATA SUMMARY:     THERAPEUTIC ACTIVITY Daily Assessment    During second session, pt participated in tabletop activity to challenge dynamic sitting balance and to increase core strength. Pt sat forward in w/c chair and was required to play a game where she had to reach outside NATALIO. THERAPEUTIC EXERCISE Daily Assessment    During second session, pt participated in [employer] [facility] for carryover to functional tasks. Pt used 2# dowel to perform shoulder press, chest press and bicep curls (x10 each exercise). Pt used flexibar (red) for forearm pronation/supination (x30 each direction). Pt used digiflex (red) to perform hand strengthening exercises to facilitate increased independence with ADLs. Pt performed x20 each hand. TOILETING Daily Assessment    Toileting  Toileting Assistance (FIM Score): 4 (Minimal assistance)  Cues: Physical assistance for pants up  Adaptive Equipment: Elevated seat;Grab bars     LOWER BODY DRESSING Daily Assessment    Lower Body Dressing   Dressing Assistance : 4 (Minimal assistance)  Leg Crossed Method Used: Yes  Position Performed: Seated in chair;Standing  Don/Doff Anti-Embolic Stockings: 1 (Total assistance)  Comments: Pt doffed/donned non-skid socks and pants. MOBILITY/TRANSFERS Daily Assessment    Functional Transfers  Toilet Transfer : Grab bars  Amount of Assistance Required: 4 (Contact guard assistance)       ASSESSMENT:  Pt actively participated in skilled OT services this date. Pt demonstrates decreased BUE strength. Progression toward goals:  [x]          Improving appropriately and progressing toward goals  []          Improving slowly and progressing toward goals  []          Not making progress toward goals and plan of care will be adjusted     PLAN:  Patient continues to benefit from skilled intervention to address the above impairments.   Continue treatment per established plan of care. Discharge Recommendations:  Home Health  Further Equipment Recommendations for Discharge:  TBD      Activity Tolerance:  Fair+      Estimated LOS:TBD    Please refer to the flowsheet for vital signs taken during this treatment.   After treatment:   []  Patient left in no apparent distress sitting up in chair   [x]  Patient left in no apparent distress in bed  [x]  Call bell left within reach  []  Nursing notified  []  Caregiver present  []  Bed alarm activated      CUONG Cabral

## 2018-07-03 NOTE — PROGRESS NOTES
[facility] PHYSICAL REHABILITATION  [address] [place] [address], [address]     INPATIENT REHABILITATION  DAILY PROGRESS NOTE     Date: 7/3/2018    Name: Eugene Malloy Age / Sex: 59 y.o. / female   CSN: [id number] MRN: [id number]   Admit Date: 6/27/2018 Length of Stay: 6 days     Primary Rehab Diagnosis: Impaired Mobility and ADLs secondary to Rhabdomyolysis      Subjective:     Patient seen and examined. Blood pressure fairly controlled. Blood sugars controlled. Patient's Complaint:   Urinary problems (frequent urination; denies any burning sensation)  Suprapubic abdominal pain    Pain Control: no current joint or muscle symptoms, essentially pain-free      Objective:     Vital Signs:  Patient Vitals for the past 24 hrs:   BP Temp Pulse Resp SpO2   07/03/18 0724 153/80 97.6 °F (36.4 °C) 63 18 97 %   07/02/18 2200 141/77 98.2 °F (36.8 °C) 76 18 95 %   07/02/18 1625 147/85 99.1 °F (37.3 °C) 76 16 95 %        Physical Examination:  GENERAL SURVEY: Patient is awake, alert, oriented x 3, sitting comfortably on the chair, not in acute respiratory distress. HEENT: pink palpebral conjunctivae, anicteric sclerae, no nasoaural discharge, moist oral mucosa  NECK: supple, no jugular venous distention, no palpable lymph nodes  CHEST/LUNGS: symmetrical chest expansion, good air entry, clear breath sounds  HEART: adynamic precordium, good S1 S2, no S3, regular rhythm, no murmurs  ABDOMEN: obese, bowel sounds appreciated, soft, non-tender  EXTREMITIES: pink nailbeds, no edema, full and equal pulses, no calf tenderness   NEUROLOGICAL EXAM: The patient is awake, alert and oriented x3, able to answer questions fairly appropriately, able to follow 1 and 2 step commands. Able to tell time from the wall clock. Cranial nerves II-XII are grossly intact. No gross sensory deficit. Motor strength is 4/5 on BUE, 4/5 on BLE.       Current Medications:  Current Facility-Administered Medications   Medication Dose Route Frequency    metFORMIN (GLUCOPHAGE) tablet 500 mg  500 mg Oral DAILY WITH BREAKFAST    cholecalciferol (VITAMIN D3) capsule 5,000 Units  5,000 Units Oral DAILY    acetaminophen (TYLENOL) tablet 650 mg  650 mg Oral Q4H PRN    bisacodyl (DULCOLAX) tablet 10 mg  10 mg Oral Q48H PRN    insulin lispro (HUMALOG) injection   SubCUTAneous TIDAC    aspirin chewable tablet 81 mg  81 mg Oral DAILY WITH BREAKFAST    vitamin B complex tablet  1 Tab Oral DAILY    pantoprazole (PROTONIX) tablet 40 mg  40 mg Oral ACB    heparin (porcine) injection 5,000 Units  5,000 Units SubCUTAneous Q8H    albuterol (PROVENTIL VENTOLIN) nebulizer solution 2.5 mg  2.5 mg Nebulization Q4H PRN    losartan (COZAAR) tablet 25 mg  25 mg Oral DAILY    colchicine (MITIGARE) capsule 0.6 mg  0.6 mg Oral DAILY       Allergies:   Allergies   Allergen Reactions    Ace Inhibitors Cough    Penicillins Hives       Functional Progress:    OCCUPATIONAL THERAPY    ON ADMISSION MOST RECENT   Eating  Functional Level: 7   Eating  Functional Level: 7     Grooming  Functional Level: 5   Grooming  Functional Level: 5     Bathing  Functional Level: 4   Bathing  Functional Level: 4     Upper Body Dressing  Functional Level: 4   Upper Body Dressing  Functional Level: 4     Lower Body Dressing  Functional Level: 4   Lower Body Dressing  Functional Level: 4     Toileting  Functional Level: 0   Toileting  Functional Level: 4     Toilet Transfers  Toilet Transfer Score: 4   Toilet Transfers  Toilet Transfer Score: 4     Tub /Shower Transfers  Tub/Shower Transfer Score: 4   Tub/Shower Transfers  Tub/Shower Transfer Score: 4       Legend:   7 - Independent   6 - Modified Independent   5 - Standby Assistance / Supervision / Set-up   4 - Minimum Assistance / Contact Guard Assistance   3 - Moderate Assistance   2 - Maximum Assistance   1 - Total Assistance / Dependent       Lab/Data Review:  Recent Results (from the past 24 hour(s))   GLUCOSE, POC    Collection Time: 07/02/18  4:29 PM   Result Value Ref Range    Glucose (POC) 95 70 - 110 mg/dL   GLUCOSE, POC    Collection Time: 07/02/18  8:59 PM   Result Value Ref Range    Glucose (POC) 99 70 - 110 mg/dL   GLUCOSE, POC    Collection Time: 07/03/18  7:24 AM   Result Value Ref Range    Glucose (POC) 93 70 - 110 mg/dL   GLUCOSE, POC    Collection Time: 07/03/18 12:25 PM   Result Value Ref Range    Glucose (POC) 86 70 - 110 mg/dL       Estimated Glomerular Filtration Rate:  On admission, estimated GFR based on a Creatinine of 0.90 mg/dl:   Using CKD-EPI = 81.1 mL/min/1.73m2   Using MDRD = 82.4 mL/min/1.73m2      Assessment:     Primary Rehabilitation Diagnosis  1. Impaired Mobility and ADLs  2. Rhabdomyolysis     Comorbidities   Chronic hepatitis C virus infection  B18.2    Bipolar 1 disorder  F31.9    Depression F32.9    Alcohol dependence in remission  F10.21    Needle phobia F40.298    Eustachian tube dysfunction H69.80    Psoriasis L40.9    History of atrial fibrillation Z86.79    History of encephalopathy Z86.69    Primary osteoarthritis involving multiple joints M15.0    Essential hypertension I10    Gastroesophageal reflux disease K21.9    Type 2 diabetes mellitus  E11.9    Chronic obstructive pulmonary disease  J44.9    History of acute renal failure Z87.448    Hypoalbuminemia E88.09    Low serum high density lipoprotein (HDL) R74.8    Obesity, Class I, BMI 30-34.9 E66.9    Dysphagia R13.10    Vitamin D deficiency E55.9    Gout M10.9        Plan:     1. Justification for continued stay: Good progression towards established rehabilitation goals. 2. Medical Issues being followed closely:    [x]  Fall and safety precautions     []  Wound Care     [x]  Bowel and Bladder Function     [x]  Fluid Electrolyte and Nutrition Balance     []  Pain Control      3.  Issues that 24 hour rehabilitation nursing is following:    [x]  Fall and safety precautions     []  Wound Care     [x]  Bowel and Bladder Function [x]  Fluid Electrolyte and Nutrition Balance     []  Pain Control      [x]  Assistance with and education on in-room safety with transfers to and from the bed, wheelchair, toilet and shower. 4. Acute rehabilitation plan of care:    [x]  Continue current care and rehab. [x]  Physical Therapy           [x]  Occupational Therapy           [x]  Speech Therapy     []  Hold Rehab until further notice     5. Medications:    [x]  MAR Reviewed     [x]  Continue Present Medications     6. DVT Prophylaxis:      []  Lovenox     [x]  Unfractionated Heparin     []  Coumadin     []  NOAC     [x]  EFRAÍN Stockings     [x]  Sequential Compression Device     []  None     7. Orders:   > Rhabdomyolysis; History of acute renal failure; History of atrial fibrillation with rapid ventricular response;  History of encephalopathy   > CK (6/13/2018, on admission to [facility]) = 6954   > CK (6/21/2018) = 183   > BUN/Creatinine (6/13/2018, on admission to [facility]) = 128/2.42   > BUN/Creatinine (6/19/2018) = 11/0.86   > BUN/Creatinine (6/28/2018, on admission to ARU) = 10/0.90     > Dysphagia   > Speech and Language Pathology to evaluate and treat     > Gout   > Patient stated she has gout and that during her stay at [facility], she had pain great toes of both feet   > On 6/24/2018, patient was started on Colchicine 0.6 mg PO BID   > Upon admission to the ARU, patient stated the pain has improved significantly   > On 6/28/2018, decreased Colchicine from 0.6 mg PO BID to 0.6 mg PO once daily    > Continue Colchicine 0.6 mg PO once daily     > Essential hypertension   > Prior to admission to [facility], patient was on:    > Chlorthalidone 25 mg PO once daily    > Spironolactone 25 mg PO once daily    > Clonidine 0.2 mg PO BID    > Losartan-HCTZ 50-12.5 1 tab PO once daily   > During the patient's stay at [facility], all above antihypertensive medications were put on hold   > Upon discharge from [facility], all above antihypertensives were resumed as per discharge summary   > Increase Losartan from 25 mg to 50 mg PO once daily (9AM)    > Type 2 diabetes mellitus   > Prior to admission to [facility], patient was on Metformin 500 mg PO BID with meals   > HbA1c (6/14/2018) = 6.9   > During the patient's stay at [facility], Metformin was put on hold (re: acute renal failure)   > Upon discharge from [facility], resumed Metformin 500 mg PO BID with meals as per discharge summary   > Continue:    > Metformin 500 mg PO once daily with breakfast    > Humalog insulin sliding scale SC TID AC only    > Vitamin D Deficiency   > Vitamin D 25-Hydroxy (6/28/2018) = 13.5   > On 6/28/2018, patient was given Cholecalciferol 50,000 units PO x 1 dose    > On 6/29/2018, started Cholecalciferol 5,000 units PO once daily   > Continue Cholecalciferol 5,000 units PO once daily    > Urinary frequency   > Urinalysis to rule out UTI    > On 6/28/2018, patient stated she had 4 bowel movements since AM   > Discontinued Docusate sodium 100 mg PO BID      8. Patient's progress in rehabilitation and medical issues discussed with the patient. All questions answered to the best of my ability. Care plan discussed with patient and nurse.       Signed:    Arthur Jesus MD    July 3, 2018

## 2018-07-03 NOTE — PROGRESS NOTES
[facility] Hospitalist Group  Progress Note    Patient: Elke Arriaza Age: 59 y.o. : 1960 MR#: [id number] SSN: [id number]  Date/Time: 2018    Subjective:     I personally saw and evaluated this patient on 18. Patient sitting in bed in NAD    Assessment/Plan:     Primary Rehabilitation Diagnosis  1.  Impaired Mobility and ADLs  2. Rhabdomyolysis     Comorbidities   Chronic hepatitis C virus infection  B18.2    Bipolar 1 disorder  F31.9    Depression F32.9    Alcohol dependence in remission  F10.21    Needle phobia F40.298    Eustachian tube dysfunction H69.80    Psoriasis L40.9    History of atrial fibrillation Z86.79    History of encephalopathy Z86.69    Primary osteoarthritis involving multiple joints M15.0    Essential hypertension I10    Gastroesophageal reflux disease K21.9    Type 2 diabetes mellitus  E11.9    Chronic obstructive pulmonary disease  J44.9    History of acute renal failure Z87.448    Hypoalbuminemia E88.09    Low serum high density lipoprotein (HDL) R74.8    Obesity, Class I, BMI 30-34.9 E66.9    Dysphagia R13.10    Vitamin D deficiency E55.9    Gout M10.9      PLAN    On metformin, monitor sugars  PT, OT  Monitor BP   d/w patient      Case discussed with:  [x]Patient  []Family  []Nursing  []Case Management  DVT Prophylaxis:  []Lovenox  [x]Hep SQ  []SCDs  []Coumadin   []On Heparin gtt    Objective:   VS:   Visit Vitals    /85 (BP 1 Location: Left arm, BP Patient Position: At rest)    Pulse 76    Temp 99.1 °F (37.3 °C)    Resp 16    Ht 5' 5\" (1.651 m)    Wt 91 kg (200 lb 9.9 oz)    SpO2 95%    Breastfeeding No    BMI 33.38 kg/m2      Tmax/24hrs: Temp (24hrs), Av.4 °F (36.9 °C), Min:97.6 °F (36.4 °C), Max:99.1 °F (37.3 °C)    Input/Output:     Intake/Output Summary (Last 24 hours) at 18  Last data filed at 18 1629   Gross per 24 hour   Intake              720 ml   Output                0 ml   Net 720 ml       General:  Awake, follows commands  Cardiovascular:  S1S2+, RRR  Pulmonary:  CTA b/l  GI:  Soft, BS+, NT, ND  Extremities:  No edema      Labs:    Recent Results (from the past 24 hour(s))   GLUCOSE, POC    Collection Time: 07/01/18  9:36 PM   Result Value Ref Range    Glucose (POC) 100 70 - 110 mg/dL   HGB & HCT    Collection Time: 07/02/18  6:42 AM   Result Value Ref Range    HGB 10.9 (L) 12.0 - 16.0 g/dL    HCT 34.6 (L) 35.0 - 45.0 %   PLATELET COUNT    Collection Time: 07/02/18  6:42 AM   Result Value Ref Range    PLATELET 929 823 - 069 K/uL   GLUCOSE, POC    Collection Time: 07/02/18  8:18 AM   Result Value Ref Range    Glucose (POC) 93 70 - 110 mg/dL   GLUCOSE, POC    Collection Time: 07/02/18 11:53 AM   Result Value Ref Range    Glucose (POC) 113 (H) 70 - 110 mg/dL   GLUCOSE, POC    Collection Time: 07/02/18  4:29 PM   Result Value Ref Range    Glucose (POC) 95 70 - 110 mg/dL         Signed By: Nicole Solorzano MD     July 2, 2018

## 2018-07-03 NOTE — PROGRESS NOTES
Problem: Neurolinguistics Impaired (Adult)  Goal: *Speech Goal: (INSERT TEXT)  Long term goals:  1. Patient will use strategies of slowed reate, over-articulation and increased loudness to improve speech production, supervision. 2. Patient will be 100% intelligible in casual conversation, supervision. 3. Patient will recall 3 words after 5 minutes in short term memory task, supervision. 4. Patient will perform functional problem solving and reasoning tasks with 90% accuracy. 5. Patient will write single words and short sentences with 90% accuracy and legibility. Short term goals  (by 7/5/18):  1. Patient will use strategies of slowed reate, over-articulation and increased loudness to improve speech production, mod-min cues. 2. Patient will be 90% intelligible in production of polysyllabic words and phrases, min cues-supervision. 3. Patient will be 80% intelligible in casual conversation, min cues for use of strategies. 4. Patient will recall 3 words after 5 minutes in short term memory task, mod-min assist.  5. Patient will perform functional problem solving and reasoning tasks with 75-85% accuracy. 6. Patient will write single words with 75-80% accuracy and legibility. Speech language pathology treatment    Patient: Be Fitch (25 y.o. female)  Date: 7/3/2018  Diagnosis: Rhabdomyolisis  Rhabdomyolysis Rhabdomyolysis       SUBJECTIVE:   Patient stated Alejandra Thomas will my speech get better? . OBJECTIVE:   Mental Status:  Mrs. Bettina Falk was awake, alert and pleasant throughout her treatment sessions. Treatment & Interventions:   Patient was seen for two half hour speech therapy sessions this morning. The following treatment tasks were presented: Motor Speech:    Yawn sigh technique:  Min assist  Speech breathing exercise: Min assist-supervision  4 syllable words:  90% intelligibility      60% normal prosody  Phrase productions:  100% accuracy with patient copying SLP's intonation patterns    Neuro-Linguistics:   Discussing similarities: 90% accuracy  Discussing differences: 85% accuracy (same items)  Things that plug in:  Patient listed 5 initially; 5 more after min cue  Conversation w/Chat Pack: Excellent responses, cues for prosody    Response & Tolerance to Activities:  Mrs. Chrissie Peraza is extremely motivated to improve her speech and voice. Significant spasticity persists. Pain:  No report of pain prior to or during the sessions     After treatment:   [x]       Patient left in no apparent distress sitting up in chair  []       Patient left in no apparent distress in bed  [x]       Call bell left within reach  []       Nursing notified  []       Caregiver present  []       Bed alarm activated    ASSESSMENT:   Progression toward goals:  []       Improving appropriately and progressing toward goals  [x]       Improving slowly and progressing toward goals  []       Not making progress toward goals and plan of care will be adjusted    PLAN:   Patient continues to benefit from skilled intervention to address the above impairments. Continue treatment per established plan of care. Discharge Recommendations:   To Be Determined    Estimated LOS: Through 7/11/18    ALLISON Rangel  Time calculation:  61 Minutes

## 2018-07-03 NOTE — ROUTINE PROCESS
[facility]    Bedside and Verbal shift change report given Dylon Lerma LPN  (oncoming nurse) by  Azalea Larios Rn (offgoing nurse). Report included the following information SBAR, Kardex, MAR and Recent Results.     Situation:   Code Status: Full Code   Reason for Admission: Rhabdomyolysis  Hospital Day: 6   Problem List:   Hospital Problems  Date Reviewed: 6/28/2018          Codes Class Noted POA    Vitamin D deficiency (Chronic) ICD-10-CM: E55.9  ICD-9-CM: 268.9  6/28/2018 Yes    Overview Signed 6/28/2018  1:12 PM by Yomaira Grace MD     Vitamin D 25-Hydroxy (6/28/2018) = 13.5              Gout (Chronic) ICD-10-CM: M10.9  ICD-9-CM: 274.9  Unknown Yes        Essential hypertension (Chronic) ICD-10-CM: I10  ICD-9-CM: 401.9  Unknown Yes        Type 2 diabetes mellitus ([facility] (Chronic) ICD-10-CM: E11.9  ICD-9-CM: 250.00  Unknown Yes    Overview Signed 6/27/2018 10:18 PM by Yomaira Grace MD     HbA1c (6/14/2018) = 6.9             Dysphagia ICD-10-CM: R13.10  ICD-9-CM: 787.20  6/14/2018 Yes        History of atrial fibrillation ICD-10-CM: Z86.79  ICD-9-CM: V12.59  6/13/2018 Yes    Overview Signed 6/27/2018 10:23 PM by Yomaira Grace MD     Atrial fibrillation with rapid ventricular response             * (Principal)Rhabdomyolysis ICD-10-CM: M62.82  ICD-9-CM: 728.88  6/13/2018 Yes        History of encephalopathy ICD-10-CM: Z86.69  ICD-9-CM: V12.49  6/13/2018 Yes    Overview Signed 6/27/2018 10:21 PM by Yomaira Grace MD     Acute metabolic encephalopathy             History of acute renal failure ICD-10-CM: Z87.448  ICD-9-CM: V13.09  6/13/2018 Yes        Generalized weakness ICD-10-CM: R53.1  ICD-9-CM: 780.79  6/13/2018 Yes        Impaired mobility and ADLs ICD-10-CM: Z74.09  ICD-9-CM: 799.89  6/13/2018 Yes              Background:   Past Medical History:   Past Medical History:   Diagnosis Date    Alcohol dependence in remission ([facility]     alcohol withdrawal discharged 05/31/2013   [address] [place] [address] Bipolar 1 disorder (HCC)     Chronic hepatitis C virus infection ([facility]     prior IV drug use;  Genotype 1a     [facility]     Depression     Dysphagia 6/14/2018    Essential hypertension     Eustachian tube dysfunction     Dr. Sheppard Favorite Gastroesophageal reflux disease     Gout     History of acute renal failure 6/13/2018    History of atrial fibrillation 6/13/2018    Atrial fibrillation with rapid ventricular response    History of encephalopathy 4/21/7937    Acute metabolic encephalopathy    Low serum high density lipoprotein (HDL) 6/14/2018    HDL (6/14/2018) = 12    Needle phobia     from prior IV drug abuse    Obesity, Class I, BMI 30-34.9     Primary osteoarthritis involving multiple joints     knees and back    Psoriasis     Type 2 diabetes mellitus (HCC)     HbA1c (6/14/2018) = 6.9    Vitamin D deficiency 6/28/2018    Vitamin D 25-Hydroxy (6/28/2018) = 13.5       Patient taking anticoagulants yes    Patient has a defibrillator: no     Assessment:   Changes in Assessment throughout shift: none     Patient has central line: no Reasons if yes: Insertion date: Last dressing date:   Patient has Weston Cath: no Reasons if yes:     Insertion date:     Last Vitals:     Vitals:    07/02/18 0815 07/02/18 0929 07/02/18 1625 07/02/18 2200   BP: 146/79 144/84 147/85 141/77   Pulse: (!) 55 63 76 76   Resp: 16  16 18   Temp: 98.5 °F (36.9 °C)  99.1 °F (37.3 °C) 98.2 °F (36.8 °C)   SpO2: 96%  95% 95%   Weight:       Height:            PAIN    Pain Assessment    Pain Intensity 1: 0 (07/03/18 0400) Pain Intensity 1: 2 (12/29/14 1105)    Pain Location 1: Foot (Left foot great toe) Pain Location 1: Abdomen    Pain Intervention(s) 1: Medication (see MAR), Repositioned, Rest, Position Pain Intervention(s) 1: Medication (see MAR)  Patient Stated Pain Goal: 0 Patient Stated Pain Goal: 0  o Intervention effective: no    o Other actions taken for pain:      Skin Assessment  Skin color Skin Color: Appropriate for ethnicity  Condition/Temperature Skin Condition/Temp: Dry, Warm  Integrity Skin Integrity: Other (comment)  Turgor Turgor: Non-tenting  Weekly Pressure Ulcer Documentation  Pressure  Injury Documentation: Pressure Injury Noted-See Wound LDA to Document  Wound Prevention & Protection Methods  Orientation of wound Orientation of Wound Prevention: Posterior  Location of Prevention Location of Wound Prevention: Sacrum/Coccyx  Dressing Present Dressing Present : Yes  Dressing Status Dressing Status: Intact  Wound Offloading Wound Offloading (Prevention Methods): Bed, pressure redistribution/air     INTAKE/OUPUT    Date 07/02/18 0700 - 07/03/18 0659 07/03/18 0700 - 07/04/18 0659   Shift 9495-2156 4743-4984 24 Hour Total 0700-1859 1900-0659 24 Hour Total   I  N  T  A  K  E   P. O. 720  720         P. O. 720  720       Shift Total  (mL/kg) 720  (7.9)  720  (7.9)      O  U  T  P  U  T   Urine  (mL/kg/hr)            Urine Occurrence(s) 2 x 1 x 3 x       Stool            Stool Occurrence(s) 0 x 0 x 0 x       Shift Total  (mL/kg)           720      Weight (kg) 91 91 91 91 91 91       Recommendations:  1. Patient needs and requests:Supervised assist with transfers, setup, pain control    2. Diet: Dental soft with thin liquida    3. Pending tests/procedures: none     4. Functional Level/Equipment: 1 person, supervised assist    5. Estimated Discharge Date: 7/11 Posted on Whiteboard in Patients Room: yes     HEALS Safety Check    A safety check occurred in the patient's room between off going nurse and oncoming nurse listed above.     The safety check included the below items  Area Items   H  High Alert Medications - Verify all high alert medication drips (heparin, PCA, etc.)   E  Equipment - Suction is set up for ALL patients (with yanker)  - Red plugs utilized for all equipment (IV pumps, etc.)  - WOWs wiped down at end of shift.  - Room stocked with oxygen, suction, and other unit-specific supplies   A  Alarms - Bed alarm is set for fall risk patients  - Ensure chair alarm is in place and activated if patient is up in a chair   L  Lines - Check IV for any infiltration  - Weston bag is empty if patient has a Weston   - Tubing and IV bags are labeled   S  Safety   - Room is clean, patient is clean, and equipment is clean. - Hallways are clear from equipment besides carts. - Fall bracelet on for fall risk patients  - Ensure room is clear and free of clutter  - Suction is set up for ALL patients (with yanker)  - Hallways are clear from equipment besides carts.    - Isolation precautions followed, supplies available outside room, sign posted

## 2018-07-03 NOTE — INTERDISCIPLINARY ROUNDS
SHIVA [facility]  [address] [place] [address], [address]    INPATIENT REHABILITATION  PRE-TEAM CONFERENCE SUMMARY     Date of Conference: 7/4/18    Name: Noa Dudley Age / Sex: 59 y.o. / female   CSN: [id number] MRN: [id number]   Admit Date: 6/27/2018 Length of Stay: 6 days     Primary Rehabilitation Diagnosis  1. Impaired Mobility and ADLs  2. Rhabdomyolysis      Comorbidities   Chronic hepatitis C virus infection  B18.2    Bipolar 1 disorder  F31.9    Depression F32.9    Alcohol dependence in remission  F10.21    Needle phobia F40.298    Eustachian tube dysfunction H69.80    Psoriasis L40.9    History of atrial fibrillation Z86.79    History of encephalopathy Z86.69    Primary osteoarthritis involving multiple joints M15.0    Essential hypertension I10    Gastroesophageal reflux disease K21.9    Type 2 diabetes mellitus  E11.9    Chronic obstructive pulmonary disease  J44.9    History of acute renal failure Z87.448    Hypoalbuminemia E88.09    Low serum high density lipoprotein (HDL) R74.8    Obesity, Class I, BMI 30-34.9 E66.9    Dysphagia R13.10    Vitamin D deficiency E55.9    Gout M10.9          Therapy:     FIM SCORES Initial Assessment Weekly Progress Assessment 7/3/2018   Eating Functional Level: 7  Comments: (I) for self feeding of breakfast meal. No reports from pt about difficulty with cutting, bringing food to mouth, or swallowing.  7 ([place])   Swallowing     Grooming 5  5 (Supervision)   Bathing 4  4 (Minimal Assistance)      Upper Body Dressing Functional Level: 4  Items Applied/Steps Completed: Pullover (4 steps)  Comments: Supervision to doff pullover shirt and Min A to don pullover shirt.  Not challenged with bra due to pt not having in her personal possession.  4 (Minimal Assistance)   Lower Body Dressing Functional Level: 4  Items Applied/Steps Completed: Elastic waist pants (3 steps), Underpants (3 steps)  Comments: SBA to doff pants and underwear. Min A to don pants and underwear. Dressing Assistance : 4 (Minimal assistance)  Leg Crossed Method Used: Yes  Position Performed: Seated in chair;Standing  Don/Doff Anti-Embolic Stockings: 1 (Total assistance)  Comments: Pt doffed/donned non-skid socks and pants. Toileting Functional Level: 0  Comments: Min A for clothing mgt. Supervision for full pericare hygiene. Toileting Assistance (FIM Score): 4 (Minimal assistance)  Cues: Physical assistance for pants up  Adaptive Equipment: Elevated seat;Grab bars   Bladder - level of assist 2 4   Bladder - accident frequency score 5     Bowel - level of assist 1 2   Bowel - accident frequency score 6     Toilet Transfer [place] Toilet Transfer Score: 4  Comments: Min A secondary to require vcs and tactile cues for proper hand placement. Tendency to pull up from seat surface vs push from arm rest. 4 (Contact guard assistance)   Tub/Shower Transfer [place] Tub or Shower Type: Tub/Shower combination  Tub/Shower Transfer Score: 4  Comments: Min A for safety cues as well as utilization of proper hand placement with transfers. Tendency to want to pull up from seated position to stand.  Education provided about proper hand placement.  4 (Minimal Assistance)      Comprehension Primary Mode of Comprehension: Auditory  Score: 5  5      Expression Primary Mode of Expression: Verbal  Score: 5  5      Social Interaction Score: 4   3   Problem Solving Score: 4  4   Memory Score: 3  3     FIM SCORES Initial Assessment Weekly Progress Assessment 7/3/2018   Bed/Chair/Wheelchair Transfers Transfer Type: SPT without device (stand step w/out AD, with gait belt)  Other: toilet transfer with min A with use of grab bar  Transfer Assistance : 3 (Moderate assistance ) (w/out AD; min A with RW)  Sit to Stand Assistance: Minimal assistance  Car Transfers: Not tested  Car Type: n/a Other: stand step with RW  Transfer Assistance : 4 (Minimal assistance)  Sit to Stand Assistance: Minimal assistance   Bed Mobility Rolling Right 5 (Stand-by assistance)   Rolling Left 5 (Stand-by assistance)   Supine to Sit 4 (Minimal assistance)   Sit to Stand Minimal assistance   Sit to Supine 4 (Minimal assistance)    Rolling Right   5 (Stand-by assistance)   Rolling Left   5 (Stand-by assistance)   Supine to Sit   4 (Minimal assistance)   Sit to Stand   Minimal assistance   Sit to Supine   4 (Minimal assistance)      Locomotion (W/C) Able to Propel (ft): 50 feet  Functional Level: 2  Curbs/Ramps Assist Required (FIM Score): 0 (Not tested)  Wheelchair Setup Assist Required : 2 (Maximal assistance)  Wheelchair Management: Manages left brake, Manages right brake (with verbal cues and demonstration) Function 5  Setup Assistance: Supervision     Locomotion (W/C distance) 55 Feet 186 Feet   Locomotion (Walk) 4 (Minimal assistance) 4 (Minimal assistance)  Walker, rolling   Locomotion (Walk dist.) 18 Feet (ft) 50 Feet (ft)   Steps/Stairs Steps/Stairs Ambulated (#): 0  Level of Assist : 0 (Not tested) (deferred due to dizziness with gait training) Steps/Stairs Ambulated (#): 0  Level of Assist : 0 (Not tested) (deferred due to dizziness with gait training)         Nursing:     Neuro:   A&O x_4  With slurred speech___                   Respiratory:   [x] WNL   [] O2   [] LPM ______   Other:  Peripheral Vascular:   [] TEDS present   [] Edema present ____ Grade   Cardiac:   [x] WNL   [] Other  Genitourinary:   [x] continent   [] incontinent   [] diallo  Abdominal _______ LBM  GI: _______ Diet diabetic regular______ Liquids __thin___ tube feeds  Musculoskeletal: ____ ROM Transfers _____ Assistive Device Used  __4 min assist__ Level of Assistance  Skin Integumentary:   [] Intact   [] Not Intact   __________Preventative Measures Bilateral heels elevated have mepilex dressings  Buttocks has scrapped area to left upper portion  has mepilex dressing  Details______________________________________________________________  Pain: [x] Controlled   [] Not Controlled   Pain Meds:   [] Scheduled   [] PRN        Registered Dietitian / Nutrition:     Patient Vitals for the past 100 hrs:   % Diet Eaten   07/03/18 1317 100 %   07/03/18 0930 100 %   07/02/18 1248 100 %   07/02/18 0919 75 %   07/01/18 1755 75 %   07/01/18 1307 100 %   07/01/18 0915 100 %   06/30/18 1816 85 %   06/30/18 1305 75 %   06/30/18 0952 90 %   06/29/18 1804 75 %   06/29/18 1302 100 %     Pt unavailable at time of visit. Has good/excellent meal intake per chart. Supplements:          [] Yes   [x] No      Amount of supplement consumed:  not applicable      Intake/Output Summary (Last 24 hours) at 07/03/18 1646  Last data filed at 07/03/18 1317   Gross per 24 hour   Intake              440 ml   Output                0 ml   Net              440 ml                                Last bowel movement: 7/2      Interdisciplinary Team Goals:     1. Goal  Encourage pt to perform functional transfers with or without RW with supervision with minimal verbal cues for safety    Barrier  Impaired strength, Impaired balance, Impaired transfers    Intervention  Strength and balance training, Transfer training     2. Goal  OT: Pt will be Supervision for toilet transfer and toilet task. Barrier  Decrease BUE strength; decreased standing balance    Intervention  BUE strengthening     3. Goal  Patient will produce functional phrases with good articulation and prosody, 80-90% accuracy. Barrier  Spastic dysarthria, dysprosody, mild cognitive-linguistic deficits    Intervention  Speech drill for sounds and prosody, cognitive  retraining     4.  Goal  Nursing: Pt/family will verbalize and demonstrate 3 safety measures to prevent falls at home    Barrier  voiding frequency, decreased strength and balance, diabetes, both heels have broken areas with dressings    Intervention Frequent toileting as needed, safety education and demonstration for prevention of falls     5. Goal      Barrier      Intervention       6. Goal  Po intake of meals will meet >75% of patient estimated nutritional needs within the next 7 days. Outcome:  [x] Met/Ongoing    []  Not Met    [] New/Initial Goal     Barrier  none known     Intervention  meals/snacks: modified composition       Disposition / Discharge Planning: Follow-up therapy services:     DME recommendations:     Estimated discharge date:     Discharge Location:           Electronic Signatures:      Signature Date Signed   Physical Therapist    Stuart Sparrow PT, DPT 7/3/2018   Occupational Therapist    Shweta Marin, LEBLANC/L 7/3/2018   Speech Therapist    Claudia Govea, [id number] [facility]  7/3/18   Recreational Therapist    Shanelle Pablo, CTRS 7/3/18   Nursing    Margi Em  7/4/18   Dietitian    Rima Fisher, [address]  7/3/18   Clinical Psychologist         Physician    Monica Metz MD   7/3/2018                   The above information has been reviewed with the patient in a language that they can understand. Opportunity for comments and questions has been provided and a signed attestation has been scanned into the \"media tab\" of the EMR.       Patient Signature: ______________________________________________________    Date Signed: __________________________________________________________

## 2018-07-03 NOTE — REHAB NOTE
[facility] PHYSICAL REHABILITATION  [address] [place] [address], [address]     INPATIENT REHABILITATION  OVERALL PLAN OF CARE    Name: Luis Swain CSN: [id number]   Age: 59 y.o. MRN: [id number]   Sex: female Admit Date: 6/27/2018     Primary Rehabilitation Diagnosis  1. Impaired Mobility and ADLs  2. Rhabdomyolysis     Comorbidities   Chronic hepatitis C virus infection  B18.2    Bipolar 1 disorder  F31.9    Depression F32.9    Alcohol dependence in remission  F10.21    Needle phobia F40.298    Eustachian tube dysfunction H69.80    Psoriasis L40.9    History of atrial fibrillation Z86.79    History of encephalopathy Z86.69    Primary osteoarthritis involving multiple joints M15.0    Essential hypertension I10    Gastroesophageal reflux disease K21.9    Type 2 diabetes mellitus  E11.9    Chronic obstructive pulmonary disease  J44.9    History of acute renal failure Z87.448    Hypoalbuminemia E88.09    Low serum high density lipoprotein (HDL) R74.8    Obesity, Class I, BMI 30-34.9 E66.9    Dysphagia R13.10    Vitamin D deficiency E55.9    Gout M10.9        ANTICIPATED INTERVENTIONS THAT SUPPORT THE MEDICAL NECESSITY OF THIS ADMISSION:    I. Physical Therapy              A. Intensity: 1 hour per day              B. Frequency: 5 times per week              C. Duration: 2 weeks              D. Long Term Goals:    1. Patient will move from supine to sit and sit to supine , scoot up and down and roll side to side in bed with independence. 2.  Patient will transfer from bed to chair and chair to bed with modified independence using the least restrictive device. 3.  Patient will perform sit to stand with modified independence. 4.  Patient will ambulate with modified independence for 150 feet with the least restrictive device. 5.  Patient will ascend/descend 3 stairs with bilateral handrail(s) with supervision/set-up. E.  Interventions: Interventions may include range of motion (AROM, PROM B LE/trunk), motor function (B LE/trunk strengthening/coordination), activity tolerance (vitals, oxygen saturation levels), bed mobility training, balance activities, gait training (progressive ambulation program), and functional transfer training. II. Occupational Therapy  21 . Intensity: 1 hour per day              B. Frequency: 5 times per week              C. Duration: 2 weeks              D. Long Term Goals:    1. Pt will perform self-feeding with (I). 2. Pt will perform grooming with (I) to MI.    3. Pt will perform UB bathing with MI.    4. Pt will perform LB bathing with MI.    5. Pt will perform tub/shower transfer with MI.     6. Pt will perform UB dressing with MI.    7. Pt will perform LB dressing with MI.    8. Pt will perform toileting task with MI.    9. Pt will perform toilet transfer with MI.    8. Pt will perform IADL home mgt (ie laundry, laundry folding, medication mgt) with MI.    11. Pt will perform IADL meal prep with MI.   E. Interventions: Interventions may include range of motion (AROM, PROM B UE), motor function (B UE/ strengthening/coordination), activity tolerance (vitals, oxygen saturation levels), balance training, ADL/IADL training and functional transfer training. III. Speech Therapy              A. Intensity: 1-2 times per day              B. Frequency: 4-7 times per week              C. Duration: 3 weeks              D. Long Term Goals:    1. Patient will use strategies of slowed reate, over-articulation and increased loudness to improve speech production, supervision. 2. Patient will be 100% intelligible in casual conversation, supervision. 3. Patient will recall 3 words after 5 minutes in short term memory task, supervision. 4. Patient will perform functional problem solving and reasoning tasks with 90% accuracy.    E. Interventions: SLP will follow to address dysarthria and cognition per the care plan outlined in the SLP Evaluation. PHYSICIAN'S ASSESSMENT OF FINDINGS:    Are the established goals sufficient for achieving the optimal level of function? [x]  Yes      []  No    What changes would you recommend to the goals as written? None      Are the interventions noted sufficient for achieving the optimal level of function? [x]  Yes      []  No    What changes would you recommend to the interventions noted? If therapy staff is unable to provide 3 hr of total therapy per day in 5 days due to medical issues or decreased patient tolerance, may modify treatment schedule to 15 hr/week.       Estimated length of stay: 2 weeks      Medical rehabilitation prognosis:    []  Excellent     [x]  Good     []  Fair     []  Guarded       Discharge Destination:     [x]  Home     []  [address]     []  [facility]     []  [address]     []  [place]     []  Other:       Signed:    Jossy Garza MD    June 29, 2018

## 2018-07-03 NOTE — DISCHARGE SUMMARY
2 [facility]  Hospitalist Division    Discharge Summary    Patient: yKlah Shaw MRN: [id number]  CSN: [id number]    YOB: 1960  Age: 59 y.o.   Sex: female    DOA: 6/27/2018 LOS:  LOS: 6 days   Discharge Date: 6/27/18     Admission Diagnoses: Rhabdomyolisis  Rhabdomyolysis    Discharge Diagnoses:    Problem List as of 7/3/2018  Date Reviewed: 6/28/2018          Codes Class Noted - Resolved    Vitamin D deficiency (Chronic) ICD-10-CM: E55.9  ICD-9-CM: 268.9  6/28/2018 - Present    Overview Signed 6/28/2018  1:12 PM by Pili Chang MD     Vitamin D 25-Hydroxy (6/28/2018) = 13.5              Gout (Chronic) ICD-10-CM: M10.9  ICD-9-CM: 274.9  Unknown - Present        Primary osteoarthritis involving multiple joints (Chronic) ICD-10-CM: M15.0  ICD-9-CM: 715.09  Unknown - Present    Overview Signed 6/27/2018 10:15 PM by Pili Chang MD     knees and back             Essential hypertension (Chronic) ICD-10-CM: I10  ICD-9-CM: 401.9  Unknown - Present        Gastroesophageal reflux disease (Chronic) ICD-10-CM: K21.9  ICD-9-CM: 530.81  Unknown - Present        Type 2 diabetes mellitus ([address] (Chronic) ICD-10-CM: E11.9  ICD-9-CM: 250.00  Unknown - Present    Overview Signed 6/27/2018 10:18 PM by Pili Chang MD     HbA1c (6/14/2018) = 6.9             Chronic obstructive pulmonary disease ([facility] (Chronic) ICD-10-CM: J44.9  ICD-9-CM: 496  Unknown - Present        Obesity, Class I, BMI 30-34.9 (Chronic) ICD-10-CM: E66.9  ICD-9-CM: 278.00  Unknown - Present        Low serum high density lipoprotein (HDL) (Chronic) ICD-10-CM: R74.8  ICD-9-CM: 272.9  6/14/2018 - Present    Overview Signed 6/27/2018 10:30 PM by Pili Chang MD     HDL (6/14/2018) = 12             Dysphagia ICD-10-CM: R13.10  ICD-9-CM: 787.20  6/14/2018 - Present        History of atrial fibrillation ICD-10-CM: Z86.79  ICD-9-CM: V12.59  6/13/2018 - Present    Overview Signed 6/27/2018 10:23 PM by Darian Her Gabriela Mercedes MD     Atrial fibrillation with rapid ventricular response             * (Principal)Rhabdomyolysis ICD-10-CM: M62.82  ICD-9-CM: 728.88  6/13/2018 - Present        History of encephalopathy ICD-10-CM: Z86.69  ICD-9-CM: V12.49  6/13/2018 - Present    Overview Signed 6/27/2018 10:21 PM by Charli Domingo MD     Acute metabolic encephalopathy             History of acute renal failure ICD-10-CM: Z87.448  ICD-9-CM: V13.09  6/13/2018 - Present        Generalized weakness ICD-10-CM: R53.1  ICD-9-CM: 780.79  6/13/2018 - Present        Impaired mobility and ADLs ICD-10-CM: Z74.09  ICD-9-CM: 799.89  6/13/2018 - Present        Hypoalbuminemia (Chronic) ICD-10-CM: E88.09  ICD-9-CM: 273.8  5/25/2018 - Present        Psoriasis (Chronic) ICD-10-CM: L40.9  ICD-9-CM: 696.1  Unknown - Present        Eustachian tube dysfunction ICD-10-CM: H69.80  ICD-9-CM: 381.81  Unknown - Present        Needle phobia ICD-10-CM: F40.298  ICD-9-CM: 300.29  Unknown - Present    Overview Signed 2/11/2014  3:06 PM by Chas Key     from prior IV drug abuse             Bipolar 1 disorder ([facility] (Chronic) ICD-10-CM: F31.9  ICD-9-CM: 296.7  Unknown - Present    Overview Signed 9/6/2013 10:20 AM by Senait Washington     seeing Psychiatrist             Depression (Chronic) ICD-10-CM: F32.9  ICD-9-CM: 100  Unknown - Present        Alcohol dependence in remission ([facility] ICD-10-CM: F10.21  ICD-9-CM: 303.93  Unknown - Present    Overview Signed 9/6/2013 10:21 AM by Senait Washington     alcohol withdrawal discharged 05/31/2013             Chronic hepatitis C virus infection ([facility] (Chronic) ICD-10-CM: B18.2  ICD-9-CM: 070.54  Unknown - Present    Overview Signed 4/30/2013 10:56 AM by Senait Washington     prior IV drug use             RESOLVED: Acute kidney injury ([facility] [address] ICD-10-CM: N17.9  ICD-9-CM: 584.9  6/13/2018 - 6/24/2018        RESOLVED: Abnormal urinalysis ICD-10-CM: R82.90  ICD-9-CM: 791.9  6/13/2018 - 6/24/2018 Discharge Condition: Good    Discharge To: SNF    Consults: Neurology and Psychiatry    Hospital Course:   Ava Schultz. Inna Araujo is a 59 y.o. female with a PMHx of HTN, arthritis, DM II, Hep C, GERD, COPD, Bipolar 1 disorder, depression, etoh dependence in remission, DJD, Eustachain tube dysunction, and psoriasis who presents to the ED for AMS noting patient was found by daughter on the floor with confusion. Daughter last saw patient 6 days prior. Patient awake but not oriented to day/time and notes generalize myalgia and doesn't remember how she got on the floor. Upon arrival, patient in a-fib with rvr and was placed on cardizem gtt and given dig with conversion. Rhabdo with PAM. Admitted for further eval. IVFs given for rhabdo, PAM. PAM resolved (BUN 11 Cr 0.86 6/21/2018). Rhabdo resolved with ck improved (ck 183 6/21/2018). She was treated with IV antibiotics for urinary tract infection. Altered mental status-2/2 infectious and metabolic derangements; per patient may have taken too many of her sleeping pills-psych consulted-did not feel patient needed inpatient psych and was deemed not suicidal in any way, ammonia elevated-treated with lactulose, CT head negative, UDS positive for [facility], neurology consult -no stroke; liver disease present. Elevated LFTs-patient with history of etoh abuse, discontinued atorvastatin and reduced ASA dose, added lactulose-normalized. Echo showed EF 55-60%. The patient's depression medications were held; however, the patient should continue to follow-up with her psych doctor outpatient. She was given thiamine, folic acid for etoh abuse. Pneumonia was treated with IVAB. The patient still displays slurred speech and trouble communicating. She is ambulating around the room with no complaints. She will be discharged to SNF. The patient should follow-up with her PCP and psych  in 1 week to discuss her recent hospitalization.        Physical Exam:  General appearance: alert, cooperative, no distress, appears stated age  Head: Normocephalic, without obvious abnormality, atraumatic  Lungs: clear to auscultation bilaterally  Heart: regular rate and rhythm, S1, S2 normal, no murmur, click, rub or gallop  Abdomen: soft, non-tender. Bowel sounds normal. No masses,  no organomegaly  Extremities: extremities normal, atraumatic, no cyanosis or edema  Skin: Skin color, texture, turgor normal. No rashes or lesions  Neurologic: Grossly normal  PSY: Mood and affect normal, appropriately behaved    Significant Diagnostic Studies:   Transthoracic Echocardiogram    Patient: Solo Hutchinson  MRN: [id number]  ACCT #: [de-identified]  : 1960  Age: 59 years  Gender: Female  Height: 66 in  Weight: 182.6 lb  BSA: 1.93 m-sq  BP: 112 / 72 mmHg  Study date: 2018  Status: Routine  Location: Echo lab  [facility] #: [id number]    Allergies: ACE INHIBITORS, PENICILLINS    Referring_Ordering Physician: Tee Martin. Samuel Amanda Dr  Interpreting Physician: Messi Gallardo MD  Interpreting Group: [email or web address]  Technologist: Lakia Greenwood RVT [facility]    SUMMARY:  Left ventricle: Systolic function was normal by visual assessment. Ejection   fraction was estimated in the range of 55 %  to 60 %. No obvious wall motion abnormalities identified in the views   obtained. Left ventricular diastolic function  parameters were normal for the patient's age. Right ventricle: The size was normal. Systolic function was low normal.    Left atrium: Size was normal.    Mitral valve: There was no regurgitation. Aortic valve: There was no stenosis. There was no regurgitation. Tricuspid valve: There was trivial regurgitation. Inferior vena cava, hepatic veins: The inferior vena cava was normal in size   and course. Pericardium: A trivial pericardial effusion was identified. INDICATIONS: Atrial fibrillation. PROCEDURE: This was a routine study. The study included complete 2D imaging,   complete spectral Doppler, and color  Doppler. Systolic blood pressure was 112 mmHg, at the start of the study. Diastolic blood pressure was 72 mmHg, at the  start of the study. Images were obtained from the parasternal, apical,   subcostal, and suprasternal notch acoustic  windows. Image quality was adequate. LEFT VENTRICLE: Size was normal. Systolic function was normal by visual   assessment. Ejection fraction was estimated in  the range of 55 % to 60 %. No obvious wall motion abnormalities identified in   the views obtained. Wall thickness was  normal. DOPPLER: Left ventricular diastolic function parameters were normal   for the patient's age. RIGHT VENTRICLE: The size was normal. Systolic function was low normal.   DOPPLER: Estimated peak pressure was in the  range of 25 mmHg to 30 mmHg. LEFT ATRIUM: Size was normal. LA volume index was 20 ml/m-sq. RIGHT ATRIUM: Size was normal.    MITRAL VALVE: There was normal leaflet separation. DOPPLER: The transmitral   velocity was within the normal range. There  was no evidence for stenosis. There was no regurgitation. AORTIC VALVE: The valve was trileaflet. Leaflets exhibited normal thickness   and normal cuspal separation. DOPPLER:  Transaortic velocity was within the normal range. There was no stenosis. There was no regurgitation. TRICUSPID VALVE: There was normal leaflet separation. DOPPLER: The   transtricuspid velocity was within the normal range. There was no evidence for tricuspid stenosis. There was trivial   regurgitation. PULMONIC VALVE: Not well visualized, but normal Doppler findings. DOPPLER:   There was no stenosis. There was trivial  regurgitation. AORTA: The root exhibited normal size. SYSTEMIC VEINS: IVC: The inferior vena cava was normal in size and course. Respirophasic changes were normal.    PERICARDIUM: A trivial pericardial effusion was identified. Insignificant   pericardial effusion and/or pericardial fat  was present.     SYSTEM MEASUREMENT TABLES    2D  AV Cusp: 1.6 cm  Ao Diam: 3 cm  LA Diam: 2.8 cm  LVOT Diam: 1.9 cm  IVSd: 0.9 cm  LVIDd: 3.9 cm  LVIDs: 2.2 cm  LVPWd: 0.9 cm  EF Biplane: 63.3 %  LAESV Index (A-L): 19.7 ml/m2  LVEF MOD A2C: 60.6 %  LVEF MOD A4C: 69.6 %  RA Area: 9.8 cm2  RV Major: 5.3 cm  RV Minor: 3.4 cm    CW  AV Vmax: 1.2 m/s  AV maxP.5 mmHg  PV Vmax: 0.9 m/s  PV maxPG: 3 mmHg  TR Vmax: 2.3 m/s  TR maxP.6 mmHg  AV VTI: 18.3 cm  AV Vmean: 0.7 m/s  AV meanP.6 mmHg    MM  TAPSE: 1.5 cm    PW  MICHAEL Vmax, Pt: 2.9 cm2  LVOT Vmax: 1.2 m/s  LVOT maxP.4 mmHg  E/E': 5.9  MV A Benton: 0.6 m/s  MV Dec Giles: 2.4 m/s2  MV DecT: 225.9 ms  MV E Benton: 0.5 m/s  MV E/A Ratio: 0.9  MV PHT: 65.5 ms  MVA By PHT: 3.4 cm2  TAPSV: 12.1 cm/s  EXAM: CT head      INDICATION: Found on floor, altered mental status.      COMPARISON: None.      TECHNIQUE: Axial CT imaging of the head was performed without intravenous  contrast.      One or more dose reduction techniques were used on this CT: automated exposure  control, adjustment of the mAs and/or kVp according to patient's size, and  iterative reconstruction techniques. The specific techniques utilized on this CT  exam have been documented in the patient's electronic medical record.       _______________  [facility]  FINDINGS:      BRAIN AND POSTERIOR FOSSA: The sulci, folia, ventricles and basal cisterns are  within normal limits for the patient?s age. There is no intracranial hemorrhage,  mass effect, or midline shift. There are no areas of abnormal parenchymal  attenuation.      EXTRA-AXIAL SPACES AND MENINGES: There are no abnormal extra-axial fluid  collections.      CALVARIUM: Intact.      SINUSES: Imaged paranasal sinuses and mastoid air cells are clear.      OTHER: None.      IMPRESSION:          1. No acute intracranial abnormality.             EXAM: CT Cervical spine      INDICATION: Cervical neck pain.  Patient on the floor.      COMPARISON: No prior study.      TECHNIQUE: Axial CT imaging of the cervical spine was performed from the skull  base to the upper thoracic spine without intravenous contrast. Multiplanar  reformats were generated. One or more dose reduction techniques were used on  this CT: automated exposure control, adjustment of the mAs and/or kVp according  to patient's size, and iterative reconstruction techniques. The specific  techniques utilized on this CT exam have been documented in the patient's  electronic medical record.      _______________  [facility]  FINDINGS:      VERTEBRAE AND DISCS: Coronal reformatted images demonstrate a normal appearance  to the occipital condyles. The atlantodental and atlantooccipital articulations  are normal in appearance. There is mild straightening of usual cervical  lordosis, without evidence of listhesis. Vertebral body heights are intact. No  evidence of compression fracture. No displaced fracture. Moderate severity  spondylosis present C4-C7. No acute facet malalignment.      SPINAL CANAL AND FORAMINA: Moderate severity right-sided C4-C5 neural foraminal  narrowing. No evidence of high-grade spinal canal stenosis or neural foraminal  narrowing.      PREVERTEBRAL SOFT TISSUES: Normal      VISIBLE INTRACRANIAL CONTENTS: Unremarkable.      LUNG APICES: Patchy groundglass opacity present in the periphery of the right  upper lobe.      OTHER: None.      _______________  [facility]  IMPRESSION  IMPRESSION:          1. Straightening of the usual cervical lordosis without evidence of fracture or  acute traumatic listhesis. 2. Cervical spondylosis and neuroforaminal narrowing as described. 3. Patchy groundglass opacity at the periphery of the imaged right upper lobe,  potentially reflecting pneumonitis or pneumonia given the presenting history.             Chest, single view      Indication: Coughing and chest congestion, patient found down      Comparison: 1/30/2016      Findings:  Portable upright AP view of the chest was obtained.  Interval  development of asymmetric alveolar opacity throughout the majority of the right  lower lobe. No pneumothorax or pleural effusion. Left lung appears clear. Cardiac size and mediastinal contours are stable. Degenerative changes of each  shoulder girdle demonstrated without acute osseous abnormality.      IMPRESSION  Impression:  1. Focal airspace opacity in the right lower lobe, potentially reflecting  pneumonitis or pneumonia.                Discharge Medications:     Current Discharge Medication List   Aspirin 81 mg chewable tablet by mouth daily   Colchicine 0.6 mg tablet by mouth twice daily    CONTINUE these medications which have NOT CHANGED    Details   meloxicam (MOBIC) 15 mg tablet Take 1 Tab by mouth daily. Qty: 30 Tab, Refills: 0    Comments: Needs appt for additional.  Associated Diagnoses: Primary osteoarthritis of both knees; Chronic midline low back pain without sciatica; Degenerative disc disease, lumbar      chlorthalidone (HYGROTEN) 25 mg tablet Take  by mouth daily. folic acid (FOLVITE) 1 mg tablet Take  by mouth daily. predniSONE (DELTASONE) 20 mg tablet Take 2 tabs in AM with food for 7 days then 1 tab until gone  Qty: 28 Tab, Refills: 0    Associated Diagnoses: Primary osteoarthritis of both knees; Chronic midline low back pain without sciatica; Degenerative disc disease, lumbar      spironolactone (ALDACTONE) 25 mg tablet Take  by mouth daily. metFORMIN (GLUCOPHAGE) 500 mg tablet Take  by mouth two (2) times daily (with meals). cyclobenzaprine (FLEXERIL) 10 mg tablet Take  by mouth three (3) times daily as needed for Muscle Spasm(s). ipratropium-albuterol (COMBIVENT RESPIMAT)  mcg/actuation inhaler Take 1 Puff by inhalation every six (6) hours as needed for Wheezing. Qty: 1 Inhaler, Refills: 11      cloNIDine HCl (CATAPRES) 0.2 mg tablet Take  by mouth two (2) times a day. HYDROcodone-acetaminophen (NORCO) 5-325 mg per tablet Take 1 Tab by mouth every four (4) hours as needed for Pain.  Max Daily Amount: 6 Tabs. Qty: 12 Tab, Refills: 0      naproxen (NAPROSYN) 375 mg tablet Take 1 Tab by mouth two (2) times daily (with meals). Qty: 20 Tab, Refills: 0      conjugated estrogens (PREMARIN) 0.625 mg/gram vaginal cream Insert 0.5 g into vagina daily. Qty: 45 g, Refills: 2      hydrocortisone valerate (WEST-FELISHA) 0.2 % ointment Apply  to affected area two (2) times a day. use thin layer  Qty: 30 g, Refills: 1      Shower Chair XX clemencia Use daily with showering. Dx: djd of knee  Qty: 1 each, Refills: 0      losartan-hydrochlorothiazide (HYZAAR) 50-12.5 mg per tablet Take 1 tablet by mouth daily. Qty: 30 tablet, Refills: 5      Omeprazole delayed release (PRILOSEC D/R) 20 mg tablet Take 1 tablet by mouth daily. Qty: 30 tablet, Refills: 5      cetirizine (ZYRTEC) 10 mg tablet Take 1 Tab by mouth daily. Qty: 30 Tab, Refills: 5      traMADol (ULTRAM) 50 mg tablet Take 2 Tabs by mouth two (2) times daily as needed for Pain. Qty: 120 Tab, Refills: 2      Lancets misc Use daily or as directed for blood sugar monitoring, dx 250.00  Qty: 50 Each, Refills: 11      glucose blood VI test strips (BLOOD GLUCOSE TEST) strip Use daily or as directed for blood sugar monitoring. .00  Qty: 50 Strip, Refills: 11      Blood-Glucose Meter monitoring kit Use as directed. Qty: 1 Kit, Refills: 0             Activity: Activity as tolerated    Diet: dental soft (soft solid)     Wound Care: None needed    Follow-up:     The patient is to follow-up with PCP and psych in 1 week to discuss her recent hospitalization. Patient to arrange.      Discharge time > 35 minutes   Gerry Latham NP  7/3/2018, 10:30 AM

## 2018-07-03 NOTE — PROGRESS NOTES
Pt is a 59year old female admitted to ARU for rhabdomylisis. Pt is alert and oriented, alone in the room. Pt states that she lives alone in a senior apartment complex. Pt states that she is in [address] with no steps to enter and an elevator to her room. Pt states that she has a tub shower. Pt states that she has no history with [facility], home health, outpatient therapy or SNF. Pt states that she does not have a POA and notes that her daughter, Valarie Ortiz [phone]) is her [facility] contact. Pt confirms her insurance as [employer] [employer] and [facility] Medicaid. Sw reviewed dc planning and team conference. Pt states understanding. Sw will follow.

## 2018-07-04 LAB
GLUCOSE BLD STRIP.AUTO-MCNC: 82 MG/DL (ref 70–110)
GLUCOSE BLD STRIP.AUTO-MCNC: 93 MG/DL (ref 70–110)
GLUCOSE BLD STRIP.AUTO-MCNC: 98 MG/DL (ref 70–110)
GLUCOSE BLD STRIP.AUTO-MCNC: 99 MG/DL (ref 70–110)

## 2018-07-04 PROCEDURE — 97112 NEUROMUSCULAR REEDUCATION: CPT

## 2018-07-04 PROCEDURE — 65310000000 HC RM PRIVATE REHAB

## 2018-07-04 PROCEDURE — 74011250637 HC RX REV CODE- 250/637: Performed by: INTERNAL MEDICINE

## 2018-07-04 PROCEDURE — 74011250636 HC RX REV CODE- 250/636: Performed by: INTERNAL MEDICINE

## 2018-07-04 PROCEDURE — 82962 GLUCOSE BLOOD TEST: CPT

## 2018-07-04 PROCEDURE — 92507 TX SP LANG VOICE COMM INDIV: CPT

## 2018-07-04 PROCEDURE — 97530 THERAPEUTIC ACTIVITIES: CPT

## 2018-07-04 PROCEDURE — 97116 GAIT TRAINING THERAPY: CPT

## 2018-07-04 PROCEDURE — 97535 SELF CARE MNGMENT TRAINING: CPT

## 2018-07-04 RX ORDER — AMLODIPINE BESYLATE 5 MG/1
5 TABLET ORAL ONCE
Status: COMPLETED | OUTPATIENT
Start: 2018-07-04 | End: 2018-07-04

## 2018-07-04 RX ORDER — AMLODIPINE BESYLATE 5 MG/1
5 TABLET ORAL DAILY
Status: DISCONTINUED | OUTPATIENT
Start: 2018-07-05 | End: 2018-07-09

## 2018-07-04 RX ADMIN — COLCHICINE 0.6 MG: 0.6 CAPSULE ORAL at 08:48

## 2018-07-04 RX ADMIN — HEPARIN SODIUM 5000 UNITS: 5000 INJECTION, SOLUTION INTRAVENOUS; SUBCUTANEOUS at 21:31

## 2018-07-04 RX ADMIN — HEPARIN SODIUM 5000 UNITS: 5000 INJECTION, SOLUTION INTRAVENOUS; SUBCUTANEOUS at 06:33

## 2018-07-04 RX ADMIN — LOSARTAN POTASSIUM 50 MG: 50 TABLET ORAL at 08:48

## 2018-07-04 RX ADMIN — CHOLECALCIFEROL CAP 125 MCG (5000 UNIT) 5000 UNITS: 125 CAP at 08:48

## 2018-07-04 RX ADMIN — Medication 1 TABLET: at 08:48

## 2018-07-04 RX ADMIN — PANTOPRAZOLE SODIUM 40 MG: 40 TABLET, DELAYED RELEASE ORAL at 06:32

## 2018-07-04 RX ADMIN — HEPARIN SODIUM 5000 UNITS: 5000 INJECTION, SOLUTION INTRAVENOUS; SUBCUTANEOUS at 14:18

## 2018-07-04 RX ADMIN — METFORMIN HYDROCHLORIDE 500 MG: 500 TABLET ORAL at 08:48

## 2018-07-04 RX ADMIN — ASPIRIN 81 MG CHEWABLE TABLET 81 MG: 81 TABLET CHEWABLE at 08:48

## 2018-07-04 RX ADMIN — AMLODIPINE BESYLATE 5 MG: 5 TABLET ORAL at 12:23

## 2018-07-04 RX ADMIN — ACETAMINOPHEN 650 MG: 325 TABLET, FILM COATED ORAL at 07:42

## 2018-07-04 NOTE — PROGRESS NOTES
[facility] PHYSICAL REHABILITATION  [address] [place] [address], [address]     INPATIENT REHABILITATION  DAILY PROGRESS NOTE     Date: 7/4/2018    Name: Mariya Redding Age / Sex: 59 y.o. / female   CSN: [id number] MRN: [id number]   Admit Date: 6/27/2018 Length of Stay: 7 days     Primary Rehab Diagnosis: Impaired Mobility and ADLs secondary to Rhabdomyolysis      Subjective:     Patient seen and examined. Blood pressure fairly controlled. Blood sugars controlled. Patient's Complaint:   No significant medical complaints     Pain Control: no current joint or muscle symptoms, essentially pain-free      Objective:     Vital Signs:  Patient Vitals for the past 24 hrs:   BP Temp Pulse Resp SpO2   07/03/18 2200 145/70 98.2 °F (36.8 °C) 76 18 96 %   07/03/18 1608 (!) 145/97 98.9 °F (37.2 °C) 77 18 95 %        Physical Examination:  GENERAL SURVEY: Patient is awake, alert, oriented x 3, sitting comfortably on the chair, not in acute respiratory distress. HEENT: pink palpebral conjunctivae, anicteric sclerae, no nasoaural discharge, moist oral mucosa  NECK: supple, no jugular venous distention, no palpable lymph nodes  CHEST/LUNGS: symmetrical chest expansion, good air entry, clear breath sounds  HEART: adynamic precordium, good S1 S2, no S3, regular rhythm, no murmurs  ABDOMEN: obese, bowel sounds appreciated, soft, non-tender  EXTREMITIES: pink nailbeds, no edema, full and equal pulses, no calf tenderness   NEUROLOGICAL EXAM: The patient is awake, alert and oriented x3, able to answer questions fairly appropriately, able to follow 1 and 2 step commands. Able to tell time from the wall clock. Cranial nerves II-XII are grossly intact. No gross sensory deficit. Motor strength is 4/5 on BUE, 4/5 on BLE.       Current Medications:  Current Facility-Administered Medications   Medication Dose Route Frequency    losartan (COZAAR) tablet 50 mg  50 mg Oral DAILY    metFORMIN (GLUCOPHAGE) tablet 500 mg  500 mg Oral DAILY WITH BREAKFAST    cholecalciferol (VITAMIN D3) capsule 5,000 Units  5,000 Units Oral DAILY    acetaminophen (TYLENOL) tablet 650 mg  650 mg Oral Q4H PRN    bisacodyl (DULCOLAX) tablet 10 mg  10 mg Oral Q48H PRN    insulin lispro (HUMALOG) injection   SubCUTAneous TIDAC    aspirin chewable tablet 81 mg  81 mg Oral DAILY WITH BREAKFAST    vitamin B complex tablet  1 Tab Oral DAILY    pantoprazole (PROTONIX) tablet 40 mg  40 mg Oral ACB    heparin (porcine) injection 5,000 Units  5,000 Units SubCUTAneous Q8H    albuterol (PROVENTIL VENTOLIN) nebulizer solution 2.5 mg  2.5 mg Nebulization Q4H PRN    colchicine (MITIGARE) capsule 0.6 mg  0.6 mg Oral DAILY       Allergies:   Allergies   Allergen Reactions    Ace Inhibitors Cough    Penicillins Hives       Functional Progress:    PHYSICAL THERAPY    ON ADMISSION MOST RECENT   Wheelchair Mobility/Management  Able to Propel (ft): 50 feet  Functional Level: 2  Curbs/Ramps Assist Required (FIM Score): 0 (Not tested)  Wheelchair Setup Assist Required : 2 (Maximal assistance)  Wheelchair Management: Manages left brake, Manages right brake (with verbal cues and demonstration) Wheelchair Mobility/Management  Able to Propel (ft): 186 feet  Functional Level: 5  Curbs/Ramps Assist Required (FIM Score): 0 (Not tested)  Wheelchair Setup Assist Required : 2 (Maximal assistance)  Wheelchair Management: Manages left brake, Manages right brake (with verbal cues and demonstration)     Gait  Amount of Assistance: 4 (Minimal assistance)  Distance (ft): 18 Feet (ft)  Assistive Device: Walker, rolling, Gait belt (w/c follow) Gait  Amount of Assistance: 4 (Contact guard assistance)  Distance (ft): 15 Feet (ft) (distance not challenged)  Assistive Device: Walker, rolling     Balance-Sitting/Standing  Sitting - Static: Good (unsupported)  Sitting - Dynamic: Fair (occasional)  Standing - Static: Fair  Standing - Dynamic : Impaired Balance-Sitting/Standing  Sitting - Static: Good (unsupported)  Sitting - Dynamic: Good (unsupported)  Standing - Static: Fair  Standing - Dynamic : Impaired     Bed/Mat Mobility  Rolling Right : 5 (Stand-by assistance)  Rolling Left : 5 (Stand-by assistance)  Supine to Sit : 4 (Minimal assistance)  Sit to Supine : 4 (Minimal assistance) Bed/Mat Mobility  Rolling Right : 5 (Stand-by assistance)  Rolling Left : 6 (Modified independent)  Supine to Sit : 6 (Modified independent)  Sit to Supine : 4 (Minimal assistance)     Transfers  Transfer Type: SPT without device (stand step w/out AD, with gait belt)  Other: toilet transfer with min A with use of grab bar  Transfer Assistance : 3 (Moderate assistance ) (w/out AD; min A with RW)  Sit to Stand Assistance: Minimal assistance  Car Transfers: Not tested  Car Type: n/a Transfers  Transfer Type: SPT without device (stand step w/out AD, with gait belt)  Other: Stand Step without AD  Transfer Assistance : 4 (Minimal assistance)  Sit to Stand Assistance: Minimal assistance (CGA/Minimal assistance without AD)  Car Transfers: Not tested  Car Type: n/a     Steps or Stairs  Steps/Stairs Ambulated (#): 0  Level of Assist : 0 (Not tested) (deferred due to dizziness with gait training) Steps or Stairs  Steps/Stairs Ambulated (#): 0  Level of Assist : 0 (Not tested) (deferred due to dizziness with gait training)         Lab/Data Review:  Recent Results (from the past 24 hour(s))   GLUCOSE, POC    Collection Time: 07/03/18 12:25 PM   Result Value Ref Range    Glucose (POC) 86 70 - 110 mg/dL   GLUCOSE, POC    Collection Time: 07/03/18  4:55 PM   Result Value Ref Range    Glucose (POC) 83 70 - 110 mg/dL   URINALYSIS W/MICROSCOPIC    Collection Time: 07/03/18  6:00 PM   Result Value Ref Range    Color YELLOW      Appearance CLEAR      Specific gravity 1.006 1.005 - 1.030      pH (UA) 6.5 5.0 - 8.0      Protein NEGATIVE  NEG mg/dL    Glucose NEGATIVE  NEG mg/dL    Ketone NEGATIVE  NEG mg/dL    Bilirubin NEGATIVE  NEG      Blood NEGATIVE  NEG      Urobilinogen 0.2 0.2 - 1.0 EU/dL    Nitrites NEGATIVE  NEG      Leukocyte Esterase NEGATIVE  NEG      WBC NEGATIVE  0 - 4 /hpf    RBC NEGATIVE  0 - 5 /hpf    Epithelial cells 1+ 0 - 5 /lpf    Bacteria NEGATIVE  NEG /hpf   GLUCOSE, POC    Collection Time: 07/03/18  8:33 PM   Result Value Ref Range    Glucose (POC) 105 70 - 110 mg/dL   GLUCOSE, POC    Collection Time: 07/04/18  8:42 AM   Result Value Ref Range    Glucose (POC) 82 70 - 110 mg/dL       Estimated Glomerular Filtration Rate:  On admission, estimated GFR based on a Creatinine of 0.90 mg/dl:   Using CKD-EPI = 81.1 mL/min/1.73m2   Using MDRD = 82.4 mL/min/1.73m2      Assessment:     Primary Rehabilitation Diagnosis  1. Impaired Mobility and ADLs  2. Rhabdomyolysis     Comorbidities   Chronic hepatitis C virus infection  B18.2    Bipolar 1 disorder  F31.9    Depression F32.9    Alcohol dependence in remission  F10.21    Needle phobia F40.298    Eustachian tube dysfunction H69.80    Psoriasis L40.9    History of atrial fibrillation Z86.79    History of encephalopathy Z86.69    Primary osteoarthritis involving multiple joints M15.0    Essential hypertension I10    Gastroesophageal reflux disease K21.9    Type 2 diabetes mellitus  E11.9    Chronic obstructive pulmonary disease  J44.9    History of acute renal failure Z87.448    Hypoalbuminemia E88.09    Low serum high density lipoprotein (HDL) R74.8    Obesity, Class I, BMI 30-34.9 E66.9    Dysphagia R13.10    Vitamin D deficiency E55.9    Gout M10.9        Plan:     1. Justification for continued stay: Good progression towards established rehabilitation goals. 2. Medical Issues being followed closely:    [x]  Fall and safety precautions     []  Wound Care     [x]  Bowel and Bladder Function     [x]  Fluid Electrolyte and Nutrition Balance     []  Pain Control      3.  Issues that 24 hour rehabilitation nursing is following:    [x]  Fall and safety precautions     [] Wound Care     [x]  Bowel and Bladder Function     [x]  Fluid Electrolyte and Nutrition Balance     []  Pain Control      [x]  Assistance with and education on in-room safety with transfers to and from the bed, wheelchair, toilet and shower. 4. Acute rehabilitation plan of care:    [x]  Continue current care and rehab. [x]  Physical Therapy           [x]  Occupational Therapy           [x]  Speech Therapy     []  Hold Rehab until further notice     5. Medications:    [x]  MAR Reviewed     [x]  Continue Present Medications     6. DVT Prophylaxis:      []  Lovenox     [x]  Unfractionated Heparin     []  Coumadin     []  NOAC     [x]  EFRAÍN Stockings     [x]  Sequential Compression Device     []  None     7. Orders:   > Rhabdomyolysis; History of acute renal failure; History of atrial fibrillation with rapid ventricular response;  History of encephalopathy   > CK (6/13/2018, on admission to [facility]) = 6954   > CK (6/21/2018) = 183   > BUN/Creatinine (6/13/2018, on admission to [facility]) = 128/2.42   > BUN/Creatinine (6/19/2018) = 11/0.86   > BUN/Creatinine (6/28/2018, on admission to ARU) = 10/0.90     > Dysphagia   > Speech and Language Pathology to evaluate and treat     > Gout   > Patient stated she has gout and that during her stay at [facility], she had pain great toes of both feet   > On 6/24/2018, patient was started on Colchicine 0.6 mg PO BID   > Upon admission to the ARU, patient stated the pain has improved significantly   > On 6/28/2018, decreased Colchicine from 0.6 mg PO BID to 0.6 mg PO once daily    > Continue Colchicine 0.6 mg PO once daily     > Essential hypertension   > Prior to admission to [facility], patient was on:    > Chlorthalidone 25 mg PO once daily    > Spironolactone 25 mg PO once daily    > Clonidine 0.2 mg PO BID    > Losartan-HCTZ 50-12.5 1 tab PO once daily   > During the patient's stay at [facility], all above antihypertensive medications were put on hold   > Upon discharge from [facility], all above antihypertensives were resumed as per discharge summary   > On 7/3/2018, increased Losartan from 25 mg to 50 mg PO once daily (9AM)   > Add Amlodipine 5 mg PO once daily (6AM)   > Continue Losartan 50 mg PO once daily (9AM)    > Type 2 diabetes mellitus   > Prior to admission to [facility], patient was on Metformin 500 mg PO BID with meals   > HbA1c (6/14/2018) = 6.9   > During the patient's stay at [facility], Metformin was put on hold (re: acute renal failure)   > Upon discharge from [facility], resumed Metformin 500 mg PO BID with meals as per discharge summary   > Continue:    > Metformin 500 mg PO once daily with breakfast    > Humalog insulin sliding scale SC TID AC only    > Vitamin D Deficiency   > Vitamin D 25-Hydroxy (6/28/2018) = 13.5   > On 6/28/2018, patient was given Cholecalciferol 50,000 units PO x 1 dose    > On 6/29/2018, started Cholecalciferol 5,000 units PO once daily   > Continue Cholecalciferol 5,000 units PO once daily    > Urinary frequency   > Urinalysis (7/3/2018): ~WNL    > On 6/28/2018, patient stated she had 4 bowel movements since AM   > Discontinued Docusate sodium 100 mg PO BID      8. Patient's progress in rehabilitation and medical issues discussed with the patient. All questions answered to the best of my ability. Care plan discussed with patient and nurse.       Signed:    Jerri Case MD    July 4, 2018

## 2018-07-04 NOTE — ROUTINE PROCESS
SHIFT CHANGE NOTE FOR [facility]    Bedside and Verbal shift change report given to Nicolle  (oncoming nurse) by  Josefina Thomas (offgoing nurse). Report included the following information SBAR, Kardex, MAR and Recent Results.     Situation:   Code Status: Full Code   Reason for Admission: Rhabdomyolysis  Hospital Day: 6   Problem List:   Hospital Problems  Date Reviewed: 7/3/2018          Codes Class Noted POA    Vitamin D deficiency (Chronic) ICD-10-CM: E55.9  ICD-9-CM: 268.9  6/28/2018 Yes    Overview Signed 6/28/2018  1:12 PM by Brian Meredith MD     Vitamin D 25-Hydroxy (6/28/2018) = 13.5              Gout (Chronic) ICD-10-CM: M10.9  ICD-9-CM: 274.9  Unknown Yes        Essential hypertension (Chronic) ICD-10-CM: I10  ICD-9-CM: 401.9  Unknown Yes        Type 2 diabetes mellitus ([facility] [address] (Chronic) ICD-10-CM: E11.9  ICD-9-CM: 250.00  Unknown Yes    Overview Signed 6/27/2018 10:18 PM by Brian Meredith MD     HbA1c (6/14/2018) = 6.9             Dysphagia ICD-10-CM: R13.10  ICD-9-CM: 787.20  6/14/2018 Yes        History of atrial fibrillation ICD-10-CM: Z86.79  ICD-9-CM: V12.59  6/13/2018 Yes    Overview Signed 6/27/2018 10:23 PM by Brian Meredith MD     Atrial fibrillation with rapid ventricular response             * (Principal)Rhabdomyolysis ICD-10-CM: M62.82  ICD-9-CM: 728.88  6/13/2018 Yes        History of encephalopathy ICD-10-CM: Z86.69  ICD-9-CM: V12.49  6/13/2018 Yes    Overview Signed 6/27/2018 10:21 PM by Brian Meredith MD     Acute metabolic encephalopathy             History of acute renal failure ICD-10-CM: Z87.448  ICD-9-CM: V13.09  6/13/2018 Yes        Generalized weakness ICD-10-CM: R53.1  ICD-9-CM: 780.79  6/13/2018 Yes        Impaired mobility and ADLs ICD-10-CM: Z74.09  ICD-9-CM: 799.89  6/13/2018 Yes              Background:   Past Medical History:   Past Medical History:   Diagnosis Date    Alcohol dependence in remission ([facility] [facility]     alcohol withdrawal discharged 05/31/2013    Bipolar 1 disorder ([facility]     Chronic hepatitis C virus infection ([facility]     prior IV drug use;  Genotype 1a     Chronic obstructive pulmonary disease ([facility]     Depression     Dysphagia 6/14/2018    Essential hypertension     Eustachian tube dysfunction     Dr. Mily Underwood Gastroesophageal reflux disease     Gout     History of acute renal failure 6/13/2018    History of atrial fibrillation 6/13/2018    Atrial fibrillation with rapid ventricular response    History of encephalopathy 9/49/2004    Acute metabolic encephalopathy    Low serum high density lipoprotein (HDL) 6/14/2018    HDL (6/14/2018) = 12    Needle phobia     from prior IV drug abuse    Obesity, Class I, BMI 30-34.9     Primary osteoarthritis involving multiple joints     knees and back    Psoriasis     Type 2 diabetes mellitus (HCC)     HbA1c (6/14/2018) = 6.9    Vitamin D deficiency 6/28/2018    Vitamin D 25-Hydroxy (6/28/2018) = 13.5       Patient taking anticoagulants yes    Patient has a defibrillator: no     Assessment:   Changes in Assessment throughout shift: none     Patient has central line: no Reasons if yes: Insertion date: Last dressing date:   Patient has Weston Cath: no Reasons if yes:     Insertion date:     Last Vitals:     Vitals:    07/02/18 1625 07/02/18 2200 07/03/18 0724 07/03/18 1608   BP: 147/85 141/77 153/80 (!) 145/97   Pulse: 76 76 63 77   Resp: 16 18 18 18   Temp: 99.1 °F (37.3 °C) 98.2 °F (36.8 °C) 97.6 °F (36.4 °C) 98.9 °F (37.2 °C)   SpO2: 95% 95% 97% 95%   Weight:       Height:            PAIN    Pain Assessment    Pain Intensity 1: 0 (07/03/18 1608) Pain Intensity 1: 2 (12/29/14 1105)    Pain Location 1: Foot (Left foot great toe) Pain Location 1: Abdomen    Pain Intervention(s) 1: Medication (see MAR), Repositioned, Rest, Position Pain Intervention(s) 1: Medication (see MAR)  Patient Stated Pain Goal: 0 Patient Stated Pain Goal: 0  o Intervention effective: no    o Other actions taken for pain:      Skin Assessment  Skin color Skin Color: Appropriate for ethnicity  Condition/Temperature Skin Condition/Temp: Dry, Warm  Integrity Skin Integrity: Other (comment)  Turgor Turgor: Non-tenting  Weekly Pressure Ulcer Documentation  Pressure  Injury Documentation: Pressure Injury Noted-See Wound LDA to Document  Wound Prevention & Protection Methods  Orientation of wound Orientation of Wound Prevention: Posterior  Location of Prevention Location of Wound Prevention: Sacrum/Coccyx  Dressing Present Dressing Present : Yes  Dressing Status Dressing Status: Intact  Wound Offloading Wound Offloading (Prevention Methods): Bed, pressure redistribution/air     INTAKE/OUPUT    Date 07/02/18 1900 - 07/03/18 0659 07/03/18 0700 - 07/04/18 0659   Shift 2139-6450 24 Hour Total 2574-7199 2783-4598 24 Hour Total   I  N  T  A  K  E   P. O.  720 680  680      P. O.  720 680  680    Shift Total  (mL/kg)  720  (7.9) 680  (7.5)  680  (7.5)   O  U  T  P  U  T   Urine  (mL/kg/hr)           Urine Occurrence(s) 1 x 3 x 7 x 1 x 8 x    Stool           Stool Occurrence(s) 0 x 0 x 1 x 0 x 1 x    Shift Total  (mL/kg)        NET  720 680  680   Weight (kg) 91 91 91 91 91       Recommendations:  1. Patient needs and requests:Supervised assist with transfers, setup, pain control    2. Diet: Dental soft with thin liquida    3. Pending tests/procedures: none     4. Functional Level/Equipment: 1 person, supervised assist    5. Estimated Discharge Date: 7/11 Posted on Whiteboard in Patients Room: yes     HEALS Safety Check    A safety check occurred in the patient's room between off going nurse and oncoming nurse listed above.     The safety check included the below items  Area Items   H  High Alert Medications - Verify all high alert medication drips (heparin, PCA, etc.)   E  Equipment - Suction is set up for ALL patients (with yanker)  - Red plugs utilized for all equipment (IV pumps, etc.)  - WOWs wiped down at end of shift.  - Room stocked with oxygen, suction, and other unit-specific supplies   A  Alarms - Bed alarm is set for fall risk patients  - Ensure chair alarm is in place and activated if patient is up in a chair   L  Lines - Check IV for any infiltration  - Weston bag is empty if patient has a Weston   - Tubing and IV bags are labeled   S  Safety   - Room is clean, patient is clean, and equipment is clean. - Hallways are clear from equipment besides carts. - Fall bracelet on for fall risk patients  - Ensure room is clear and free of clutter  - Suction is set up for ALL patients (with may)  - Hallways are clear from equipment besides carts.    - Isolation precautions followed, supplies available outside room, sign posted

## 2018-07-04 NOTE — PROGRESS NOTES
Problem: Mobility Impaired (Adult and Pediatric)  Goal: *Acute Goals and Plan of Care (Insert Text)  Physical Therapy Goals  STG's  Initiated 6/28/2018 and to be accomplished within 7 day(s)  1. Patient will move from supine to sit and sit to supine  and roll side to side in bed with supervision/set-up. 2.  Patient will transfer from bed to chair and chair to bed with contact guard assist using the least restrictive device. 3.  Patient will perform sit to stand with contact guard assist.  4.  Patient will ambulate with minimal assistance/contact guard assist for 75 feet with the least restrictive device. 5.  Patient will propel w/c 150 ft on level surfaces with supervision for mobility on unit. LTG's  Initiated 6/28/2018 and to be accomplished within 14 day(s)  1. Patient will move from supine to sit and sit to supine , scoot up and down and roll side to side in bed with independence. 2.  Patient will transfer from bed to chair and chair to bed with modified independence using the least restrictive device. 3.  Patient will perform sit to stand with modified independence. 4.  Patient will ambulate with modified independence for 150 feet with the least restrictive device. 5.  Patient will ascend/descend 3 stairs with bilateral handrail(s) with supervision/set-up. physical Therapy TREATMENT    Patient: Michael Linder (43 y.o. female)  Date: 7/4/2018  Diagnosis: Rhabdomyolisis  Rhabdomyolysis Rhabdomyolysis  Precautions: Aspiration, Fall  Chart, physical therapy assessment, plan of care and goals were reviewed. Time In: 0700  Time Out: 0800  Patient Seen For: Balance activities;Gait training;Patient education;Transfer training; Therapeutic exercise; Wheelchair mobility  Pain:  Pt pain was reported as 7/10 pre-treatment with right wrist pain with weight bearing and 0/10 pain at rest.  Pt pain was reported as 7/10 with weight bearing on right wrist, 0/10 at rest post-treatment.   Intervention: Pt's nursing staff notified. Pt's nurse brought patient pain medication during treatment session. Pt participated in functional mobility training with increased emphasis on B LE functional strengthening    Patient identified with name and : yes    SUBJECTIVE:     Pt notes, \"that's hard to do,\" re: performing transfers with increased emphasis on B LE weight bearing and functional strengthening to promote increased safety and independence and decrease weight bearing on painful right wrist.    OBJECTIVE DATA SUMMARY:   Objective:       BED/MAT MOBILITY Daily Assessment    Rolling Left : 6 (Modified independent)  Supine to Sit : 6 (Modified independent)  Patient performs supine to sit transfer from hospital bed with modified independence with verbal cues for safety with attending to environment (bed clothes, bedside table etc.)       TRANSFERS Daily Assessment    Other: Stand Step without AD  Transfer Assistance : 4 (Minimal assistance)  Sit to Stand Assistance: Minimal assistance (CGA/Minimal assistance without AD)   Pt is able to perform sit <> stand transfers with B UE support to/from RW with close supervision. However, with challenge of performing stand step transfer from bed to w/c to right without AD, pt requires CGA to min assist for balance with mod verbal cues for safety and mod manual cues for weight shift and achieving upright posture in standing. Pt performed stand step transfer w/c <> commode with B UE support on w/c arm rests for sit to stand from w/c with CGA from PT for safety and verbal cues to avoid pulling on grab bar. Pt utilized grab bar for balance in standing and required mod verbal cues to release grab bar for stand to sit with pt not following verbal command to do so while performing transfer.   Pt then performed return transfer with minimal assistance from PT with education and increased challenge to perform with B hands on distal third of B femurs to promote increased B LE weight bearing and avoid unsafely pulling up on grab bar. Pt requires CGA for balance with advancing LEs for completing stand step transfer to right and CGA for slow controlled descent with stand to sit utilizing B UE support to reach back for w/c arm rests. Pt participated in functional transfer training/NMRE in front of mirror for visual feedback to perform 10 sit <> stand transfers from 22\" height mat table initially requiring mod to max manual cues for ant weight shift progressing to perform with min manual cues for ant weight shift with sit to stand and for increased hip flexion and slow controlled descent with stand to sit. Pt then performed 10 sit <> stand transfers from 21\" height mat table initially requiring mod to max manual cues but progressing to perform with minimal manual cues by the eight repetition. GAIT Daily Assessment    Amount of Assistance: 4 (Contact guard assistance)  Distance (ft): 15 Feet (ft) (distance not challenged), 25 Feet (ft), 30 Feet (ft)  Assistive Device: Walker, rolling  Pt requires CGA for safety with min verbal and manual cues for decreased fwd flexed/rounded shoulder posture with B UE weight bearing on RW. Pt ambulates with decreased B foot clearance and step length. BALANCE Daily Assessment    Sitting - Static: Good (unsupported)  Sitting - Dynamic: Good (unsupported)  Standing - Static: Fair  Standing - Dynamic : Impaired       WHEELCHAIR MOBILITY Daily Assessment    Able to Propel (ft): 186 feet  Functional Level: 5   Pt requires distant supervision with verbal cues for efficiency with w/c propulsion utilizing B UEs alternately with B LEs. Neuro Re-Education:  Please refer to transfer training for details.     ASSESSMENT:   Pt is progressing well with functional mobility participating in more challenging and functional transfers performing sit <> stand with self manual cue of B hands on distal third of B femurs for increased B LE weight bearing and improved ant weight shift with sit to stand and for increased B LE weight bearing and slow controlled descent with stand to sit. Progression toward goals:  [x]      Improving appropriately and progressing toward goals  []      Improving slowly and progressing toward goals  []      Not making progress toward goals and plan of care will be adjusted     PLAN:  Patient continues to benefit from skilled intervention to address the above impairments. Continue treatment per established plan of care. Discharge Recommendations:  Home Health with 24 hour supervision versus Outpatient Physical Therapy  Further Equipment Recommendations for Discharge:  rolling walker     Estimated LOS: 7/11/2018    Activity Tolerance:   Good  Please refer to the flowsheet for vital signs taken during this treatment. After treatment:   Patient left self propelling w/c from gym to room following PT treatment.       Stephanie Brian PT, DPT  7/4/2018

## 2018-07-04 NOTE — INTERDISCIPLINARY ROUNDS
[facility] PHYSICAL REHABILITATION  [address] [place] [address], [address]    INPATIENT REHABILITATION  TEAM CONFERENCE SUMMARY     Date of Conference: 7/4/2018    Name: Kathleen Carrion Age / Sex: 59 y.o. / female   CSN: [id number] MRN: [id number]   Admit Date: 6/27/2018 Length of Stay: 7 days     Primary Rehabilitation Diagnosis  1. Impaired Mobility and ADLs  2. Rhabdomyolysis      Comorbidities   Chronic hepatitis C virus infection  B18.2    Bipolar 1 disorder  F31.9    Depression F32.9    Alcohol dependence in remission  F10.21    Needle phobia F40.298    Eustachian tube dysfunction H69.80    Psoriasis L40.9    History of atrial fibrillation Z86.79    History of encephalopathy Z86.69    Primary osteoarthritis involving multiple joints M15.0    Essential hypertension I10    Gastroesophageal reflux disease K21.9    Type 2 diabetes mellitus  E11.9    Chronic obstructive pulmonary disease  J44.9    History of acute renal failure Z87.448    Hypoalbuminemia E88.09    Low serum high density lipoprotein (HDL) R74.8    Obesity, Class I, BMI 30-34.9 E66.9    Dysphagia R13.10    Vitamin D deficiency E55.9    Gout M10.9          Therapy:     FIM SCORES Initial Assessment Weekly Progress Assessment 7/4/2018   Eating Functional Level: 7  Comments: (I) for self feeding of breakfast meal. No reports from pt about difficulty with cutting, bringing food to mouth, or swallowing.  7 ([place])   Swallowing     Grooming 5  5 (Supervision)   Bathing 4  4 (Minimal Assistance)      Upper Body Dressing Functional Level: 4  Items Applied/Steps Completed: Pullover (4 steps)  Comments: Supervision to doff pullover shirt and Min A to don pullover shirt.  Not challenged with bra due to pt not having in her personal possession.  4 (Minimal Assistance)   Lower Body Dressing Functional Level: 4  Items Applied/Steps Completed: Elastic waist pants (3 steps), Underpants (3 steps)  Comments: SBA to doff pants and underwear. Min A to don pants and underwear. Toileting Functional Level: 0  Comments: Min A for clothing mgt. Supervision for full pericare hygiene. Bladder - level of assist 2     Bladder - accident frequency score 5     Bowel - level of assist 1     Bowel - accident frequency score 6     Toilet Transfer Thomas Toilet Transfer Score: 4  Comments: Min A secondary to require vcs and tactile cues for proper hand placement. Tendency to pull up from seat surface vs push from arm rest.     Tub/Shower Transfer Thomas Tub or Shower Type: Tub/Shower combination  Tub/Shower Transfer Score: 4  Comments: Min A for safety cues as well as utilization of proper hand placement with transfers. Tendency to want to pull up from seated position to stand.  Education provided about proper hand placement.  4 (Minimal Assistance)      Comprehension Primary Mode of Comprehension: Auditory  Score: 5  5      Expression Primary Mode of Expression: Verbal  Score: 5  5      Social Interaction Score: 4   3   Problem Solving Score: 4  4   Memory Score: 3  3     FIM SCORES Initial Assessment Weekly Progress Assessment 7/4/2018   Bed/Chair/Wheelchair Transfers Transfer Type: SPT without device (stand step w/out AD, with gait belt)  Other: toilet transfer with min A with use of grab bar  Transfer Assistance : 3 (Moderate assistance ) (w/out AD; min A with RW)  Sit to Stand Assistance: Minimal assistance  Car Transfers: Not tested  Car Type: n/a Other: Stand Step without AD  Transfer Assistance : 4 (Minimal assistance)  Sit to Stand Assistance: Minimal assistance (CGA/Minimal assistance without AD)   Bed Mobility Rolling Right 5 (Stand-by assistance)   Rolling Left 5 (Stand-by assistance)   Supine to Sit 4 (Minimal assistance)   Sit to Stand Minimal assistance   Sit to Supine 4 (Minimal assistance)    Rolling Right   5 (Stand-by assistance)   Rolling Left   5 (Stand-by assistance)   Supine to Sit   4 (Minimal assistance)   Sit to Stand   Minimal assistance (CGA/Minimal assistance without AD)   Sit to Supine   4 (Minimal assistance)      Locomotion (W/C) Able to Propel (ft): 50 feet  Functional Level: 2  Curbs/Ramps Assist Required (FIM Score): 0 (Not tested)  Wheelchair Setup Assist Required : 2 (Maximal assistance)  Wheelchair Management: Manages left brake, Manages right brake (with verbal cues and demonstration) Function 5  Setup Assistance: Supervision     Locomotion (W/C distance) 55 Feet 186 Feet   Locomotion (Walk) 4 (Minimal assistance) 4 (Contact guard assistance)  Walker, rolling   Locomotion (Walk dist.) 18 Feet (ft) 50 Feet (ft)   Steps/Stairs Steps/Stairs Ambulated (#): 0  Level of Assist : 0 (Not tested) (deferred due to dizziness with gait training) Steps/Stairs Ambulated (#): 0  Level of Assist : 0 (Not tested) (deferred due to dizziness with gait training)         Nursing:     Neuro:   A&O x_4  With slurred speech___                   Respiratory:   [x] WNL   [] O2   [] LPM ______   Other:  Peripheral Vascular:   [] TEDS present   [] Edema present ____ Grade   Cardiac:   [x] WNL   [] Other  Genitourinary:   [x] continent   [] incontinent   [] diallo  Abdominal _______ LBM  GI: _______ Diet diabetic regular______ Liquids __thin___ tube feeds  Musculoskeletal: ____ ROM Transfers _____ Assistive Device Used  __4 min assist__ Level of Assistance  Skin Integumentary:   [] Intact   [] Not Intact   __________Preventative Measures Bilateral heels elevated have mepilex dressings  Buttocks has scrapped area to left upper portion  has mepilex dressing  Details______________________________________________________________  Pain: [x] Controlled   [] Not Controlled   Pain Meds:   [] Scheduled   [] PRN        Registered Dietitian / Nutrition:     Patient Vitals for the past 100 hrs:   % Diet Eaten   07/03/18 1830 100 %   07/03/18 1317 100 %   07/03/18 0930 100 %   07/02/18 1248 100 %   07/02/18 0919 75 %   07/01/18 1755 75 %   07/01/18 1307 100 %   07/01/18 0915 100 %   06/30/18 1816 85 %   06/30/18 1305 75 %   06/30/18 0952 90 %     Pt unavailable at time of visit. Has good/excellent meal intake per chart. Supplements:          [] Yes   [x] No      Amount of supplement consumed:  not applicable      Intake/Output Summary (Last 24 hours) at 07/04/18 1103  Last data filed at 07/03/18 1830   Gross per 24 hour   Intake              480 ml   Output                0 ml   Net              480 ml                                Last bowel movement: 7/2      Interdisciplinary Team Goals:     1. Goal  Encourage pt to perform functional transfers with or without RW with supervision with minimal verbal cues for safety    Barrier  Impaired strength, Impaired balance, Impaired transfers    Intervention  Strength and balance training, Transfer training     2. Goal  OT: Pt will be Supervision for toilet transfer and toilet task. Barrier  Decrease BUE strength; decreased standing balance    Intervention  BUE strengthening     3. Goal  Patient will produce functional phrases with good articulation and prosody, 80-90% accuracy. Barrier  Spastic dysarthria, dysprosody, mild cognitive-linguistic deficits    Intervention  Speech drill for sounds and prosody, cognitive  retraining     4. Goal  Nursing: Pt/family will verbalize and demonstrate 3 safety measures to prevent falls at home    Barrier  voiding frequency, decreased strength and balance, diabetes, both heels have broken areas with dressings    Intervention Frequent toileting as needed, safety education and demonstration for prevention of falls     5. Goal      Barrier      Intervention       6. Goal  Po intake of meals will meet >75% of patient estimated nutritional needs within the next 7 days. Outcome:  [x] Met/Ongoing    []  Not Met    [] New/Initial Goal     Barrier  none known     Intervention  meals/snacks: modified composition       Disposition / Discharge Planning:      Follow-up therapy services:     DME recommendations:     Estimated discharge date:     Discharge Location:           Interdisciplinary team rounds were held this PM with the following team members:       Name   Physical Therapist    Stephanie Brian PT, DPT   Occupational Therapist    Andre Romero, OTR/L   Speech Therapist    Dixie Aguilar, [address] [facility]   Recreational Therapist    Robin Potter, [address], RN   Dietitian    Karishma Tanner, MARY   Physician    Brian Meredith MD        Natanael Nance RN       Signed:  Brian Meredith MD    July 4, 2018

## 2018-07-04 NOTE — PROGRESS NOTES
Problem: Neurolinguistics Impaired (Adult)  Goal: *Speech Goal: (INSERT TEXT)  Long term goals:  1. Patient will use strategies of slowed reate, over-articulation and increased loudness to improve speech production, supervision. 2. Patient will be 100% intelligible in casual conversation, supervision. 3. Patient will recall 3 words after 5 minutes in short term memory task, supervision. 4. Patient will perform functional problem solving and reasoning tasks with 90% accuracy. 5. Patient will write single words and short sentences with 90% accuracy and legibility. Short term goals  (by 7/5/18):  1. Patient will use strategies of slowed reate, over-articulation and increased loudness to improve speech production, mod-min cues. 2. Patient will be 90% intelligible in production of polysyllabic words and phrases, min cues-supervision. 3. Patient will be 80% intelligible in casual conversation, min cues for use of strategies. 4. Patient will recall 3 words after 5 minutes in short term memory task, mod-min assist.  5. Patient will perform functional problem solving and reasoning tasks with 75-85% accuracy. 6. Patient will write single words with 75-80% accuracy and legibility. Speech language pathology treatment    Patient: Boom Kennedy (4 y.o. female)  Date: 7/4/2018  Diagnosis: Rhabdomyolisis  Rhabdomyolysis Rhabdomyolysis       SUBJECTIVE:   Patient stated What didn't I do in therapy today? . OBJECTIVE:   Mental Status:  Mrs. Anthony Veras is consistently alert and motivated in speech therapy sessions. Treatment & Interventions:   Mrs. Jon Chatterjee was seen for two speech therapy sessions, morning and afternoon. She brought herself to these sessions. The following treatment tasks were presented:   Motor Speech:   Speech Breathing:  Min assist in isolation      Mod cues in speaking tasks  Sentence productions: Good articulation; mod assist for breath support and intonation  4 syllable words:  90% accuracy     Neuro-Linguistics:   Orientation:   Independent  Recent memory:  Independent  Homonyms:   100% for providing at least 2 meanings   Convergent thinkin% accuracy  Associations:   90% accuracy    Response & Tolerance to Activities:  Mrs. Bert Maldonado is very motivated to improve speech production and flow. Pain:  Pain Scale 1: Numeric (0 - 10)  Pain Orientation 1: Right  Pain Description 1: Aching  After treatment:   [x]       Patient left in no apparent distress sitting up in chair  []       Patient left in no apparent distress in bed  [x]       Call bell left within reach  []       Nursing notified  []       Caregiver present  []       Bed alarm activated    ASSESSMENT:   Progression toward goals:  [x]       Improving appropriately and progressing toward goals  []       Improving slowly and progressing toward goals  []       Not making progress toward goals and plan of care will be adjusted    PLAN:   Patient continues to benefit from skilled intervention to address the above impairments. Continue treatment per established plan of care. Discharge Recommendations:   To Be Determined    Estimated LOS: Through 18    ALLISON Bennett  Time Calculation:  75 minutes

## 2018-07-04 NOTE — PROGRESS NOTES
Problem: Self Care Deficits Care Plan (Adult)  Goal: *Therapy Goal (Edit Goal, Insert Text)  Occupational Therapy Goals   Long Term Goals  Initiated 2018 and to be accomplished within 2 week(s)  1. Pt will perform self-feeding with (I). 2. Pt will perform grooming with (I) to MI.  3. Pt will perform UB bathing with MI.  4. Pt will perform LB bathing with MI.  5. Pt will perform tub/shower transfer with MI.   6. Pt will perform UB dressing with MI.  7. Pt will perform LB dressing with MI.  8. Pt will perform toileting task with MI.  9. Pt will perform toilet transfer with MI.  8. Pt will perform IADL home mgt (ie laundry, laundry folding, medication mgt) with MI.  11. Pt will perform IADL meal prep with MI. Short Term Goals   Initiated 2018 and to be accomplished within 7 day(s) 2018  1. Pt will perform self-feeding with (I). 2. Pt will perform grooming with (I) to MI.  3. Pt will perform UB bathing with Supervision. 4. Pt will perform LB bathing with Supervision. 5. Pt will perform tub/shower transfer with Supervision . 6. Pt will perform UB dressing with Setup. 7. Pt will perform LB dressing with Supervision. 8. Pt will perform toileting task with Supervision. 9. Pt will perform toilet transfer with Supervision. 10. Pt will perform IADL home mgt (ie laundry, laundry folding, medication mgt) with Supervision. 11. Pt will perform IADL meal prep with Supervision. Occupational Therapy TREATMENT    Patient: Jorge A Acevedo   59 y.o. Patient identified with name and : Yes    Date: 2018    First Tx Session  Time In: 1000  Time Out[de-identified] 1130    Diagnosis: Rhabdomyolisis  Rhabdomyolysis   Precautions: Aspiration, Fall  Chart, occupational therapy assessment, plan of care, and goals were reviewed. Pain:  Pt reports 7/10 pain or discomfort prior to treatment. Pt reports 7/10 pain or discomfort post treatment.    Intervention Provided: joint distraction for wrist pain      SUBJECTIVE:   Patient stated I usually have a Lidocaine pain gel for my wrist arthritis but it's at home.     OBJECTIVE DATA SUMMARY:     NMRE Daily Assessment   Arm bike 8 minutes with 1 rest break at 6 minutes. (B) UB strengthening with 2# dowel with 2 sets of 15 repetitions for shoulder flexion, horizontal abduction and elbow flexion. Red therabar for supination/pronation, wrist flexion/extension 2 sets of 10 repetitions each. Red and green digiflex for full  2 sets of 10 repetitions, 1 set of 10 repetitions for isolated digital flexion with red digiflex. Pt education regarding wrist pain including pain reduction techniques/modalities: paraffin wax bath, joint distraction/wrist exercises, IMAK arthritis compression gloves. Activity tolerance task for 8 minutes in standing with 1 rest break needed at 3 minutes 46 seconds during pegboard with pattern in standing. TOILETING Daily Assessment    Toileting  Toileting Assistance (FIM Score): 5 (Supervision)  Cues: Verbal cues provided     MOBILITY/TRANSFERS Daily Assessment    Functional Transfers  Toilet Transfer : Grab bars;Stand pivot transfer without device  Amount of Assistance Required: 5 (stand-by assistance)       ASSESSMENT:  Fatigues easily and requires multiple rest breaks during UB strengthening and functional task in standing to promote increased activity tolerance. Patient discussion re: pain reduction techniques with return demo of joint distraction to alleviate pain which patient reports helps somewhat. Provided listed interventions on post it for her to take with her and obtain post inpatient rehab stay. Also recommended outpatient OT to address further wrist arthritis interventions including joint protection techniques with patient verbalizing good understanding.   Progression toward goals:  [x]          Improving appropriately and progressing toward goals  []          Improving slowly and progressing toward goals  [] Not making progress toward goals and plan of care will be adjusted     PLAN:  Patient continues to benefit from skilled intervention to address the above impairments. Continue treatment per established plan of care. Discharge Recommendations: To Be Determined  Further Equipment Recommendations for Discharge:  TBD     Activity Tolerance:  Fair    Please refer to the flowsheet for vital signs taken during this treatment. After treatment:   [x]  Patient left in no apparent distress sitting up in chair and ambulating back to room  []  Patient left in no apparent distress in bed  []  Call bell left within reach  []  Nursing notified  []  Caregiver present  []  Bed alarm activated    COMMUNICATION/EDUCATION:   [] Home safety education was provided and the patient/caregiver indicated understanding. [] Patient/family have participated as able in goal setting and plan of care. [] Patient/family agree to work toward stated goals and plan of care. [] Patient understands intent and goals of therapy, but is neutral about his/her participation. [] Patient is unable to participate in goal setting and plan of care. Lesley Sevilla, OTR/L

## 2018-07-04 NOTE — ROUTINE PROCESS
SHIFT CHANGE NOTE FOR [facility]    Bedside and Verbal shift change report given to WILLIAM CARMONA (oncoming nurse)Nilay Longoria LPN     (offgoing nurse). Report included the following information SBAR, Kardex, MAR and Recent Results.     Situation:   Code Status: Full Code   Reason for Admission: Rhabdomyolysis  Hospital Day: 7   Problem List:   Hospital Problems  Date Reviewed: 7/4/2018          Codes Class Noted POA    Vitamin D deficiency (Chronic) ICD-10-CM: E55.9  ICD-9-CM: 268.9  6/28/2018 Yes    Overview Signed 6/28/2018  1:12 PM by Luis Mortensen MD     Vitamin D 25-Hydroxy (6/28/2018) = 13.5              Gout (Chronic) ICD-10-CM: M10.9  ICD-9-CM: 274.9  Unknown Yes        Essential hypertension (Chronic) ICD-10-CM: I10  ICD-9-CM: 401.9  Unknown Yes        Type 2 diabetes mellitus ([facility] (Chronic) ICD-10-CM: E11.9  ICD-9-CM: 250.00  Unknown Yes    Overview Signed 6/27/2018 10:18 PM by Luis Mortensen MD     HbA1c (6/14/2018) = 6.9             Dysphagia ICD-10-CM: R13.10  ICD-9-CM: 787.20  6/14/2018 Yes        History of atrial fibrillation ICD-10-CM: Z86.79  ICD-9-CM: V12.59  6/13/2018 Yes    Overview Signed 6/27/2018 10:23 PM by Luis Mortensen MD     Atrial fibrillation with rapid ventricular response             * (Principal)Rhabdomyolysis ICD-10-CM: M62.82  ICD-9-CM: 728.88  6/13/2018 Yes        History of encephalopathy ICD-10-CM: Z86.69  ICD-9-CM: V12.49  6/13/2018 Yes    Overview Signed 6/27/2018 10:21 PM by Luis Mortensen MD     Acute metabolic encephalopathy             History of acute renal failure ICD-10-CM: Z87.448  ICD-9-CM: V13.09  6/13/2018 Yes        Generalized weakness ICD-10-CM: R53.1  ICD-9-CM: 780.79  6/13/2018 Yes        Impaired mobility and ADLs ICD-10-CM: Z74.09  ICD-9-CM: 799.89  6/13/2018 Yes              Background:   Past Medical History:   Past Medical History:   Diagnosis Date    Alcohol dependence in remission ([facility]     alcohol withdrawal discharged 05/31/2013    Bipolar 1 disorder ([facility]     Chronic hepatitis C virus infection ([facility]     prior IV drug use;  Genotype 1a     Chronic obstructive pulmonary disease ([facility]     Depression     Dysphagia 6/14/2018    Essential hypertension     Eustachian tube dysfunction     Dr. Ankit Cano Gastroesophageal reflux disease     Gout     History of acute renal failure 6/13/2018    History of atrial fibrillation 6/13/2018    Atrial fibrillation with rapid ventricular response    History of encephalopathy 6/55/2759    Acute metabolic encephalopathy    Low serum high density lipoprotein (HDL) 6/14/2018    HDL (6/14/2018) = 12    Needle phobia     from prior IV drug abuse    Obesity, Class I, BMI 30-34.9     Primary osteoarthritis involving multiple joints     knees and back    Psoriasis     Type 2 diabetes mellitus (HCC)     HbA1c (6/14/2018) = 6.9    Vitamin D deficiency 6/28/2018    Vitamin D 25-Hydroxy (6/28/2018) = 13.5       Patient taking anticoagulants yes    Patient has a defibrillator: no     Assessment:   Changes in Assessment throughout shift: none     Patient has central line: no Reasons if yes: Insertion date: Last dressing date:   Patient has Weston Cath: no Reasons if yes:     Insertion date:     Last Vitals:     Vitals:    07/03/18 1608 07/03/18 2200 07/04/18 1223 07/04/18 1600   BP: (!) 145/97 145/70 145/86 147/83   Pulse: 77 76 86 88   Resp: 18 18 17   Temp: 98.9 °F (37.2 °C) 98.2 °F (36.8 °C)  97.8 °F (36.6 °C)   SpO2: 95% 96%  96%   Weight:       Height:            PAIN    Pain Assessment    Pain Intensity 1: 0 (07/04/18 1600) Pain Intensity 1: 2 (12/29/14 1105)    Pain Location 1: Wrist Pain Location 1: Abdomen    Pain Intervention(s) 1: Medication (see MAR) Pain Intervention(s) 1: Medication (see MAR)  Patient Stated Pain Goal: 0 Patient Stated Pain Goal: 0  o Intervention effective: no    o Other actions taken for pain:      Skin Assessment  Skin color Skin Color: Appropriate for ethnicity  Condition/Temperature Skin Condition/Temp: Dry, Warm  Integrity Skin Integrity: Other (comment)  Turgor Turgor: Non-tenting  Weekly Pressure Ulcer Documentation  Pressure  Injury Documentation: Pressure Injury Noted-See Wound LDA to Document  Wound Prevention & Protection Methods  Orientation of wound Orientation of Wound Prevention: Posterior  Location of Prevention Location of Wound Prevention: Sacrum/Coccyx, Buttocks  Dressing Present Dressing Present : Yes  Dressing Status Dressing Status: Intact  Wound Offloading Wound Offloading (Prevention Methods): Bed, pressure redistribution/air     INTAKE/OUPUT    Date 07/03/18 0700 - 07/04/18 0659 07/04/18 0700 - 07/05/18 0659   Shift 7663-1664 9749-7311 24 Hour Total 0700-1859 1900-0659 24 Hour Total   I  N  T  A  K  E   P. O. 680  680 700  700      P. O. 680  680 700  700    Shift Total  (mL/kg) 680  (7.5)  680  (7.5) 700  (7.7)  700  (7.7)   O  U  T  P  U  T   Urine  (mL/kg/hr)            Urine Occurrence(s) 7 x 8 x 15 x 7 x  7 x    Stool            Stool Occurrence(s) 1 x 0 x 1 x 1 x  1 x    Shift Total  (mL/kg)           680 700  700   Weight (kg) 91 91 91 91 91 91       Recommendations:  1. Patient needs and requests:Supervised assist with transfers, setup, pain control    2. Diet: Dental soft with thin liquida    3. Pending tests/procedures: none     4. Functional Level/Equipment: 1 person, supervised assist    5. Estimated Discharge Date: 7/11 Posted on Whiteboard in Patients Room: yes     HEALS Safety Check    A safety check occurred in the patient's room between off going nurse and oncoming nurse listed above.     The safety check included the below items  Area Items   H  High Alert Medications - Verify all high alert medication drips (heparin, PCA, etc.)   E  Equipment - Suction is set up for ALL patients (with yanker)  - Red plugs utilized for all equipment (IV pumps, etc.)  - WOWs wiped down at end of shift.  - Room stocked with oxygen, suction, and other unit-specific supplies   A  Alarms - Bed alarm is set for fall risk patients  - Ensure chair alarm is in place and activated if patient is up in a chair   L  Lines - Check IV for any infiltration  - Weston bag is empty if patient has a Weston   - Tubing and IV bags are labeled   S  Safety   - Room is clean, patient is clean, and equipment is clean. - Hallways are clear from equipment besides carts. - Fall bracelet on for fall risk patients  - Ensure room is clear and free of clutter  - Suction is set up for ALL patients (with may)  - Hallways are clear from equipment besides carts.    - Isolation precautions followed, supplies available outside room, sign posted

## 2018-07-04 NOTE — ROUTINE PROCESS
[facility]    Bedside and Verbal shift change report given to Radha Ramirez (oncoming nurse) by Giles Hirsch (offgoing nurse). Report included the following information SBAR, Kardex, MAR and Recent Results.     Situation:   Code Status: Full Code   Reason for Admission: Rhabdomyolysis  Hospital Day: 7   Problem List:   Hospital Problems  Date Reviewed: 7/3/2018          Codes Class Noted POA    Vitamin D deficiency (Chronic) ICD-10-CM: E55.9  ICD-9-CM: 268.9  6/28/2018 Yes    Overview Signed 6/28/2018  1:12 PM by Charli Domingo MD     Vitamin D 25-Hydroxy (6/28/2018) = 13.5              Gout (Chronic) ICD-10-CM: M10.9  ICD-9-CM: 274.9  Unknown Yes        Essential hypertension (Chronic) ICD-10-CM: I10  ICD-9-CM: 401.9  Unknown Yes        Type 2 diabetes mellitus ([facility] (Chronic) ICD-10-CM: E11.9  ICD-9-CM: 250.00  Unknown Yes    Overview Signed 6/27/2018 10:18 PM by Charli Domingo MD     HbA1c (6/14/2018) = 6.9             Dysphagia ICD-10-CM: R13.10  ICD-9-CM: 787.20  6/14/2018 Yes        History of atrial fibrillation ICD-10-CM: Z86.79  ICD-9-CM: V12.59  6/13/2018 Yes    Overview Signed 6/27/2018 10:23 PM by Charli Domingo MD     Atrial fibrillation with rapid ventricular response             * (Principal)Rhabdomyolysis ICD-10-CM: M62.82  ICD-9-CM: 728.88  6/13/2018 Yes        History of encephalopathy ICD-10-CM: Z86.69  ICD-9-CM: V12.49  6/13/2018 Yes    Overview Signed 6/27/2018 10:21 PM by Charli Domingo MD     Acute metabolic encephalopathy             History of acute renal failure ICD-10-CM: Z87.448  ICD-9-CM: V13.09  6/13/2018 Yes        Generalized weakness ICD-10-CM: R53.1  ICD-9-CM: 780.79  6/13/2018 Yes        Impaired mobility and ADLs ICD-10-CM: Z74.09  ICD-9-CM: 799.89  6/13/2018 Yes              Background:   Past Medical History:   Past Medical History:   Diagnosis Date    Alcohol dependence in remission ([facility]     alcohol withdrawal discharged 05/31/2013    Bipolar 1 disorder ([facility]     Chronic hepatitis C virus infection ([facility]     prior IV drug use;  Genotype 1a     Chronic obstructive pulmonary disease ([facility]     Depression     Dysphagia 6/14/2018    Essential hypertension     Eustachian tube dysfunction     Dr. Mahnaz Farfan Gastroesophageal reflux disease     Gout     History of acute renal failure 6/13/2018    History of atrial fibrillation 6/13/2018    Atrial fibrillation with rapid ventricular response    History of encephalopathy 8/89/7639    Acute metabolic encephalopathy    Low serum high density lipoprotein (HDL) 6/14/2018    HDL (6/14/2018) = 12    Needle phobia     from prior IV drug abuse    Obesity, Class I, BMI 30-34.9     Primary osteoarthritis involving multiple joints     knees and back    Psoriasis     Type 2 diabetes mellitus (HCC)     HbA1c (6/14/2018) = 6.9    Vitamin D deficiency 6/28/2018    Vitamin D 25-Hydroxy (6/28/2018) = 13.5       Patient taking anticoagulants yes    Patient has a defibrillator: no     Assessment:   Changes in Assessment throughout shift: none     Patient has central line: no Reasons if yes: Insertion date: Last dressing date:   Patient has Weston Cath: no Reasons if yes:     Insertion date:     Last Vitals:     Vitals:    07/02/18 2200 07/03/18 0724 07/03/18 1608 07/03/18 2200   BP: 141/77 153/80 (!) 145/97 145/70   Pulse: 76 63 77 76   Resp: 18 18 18 18   Temp: 98.2 °F (36.8 °C) 97.6 °F (36.4 °C) 98.9 °F (37.2 °C) 98.2 °F (36.8 °C)   SpO2: 95% 97% 95% 96%   Weight:       Height:            PAIN    Pain Assessment    Pain Intensity 1: 0 (07/04/18 0013) Pain Intensity 1: 2 (12/29/14 1105)    Pain Location 1: Foot (Left foot great toe) Pain Location 1: Abdomen    Pain Intervention(s) 1: Medication (see MAR), Repositioned, Rest, Position Pain Intervention(s) 1: Medication (see MAR)  Patient Stated Pain Goal: 0 Patient Stated Pain Goal: 0  o Intervention effective: no    o Other actions taken for pain:      Skin Assessment  Skin color Skin Color: Appropriate for ethnicity  Condition/Temperature Skin Condition/Temp: Dry, Warm  Integrity Skin Integrity: Other (comment)  Turgor Turgor: Non-tenting  Weekly Pressure Ulcer Documentation  Pressure  Injury Documentation: Pressure Injury Noted-See Wound LDA to Document  Wound Prevention & Protection Methods  Orientation of wound Orientation of Wound Prevention: Posterior  Location of Prevention Location of Wound Prevention: Sacrum/Coccyx  Dressing Present Dressing Present : Yes  Dressing Status Dressing Status: Intact  Wound Offloading Wound Offloading (Prevention Methods): Bed, pressure redistribution/air     INTAKE/OUPUT    Date 07/03/18 0700 - 07/04/18 0659 07/04/18 0700 - 07/05/18 0659   Shift 0700-1859 1900-0659 24 Hour Total 0700-1859 1900-0659 24 Hour Total   I  N  T  A  K  E   P. O. 680  680         P. O. 680  680       Shift Total  (mL/kg) 680  (7.5)  680  (7.5)      O  U  T  P  U  T   Urine  (mL/kg/hr)            Urine Occurrence(s) 7 x 7 x 14 x       Stool            Stool Occurrence(s) 1 x 0 x 1 x       Shift Total  (mL/kg)           680      Weight (kg) 91 91 91 91 91 91       Recommendations:  1. Patient needs and requests:Supervised assist with transfers, setup, pain control    2. Diet: Dental soft with thin liquida    3. Pending tests/procedures: none     4. Functional Level/Equipment: 1 person, supervised assist    5. Estimated Discharge Date: 7/11 Posted on Whiteboard in Patients Room: yes     HEALS Safety Check    A safety check occurred in the patient's room between off going nurse and oncoming nurse listed above.     The safety check included the below items  Area Items   H  High Alert Medications - Verify all high alert medication drips (heparin, PCA, etc.)   E  Equipment - Suction is set up for ALL patients (with yanker)  - Red plugs utilized for all equipment (IV pumps, etc.)  - WOWs wiped down at end of shift.  - Room stocked with oxygen, suction, and other unit-specific supplies   A  Alarms - Bed alarm is set for fall risk patients  - Ensure chair alarm is in place and activated if patient is up in a chair   L  Lines - Check IV for any infiltration  - Weston bag is empty if patient has a Weston   - Tubing and IV bags are labeled   S  Safety   - Room is clean, patient is clean, and equipment is clean. - Hallways are clear from equipment besides carts. - Fall bracelet on for fall risk patients  - Ensure room is clear and free of clutter  - Suction is set up for ALL patients (with may)  - Hallways are clear from equipment besides carts.    - Isolation precautions followed, supplies available outside room, sign posted

## 2018-07-05 LAB
ANION GAP SERPL CALC-SCNC: 9 MMOL/L (ref 3–18)
BUN SERPL-MCNC: 13 MG/DL (ref 7–18)
BUN/CREAT SERPL: 12 (ref 12–20)
CALCIUM SERPL-MCNC: 9.2 MG/DL (ref 8.5–10.1)
CHLORIDE SERPL-SCNC: 105 MMOL/L (ref 100–108)
CO2 SERPL-SCNC: 27 MMOL/L (ref 21–32)
CREAT SERPL-MCNC: 1.09 MG/DL (ref 0.6–1.3)
GLUCOSE BLD STRIP.AUTO-MCNC: 119 MG/DL (ref 70–110)
GLUCOSE BLD STRIP.AUTO-MCNC: 94 MG/DL (ref 70–110)
GLUCOSE BLD STRIP.AUTO-MCNC: 98 MG/DL (ref 70–110)
GLUCOSE BLD STRIP.AUTO-MCNC: 99 MG/DL (ref 70–110)
GLUCOSE SERPL-MCNC: 86 MG/DL (ref 74–99)
HCT VFR BLD AUTO: 38.2 % (ref 35–45)
HGB BLD-MCNC: 12.2 G/DL (ref 12–16)
PLATELET # BLD AUTO: 368 K/UL (ref 135–420)
POTASSIUM SERPL-SCNC: 3.8 MMOL/L (ref 3.5–5.5)
SODIUM SERPL-SCNC: 141 MMOL/L (ref 136–145)

## 2018-07-05 PROCEDURE — 85018 HEMOGLOBIN: CPT | Performed by: INTERNAL MEDICINE

## 2018-07-05 PROCEDURE — 97530 THERAPEUTIC ACTIVITIES: CPT

## 2018-07-05 PROCEDURE — 82962 GLUCOSE BLOOD TEST: CPT

## 2018-07-05 PROCEDURE — 65310000000 HC RM PRIVATE REHAB

## 2018-07-05 PROCEDURE — 97110 THERAPEUTIC EXERCISES: CPT

## 2018-07-05 PROCEDURE — 97150 GROUP THERAPEUTIC PROCEDURES: CPT

## 2018-07-05 PROCEDURE — 74011250636 HC RX REV CODE- 250/636: Performed by: INTERNAL MEDICINE

## 2018-07-05 PROCEDURE — 97535 SELF CARE MNGMENT TRAINING: CPT

## 2018-07-05 PROCEDURE — 74011250637 HC RX REV CODE- 250/637: Performed by: INTERNAL MEDICINE

## 2018-07-05 PROCEDURE — 97116 GAIT TRAINING THERAPY: CPT

## 2018-07-05 PROCEDURE — 36415 COLL VENOUS BLD VENIPUNCTURE: CPT | Performed by: INTERNAL MEDICINE

## 2018-07-05 PROCEDURE — 80048 BASIC METABOLIC PNL TOTAL CA: CPT | Performed by: INTERNAL MEDICINE

## 2018-07-05 PROCEDURE — 92507 TX SP LANG VOICE COMM INDIV: CPT

## 2018-07-05 PROCEDURE — 85049 AUTOMATED PLATELET COUNT: CPT | Performed by: INTERNAL MEDICINE

## 2018-07-05 RX ADMIN — Medication 1 TABLET: at 08:42

## 2018-07-05 RX ADMIN — HEPARIN SODIUM 5000 UNITS: 5000 INJECTION, SOLUTION INTRAVENOUS; SUBCUTANEOUS at 06:33

## 2018-07-05 RX ADMIN — CHOLECALCIFEROL CAP 125 MCG (5000 UNIT) 5000 UNITS: 125 CAP at 08:42

## 2018-07-05 RX ADMIN — COLCHICINE 0.6 MG: 0.6 CAPSULE ORAL at 08:42

## 2018-07-05 RX ADMIN — HEPARIN SODIUM 5000 UNITS: 5000 INJECTION, SOLUTION INTRAVENOUS; SUBCUTANEOUS at 14:10

## 2018-07-05 RX ADMIN — ASPIRIN 81 MG CHEWABLE TABLET 81 MG: 81 TABLET CHEWABLE at 08:42

## 2018-07-05 RX ADMIN — METFORMIN HYDROCHLORIDE 500 MG: 500 TABLET ORAL at 08:42

## 2018-07-05 RX ADMIN — AMLODIPINE BESYLATE 5 MG: 5 TABLET ORAL at 06:33

## 2018-07-05 RX ADMIN — PANTOPRAZOLE SODIUM 40 MG: 40 TABLET, DELAYED RELEASE ORAL at 06:33

## 2018-07-05 RX ADMIN — HEPARIN SODIUM 5000 UNITS: 5000 INJECTION, SOLUTION INTRAVENOUS; SUBCUTANEOUS at 21:35

## 2018-07-05 RX ADMIN — LOSARTAN POTASSIUM 50 MG: 50 TABLET ORAL at 08:42

## 2018-07-05 NOTE — PROGRESS NOTES
Problem: Neurolinguistics Impaired (Adult)  Goal: *Speech Goal: (INSERT TEXT)  Long term goals:  1. Patient will use strategies of slowed reate, over-articulation and increased loudness to improve speech production, supervision. 2. Patient will be 100% intelligible in casual conversation, supervision. 3. Patient will recall 3 words after 5 minutes in short term memory task, supervision. 4. Patient will perform functional problem solving and reasoning tasks with 90% accuracy. 5. Patient will write single words and short sentences with 90% accuracy and legibility. Short term goals  (by 7/5/18):  1. Patient will use strategies of slowed reate, over-articulation and increased loudness to improve speech production, mod-min cues. 2. Patient will be 90% intelligible in production of polysyllabic words and phrases, min cues-supervision. 3. Patient will be 80% intelligible in casual conversation, min cues for use of strategies. 4. Patient will recall 3 words after 5 minutes in short term memory task, mod-min assist.  5. Patient will perform functional problem solving and reasoning tasks with 75-85% accuracy. 6. Patient will write single words with 75-80% accuracy and legibility. Speech language pathology treatment    Patient: Suyapa Quintana (38 y.o. female)  Date: 7/5/2018  Diagnosis: Rhabdomyolisis  Rhabdomyolysis Rhabdomyolysis       SUBJECTIVE:   Patient stated Is the time over already? . OBJECTIVE:   Mental Status:  Mrs. Marlin Melissa was awake, alert and eager to participate in today's session. Treatment & Interventions:   Patient was seen for an hour long session this morning. The following treatment tasks were presented:   Motor Speech:   Breathing for speech:  Min assist  4 syllable words:  400% accuracy for sound production  Short sentences:  90% accuracy  Longer sentences:  80% accuracy of articulation and prosody  Conversation:   50% accuracy for breath support and prosody    Neuro-Linguistics:  Orientation:   Independent  Recent memory:  Independent  Creative uses of items: 90% accuracy  Recall 3 words:  Supervision  ID common trait/3 items: 80% accuracy  \"Things you spray:\"  Patient listed 9 with a single cue    Response & Tolerance to Activities:  Mrs. Lilliana Kang has a strong desire to improve her speech production. She is working on speech breathing exercises between sessions and is mindful of intonation patterns, though these continue to be difficult. Pain:  Pain Scale 1: Numeric (0 - 10)  No report of pain     After treatment:   [x]       Patient left in no apparent distress sitting up in chair  []       Patient left in no apparent distress in bed  []       Call bell left within reach  []       Nursing notified  []       Caregiver present  []       Bed alarm activated    ASSESSMENT:   Progression toward goals:  [x]       Improving appropriately and progressing toward goals  []       Improving slowly and progressing toward goals  []       Not making progress toward goals and plan of care will be adjusted    PLAN:   Patient continues to benefit from skilled intervention to address the above impairments. Continue treatment per established plan of care.   Discharge Recommendations:  Home Health    Estimated LOS: Through 7/11/18    ALLISON Jefferson  Time Calculation: 60 mins

## 2018-07-05 NOTE — PROGRESS NOTES
Left another voicemail message for Milderd Dates with [facility] requesting determination on request for a continued stay.  Awaiting a call back

## 2018-07-05 NOTE — PROGRESS NOTES
Problem: Self Care Deficits Care Plan (Adult)  Goal: *Therapy Goal (Edit Goal, Insert Text)  Occupational Therapy Goals   Long Term Goals  Initiated 2018 and to be accomplished within 2 week(s)  1. Pt will perform self-feeding with (I). 2. Pt will perform grooming with (I) to MI.  3. Pt will perform UB bathing with MI.  4. Pt will perform LB bathing with MI.  5. Pt will perform tub/shower transfer with MI.   6. Pt will perform UB dressing with MI.  7. Pt will perform LB dressing with MI.  8. Pt will perform toileting task with MI.  9. Pt will perform toilet transfer with MI.  8. Pt will perform IADL home mgt (ie laundry, laundry folding, medication mgt) with MI.  11. Pt will perform IADL meal prep with MI. Short Term Goals   Initiated 2018 and to be accomplished within 7 day(s) 2018  1. Pt will perform self-feeding with (I). 2. Pt will perform grooming with (I) to MI.  3. Pt will perform UB bathing with Supervision. 4. Pt will perform LB bathing with Supervision. 5. Pt will perform tub/shower transfer with Supervision . 6. Pt will perform UB dressing with Setup. 7. Pt will perform LB dressing with Supervision. 8. Pt will perform toileting task with Supervision. 9. Pt will perform toilet transfer with Supervision. 10. Pt will perform IADL home mgt (ie laundry, laundry folding, medication mgt) with Supervision. 11. Pt will perform IADL meal prep with Supervision. OT WEEKLY PROGRESS NOTE  Patient Name:Kya Lorenzo      Time In: 9415  Time Out: 1200    Medical Diagnosis:  Rhabdomyolisis  Rhabdomyolysis Rhabdomyolysis     Pain at start of tx: 4/10 pain or discomfort BLE knees. Pain at stop of tx: 4/10 pain or discomfort BLE knees. Patient identified with name and :Yes  Subjective: Report that she prefer to bathe at the sink instead of tub cause that how she does it as home. Objective: Initiation of therapy, pt seated in wc in therapy gym.  Therapist provided introduction and POC for current date. Pt receptive about intervention session and agreeable for all presented interventions. Pt performed doff and don of bilateral socks seated in wc with supervision via cross leg method. Then proceeded to restore endurance via standing without support of DME (ie walker, cane) with shoulder ARC. Initially to get pt comfortable with task therapist allowed pt to have one hand to stabilize on table top then to challenge pt she was cued to position contralateral non used hand at her side. Utilized affected left UE to retrieve individual rings from unilateral right side to cross midline to place on contralateral left side. Then she switched and performed the same task on the right UE. Standing endurance approx 3-5 mins with rest breaks. To restore UE strength participated in resistive exercises for 2 lb dowel [place]. Performed 1 set of 20 reps shoulder flexion seated then therapist noticed pt fatigued. Recommendation to perform 1 set of 10 reps with rest breaks for recovery as well as reduce exhaustion. Additional 1 set x 10 reps of shoulder flexion seated. Performed 3 sets x 10 reps chest press unsupported stand with rest breaks between sets. Pt eager to perform sets with rest and require reeducation on energy conservation technique and pacing strategies with exercises and self care. Performed seated horizontal adduction/abduction 3 sets x 10 reps with rest breaks. Then performed seated one handed shoulder adduction 2 sets x 10 reps with rest break. Lastly performed unsupported stand 3 sets x 10 reps bicep curls with rest break.        Outcome Measures:      AROM: UE WFL      COGNITION/PERCEPTION Initial Assessment Weekly Progress Assessment 7/5/2018   Premorbid Reading Status Literate  Literate   Premorbid Writing Status [facility]   Arousal/Alertness    Generalized responses   Orientation Level Oriented X4 Oriented X4   Visual Fields    Intact   Praxis    Intact   Body Scheme    Intact COMPREHENSION MODE Initial Assessment Weekly Progress Assessment 7/5/2018   Primary Mode of Comprehension Auditory  Auditory   Hearing Aide    None   Corrective Lenses       Score 5  6     EXPRESSION Initial Assessment Weekly Progress Assessment 7/5/2018   Primary Mode of Expression Verbal Verbal   Score 5 5   Comments         SOCIAL INTERACTION/ PRAGMATICS Initial Assessment Weekly Progress Assessment 7/5/2018   Score 4 5   Comments         PROBLEM SOLVING Initial Assessment Weekly Progress Assessment 7/5/2018   Score 4 5   Comments         MEMORY Initial Assessment Weekly Progress Assessment 7/5/2018   Score 3 4   Comments         EATING Initial Assessment Weekly Progress Assessment 7/5/2018   Functional Level 7  7   Comments (I) for self feeding of breakfast meal. No reports from pt about difficulty with cutting, bringing food to mouth, or swallowing. GROOMING Initial Assessment Weekly Progress Assessment 7/5/2018   Functional Level 5  5   Tasks completed by patient Washed face, Washed hands  Hand wash   Comments Setup for grooming seated at sink as well as in shower stall on tub bench. Setup     BATHING Initial Assessment Weekly Progress Assessment 7/5/2018   Functional Level 4  4         Body parts patient bathed Abdomen, Arm, left, Arm, right, Buttocks, Chest, Tiffanie area, Thigh, left, Thigh, right     Comments Right lower leg and left lower leg washed/dried by pt. Require min assistance to wash and dry bilateral feet. Completed bathing in shower stall seated on tub transfer bench. TUB/SHOWER TRANSFER INDEPENDENCE Initial Assessment Weekly Progress Assessment 7/5/2018   Score 4  4   Comments Min A for safety cues as well as utilization of proper hand placement with transfers. Tendency to want to pull up from seated position to stand. Education provided about proper hand placement. NT on current date. Due to clinical observation and reasoning pt would require min A for safety cues.       UPPER BODY DRESSING/UNDRESSING Initial Assessment Weekly Progress Assessment 7/5/2018   Functional Level 4  5   Items applied/Steps completed Pullover (4 steps)  Pullover shirt   Comments Supervision to doff pullover shirt and Min A to don pullover shirt. Not challenged with bra due to pt not having in her personal possession. Based on clinical observation and reasoning. LOWER BODY DRESSING/UNDRESSING Initial Assessment Weekly Progress Assessment 7/5/2018   Functional Level 4  4   Items applied/Steps completed Elastic waist pants (3 steps), Underpants (3 steps)  Socks and pants   Comments SBA to doff pants and underwear. Min A to don pants and underwear. TOILETING Initial Assessment Weekly Progress Assessment 7/5/2018   Functional Level 0  5   Comments Min A for clothing mgt. Supervision for full pericare hygiene. Supervision   Comments: verbal cues provided, reported on 7/4/2018 by Rachael Antunez. Roel, OTR/L        TOILET TRANSFER INDEPENDENCE Initial Assessment Weekly Progress Assessment 7/5/2018   Transfer score 4  5   Comments Min A secondary to require vcs and tactile cues for proper hand placement. Tendency to pull up from seat surface vs push from arm rest.  SBA  Comments stand pivot transfer without device, reported on 7/4/2018 by Rachael Antunez. Creef, OTR/L              ASSESSMENT:  Patient is cooperative and motivated to return to [facility] for self care ADLs/IADLs. Patient has made small gains in skilled OT services. She has met 3 out of 11 STGs (ie 1, 6, 8). Progression toward goals:  []          Improving appropriately and progressing toward goals  [x]          Improving slowly and progressing toward goals  []          Not making progress toward goals and plan of care will be adjusted     PLAN:  Patient continues to benefit from skilled intervention to address the above impairments. Continue treatment per established plan of care.   Discharge Recommendations:  Home Health  Further Equipment Recommendations for Discharge:  TBD     Please refer to the flowsheet for vital signs taken during this treatment. After treatment:   [x]  Patient left in no apparent distress sitting up in chair  []  Patient left in no apparent distress in bed  []  Call bell left within reach  []  Nursing notified  []  Caregiver present  [x]  Transition to work with PT    COMMUNICATION/EDUCATION:   [] Home safety education was provided and the patient/caregiver indicated understanding. [x] Patient/family have participated as able in goal setting and plan of care. [x] Patient/family agree to work toward stated goals and plan of care. [] Patient understands intent and goals of therapy, but is neutral about his/her participation. [] Patient is unable to participate in goal setting and plan of care. Plan of Care: Please see Care Plan for updated LTGs.    Family Training:  TBD  Estimated LOS: 7/11/2018    Christopher De La Garza Lobo 85 OTR/L  7/5/2018

## 2018-07-05 NOTE — PROGRESS NOTES
[facility] PHYSICAL REHABILITATION  [address] [place] [address], [address]     INPATIENT REHABILITATION  DAILY PROGRESS NOTE     Date: 7/5/2018    Name: Derick Santo Age / Sex: 59 y.o. / female   CSN: [id number] MRN: [id number]   Admit Date: 6/27/2018 Length of Stay: 8 days     Primary Rehab Diagnosis: Impaired Mobility and ADLs secondary to Rhabdomyolysis      Subjective:     Patient seen and examined. Blood pressure controlled. Blood sugars controlled. Patient's Complaint:   No significant medical complaints     Pain Control: no current joint or muscle symptoms, essentially pain-free      Objective:     Vital Signs:  Patient Vitals for the past 24 hrs:   BP Temp Pulse Resp SpO2   07/05/18 0745 139/85 98.7 °F (37.1 °C) 60 18 96 %   07/05/18 0633 145/78 - 62 - -   07/04/18 2152 133/81 98.6 °F (37 °C) 80 20 94 %   07/04/18 1600 147/83 97.8 °F (36.6 °C) 88 17 96 %        Physical Examination:  GENERAL SURVEY: Patient is awake, alert, oriented x 3, sitting comfortably on the chair, not in acute respiratory distress. HEENT: pink palpebral conjunctivae, anicteric sclerae, no nasoaural discharge, moist oral mucosa  NECK: supple, no jugular venous distention, no palpable lymph nodes  CHEST/LUNGS: symmetrical chest expansion, good air entry, clear breath sounds  HEART: adynamic precordium, good S1 S2, no S3, regular rhythm, no murmurs  ABDOMEN: obese, bowel sounds appreciated, soft, non-tender  EXTREMITIES: pink nailbeds, (+) bipedal edema, full and equal pulses, no calf tenderness   NEUROLOGICAL EXAM: The patient is awake, alert and oriented x3, able to answer questions fairly appropriately, able to follow 1 and 2 step commands. Able to tell time from the wall clock. Cranial nerves II-XII are grossly intact. No gross sensory deficit. Motor strength is 4/5 on BUE, 4/5 on BLE.       Current Medications:  Current Facility-Administered Medications   Medication Dose Route Frequency    amLODIPine (NORVASC) tablet 5 mg  5 mg Oral DAILY    losartan (COZAAR) tablet 50 mg  50 mg Oral DAILY    metFORMIN (GLUCOPHAGE) tablet 500 mg  500 mg Oral DAILY WITH BREAKFAST    cholecalciferol (VITAMIN D3) capsule 5,000 Units  5,000 Units Oral DAILY    acetaminophen (TYLENOL) tablet 650 mg  650 mg Oral Q4H PRN    bisacodyl (DULCOLAX) tablet 10 mg  10 mg Oral Q48H PRN    insulin lispro (HUMALOG) injection   SubCUTAneous TIDAC    aspirin chewable tablet 81 mg  81 mg Oral DAILY WITH BREAKFAST    vitamin B complex tablet  1 Tab Oral DAILY    pantoprazole (PROTONIX) tablet 40 mg  40 mg Oral ACB    heparin (porcine) injection 5,000 Units  5,000 Units SubCUTAneous Q8H    albuterol (PROVENTIL VENTOLIN) nebulizer solution 2.5 mg  2.5 mg Nebulization Q4H PRN    colchicine (MITIGARE) capsule 0.6 mg  0.6 mg Oral DAILY       Allergies:   Allergies   Allergen Reactions    Ace Inhibitors Cough    Penicillins Hives       Functional Progress:    SPEECH AND LANGUAGE PATHOLOGY    ON ADMISSION MOST RECENT   Comprehension (Native Language)  Primary Mode of Comprehension: Auditory  Score: 5 Comprehension (Native Language)  Primary Mode of Comprehension: Auditory  Score: 5     Expression (Native Language)  Primary Mode of Expression: Verbal  Score: 5   Expression (Native Language)  Primary Mode of Expression: Verbal  Score: 5     Social Interaction/Pragmatics  Score: 4 Social Interaction/Pragmatics  Score: 4     Problem Solving  Score: 4   Problem Solving  Score: 4     Memory  Score: 3 Memory  Score: 3       Legend:   7 - Independent   6 - Modified Independent   5 - Standby Assistance / Supervision / Set-up   4 - Minimum Assistance / Contact Guard Assistance   3 - Moderate Assistance   2 - Maximum Assistance   1 - Total Assistance / Dependent       Lab/Data Review:  Recent Results (from the past 24 hour(s))   GLUCOSE, POC    Collection Time: 07/04/18  4:23 PM   Result Value Ref Range    Glucose (POC) 98 70 - 110 mg/dL   GLUCOSE, POC Collection Time: 07/04/18  9:28 PM   Result Value Ref Range    Glucose (POC) 99 70 - 110 mg/dL   HGB & HCT    Collection Time: 07/05/18  6:20 AM   Result Value Ref Range    HGB 12.2 12.0 - 16.0 g/dL    HCT 38.2 35.0 - 45.0 %   PLATELET COUNT    Collection Time: 07/05/18  6:20 AM   Result Value Ref Range    PLATELET 731 473 - 636 K/uL   METABOLIC PANEL, BASIC    Collection Time: 07/05/18  6:20 AM   Result Value Ref Range    Sodium 141 136 - 145 mmol/L    Potassium 3.8 3.5 - 5.5 mmol/L    Chloride 105 100 - 108 mmol/L    CO2 27 21 - 32 mmol/L    Anion gap 9 3.0 - 18 mmol/L    Glucose 86 74 - 99 mg/dL    BUN 13 7.0 - 18 MG/DL    Creatinine 1.09 0.6 - 1.3 MG/DL    BUN/Creatinine ratio 12 12 - 20      GFR est AA >60 >60 ml/min/1.73m2    GFR est non-AA 51 (L) >60 ml/min/1.73m2    Calcium 9.2 8.5 - 10.1 MG/DL   GLUCOSE, POC    Collection Time: 07/05/18  7:30 AM   Result Value Ref Range    Glucose (POC) 99 70 - 110 mg/dL   GLUCOSE, POC    Collection Time: 07/05/18 11:28 AM   Result Value Ref Range    Glucose (POC) 98 70 - 110 mg/dL       Estimated Glomerular Filtration Rate:  On admission, estimated GFR based on a Creatinine of 0.90 mg/dl:   Using CKD-EPI = 81.1 mL/min/1.73m2   Using MDRD = 82.4 mL/min/1.73m2  Most recent estimated GFR, based on a Creatinine of 1.09 mg/dl on 7/5/2018:   Using CKD-EPI = 64.3 mL/min/1.73m2   Using MDRD = 66.1 mL/min/1.73m2       Assessment:     Primary Rehabilitation Diagnosis  1.  Impaired Mobility and ADLs  2. Rhabdomyolysis     Comorbidities   Chronic hepatitis C virus infection  B18.2    Bipolar 1 disorder  F31.9    Depression F32.9    Alcohol dependence in remission  F10.21    Needle phobia F40.298    Eustachian tube dysfunction H69.80    Psoriasis L40.9    History of atrial fibrillation Z86.79    History of encephalopathy Z86.69    Primary osteoarthritis involving multiple joints M15.0    Essential hypertension I10    Gastroesophageal reflux disease K21.9    Type 2 diabetes mellitus  E11.9    Chronic obstructive pulmonary disease  J44.9    History of acute renal failure Z87.448    Hypoalbuminemia E88.09    Low serum high density lipoprotein (HDL) R74.8    Obesity, Class I, BMI 30-34.9 E66.9    Dysphagia R13.10    Vitamin D deficiency E55.9    Gout M10.9        Plan:     1. Justification for continued stay: Good progression towards established rehabilitation goals. 2. Medical Issues being followed closely:    [x]  Fall and safety precautions     []  Wound Care     [x]  Bowel and Bladder Function     [x]  Fluid Electrolyte and Nutrition Balance     []  Pain Control      3. Issues that 24 hour rehabilitation nursing is following:    [x]  Fall and safety precautions     []  Wound Care     [x]  Bowel and Bladder Function     [x]  Fluid Electrolyte and Nutrition Balance     []  Pain Control      [x]  Assistance with and education on in-room safety with transfers to and from the bed, wheelchair, toilet and shower. 4. Acute rehabilitation plan of care:    [x]  Continue current care and rehab. [x]  Physical Therapy           [x]  Occupational Therapy           [x]  Speech Therapy     []  Hold Rehab until further notice     5. Medications:    [x]  MAR Reviewed     [x]  Continue Present Medications     6. DVT Prophylaxis:      []  Lovenox     [x]  Unfractionated Heparin     []  Coumadin     []  NOAC     [x]  EFRAÍN Stockings     [x]  Sequential Compression Device     []  None     7. Orders:   > Rhabdomyolysis; History of acute renal failure; History of atrial fibrillation with rapid ventricular response;  History of encephalopathy   > CK (6/13/2018, on admission to [facility]) = 6954   > CK (6/21/2018) = 183   > BUN/Creatinine (6/13/2018, on admission to [facility]) = 128/2.42   > BUN/Creatinine (6/19/2018) = 11/0.86   > BUN/Creatinine (6/28/2018, on admission to ARU) = 10/0.90    > BUN/Creatinine (7/5/2018) = 13/1.09     > Dysphagia   > Speech and Language Pathology to evaluate and treat     > Gout   > Patient stated she has gout and that during her stay at [facility], she had pain great toes of both feet   > On 6/24/2018, patient was started on Colchicine 0.6 mg PO BID   > Upon admission to the ARU, patient stated the pain has improved significantly   > Uric acid (6/28/2018) = 5.7   > On 6/28/2018, decreased Colchicine from 0.6 mg PO BID to 0.6 mg PO once daily    > Continue Colchicine 0.6 mg PO once daily     > Essential hypertension   > Prior to admission to [facility], patient was on:    > Chlorthalidone 25 mg PO once daily    > Spironolactone 25 mg PO once daily    > Clonidine 0.2 mg PO BID    > Losartan-HCTZ 50-12.5 1 tab PO once daily   > During the patient's stay at [facility], all above antihypertensive medications were put on hold   > Upon discharge from [facility], all above antihypertensives were resumed as per discharge summary   > On 7/3/2018, increased Losartan from 25 mg to 50 mg PO once daily (9AM)   > On 7/4/2018, added Amlodipine 5 mg PO once daily (6AM)   > Continue:    > Amlodipine 5 mg PO once daily (6AM)    > Losartan 50 mg PO once daily (9AM)    > Type 2 diabetes mellitus   > Prior to admission to [facility], patient was on Metformin 500 mg PO BID with meals   > HbA1c (6/14/2018) = 6.9   > During the patient's stay at [facility], Metformin was put on hold (re: acute renal failure)   > Upon discharge from [facility], resumed Metformin 500 mg PO BID with meals as per discharge summary   > Continue:    > Metformin 500 mg PO once daily with breakfast    > Humalog insulin sliding scale SC TID AC only    > Vitamin D Deficiency   > Vitamin D 25-Hydroxy (6/28/2018) = 13.5   > On 6/28/2018, patient was given Cholecalciferol 50,000 units PO x 1 dose    > On 6/29/2018, started Cholecalciferol 5,000 units PO once daily   > Continue Cholecalciferol 5,000 units PO once daily    > Urinary frequency   > Urinalysis (7/3/2018): ~WNL    > On 6/28/2018, patient stated she had 4 bowel movements since AM   > Discontinued Docusate sodium 100 mg PO BID      8. Patient's progress in rehabilitation and medical issues discussed with the patient. All questions answered to the best of my ability. Care plan discussed with patient and nurse.       Signed:    Davey Kate MD    July 5, 2018

## 2018-07-05 NOTE — PROGRESS NOTES
Left another voicemail message for Ria Salter with [facility] requesting determination on request for a continued stay.  Awaiting a call back

## 2018-07-05 NOTE — ROUTINE PROCESS
[facility]    Bedside and Verbal shift change report given to MITUL CAMPBELL(oncoming nurse)Nilay Sanchez LPN     (offgoing nurse). Report included the following information SBAR, Kardex, MAR and Recent Results.     Situation:   Code Status: Full Code   Reason for Admission: Rhabdomyolysis  Hospital Day: 8   Problem List:   Hospital Problems  Date Reviewed: 7/4/2018          Codes Class Noted POA    Vitamin D deficiency (Chronic) ICD-10-CM: E55.9  ICD-9-CM: 268.9  6/28/2018 Yes    Overview Signed 6/28/2018  1:12 PM by Chele Skelton MD     Vitamin D 25-Hydroxy (6/28/2018) = 13.5              Gout (Chronic) ICD-10-CM: M10.9  ICD-9-CM: 274.9  Unknown Yes        Essential hypertension (Chronic) ICD-10-CM: I10  ICD-9-CM: 401.9  Unknown Yes        Type 2 diabetes mellitus ([facility] (Chronic) ICD-10-CM: E11.9  ICD-9-CM: 250.00  Unknown Yes    Overview Signed 6/27/2018 10:18 PM by Chele Skelton MD     HbA1c (6/14/2018) = 6.9             Dysphagia ICD-10-CM: R13.10  ICD-9-CM: 787.20  6/14/2018 Yes        History of atrial fibrillation ICD-10-CM: Z86.79  ICD-9-CM: V12.59  6/13/2018 Yes    Overview Signed 6/27/2018 10:23 PM by Chele Skelton MD     Atrial fibrillation with rapid ventricular response             * (Principal)Rhabdomyolysis ICD-10-CM: M62.82  ICD-9-CM: 728.88  6/13/2018 Yes        History of encephalopathy ICD-10-CM: Z86.69  ICD-9-CM: V12.49  6/13/2018 Yes    Overview Signed 6/27/2018 10:21 PM by Chele Skelton MD     Acute metabolic encephalopathy             History of acute renal failure ICD-10-CM: Z87.448  ICD-9-CM: V13.09  6/13/2018 Yes        Generalized weakness ICD-10-CM: R53.1  ICD-9-CM: 780.79  6/13/2018 Yes        Impaired mobility and ADLs ICD-10-CM: Z74.09  ICD-9-CM: 799.89  6/13/2018 Yes              Background:   Past Medical History:   Past Medical History:   Diagnosis Date    Alcohol dependence in remission ([facility]     alcohol withdrawal discharged 05/31/2013    Bipolar 1 disorder ([facility]     Chronic hepatitis C virus infection ([facility]     prior IV drug use;  Genotype 1a     Chronic obstructive pulmonary disease ([facility]     Depression     Dysphagia 6/14/2018    Essential hypertension     Eustachian tube dysfunction     Dr. Jon Edmondson Gastroesophageal reflux disease     Gout     History of acute renal failure 6/13/2018    History of atrial fibrillation 6/13/2018    Atrial fibrillation with rapid ventricular response    History of encephalopathy 7/78/5303    Acute metabolic encephalopathy    Low serum high density lipoprotein (HDL) 6/14/2018    HDL (6/14/2018) = 12    Needle phobia     from prior IV drug abuse    Obesity, Class I, BMI 30-34.9     Primary osteoarthritis involving multiple joints     knees and back    Psoriasis     Type 2 diabetes mellitus (HCC)     HbA1c (6/14/2018) = 6.9    Vitamin D deficiency 6/28/2018    Vitamin D 25-Hydroxy (6/28/2018) = 13.5       Patient taking anticoagulants yes    Patient has a defibrillator: no     Assessment:   Changes in Assessment throughout shift: none     Patient has central line: no Reasons if yes: Insertion date: Last dressing date:   Patient has Weston Cath: no Reasons if yes:     Insertion date:     Last Vitals:     Vitals:    07/04/18 1600 07/04/18 2152 07/05/18 0633 07/05/18 0745   BP: 147/83 133/81 145/78 139/85   Pulse: 88 80 62 60   Resp: 17 20  18   Temp: 97.8 °F (36.6 °C) 98.6 °F (37 °C)  98.7 °F (37.1 °C)   SpO2: 96% 94%  96%   Weight:       Height:            PAIN    Pain Assessment    Pain Intensity 1: 0 (07/05/18 0749) Pain Intensity 1: 2 (12/29/14 1105)    Pain Location 1: Wrist Pain Location 1: Abdomen    Pain Intervention(s) 1: Medication (see MAR) Pain Intervention(s) 1: Medication (see MAR)  Patient Stated Pain Goal: 0 Patient Stated Pain Goal: 0  o Intervention effective: no    o Other actions taken for pain:      Skin Assessment  Skin color Skin Color: Appropriate for ethnicity  Condition/Temperature Skin Condition/Temp: Dry, Warm  Integrity Skin Integrity: Wound (add Wound LDA)  Turgor Turgor: Non-tenting  Weekly Pressure Ulcer Documentation  Pressure  Injury Documentation: Pressure Injury Noted-See Wound LDA to Document  Wound Prevention & Protection Methods  Orientation of wound Orientation of Wound Prevention: Posterior  Location of Prevention Location of Wound Prevention: Sacrum/Coccyx, Buttocks  Dressing Present Dressing Present : Yes  Dressing Status Dressing Status: Intact  Wound Offloading Wound Offloading (Prevention Methods): Foam silicone     INTAKE/OUPUT    Date 07/04/18 0700 - 07/05/18 0659 07/05/18 0700 - 07/06/18 0659   Shift 0700-1859 1900-0659 24 Hour Total 0700-1859 1900-0659 24 Hour Total   I  N  T  A  K  E   P. O. 700  700 240  240      P. O. 700  700 240  240    Shift Total  (mL/kg) 700  (7.7)  700  (7.7) 240  (2.6)  240  (2.6)   O  U  T  P  U  T   Urine  (mL/kg/hr)            Urine Occurrence(s) 8 x 4 x 12 x 3 x  3 x    Stool            Stool Occurrence(s) 2 x 0 x 2 x 0 x  0 x    Shift Total  (mL/kg)           700 240  240   Weight (kg) 91 91 91 91 91 91       Recommendations:  1. Patient needs and requests:Supervised assist with transfers, setup, pain control    2. Diet: Dental soft with thin liquida    3. Pending tests/procedures: none     4. Functional Level/Equipment: 1 person, supervised assist    5. Estimated Discharge Date: 7/11 Posted on Whiteboard in Patients Room: yes     HEALS Safety Check    A safety check occurred in the patient's room between off going nurse and oncoming nurse listed above.     The safety check included the below items  Area Items   H  High Alert Medications - Verify all high alert medication drips (heparin, PCA, etc.)   E  Equipment - Suction is set up for ALL patients (with yanker)  - Red plugs utilized for all equipment (IV pumps, etc.)  - WOWs wiped down at end of shift.  - Room stocked with oxygen, suction, and other unit-specific supplies A  Alarms - Bed alarm is set for fall risk patients  - Ensure chair alarm is in place and activated if patient is up in a chair   L  Lines - Check IV for any infiltration  - Weston bag is empty if patient has a Weston   - Tubing and IV bags are labeled   S  Safety   - Room is clean, patient is clean, and equipment is clean. - Hallways are clear from equipment besides carts. - Fall bracelet on for fall risk patients  - Ensure room is clear and free of clutter  - Suction is set up for ALL patients (with jamiker)  - Hallways are clear from equipment besides carts.    - Isolation precautions followed, supplies available outside room, sign posted

## 2018-07-05 NOTE — PROGRESS NOTES
[x] Psychology  [] Social Work [] Recreational Therapy    INTERVENTION  UNITS/TIME OF SERVICE   Assessment    Supportive Counseling July 5, 2018   Orientation    Discharge Planning    Resource Linkage              Progress/Current Status    Late entry for individual  with patient on ARU on above referenced date. Patient found lying supine in bed and is immediately responsive to inquiry. Patient expressed her frustration with perceived areas of impairment, especially focusing on her altered speech since illness occurred. Patient is seemingly frustrated, feeling demoralized and with low self esteem. She is further worried about what will be her course of recovery and her insistence that she is not accustomed to being so dependent. Patient can be redirected and is not entirely despondent. However, she is obviously struggling with distress feelings in her recovery and will continue to require ego support and other support counseling to maximize her self confidence and self esteem. This is a patient with past diagnosis for Bipolar Disorder but on admission to ARU, she insisted that she has had no recent treatment by psychiatry and has not taken any psychotropic medication in years. She further denied any history of psychiatric hospitalization. Patient may well benefit from introduction of psychotropic medication for mood stability and this can be discussed with Dr. Rosalee Marsh; however, it remains to be seen whether mood disturbance is negatively impacting her current therapy effort. She will be further monitored.     Harshil Hanna, THE [facility] 7/5/2018 8:35 AM

## 2018-07-05 NOTE — PROGRESS NOTES
Problem: Mobility Impaired (Adult and Pediatric)  Goal: *Acute Goals and Plan of Care (Insert Text)  Physical Therapy Goals  STG's  Initiated 2018 and to be accomplished within 7 day(s)  1. Patient will move from supine to sit and sit to supine  and roll side to side in bed with supervision/set-up. 2.  Patient will transfer from bed to chair and chair to bed with contact guard assist using the least restrictive device. 3.  Patient will perform sit to stand with contact guard assist.  4.  Patient will ambulate with minimal assistance/contact guard assist for 75 feet with the least restrictive device. 5.  Patient will propel w/c 150 ft on level surfaces with supervision for mobility on unit. LTG's  Initiated 2018 and to be accomplished within 14 day(s)  1. Patient will move from supine to sit and sit to supine , scoot up and down and roll side to side in bed with independence. 2.  Patient will transfer from bed to chair and chair to bed with modified independence using the least restrictive device. 3.  Patient will perform sit to stand with modified independence. 4.  Patient will ambulate with modified independence for 150 feet with the least restrictive device. 5.  Patient will ascend/descend 3 stairs with bilateral handrail(s) with supervision/set-up. physical Therapy weekly Progress note    Patient: Eugene Malloy (88 y.o. female)  Date: 2018  Diagnosis: Rhabdomyolisis  Rhabdomyolysis Rhabdomyolysis  Precautions: Aspiration, Fall  Chart, physical therapy assessment, plan of care and goals were reviewed. Time In: 1200  Time Out: 1230  Patient Seen For: Balance activities;Gait training;Patient education;Transfer training    Pain:  Pt pain was reported as  0/10 pre-treatment. Pt pain was reported as 0/10 post-treatment. Intervention: NA    Patient identified with name and : yes    SUBJECTIVE:     Pt reports fatigue following OT treatment session.   However, pt is pleasant and cooperative to participate in skilled PT intervention. OBJECTIVE DATA SUMMARY:   AROM: WFL    FIM SCORES Initial Assessment Weekly Progress Assessment 7/5/2018   Bed/Chair/Wheelchair Transfers 3 4   Wheelchair Mobility 2 6   Walking [place] 1 1   Steps/Stairs 0 1 (NT)   Please see [facility] Interdisciplinary Eval: Coordination/Balance Section for details regarding FIM score description. BED/CHAIR/WHEELCHAIR TRANSFERS Initial Assessment Weekly Progress Assessment 7/5/2018   Rolling Right 5 (Stand-by assistance) Supervision   Rolling Left 5 (Stand-by assistance) Supervision   Supine to Sit 4 (Minimal assistance) Supervision   Sit to Stand Minimal assistance Minimal assistance   Sit to Supine 4 (Minimal assistance) Supervision   Transfer Type SPT without device (stand step w/out AD, with gait belt) Stand Step with RW   Comments mod A for stand step transfer w/out AD, due to balance issues and weakness in LE's; min A with support of RW Pt requires minimal assistance for sit <> stand portion of transfers when challenged to perform with B hands on distal third of B femurs to promote increased B LE weight bearing for functional strengthening. Otherwise, pt is able to perform transfers with CGA with use of B UEs for support.    Car Transfer Not tested NT   Car Type n/a NA     GROSS ASSESSMENT Weekly Progress Assessment 7/5/2018   AROM WFL   Strength Generally Decreased   Coordination WFL   Tone WFL   Sensation Impaired   PROM WFL     POSTURE Weekly Progress Assessment 7/5/2018   Posture (WDL) Exceptions to WDL   Posture Assessment Forward Flexed Posture     WHEELCHAIR MOBILITY/MANAGEMENT Initial Assessment Weekly Progress Assessment 7/5/2018   Able to Propel 50 feet 150 Feet with modified independence   Curbs/ramps assistance required 0 (Not tested) NT   Wheelchair set up assistance required 2 (Maximal assistance) Modified independence   Wheelchair management Manages left brake, Manages right brake (with verbal cues and demonstration) Manages B brakes     WALKING INDEPENDENCE Initial Assessment Weekly Progress Assessment 7/5/2018   Assistive device Walker, rolling, Gait belt (w/c follow)  (without AD)   Ambulation assistance - level surface   CGA for balance and safety   Distance 18 Feet (ft) 38 Feet (ft), 30 Feet x 2 with RW (distance not challenged)   Comments pt able to ambulate with min A  and w/c follow with RW, but <50 ft due to fatigue and dizziness Pt ambulates with hesitant gait with decreased B foot clearance and step length. GAIT Weekly Progress Assessment 7/5/2018   Gait Description (WDL) Exceptions to WDL   Gait Abnormalities Fwd flexed posture     STEPS/STAIRS Initial Assessment Weekly Progress Assessment 7/5/2018   Steps/Stairs ambulated 0 0   Rail Use   NT this session   Comments not tested due to issues with dizziness with gait training NT   Curbs/Ramps   NT         ASSESSMENT:  Pt has progressed to achieve 4/5 Short Term Goals and is progressing toward achieving goal for ambulating 75 feet with RW with minimal assistance. Pt requires increased physical assistance for functional transfers with increased challenge to engage B LEs. Progression toward goals:  [x]      Improving appropriately and progressing toward goals  []      Improving slowly and progressing toward goals  []      Not making progress toward goals and plan of care will be adjusted     PLAN:  Patient continues to benefit from skilled intervention to address the above impairments. Continue treatment per established plan of care. Discharge Recommendations:  Home Health with 24 hour supervision/supplemental assistance  Further Equipment Recommendations for Discharge:  RW     Estimated LOS:7/11/2018    Activity Tolerance:   Good  Please refer to the flowsheet for vital signs taken during this treatment.   After treatment:   [] Patient left in no apparent distress in bed  [x] Patient left in no apparent distress sitting up in chair  [] Patient left in no apparent distress in w/c mobilizing under own power  [] Patient left in no apparent distress dining area  [] Patient left in no apparent distress mobilizing under own power  [] Call bell left within reach  [] Nursing notified  [] Caregiver present  [] Bed alarm activated       Indu Babcock PT, DPT  7/5/2018

## 2018-07-05 NOTE — PROGRESS NOTES
Problem: Mobility Impaired (Adult and Pediatric)  Goal: *Acute Goals and Plan of Care (Insert Text)  Physical Therapy Goals  STGs = LTGs  Initiated 2018, updated 2018, and to be accomplished within 14 day(s)  1. Patient will move from supine to sit and sit to supine , scoot up and down and roll side to side in bed with independence. 2.  Patient will transfer from bed to chair and chair to bed with modified independence using the least restrictive device. 3.  Patient will perform sit to stand with modified independence. 4.  Patient will ambulate with supervision for 150 feet with the least restrictive device. 5.  Patient will ascend/descend 3 stairs with bilateral handrail(s) with supervision/set-up. physical Therapy TREATMENT    Patient: Corey Fitzgerald (61 y.o. female)  Date: 2018  Diagnosis: Rhabdomyolisis  Rhabdomyolysis Rhabdomyolysis  Precautions: Aspiration, Fall  Chart, physical therapy assessment, plan of care and goals were reviewed. Time In: 1330  Time Out: 1400  Patient Seen For: Transfer training;Balance activities  Pain:  Pt pain was reported as  Not reported pre-treatment. Pt pain was reported as not reported  post-treatment. Intervention: NA    Patient identified with name and : yes     SUBJECTIVE:     Pt reported B feet swelling and being uncomfortable to stand on. OBJECTIVE DATA SUMMARY:   Objective:     TRANSFERS Daily Assessment    Sit to Stand Assistance: Moderate assistance  Pt required modA to perform sit to stand from w/c with B hands on distal femur without rocking technique and with v/c to prevent holding her breath. Pt required v/c to step back completely to w/c and to reach back with BUE before sitting.         BALANCE Daily Assessment    Sitting - Static: Good (unsupported)  Sitting - Dynamic: Good (unsupported)  Standing - Static: Fair (with RW)  Standing - Dynamic : Impaired   Pt participated in standing table top game to challenge static standing balance and tolerance. Pt stood 2 trials, [id number] and [id number], with RW and SBA/CGA for safety. ASSESSMENT:  Pt tolerated increased standing time second trial even though pt seemed more frustrated second trial.   Progression toward goals:  [x]      Improving appropriately and progressing toward goals  []      Improving slowly and progressing toward goals  []      Not making progress toward goals and plan of care will be adjusted     PLAN:  Patient continues to benefit from skilled intervention to address the above impairments. Continue treatment per established plan of care. Discharge Recommendations:  See weekly assessment  Further Equipment Recommendations for Discharge:  rolling walker     Estimated LOS: 7/11/18    Activity Tolerance:   fair  Please refer to the flowsheet for vital signs taken during this treatment. After treatment:   Patient left propelling w/c on own power.        Sunni Bullard, PTA  7/5/2018

## 2018-07-05 NOTE — ROUTINE PROCESS
SHIFT CHANGE NOTE FOR [facility]    Bedside and Verbal shift change report given to Baljit Cr (oncoming nurse) by Delia Granados LPN (offgoing nurse). Report included the following information SBAR, Kardex, MAR and Recent Results.     Situation:   Code Status: Full Code   Reason for Admission: Rhabdomyolysis  Hospital Day: 8   Problem List:   Hospital Problems  Date Reviewed: 7/4/2018          Codes Class Noted POA    Vitamin D deficiency (Chronic) ICD-10-CM: E55.9  ICD-9-CM: 268.9  6/28/2018 Yes    Overview Signed 6/28/2018  1:12 PM by Osmany Harris MD     Vitamin D 25-Hydroxy (6/28/2018) = 13.5              Gout (Chronic) ICD-10-CM: M10.9  ICD-9-CM: 274.9  Unknown Yes        Essential hypertension (Chronic) ICD-10-CM: I10  ICD-9-CM: 401.9  Unknown Yes        Type 2 diabetes mellitus ([facility] (Chronic) ICD-10-CM: E11.9  ICD-9-CM: 250.00  Unknown Yes    Overview Signed 6/27/2018 10:18 PM by Osmany Harris MD     HbA1c (6/14/2018) = 6.9             Dysphagia ICD-10-CM: R13.10  ICD-9-CM: 787.20  6/14/2018 Yes        History of atrial fibrillation ICD-10-CM: Z86.79  ICD-9-CM: V12.59  6/13/2018 Yes    Overview Signed 6/27/2018 10:23 PM by Osmany Harris MD     Atrial fibrillation with rapid ventricular response             * (Principal)Rhabdomyolysis ICD-10-CM: M62.82  ICD-9-CM: 728.88  6/13/2018 Yes        History of encephalopathy ICD-10-CM: Z86.69  ICD-9-CM: V12.49  6/13/2018 Yes    Overview Signed 6/27/2018 10:21 PM by Osmany Harris MD     Acute metabolic encephalopathy             History of acute renal failure ICD-10-CM: Z87.448  ICD-9-CM: V13.09  6/13/2018 Yes        Generalized weakness ICD-10-CM: R53.1  ICD-9-CM: 780.79  6/13/2018 Yes        Impaired mobility and ADLs ICD-10-CM: Z74.09  ICD-9-CM: 799.89  6/13/2018 Yes              Background:   Past Medical History:   Past Medical History:   Diagnosis Date    Alcohol dependence in remission ([facility]     alcohol withdrawal discharged 05/31/2013    Bipolar 1 disorder ([facility]     Chronic hepatitis C virus infection ([facility]     prior IV drug use;  Genotype 1a     Chronic obstructive pulmonary disease ([facility]     Depression     Dysphagia 6/14/2018    Essential hypertension     Eustachian tube dysfunction     Dr. Morgan Mckeon Gastroesophageal reflux disease     Gout     History of acute renal failure 6/13/2018    History of atrial fibrillation 6/13/2018    Atrial fibrillation with rapid ventricular response    History of encephalopathy 4/13/0219    Acute metabolic encephalopathy    Low serum high density lipoprotein (HDL) 6/14/2018    HDL (6/14/2018) = 12    Needle phobia     from prior IV drug abuse    Obesity, Class I, BMI 30-34.9     Primary osteoarthritis involving multiple joints     knees and back    Psoriasis     Type 2 diabetes mellitus (HCC)     HbA1c (6/14/2018) = 6.9    Vitamin D deficiency 6/28/2018    Vitamin D 25-Hydroxy (6/28/2018) = 13.5       Patient taking anticoagulants yes    Patient has a defibrillator: no     Assessment:   Changes in Assessment throughout shift: none     Patient has central line: no Reasons if yes: Insertion date: Last dressing date:   Patient has Weston Cath: no Reasons if yes:     Insertion date:     Last Vitals:     Vitals:    07/03/18 2200 07/04/18 1223 07/04/18 1600 07/04/18 2152   BP: 145/70 145/86 147/83 133/81   Pulse: 76 86 88 80   Resp: 18  17 20   Temp: 98.2 °F (36.8 °C)  97.8 °F (36.6 °C) 98.6 °F (37 °C)   SpO2: 96%  96% 94%   Weight:       Height:            PAIN    Pain Assessment    Pain Intensity 1: 0 (07/05/18 0006) Pain Intensity 1: 2 (12/29/14 1105)    Pain Location 1: Wrist Pain Location 1: Abdomen    Pain Intervention(s) 1: Medication (see MAR) Pain Intervention(s) 1: Medication (see MAR)  Patient Stated Pain Goal: 0 Patient Stated Pain Goal: 0  o Intervention effective: no    o Other actions taken for pain:      Skin Assessment  Skin color Skin Color: Appropriate for ethnicity  Condition/Temperature Skin Condition/Temp: Dry, Warm  Integrity Skin Integrity: Wound (add Wound LDA) (subhash heels boots in place in bed)  Turgor Turgor: Non-tenting  Weekly Pressure Ulcer Documentation  Pressure  Injury Documentation: Pressure Injury Noted-See Wound LDA to Document  Wound Prevention & Protection Methods  Orientation of wound Orientation of Wound Prevention: Posterior  Location of Prevention Location of Wound Prevention: Sacrum/Coccyx  Dressing Present Dressing Present : Yes  Dressing Status Dressing Status: Intact  Wound Offloading Wound Offloading (Prevention Methods): Foam silicone     INTAKE/OUPUT    Date 07/04/18 0700 - 07/05/18 0659 07/05/18 0700 - 07/06/18 0659   Shift 0700-1859 1900-0659 24 Hour Total 0700-1859 1900-0659 24 Hour Total   I  N  T  A  K  E   P. O. 700  700         P. O. 700  700       Shift Total  (mL/kg) 700  (7.7)  700  (7.7)      O  U  T  P  U  T   Urine  (mL/kg/hr)            Urine Occurrence(s) 8 x 2 x 10 x       Stool            Stool Occurrence(s) 2 x 0 x 2 x       Shift Total  (mL/kg)           700      Weight (kg) 91 91 91 91 91 91       Recommendations:  1. Patient needs and requests:Supervised assist with transfers, setup, pain control    2. Diet: Dental soft with thin liquida    3. Pending tests/procedures: none     4. Functional Level/Equipment: 1 person, supervised assist    5. Estimated Discharge Date: 7/11 Posted on Whiteboard in Patients Room: yes     HEALS Safety Check    A safety check occurred in the patient's room between off going nurse and oncoming nurse listed above.     The safety check included the below items  Area Items   H  High Alert Medications - Verify all high alert medication drips (heparin, PCA, etc.)   E  Equipment - Suction is set up for ALL patients (with yanker)  - Red plugs utilized for all equipment (IV pumps, etc.)  - WOWs wiped down at end of shift.  - Room stocked with oxygen, suction, and other unit-specific supplies A  Alarms - Bed alarm is set for fall risk patients  - Ensure chair alarm is in place and activated if patient is up in a chair   L  Lines - Check IV for any infiltration  - Weston bag is empty if patient has a Weston   - Tubing and IV bags are labeled   S  Safety   - Room is clean, patient is clean, and equipment is clean. - Hallways are clear from equipment besides carts. - Fall bracelet on for fall risk patients  - Ensure room is clear and free of clutter  - Suction is set up for ALL patients (with jamiker)  - Hallways are clear from equipment besides carts.    - Isolation precautions followed, supplies available outside room, sign posted

## 2018-07-05 NOTE — PROGRESS NOTES
Updates were submitted by Mary Pelayo to [facility] for authorization for continued stay. TC made to Yovanny Gardner to request a determination on the submission. Have left 2 voicemail's this AM. Awaiting a call back with the determination.

## 2018-07-06 LAB
GLUCOSE BLD STRIP.AUTO-MCNC: 84 MG/DL (ref 70–110)
GLUCOSE BLD STRIP.AUTO-MCNC: 90 MG/DL (ref 70–110)
GLUCOSE BLD STRIP.AUTO-MCNC: 93 MG/DL (ref 70–110)

## 2018-07-06 PROCEDURE — 97535 SELF CARE MNGMENT TRAINING: CPT

## 2018-07-06 PROCEDURE — 74011250636 HC RX REV CODE- 250/636: Performed by: INTERNAL MEDICINE

## 2018-07-06 PROCEDURE — 82962 GLUCOSE BLOOD TEST: CPT

## 2018-07-06 PROCEDURE — 74011250637 HC RX REV CODE- 250/637: Performed by: INTERNAL MEDICINE

## 2018-07-06 PROCEDURE — 65310000000 HC RM PRIVATE REHAB

## 2018-07-06 PROCEDURE — 97116 GAIT TRAINING THERAPY: CPT

## 2018-07-06 PROCEDURE — 97112 NEUROMUSCULAR REEDUCATION: CPT

## 2018-07-06 PROCEDURE — 97542 WHEELCHAIR MNGMENT TRAINING: CPT

## 2018-07-06 PROCEDURE — 97530 THERAPEUTIC ACTIVITIES: CPT

## 2018-07-06 PROCEDURE — 77030011256 HC DRSG MEPILEX <16IN NO BORD MOLN -A

## 2018-07-06 PROCEDURE — 97110 THERAPEUTIC EXERCISES: CPT

## 2018-07-06 PROCEDURE — 92507 TX SP LANG VOICE COMM INDIV: CPT

## 2018-07-06 RX ADMIN — CHOLECALCIFEROL CAP 125 MCG (5000 UNIT) 5000 UNITS: 125 CAP at 08:28

## 2018-07-06 RX ADMIN — Medication 1 TABLET: at 08:28

## 2018-07-06 RX ADMIN — METFORMIN HYDROCHLORIDE 500 MG: 500 TABLET ORAL at 08:27

## 2018-07-06 RX ADMIN — AMLODIPINE BESYLATE 5 MG: 5 TABLET ORAL at 06:08

## 2018-07-06 RX ADMIN — LOSARTAN POTASSIUM 50 MG: 50 TABLET ORAL at 08:28

## 2018-07-06 RX ADMIN — HEPARIN SODIUM 5000 UNITS: 5000 INJECTION, SOLUTION INTRAVENOUS; SUBCUTANEOUS at 06:08

## 2018-07-06 RX ADMIN — PANTOPRAZOLE SODIUM 40 MG: 40 TABLET, DELAYED RELEASE ORAL at 06:38

## 2018-07-06 RX ADMIN — COLCHICINE 0.6 MG: 0.6 CAPSULE ORAL at 08:28

## 2018-07-06 RX ADMIN — HEPARIN SODIUM 5000 UNITS: 5000 INJECTION, SOLUTION INTRAVENOUS; SUBCUTANEOUS at 21:04

## 2018-07-06 RX ADMIN — HEPARIN SODIUM 5000 UNITS: 5000 INJECTION, SOLUTION INTRAVENOUS; SUBCUTANEOUS at 14:44

## 2018-07-06 RX ADMIN — ASPIRIN 81 MG CHEWABLE TABLET 81 MG: 81 TABLET CHEWABLE at 08:29

## 2018-07-06 NOTE — ROUTINE PROCESS
[facility]     Bedside and Verbal shift change report given to Milady David LPN (oncoming nurse)  By Chun Shen, SHANNAN   (offgoing nurse). Report included the following information SBAR, Kardex, MAR and Recent Results.     Situation:                        Code Status: Full Code                        Reason for Admission: Rhabdomyolysis  Hospital Day: 8                        Problem List:   Hospital Problems  Date Reviewed: 7/4/2018                         Codes Class Noted POA      Vitamin D deficiency (Chronic) ICD-10-CM: E55.9  ICD-9-CM: 821. 9   6/28/2018 Yes      Overview Signed 6/28/2018  1:12 PM by Nora Mata MD        Vitamin D 25-Hydroxy (6/28/2018) = 13.5                 Gout (Chronic) ICD-10-CM: M10.9  ICD-9-CM: 274.9   Unknown Yes             Essential hypertension (Chronic) ICD-10-CM: I10  ICD-9-CM: 401.9   Unknown Yes             Type 2 diabetes mellitus (HCC) (Chronic) ICD-10-CM: E11.9  ICD-9-CM: 250.00   Unknown Yes      Overview Signed 6/27/2018 10:18 PM by Nora Mata MD        HbA1c (6/14/2018) = 6.9                Dysphagia ICD-10-CM: R13.10  ICD-9-CM: 787.20   6/14/2018 Yes             History of atrial fibrillation ICD-10-CM: Z86.79  ICD-9-CM: V12.59   6/13/2018 Yes      Overview Signed 6/27/2018 10:23 PM by Nora Mata MD        Atrial fibrillation with rapid ventricular response                * (Principal)Rhabdomyolysis ICD-10-CM: M62.82  ICD-9-CM: 728.88   6/13/2018 Yes             History of encephalopathy ICD-10-CM: Z86.69  ICD-9-CM: V12.49   6/13/2018 Yes      Overview Signed 6/27/2018 10:21 PM by Nora Mata MD        Acute metabolic encephalopathy                History of acute renal failure ICD-10-CM: Z87.448  ICD-9-CM: V13.09   6/13/2018 Yes             Generalized weakness ICD-10-CM: R53.1  ICD-9-CM: 780.79   6/13/2018 Yes             Impaired mobility and ADLs ICD-10-CM: Z74.09  ICD-9-CM: 799.89   6/13/2018 Yes              Background:                        Past Medical History:        Past Medical History:   Diagnosis Date    Alcohol dependence in remission [facility])       alcohol withdrawal discharged 05/31/2013    Bipolar 1 disorder ([facility]      Chronic hepatitis C virus infection ([facility]       prior IV drug use;  Genotype 1a     Chronic obstructive pulmonary disease ([facility]      Depression      Dysphagia 6/14/2018    Essential hypertension      Eustachian tube dysfunction       Dr. Isaiah Spencer Gastroesophageal reflux disease      Gout      History of acute renal failure 6/13/2018    History of atrial fibrillation 6/13/2018     Atrial fibrillation with rapid ventricular response    History of encephalopathy 6/13/2018     Acute metabolic encephalopathy    Low serum high density lipoprotein (HDL) 6/14/2018     HDL (6/14/2018) = 12    Needle phobia       from prior IV drug abuse    Obesity, Class I, BMI 30-34. 9      Primary osteoarthritis involving multiple joints       knees and back    Psoriasis      Type 2 diabetes mellitus (HCC)       HbA1c (6/14/2018) = 6.9    Vitamin D deficiency 6/28/2018     Vitamin D 25-Hydroxy (6/28/2018) = 13.5                             Patient taking anticoagulants yes                         Patient has a defibrillator: no      Assessment:  ¨ Changes in Assessment throughout shift: none     ¨ Patient has central line: no Reasons if yes: Insertion date: Last dressing date:  ¨ Patient has Weston Cath: no Reasons if yes:     Insertion date:     ¨ Last Vitals:             Vitals:     07/04/18 1600 07/04/18 2152 07/05/18 0633 07/05/18 0745   BP: 147/83 133/81 145/78 139/85   Pulse: 88 80 62 60   Resp: 17 20   18   Temp: 97.8 °F (36.6 °C) 98.6 °F (37 °C)   98.7 °F (37.1 °C)   SpO2: 96% 94%   96%   Weight:           Height:                 · PAIN                                              Pain Assessment                                              Pain Intensity 1: 0 (07/05/18 0749) Pain Intensity 1: 2 (12/29/14 1105)                                              Pain Location 1: Wrist Pain Location 1: Abdomen                                              Pain Intervention(s) 1: Medication (see MAR) Pain Intervention(s) 1: Medication (see MAR)  Patient Stated Pain Goal: 0 Patient Stated Pain Goal: 0  ¨ Intervention effective: no    ¨ Other actions taken for pain:      · Skin Assessment  Skin color Skin Color: Appropriate for ethnicity  Condition/Temperature Skin Condition/Temp: Dry, Warm  Integrity Skin Integrity: Wound (add Wound LDA)  Turgor Turgor: Non-tenting  Weekly Pressure Ulcer Documentation  Pressure  Injury Documentation: Pressure Injury Noted-See Wound LDA to Document  Wound Prevention & Protection Methods  Orientation of wound Orientation of Wound Prevention: Posterior  Location of Prevention Location of Wound Prevention: Sacrum/Coccyx, Buttocks  Dressing Present Dressing Present : Yes  Dressing Status Dressing Status: Intact  Wound Offloading Wound Offloading (Prevention Methods): Foam silicone     · INTAKE/OUPUT               Date 07/04/18 0700 - 07/05/18 0659 07/05/18 0700 - 07/06/18 0659   Shift 6953-2175 0072-0600 24 Hour Total 8405-7295 8985-3786 24 Hour Total   I  N  T  A  K  E  P. O. 700   700 240   240      P. O. 700   700 240   240    Shift Total  (mL/kg) 700  (7.7)   700  (7.7) 240  (2.6)   240  (2.6)   O  U  T  P  U  T  Urine  (mL/kg/hr)                  Urine Occurrence(s) 8 x 4 x 12 x 3 x   3 x    Stool                  Stool Occurrence(s) 2 x 0 x 2 x 0 x   0 x    Shift Total  (mL/kg)                  700 240   240   Weight (kg) 91 91 91 91 91 91         Recommendations:  1. Patient needs and requests:Supervised assist with transfers, setup, pain control     2. Diet: Dental soft with thin liquida     3. Pending tests/procedures: none      4.  Functional Level/Equipment: 1 person, supervised assist     5. Estimated Discharge Date: 7/11 Posted on Whiteboard in [address] [place] [address] Room: yes      [facility] Safety Check     A safety check occurred in the patient's room between off going nurse and oncoming nurse listed above.     The safety check included the below items  Area Items   H  High Alert Medications § Verify all high alert medication drips (heparin, PCA, etc.)   E  Equipment § Suction is set up for ALL patients (with may)  § Red plugs utilized for all equipment (IV pumps, etc.)  § WOWs wiped down at end of shift. § Room stocked with oxygen, suction, and other unit-specific supplies   A  Alarms § Bed alarm is set for fall risk patients  § Ensure chair alarm is in place and activated if patient is up in a chair   L  Lines § Check IV for any infiltration  § Weston bag is empty if patient has a Weston   § Tubing and IV bags are labeled   S  Safety § Room is clean, patient is clean, and equipment is clean. § Hallways are clear from equipment besides carts. § Fall bracelet on for fall risk patients  § Ensure room is clear and free of clutter  § Suction is set up for ALL patients (with may)  § Hallways are clear from equipment besides carts.    § Isolation precautions followed, supplies available outside room, sign posted

## 2018-07-06 NOTE — PROGRESS NOTES
Problem: Mobility Impaired (Adult and Pediatric)  Goal: *Acute Goals and Plan of Care (Insert Text)  Physical Therapy Goals  STGs = LTGs  Initiated 2018, updated 2018, and to be accomplished within 14 day(s)  1. Patient will move from supine to sit and sit to supine , scoot up and down and roll side to side in bed with independence. 2.  Patient will transfer from bed to chair and chair to bed with modified independence using the least restrictive device. 3.  Patient will perform sit to stand with modified independence. 4.  Patient will ambulate with supervision for 150 feet with the least restrictive device. 5.  Patient will ascend/descend 3 stairs with bilateral handrail(s) with supervision/set-up. physical Therapy TREATMENT    Patient: Noa Dudley (89 y.o. female)  Date: 2018  Diagnosis: Rhabdomyolisis  Rhabdomyolysis Rhabdomyolysis  Precautions: Aspiration, Fall  Chart, physical therapy assessment, plan of care and goals were reviewed. Time In: 0930  Time Out: 1030  Patient Seen For: Balance activities; Patient education;Transfer training;Gait training  Interrupted treatment time due to pt awaiting breakfast tray. Pt missed one unit of treatment time due to waiting for breakfast tray. Time In: 6820 (pt was speaking with  when PT originally arrived at the room at 1335)  Time Out: 1430  Patient Seen For: Balance activities; Patient education;Transfer training;Gait training; Therapeutic exercises    Pain:  Pt pain was reported as 0/10 pre-treatment. Pt pain was reported as 0/10 post-treatment. Intervention: NA    Patient identified with name and : yes    SUBJECTIVE:     Pt notes she is hungry and her breakfast tray was accidentally dropped this morning so she was waiting for her breakfast.  Pt's CNA reports dietary has been informed and a new tray is on its way. Pt requests assistance for toileting and was helped by PT at start of session.   PT then provided pt with coffee as requested and pt reported she would be agreeable to initiating treatment. Pt declined offer of crackers, pudding, or apple sauce but breakfast arrived shortly after. Pt missed one unit of AM treatment. Pt reports, \"I don't do steps,\" and declines any attempt at stair training during afternoon session. OBJECTIVE DATA SUMMARY:   Objective:     TRANSFERS Daily Assessment    Other: stand step without AD  Transfer Assistance : 4 (Contact guard assistance)  Sit to Stand Assistance: Contact guard assistance   Pt performed stand step transfer w/c <> bedside commode at start of treatment with CGA for balance and safety with pt demonstrating impulsive and compensatory movement pattern utilizing B UEs for support. Pt then participated in sit <> stand training from 21\" mat table performing 1 trial of 10 repetitions and 1 trial of 3 repetitions of sit <> stand with B hands on distal third of B femurs to promote increased B LE functional strengthening with CGA and min manual cues for ant weight shift with sit to stand and min manual cues for increased hip flexion with stand to sit progressing to perform intermittently with supervision. At start of PM session, pt appears rushed after arriving in gym in w/c following conversation with  and notes she needs assistance with toileting. Pt moves quickly and unsafely despite max verbal cues when assisted for transfer w/c <> commode with pt not attending to quality of movement during transfer requiring CGA for balance and safety with pt utilizing B UEs to push up from w/c to stand and reach for arm rests of commode to sit. Pt educated re: safety concerns with moving quickly/rushing at home especially with approaching discharge. GAIT Daily Assessment    Pt ambulated without AD 15 Feet x 4 Repetitions during first treatment session.   Pt ambulates with wide NATALIO with increased lateral weight shifts and decreased B foot clearance with B UEs in high guard requiring close supervision to CGA for balance and safety and max verbal cues for arm swing and foot clearance. During PM session, pt ambulates with RW for one trial of 76 feet and one trial x 90 feet with close supervision and intermittent verbal and tactile cuing for scap add and depression with B UE support on RW. STEPS or STAIRS Daily Assessment    Pt adamantly declines stating, \"I don't do steps. \"       BALANCE Daily Assessment    Sitting - Static: Good (unsupported)  Sitting - Dynamic: Good (unsupported)  Standing - Static: Fair  Standing - Dynamic : Impaired       WHEELCHAIR MOBILITY Daily Assessment    Pt mobilizes in w/c utilizing B UEs and intermittently B LEs with modified independence over level surfaces. THERAPEUTIC EXERCISES Daily Assessment    Seated LE Therapeutic Exercises:  Performed seated without lateral or post support with verbal cuing for posture. 3 Sets of 10 Repetitions  Isometric Hip Add x 5\" Holds  Hip Abd/ER with red theraband resistance       ASSESSMENT:  Pt is progressing with functional mobility requiring decreased physical assistance with transfers. However, pt demonstrates poor carryover of strategies and techniques to promote improved functional strength. Progression toward goals:  []      Improving appropriately and progressing toward goals  [x]      Improving slowly and progressing toward goals  []      Not making progress toward goals and plan of care will be adjusted     PLAN:  Patient continues to benefit from skilled intervention to address the above impairments. Continue treatment per established plan of care. Discharge Recommendations:  Home Health Physical Therapy and 24 hour supervision/supplemental assistance upon d/c. Further Equipment Recommendations for Discharge:  rolling walker     Estimated LOS: 7/11/18    Activity Tolerance:   Good  Please refer to the flowsheet for vital signs taken during this treatment.     After treatment:   Patient left seated in no apparent distress in w/c in gym awaiting OT treatment after first treatment and self propelling w/c from gym to SLP office following second treatment session.       Sandeep Scales PT, DPT  7/6/2018

## 2018-07-06 NOTE — PROGRESS NOTES
conducted a Follow up consultation and Spiritual Assessment for Maurilio Johnson, who is a 59 y.o.,female. The  provided the following Interventions:  Continued the relationship of care and support. Listened empathically. Offered prayer and assurance of continued prayer on patients behalf. Chart reviewed. The following outcomes were achieved:  Patient expressed gratitude for pastoral care visit. Assessment:  There are no further spiritual or [employer] issues which require Spiritual Care Services interventions at this time. Plan:  Chaplains will continue to follow and will provide pastoral care on an as needed/requested basis.  recommends bedside caregivers page  on duty if patient shows signs of acute spiritual or emotional distress.    Dania Rosado, [facility]  Spiritual Care  [phone]

## 2018-07-06 NOTE — PROGRESS NOTES
Problem: Self Care Deficits Care Plan (Adult)  Goal: *Therapy Goal (Edit Goal, Insert Text)  Occupational Therapy Goals   Long Term Goals = Short Term Goals  Initiated 2018 and to be accomplished within 2 week(s) 2018  1. Pt will perform self-feeding with (I). 2. Pt will perform grooming with (I) to MI.  3. Pt will perform UB bathing with MI.  4. Pt will perform LB bathing with MI.  5. Pt will perform tub/shower transfer with MI.   6. Pt will perform UB dressing with MI.  7. Pt will perform LB dressing with MI.  8. Pt will perform toileting task with MI.  9. Pt will perform toilet transfer with MI.  8. Pt will perform IADL home mgt (ie laundry, laundry folding, medication mgt) with MI.  11. Pt will perform IADL meal prep with MI. Short Term Goals   1. Pt will perform self-feeding with (I). 2. Pt will perform grooming with (I) to MI.  3. Pt will perform UB bathing with Supervision. 4. Pt will perform LB bathing with Supervision. 5. Pt will perform tub/shower transfer with Supervision . 6. Pt will perform UB dressing with Setup. 7. Pt will perform LB dressing with Supervision. 8. Pt will perform toileting task with Supervision. 9. Pt will perform toilet transfer with Supervision. 10. Pt will perform IADL home mgt (ie laundry, laundry folding, medication mgt) with Supervision. 11. Pt will perform IADL meal prep with Supervision. Occupational Therapy TREATMENT    Patient: Ajit Fowler   59 y.o. Patient identified with name and : Yes    Date: 2018    First Tx Session  Time In: 1033  Time Out[de-identified] 8429    Diagnosis: Rhabdomyolisis  Rhabdomyolysis   Precautions: Aspiration, Fall  Chart, occupational therapy assessment, plan of care, and goals were reviewed. Pain:  Pt reports 0/10 pain or discomfort prior to treatment. Pt reports 0/10 pain or discomfort post treatment.    Intervention Provided: none required      SUBJECTIVE:   Patient stated My feet feels so much better.     OBJECTIVE DATA SUMMARY:     THERAPEUTIC ACTIVITY Daily Assessment    Visuomotor coloring activity with color utensil to restore fine motor endurance for preparation of writing important documents. Require increase time to complete visuomotor activity. GROOMING Daily Assessment    Grooming  Grooming Assistance : 6 (Modified independent)  Comments: MI for hand hygiene post void of bladder/bowels seated in wc. Foot spa with mild soapy water to soak BLEs feet inside water basin for approx 30 mins. Patient able to cross leg to dry bilateral LEs to rude dry skin. Application of oil to hydrate BLE feet. Proceeded to don non skid socks, please review below for level of function. TOILETING Daily Assessment    Toileting  Toileting Assistance (FIM Score):  (CGA/Supervision)  Cues: Verbal cues provided  Adaptive Equipment: Grab bars     LOWER BODY DRESSING Daily Assessment    Lower Body Dressing   Dressing Assistance : 5 (Supervision) (Setup)  Leg Crossed Method Used: Yes  Position Performed: Seated in chair  Comments: Doff and don non skid socks Setup via cross leg method. MOBILITY/TRANSFERS Daily Assessment    Functional Transfers  Toilet Transfer : Grab bars  Amount of Assistance Required:  (CGA/Supervision)       ASSESSMENT:  Patient is making good progress with incorporating right dominant hand into activity to restore fine motor and visuomotor activities. Patient has handicap accessible apartment with grab bars thus toilet training is beneficial with practice of grab bars. Progression toward goals:  []          Improving appropriately and progressing toward goals  [x]          Improving slowly and progressing toward goals  []          Not making progress toward goals and plan of care will be adjusted     PLAN:  Patient continues to benefit from skilled intervention to address the above impairments. Continue treatment per established plan of care.   Discharge Recommendations:  Home Health  Further Equipment Recommendations for Discharge:  bedside commode     Activity Tolerance:  Fair      Estimated LOS:July 11, 2018     Please refer to the flowsheet for vital signs taken during this treatment. After treatment:   [x]  Patient left in no apparent distress sitting up in wheelchair   []  Patient left in no apparent distress in bed  [x]  Call bell left within reach  [x]  Nursing notified  []  Caregiver present  []  Bed alarm activated    COMMUNICATION/EDUCATION:   [] Home safety education was provided and the patient/caregiver indicated understanding. [x] Patient/family have participated as able in goal setting and plan of care. [x] Patient/family agree to work toward stated goals and plan of care. [] Patient understands intent and goals of therapy, but is neutral about his/her participation. [] Patient is unable to participate in goal setting and plan of care.       Opal Killian MS OTR/L

## 2018-07-06 NOTE — PROGRESS NOTES
Christina spoke with pt's  at insurance who states that pt is approved with dc on 7/11. Christina spoke with pt's daughter regarding dc planning for 7/11. Daughter is concerned about a dc to home. Sw coordinated the pt, daughter and therapist to discuss needs and concerns on Sat at 8. Daughter agreed to discuss with pt and family this weekend and advise sw of family/pt preference on Monday. Sw will follow. Sw called to pt's room. Found pt on the phone with her daughter. Pt asked that daughter remain on the phone and listen to conversation. Sw reviewed above conversation. Pt states preference to go home. Sw will follow on Monday.

## 2018-07-06 NOTE — PROGRESS NOTES
[facility] PHYSICAL REHABILITATION  [address] [place] [address], [address]     INPATIENT REHABILITATION  DAILY PROGRESS NOTE     Date: 7/6/2018    Name: Jorge A Acevedo Age / Sex: 59 y.o. / female   CSN: [id number] MRN: [id number]   Admit Date: 6/27/2018 Length of Stay: 9 days     Primary Rehab Diagnosis: Impaired Mobility and ADLs secondary to Rhabdomyolysis      Subjective:     Patient seen and examined. Blood pressure controlled. Blood sugars controlled. Patient's Complaint:   No significant medical complaints     Pain Control: no current joint or muscle symptoms, essentially pain-free      Objective:     Vital Signs:  Patient Vitals for the past 24 hrs:   BP Temp Pulse Resp SpO2   07/06/18 0746 (!) 135/91 98.6 °F (37 °C) 74 18 96 %   07/06/18 0608 131/78 - 68 - -   07/05/18 2223 125/74 97.8 °F (36.6 °C) 67 - 95 %   07/05/18 1556 131/85 99.4 °F (37.4 °C) 77 18 96 %        Physical Examination:  GENERAL SURVEY: Patient is awake, alert, oriented x 3, sitting comfortably on the chair, not in acute respiratory distress. HEENT: pink palpebral conjunctivae, anicteric sclerae, no nasoaural discharge, moist oral mucosa  NECK: supple, no jugular venous distention, no palpable lymph nodes  CHEST/LUNGS: symmetrical chest expansion, good air entry, clear breath sounds  HEART: adynamic precordium, good S1 S2, no S3, regular rhythm, no murmurs  ABDOMEN: obese, bowel sounds appreciated, soft, non-tender  EXTREMITIES: pink nailbeds, (+) bipedal edema, full and equal pulses, no calf tenderness   NEUROLOGICAL EXAM: The patient is awake, alert and oriented x3, able to answer questions fairly appropriately, able to follow 1 and 2 step commands. Able to tell time from the wall clock. Cranial nerves II-XII are grossly intact. No gross sensory deficit. Motor strength is 4/5 on BUE, 4/5 on BLE.       Current Medications:  Current Facility-Administered Medications   Medication Dose Route Frequency    amLODIPine (NORVASC) tablet 5 mg  5 mg Oral DAILY    losartan (COZAAR) tablet 50 mg  50 mg Oral DAILY    metFORMIN (GLUCOPHAGE) tablet 500 mg  500 mg Oral DAILY WITH BREAKFAST    cholecalciferol (VITAMIN D3) capsule 5,000 Units  5,000 Units Oral DAILY    acetaminophen (TYLENOL) tablet 650 mg  650 mg Oral Q4H PRN    bisacodyl (DULCOLAX) tablet 10 mg  10 mg Oral Q48H PRN    insulin lispro (HUMALOG) injection   SubCUTAneous TIDAC    aspirin chewable tablet 81 mg  81 mg Oral DAILY WITH BREAKFAST    vitamin B complex tablet  1 Tab Oral DAILY    pantoprazole (PROTONIX) tablet 40 mg  40 mg Oral ACB    heparin (porcine) injection 5,000 Units  5,000 Units SubCUTAneous Q8H    albuterol (PROVENTIL VENTOLIN) nebulizer solution 2.5 mg  2.5 mg Nebulization Q4H PRN    colchicine (MITIGARE) capsule 0.6 mg  0.6 mg Oral DAILY       Allergies: Allergies   Allergen Reactions    Ace Inhibitors Cough    Penicillins Hives       Lab/Data Review:  Recent Results (from the past 24 hour(s))   GLUCOSE, POC    Collection Time: 07/05/18  4:23 PM   Result Value Ref Range    Glucose (POC) 94 70 - 110 mg/dL   GLUCOSE, POC    Collection Time: 07/05/18  9:33 PM   Result Value Ref Range    Glucose (POC) 119 (H) 70 - 110 mg/dL   GLUCOSE, POC    Collection Time: 07/06/18  7:14 AM   Result Value Ref Range    Glucose (POC) 90 70 - 110 mg/dL       Estimated Glomerular Filtration Rate:  On admission, estimated GFR based on a Creatinine of 0.90 mg/dl:   Using CKD-EPI = 81.1 mL/min/1.73m2   Using MDRD = 82.4 mL/min/1.73m2  Most recent estimated GFR, based on a Creatinine of 1.09 mg/dl on 7/5/2018:   Using CKD-EPI = 64.3 mL/min/1.73m2   Using MDRD = 66.1 mL/min/1.73m2       Assessment:     Primary Rehabilitation Diagnosis  1.  Impaired Mobility and ADLs  2. Rhabdomyolysis     Comorbidities   Chronic hepatitis C virus infection  B18.2    Bipolar 1 disorder  F31.9    Depression F32.9    Alcohol dependence in remission  F10.21    Needle phobia F40.298    Eustachian tube dysfunction H69.80    Psoriasis L40.9    History of atrial fibrillation Z86.79    History of encephalopathy Z86.69    Primary osteoarthritis involving multiple joints M15.0    Essential hypertension I10    Gastroesophageal reflux disease K21.9    Type 2 diabetes mellitus  E11.9    Chronic obstructive pulmonary disease  J44.9    History of acute renal failure Z87.448    Hypoalbuminemia E88.09    Low serum high density lipoprotein (HDL) R74.8    Obesity, Class I, BMI 30-34.9 E66.9    Dysphagia R13.10    Vitamin D deficiency E55.9    Gout M10.9        Plan:     1. Justification for continued stay: Good progression towards established rehabilitation goals. 2. Medical Issues being followed closely:    [x]  Fall and safety precautions     []  Wound Care     [x]  Bowel and Bladder Function     [x]  Fluid Electrolyte and Nutrition Balance     []  Pain Control      3. Issues that 24 hour rehabilitation nursing is following:    [x]  Fall and safety precautions     []  Wound Care     [x]  Bowel and Bladder Function     [x]  Fluid Electrolyte and Nutrition Balance     []  Pain Control      [x]  Assistance with and education on in-room safety with transfers to and from the bed, wheelchair, toilet and shower. 4. Acute rehabilitation plan of care:    [x]  Continue current care and rehab. [x]  Physical Therapy           [x]  Occupational Therapy           [x]  Speech Therapy     []  Hold Rehab until further notice     5. Medications:    [x]  MAR Reviewed     [x]  Continue Present Medications     6. DVT Prophylaxis:      []  Lovenox     [x]  Unfractionated Heparin     []  Coumadin     []  NOAC     [x]  EFRAÍN Stockings     [x]  Sequential Compression Device     []  None     7. Orders:   > Rhabdomyolysis; History of acute renal failure; History of atrial fibrillation with rapid ventricular response;  History of encephalopathy   > CK (6/13/2018, on admission to [facility]) = 9254   > CK (6/21/2018) = 183   > BUN/Creatinine (6/13/2018, on admission to [facility]) = 128/2.42   > BUN/Creatinine (6/19/2018) = 11/0.86   > BUN/Creatinine (6/28/2018, on admission to ARU) = 10/0.90    > BUN/Creatinine (7/5/2018) = 13/1.09     > Dysphagia   > Speech and Language Pathology to evaluate and treat     > Gout   > Patient stated she has gout and that during her stay at [facility], she had pain great toes of both feet   > On 6/24/2018, patient was started on Colchicine 0.6 mg PO BID   > Upon admission to the ARU, patient stated the pain has improved significantly   > Uric acid (6/28/2018) = 5.7   > On 6/28/2018, decreased Colchicine from 0.6 mg PO BID to 0.6 mg PO once daily    > Continue Colchicine 0.6 mg PO once daily     > Essential hypertension   > Prior to admission to [facility], patient was on:    > Chlorthalidone 25 mg PO once daily    > Spironolactone 25 mg PO once daily    > Clonidine 0.2 mg PO BID    > Losartan-HCTZ 50-12.5 1 tab PO once daily   > During the patient's stay at [facility], all above antihypertensive medications were put on hold   > Upon discharge from [facility], all above antihypertensives were resumed as per discharge summary   > On 7/3/2018, increased Losartan from 25 mg to 50 mg PO once daily (9AM)   > On 7/4/2018, added Amlodipine 5 mg PO once daily (6AM)   > Continue:    > Amlodipine 5 mg PO once daily (6AM)    > Losartan 50 mg PO once daily (9AM)    > Type 2 diabetes mellitus   > Prior to admission to [facility], patient was on Metformin 500 mg PO BID with meals   > HbA1c (6/14/2018) = 6.9   > During the patient's stay at [facility], Metformin was put on hold (re: acute renal failure)   > Upon discharge from [facility], resumed Metformin 500 mg PO BID with meals as per discharge summary   > Continue:    > Metformin 500 mg PO once daily with breakfast    > Humalog insulin sliding scale SC TID AC only    > Vitamin D Deficiency   > Vitamin D 25-Hydroxy (6/28/2018) = 13.5   > On 6/28/2018, patient was given Cholecalciferol 50,000 units PO x 1 dose    > On 6/29/2018, started Cholecalciferol 5,000 units PO once daily   > Continue Cholecalciferol 5,000 units PO once daily    > Urinary frequency   > Urinalysis (7/3/2018): ~WNL    > On 6/28/2018, patient stated she had 4 bowel movements since AM   > Discontinued Docusate sodium 100 mg PO BID      8. Patient's progress in rehabilitation and medical issues discussed with the patient. All questions answered to the best of my ability. Care plan discussed with patient and nurse.       Signed:    Marcie Mix MD    July 6, 2018

## 2018-07-06 NOTE — PROGRESS NOTES
Problem: Neurolinguistics Impaired (Adult)  Goal: *Speech Goal: (INSERT TEXT)  Long term goals:  1. Patient will use strategies of slowed reate, over-articulation and increased loudness to improve speech production, supervision. 2. Patient will be 100% intelligible in casual conversation, supervision. 3. Patient will recall 3 words after 5 minutes in short term memory task, supervision. 4. Patient will perform functional problem solving and reasoning tasks with 90% accuracy. 5. Patient will write single words and short sentences with 90% accuracy and legibility. Short term goals  (by 7/5/18):  1. Patient will use strategies of slowed reate, over-articulation and increased loudness to improve speech production, min cues. 2. Patient will be 90% intelligible in production of polysyllabic words and phrases, supervision. 3. Patient will be 80% intelligible in casual conversation, min cues for use of strategies. 4. Patient will recall 3 words after 5 minutes in short term memory task, min assist-supervision. 5. Patient will perform functional problem solving and reasoning tasks with 80-90% accuracy. 6. Patient will write single words with 75-80% accuracy and legibility. Speech language pathology treatment    Patient: Kathleen Carrion (34 y.o. female)  Date: 7/6/2018  Diagnosis: Rhabdomyolisis  Rhabdomyolysis Rhabdomyolysis       SUBJECTIVE:   Patient stated After you, I'm going to take a nap. OBJECTIVE:   Mental Status:  Mrs. Jody Ellison was awake, alert and engaged throughout today's sessions. Treatment & Interventions: In two speech therapy sessions, morning and afternoon, the following treatment tasks were presented:   Motor Speech:   5 syllable words:  87% accuracy  Sentence productions: 80% accuracy (with modeled intonation)  Conversation:   80% intelligibility/functional intonation    Neuro-Linguistics:   Orientation:   Independent  Recent memory:  Independent  \"Scattergories\":  80% accuracy providing words in categories with specific 1st letter  Divergent thinking:   Superstitions:   Patient named 7   Things that don't talk back: 9 items provided, more with cues    Response & Tolerance to Activities:  Mrs. Bozena Alex consistently works hard in treatment sessions and is very motivated to improve her speech production. Pain:  Pain Scale 1: Numeric (0 - 10)  No report of pain     After treatment:   [x]       Patient left in no apparent distress sitting up in chair  []       Patient left in no apparent distress in bed  [x]       Call bell left within reach  [x]       Nursing notified  []       Caregiver present  []       Bed alarm activated    ASSESSMENT:   Progression toward goals:  [x]       Improving appropriately and progressing toward goals  []       Improving slowly and progressing toward goals  []       Not making progress toward goals and plan of care will be adjusted    PLAN:   Patient continues to benefit from skilled intervention to address the above impairments. Continue treatment per established plan of care.   Discharge Recommendations:  Home Health    Estimated LOS: Through 7/11/18  ALLISON Boo  Time Calculation:  80 Minutes

## 2018-07-06 NOTE — ROUTINE PROCESS
[facility]     Bedside and Verbal shift change report given to Zaira Mckeon RN(oncoming nurse)  By Bismark Bar RN      (offgoing nurse). Report included the following information SBAR, Kardex, MAR and Recent Results.     Situation:                        Code Status: Full Code                        Reason for Admission: Rhabdomyolysis  Hospital Day: 8                        Problem List:   Hospital Problems  Date Reviewed: 7/4/2018                         Codes Class Noted POA      Vitamin D deficiency (Chronic) ICD-10-CM: E55.9  ICD-9-CM: 647. 9   6/28/2018 Yes      Overview Signed 6/28/2018  1:12 PM by Suly Mayorga MD        Vitamin D 25-Hydroxy (6/28/2018) = 13.5                 Gout (Chronic) ICD-10-CM: M10.9  ICD-9-CM: 274.9   Unknown Yes             Essential hypertension (Chronic) ICD-10-CM: I10  ICD-9-CM: 401.9   Unknown Yes             Type 2 diabetes mellitus (HCC) (Chronic) ICD-10-CM: E11.9  ICD-9-CM: 250.00   Unknown Yes      Overview Signed 6/27/2018 10:18 PM by Suly Mayorga MD        HbA1c (6/14/2018) = 6.9                Dysphagia ICD-10-CM: R13.10  ICD-9-CM: 787.20   6/14/2018 Yes             History of atrial fibrillation ICD-10-CM: Z86.79  ICD-9-CM: V12.59   6/13/2018 Yes      Overview Signed 6/27/2018 10:23 PM by Suly Mayorga MD        Atrial fibrillation with rapid ventricular response                * (Principal)Rhabdomyolysis ICD-10-CM: M62.82  ICD-9-CM: 728.88   6/13/2018 Yes             History of encephalopathy ICD-10-CM: Z86.69  ICD-9-CM: V12.49   6/13/2018 Yes      Overview Signed 6/27/2018 10:21 PM by Suly Mayorga MD        Acute metabolic encephalopathy                History of acute renal failure ICD-10-CM: Z87.448  ICD-9-CM: V13.09   6/13/2018 Yes             Generalized weakness ICD-10-CM: R53.1  ICD-9-CM: 780.79   6/13/2018 Yes             Impaired mobility and ADLs ICD-10-CM: Z74.09  ICD-9-CM: 799.89   6/13/2018 Yes              Background:                        Past Medical History:        Past Medical History:   Diagnosis Date    Alcohol dependence in remission [facility])       alcohol withdrawal discharged 05/31/2013    Bipolar 1 disorder ([facility]      Chronic hepatitis C virus infection ([facility]       prior IV drug use;  Genotype 1a     Chronic obstructive pulmonary disease ([facility]      Depression      Dysphagia 6/14/2018    Essential hypertension      Eustachian tube dysfunction       Dr. Abena Hays Gastroesophageal reflux disease      Gout      History of acute renal failure 6/13/2018    History of atrial fibrillation 6/13/2018     Atrial fibrillation with rapid ventricular response    History of encephalopathy 6/13/2018     Acute metabolic encephalopathy    Low serum high density lipoprotein (HDL) 6/14/2018     HDL (6/14/2018) = 12    Needle phobia       from prior IV drug abuse    Obesity, Class I, BMI 30-34. 9      Primary osteoarthritis involving multiple joints       knees and back    Psoriasis      Type 2 diabetes mellitus (HCC)       HbA1c (6/14/2018) = 6.9    Vitamin D deficiency 6/28/2018     Vitamin D 25-Hydroxy (6/28/2018) = 13.5                             Patient taking anticoagulants yes                         Patient has a defibrillator: no      Assessment:  ¨ Changes in Assessment throughout shift: none     ¨ Patient has central line: no Reasons if yes: Insertion date: Last dressing date:  ¨ Patient has Weston Cath: no Reasons if yes:     Insertion date:     ¨ Last Vitals:             Vitals:     07/04/18 1600 07/04/18 2152 07/05/18 0633 07/05/18 0745   BP: 147/83 133/81 145/78 139/85   Pulse: 88 80 62 60   Resp: 17 20   18   Temp: 97.8 °F (36.6 °C) 98.6 °F (37 °C)   98.7 °F (37.1 °C)   SpO2: 96% 94%   96%   Weight:           Height:                 · PAIN                                              Pain Assessment                                              Pain Intensity 1: 0 (07/05/18 0749) Pain Intensity 1: 2 (12/29/14 1105)                                              Pain Location 1: Wrist Pain Location 1: Abdomen                                              Pain Intervention(s) 1: Medication (see MAR) Pain Intervention(s) 1: Medication (see MAR)  Patient Stated Pain Goal: 0 Patient Stated Pain Goal: 0  ¨ Intervention effective: no    ¨ Other actions taken for pain:      · Skin Assessment  Skin color Skin Color: Appropriate for ethnicity  Condition/Temperature Skin Condition/Temp: Dry, Warm  Integrity Skin Integrity: Wound (add Wound LDA)  Turgor Turgor: Non-tenting  Weekly Pressure Ulcer Documentation  Pressure  Injury Documentation: Pressure Injury Noted-See Wound LDA to Document  Wound Prevention & Protection Methods  Orientation of wound Orientation of Wound Prevention: Posterior  Location of Prevention Location of Wound Prevention: Sacrum/Coccyx, Buttocks  Dressing Present Dressing Present : Yes  Dressing Status Dressing Status: Intact  Wound Offloading Wound Offloading (Prevention Methods): Foam silicone     · INTAKE/OUPUT               Date 07/04/18 0700 - 07/05/18 0659 07/05/18 0700 - 07/06/18 0659   Shift 0700-1859 8156-7449 24 Hour Total 0700-1859 6281-1980 24 Hour Total   I  N  T  A  K  E  P. O. 700   700 240   240      P. O. 700   700 240   240    Shift Total  (mL/kg) 700  (7.7)   700  (7.7) 240  (2.6)   240  (2.6)   O  U  T  P  U  T  Urine  (mL/kg/hr)                  Urine Occurrence(s) 8 x 4 x 12 x 3 x   3 x    Stool                  Stool Occurrence(s) 2 x 0 x 2 x 0 x   0 x    Shift Total  (mL/kg)                  700 240   240   Weight (kg) 91 91 91 91 91 91         Recommendations:  1. Patient needs and requests:Supervised assist with transfers, setup, pain control     2. Diet: Dental soft with thin liquida     3. Pending tests/procedures: none      4.  Functional Level/Equipment: 1 person, supervised assist     5. Estimated Discharge Date: 7/11 Posted on Whiteboard in [facility] Room: yes      [facility] Safety Check     A safety check occurred in the patient's room between off going nurse and oncoming nurse listed above.     The safety check included the below items  Area Items   H  High Alert Medications § Verify all high alert medication drips (heparin, PCA, etc.)   E  Equipment § Suction is set up for ALL patients (with may)  § Red plugs utilized for all equipment (IV pumps, etc.)  § WOWs wiped down at end of shift. § Room stocked with oxygen, suction, and other unit-specific supplies   A  Alarms § Bed alarm is set for fall risk patients  § Ensure chair alarm is in place and activated if patient is up in a chair   L  Lines § Check IV for any infiltration  § Weston bag is empty if patient has a Weston   § Tubing and IV bags are labeled   S  Safety § Room is clean, patient is clean, and equipment is clean. § Hallways are clear from equipment besides carts. § Fall bracelet on for fall risk patients  § Ensure room is clear and free of clutter  § Suction is set up for ALL patients (with may)  § Hallways are clear from equipment besides carts.    § Isolation precautions followed, supplies available outside room, sign posted

## 2018-07-07 PROCEDURE — 97530 THERAPEUTIC ACTIVITIES: CPT

## 2018-07-07 PROCEDURE — 97110 THERAPEUTIC EXERCISES: CPT

## 2018-07-07 PROCEDURE — 97535 SELF CARE MNGMENT TRAINING: CPT

## 2018-07-07 PROCEDURE — 65310000000 HC RM PRIVATE REHAB

## 2018-07-07 PROCEDURE — 74011250637 HC RX REV CODE- 250/637: Performed by: INTERNAL MEDICINE

## 2018-07-07 PROCEDURE — 74011250636 HC RX REV CODE- 250/636: Performed by: INTERNAL MEDICINE

## 2018-07-07 RX ORDER — LOSARTAN POTASSIUM 25 MG/1
25 TABLET ORAL ONCE
Status: COMPLETED | OUTPATIENT
Start: 2018-07-07 | End: 2018-07-07

## 2018-07-07 RX ORDER — LOSARTAN POTASSIUM 50 MG/1
100 TABLET ORAL DAILY
Status: DISCONTINUED | OUTPATIENT
Start: 2018-07-08 | End: 2018-07-09

## 2018-07-07 RX ADMIN — ASPIRIN 81 MG CHEWABLE TABLET 81 MG: 81 TABLET CHEWABLE at 08:38

## 2018-07-07 RX ADMIN — LOSARTAN POTASSIUM 50 MG: 50 TABLET ORAL at 08:38

## 2018-07-07 RX ADMIN — LOSARTAN POTASSIUM 25 MG: 25 TABLET ORAL at 11:54

## 2018-07-07 RX ADMIN — AMLODIPINE BESYLATE 5 MG: 5 TABLET ORAL at 06:23

## 2018-07-07 RX ADMIN — COLCHICINE 0.6 MG: 0.6 CAPSULE ORAL at 08:38

## 2018-07-07 RX ADMIN — PANTOPRAZOLE SODIUM 40 MG: 40 TABLET, DELAYED RELEASE ORAL at 06:31

## 2018-07-07 RX ADMIN — HEPARIN SODIUM 5000 UNITS: 5000 INJECTION, SOLUTION INTRAVENOUS; SUBCUTANEOUS at 21:58

## 2018-07-07 RX ADMIN — CHOLECALCIFEROL CAP 125 MCG (5000 UNIT) 5000 UNITS: 125 CAP at 08:38

## 2018-07-07 RX ADMIN — HEPARIN SODIUM 5000 UNITS: 5000 INJECTION, SOLUTION INTRAVENOUS; SUBCUTANEOUS at 06:24

## 2018-07-07 RX ADMIN — METFORMIN HYDROCHLORIDE 500 MG: 500 TABLET ORAL at 08:38

## 2018-07-07 RX ADMIN — ACETAMINOPHEN 650 MG: 325 TABLET, FILM COATED ORAL at 15:53

## 2018-07-07 RX ADMIN — Medication 1 TABLET: at 08:38

## 2018-07-07 RX ADMIN — HEPARIN SODIUM 5000 UNITS: 5000 INJECTION, SOLUTION INTRAVENOUS; SUBCUTANEOUS at 13:24

## 2018-07-07 NOTE — PROGRESS NOTES
Patient refused fingerstick this am and last night, stating her fingers are too sore. Patient has not required sliding scale insulin during admission. Is on Meformin.  Will notify MD.

## 2018-07-07 NOTE — PROGRESS NOTES
[facility] PHYSICAL REHABILITATION  [address] [place] [address], [facility], [address]     INPATIENT REHABILITATION  DAILY PROGRESS NOTE     Date: 7/7/2018    Name: Angelina Graham Age / Sex: 59 y.o. / female   CSN: [id number] MRN: [id number]   Admit Date: 6/27/2018 Length of Stay: 10 days     Primary Rehab Diagnosis: Impaired Mobility and ADLs secondary to Rhabdomyolysis      Subjective:     No new issues or problems reported except for patient refusing blood sugar checks because her fingers are sore. Blood pressure fairly controlled. Blood sugars controlled. Objective:     Vital Signs:  Patient Vitals for the past 24 hrs:   BP Temp Pulse Resp SpO2   07/07/18 0827 (!) 159/94 98.3 °F (36.8 °C) 71 16 98 %   07/07/18 0623 125/84 - - - -   07/06/18 2200 140/86 98.9 °F (37.2 °C) 84 18 98 %   07/06/18 1600 139/84 99.3 °F (37.4 °C) 80 16 97 %        Current Medications:  Current Facility-Administered Medications   Medication Dose Route Frequency    amLODIPine (NORVASC) tablet 5 mg  5 mg Oral DAILY    losartan (COZAAR) tablet 50 mg  50 mg Oral DAILY    metFORMIN (GLUCOPHAGE) tablet 500 mg  500 mg Oral DAILY WITH BREAKFAST    cholecalciferol (VITAMIN D3) capsule 5,000 Units  5,000 Units Oral DAILY    acetaminophen (TYLENOL) tablet 650 mg  650 mg Oral Q4H PRN    bisacodyl (DULCOLAX) tablet 10 mg  10 mg Oral Q48H PRN    insulin lispro (HUMALOG) injection   SubCUTAneous TIDAC    aspirin chewable tablet 81 mg  81 mg Oral DAILY WITH BREAKFAST    vitamin B complex tablet  1 Tab Oral DAILY    pantoprazole (PROTONIX) tablet 40 mg  40 mg Oral ACB    heparin (porcine) injection 5,000 Units  5,000 Units SubCUTAneous Q8H    albuterol (PROVENTIL VENTOLIN) nebulizer solution 2.5 mg  2.5 mg Nebulization Q4H PRN    colchicine (MITIGARE) capsule 0.6 mg  0.6 mg Oral DAILY       Allergies:   Allergies   Allergen Reactions    Ace Inhibitors Cough    Penicillins Hives       Lab/Data Review:  Recent Results (from the past 24 hour(s))   GLUCOSE, POC    Collection Time: 07/06/18 11:34 AM   Result Value Ref Range    Glucose (POC) 93 70 - 110 mg/dL   GLUCOSE, POC    Collection Time: 07/06/18  5:00 PM   Result Value Ref Range    Glucose (POC) 84 70 - 110 mg/dL       Estimated Glomerular Filtration Rate:  On admission, estimated GFR based on a Creatinine of 0.90 mg/dl:   Using CKD-EPI = 81.1 mL/min/1.73m2   Using MDRD = 82.4 mL/min/1.73m2  Most recent estimated GFR, based on a Creatinine of 1.09 mg/dl on 7/5/2018:   Using CKD-EPI = 64.3 mL/min/1.73m2   Using MDRD = 66.1 mL/min/1.73m2       Assessment:     Primary Rehabilitation Diagnosis  1. Impaired Mobility and ADLs  2. Rhabdomyolysis     Comorbidities   Chronic hepatitis C virus infection  B18.2    Bipolar 1 disorder  F31.9    Depression F32.9    Alcohol dependence in remission  F10.21    Needle phobia F40.298    Eustachian tube dysfunction H69.80    Psoriasis L40.9    History of atrial fibrillation Z86.79    History of encephalopathy Z86.69    Primary osteoarthritis involving multiple joints M15.0    Essential hypertension I10    Gastroesophageal reflux disease K21.9    Type 2 diabetes mellitus  E11.9    Chronic obstructive pulmonary disease  J44.9    History of acute renal failure Z87.448    Hypoalbuminemia E88.09    Low serum high density lipoprotein (HDL) R74.8    Obesity, Class I, BMI 30-34.9 E66.9    Dysphagia R13.10    Vitamin D deficiency E55.9    Gout M10.9        Plan:     1. Justification for continued stay: Good progression towards established rehabilitation goals. 2. Medical Issues being followed closely:    [x]  Fall and safety precautions     []  Wound Care     [x]  Bowel and Bladder Function     [x]  Fluid Electrolyte and Nutrition Balance     []  Pain Control      3.  Issues that 24 hour rehabilitation nursing is following:    [x]  Fall and safety precautions     []  Wound Care     [x]  Bowel and Bladder Function     [x]  Fluid Electrolyte and Nutrition Balance     []  Pain Control      [x]  Assistance with and education on in-room safety with transfers to and from the bed, wheelchair, toilet and shower. 4. Acute rehabilitation plan of care:    [x]  Continue current care and rehab. [x]  Physical Therapy           [x]  Occupational Therapy           [x]  Speech Therapy     []  Hold Rehab until further notice     5. Medications:    [x]  MAR Reviewed     [x]  Continue Present Medications     6. DVT Prophylaxis:      []  Lovenox     [x]  Unfractionated Heparin     []  Coumadin     []  NOAC     [x]  EFRAÍN Stockings     [x]  Sequential Compression Device     []  None     7. Orders:   > Rhabdomyolysis; History of acute renal failure; History of atrial fibrillation with rapid ventricular response;  History of encephalopathy   > CK (6/13/2018, on admission to [facility]) = 6954   > CK (6/21/2018) = 183   > BUN/Creatinine (6/13/2018, on admission to [facility]) = 128/2.42   > BUN/Creatinine (6/19/2018) = 11/0.86   > BUN/Creatinine (6/28/2018, on admission to ARU) = 10/0.90    > BUN/Creatinine (7/5/2018) = 13/1.09     > Dysphagia   > Speech and Language Pathology to evaluate and treat     > Gout   > Patient stated she has gout and that during her stay at [facility], she had pain great toes of both feet   > On 6/24/2018, patient was started on Colchicine 0.6 mg PO BID   > Upon admission to the ARU, patient stated the pain has improved significantly   > Uric acid (6/28/2018) = 5.7   > On 6/28/2018, decreased Colchicine from 0.6 mg PO BID to 0.6 mg PO once daily    > Continue Colchicine 0.6 mg PO once daily     > Essential hypertension   > Prior to admission to [facility], patient was on:    > Chlorthalidone 25 mg PO once daily    > Spironolactone 25 mg PO once daily    > Clonidine 0.2 mg PO BID    > Losartan-HCTZ 50-12.5 1 tab PO once daily   > During the patient's stay at [facility], all above antihypertensive medications were put on hold   > Upon discharge from [facility], all above antihypertensives were resumed as per discharge summary   > On 7/3/2018, increased Losartan from 25 mg to 50 mg PO once daily (9AM)   > On 7/4/2018, added Amlodipine 5 mg PO once daily (6AM)   > Continue:    > Amlodipine 5 mg PO once daily (6AM)    > Increase Losartan from 50 mg to 100 mg PO once daily (9AM)    > Type 2 diabetes mellitus   > Prior to admission to [facility], patient was on Metformin 500 mg PO BID with meals   > HbA1c (6/14/2018) = 6.9   > During the patient's stay at [facility], Metformin was put on hold (re: acute renal failure)   > Upon discharge from [facility], resumed Metformin 500 mg PO BID with meals as per discharge summary   > Discontinue Humalog insulin sliding scale SC TID AC only   > Continue Metformin 500 mg PO once daily with breakfast    > Vitamin D Deficiency   > Vitamin D 25-Hydroxy (6/28/2018) = 13.5   > On 6/28/2018, patient was given Cholecalciferol 50,000 units PO x 1 dose    > On 6/29/2018, started Cholecalciferol 5,000 units PO once daily   > Continue Cholecalciferol 5,000 units PO once daily    > Urinary frequency   > Urinalysis (7/3/2018): ~WNL    > On 6/28/2018, patient stated she had 4 bowel movements since AM   > Discontinued Docusate sodium 100 mg PO BID      Signed:    Bharati Lynn MD    July 7, 2018

## 2018-07-07 NOTE — PROGRESS NOTES
Problem: Pressure Injury - Risk of  Goal: *Prevention of pressure injury  Document Ryan Scale and appropriate interventions in the flowsheet. Outcome: Progressing Towards Goal  Pressure Injury Interventions:  Sensory Interventions: Assess changes in LOC    Moisture Interventions: Absorbent underpads    Activity Interventions: Increase time out of bed, Pressure redistribution bed/mattress(bed type)    Mobility Interventions: Pressure redistribution bed/mattress (bed type)    Nutrition Interventions: Document food/fluid/supplement intake    Friction and Shear Interventions: Foam dressings/transparent film/skin sealants               Problem: Falls - Risk of  Goal: *Absence of Falls  Document Radha Fall Risk and appropriate interventions in the flowsheet.    Outcome: Progressing Towards Goal  Fall Risk Interventions:  Mobility Interventions: Bed/chair exit alarm, Patient to call before getting OOB    Mentation Interventions: Adequate sleep, hydration, pain control    Medication Interventions: Assess postural VS orthostatic hypotension    Elimination Interventions: Bed/chair exit alarm, Call light in reach, Patient to call for help with toileting needs    History of Falls Interventions: Bed/chair exit alarm

## 2018-07-07 NOTE — ROUTINE PROCESS
SHIFT CHANGE NOTE FOR [facility]     Bedside and Verbal shift change report given to Josee Ryan RN (oncoming nurse)  By Reuben Chambers LPN   (offgoing nurse). Report included the following information SBAR, Kardex, MAR and Recent Results.     Situation:                        Code Status: Full Code                        Reason for Admission: Rhabdomyolysis  Hospital Day: 8                        Problem List:   Hospital Problems  Date Reviewed: 7/4/2018                         Codes Class Noted POA      Vitamin D deficiency (Chronic) ICD-10-CM: E55.9  ICD-9-CM: 439. 9   6/28/2018 Yes      Overview Signed 6/28/2018  1:12 PM by Mitzie Dance, MD        Vitamin D 25-Hydroxy (6/28/2018) = 13.5                 Gout (Chronic) ICD-10-CM: M10.9  ICD-9-CM: 274.9   Unknown Yes             Essential hypertension (Chronic) ICD-10-CM: I10  ICD-9-CM: 401.9   Unknown Yes             Type 2 diabetes mellitus (HCC) (Chronic) ICD-10-CM: E11.9  ICD-9-CM: 250.00   Unknown Yes      Overview Signed 6/27/2018 10:18 PM by Mitzie Dance, MD        HbA1c (6/14/2018) = 6.9                Dysphagia ICD-10-CM: R13.10  ICD-9-CM: 787.20   6/14/2018 Yes             History of atrial fibrillation ICD-10-CM: Z86.79  ICD-9-CM: V12.59   6/13/2018 Yes      Overview Signed 6/27/2018 10:23 PM by Mitzie Dance, MD        Atrial fibrillation with rapid ventricular response                * (Principal)Rhabdomyolysis ICD-10-CM: M62.82  ICD-9-CM: 728.88   6/13/2018 Yes             History of encephalopathy ICD-10-CM: Z86.69  ICD-9-CM: V12.49   6/13/2018 Yes      Overview Signed 6/27/2018 10:21 PM by Mitzie Dance, MD        Acute metabolic encephalopathy                History of acute renal failure ICD-10-CM: Z87.448  ICD-9-CM: V13.09   6/13/2018 Yes             Generalized weakness ICD-10-CM: R53.1  ICD-9-CM: 780.79   6/13/2018 Yes             Impaired mobility and ADLs ICD-10-CM: Z74.09  ICD-9-CM: 799.89   6/13/2018 Yes                    Background: Past Medical History:        Past Medical History:   Diagnosis Date    Alcohol dependence in remission [facility])       alcohol withdrawal discharged 05/31/2013    Bipolar 1 disorder ([facility]      Chronic hepatitis C virus infection ([facility]       prior IV drug use;  Genotype 1a     Chronic obstructive pulmonary disease ([facility]      Depression      Dysphagia 6/14/2018    Essential hypertension      Eustachian tube dysfunction       Dr. Glenn Alvarado Gastroesophageal reflux disease      Gout      History of acute renal failure 6/13/2018    History of atrial fibrillation 6/13/2018     Atrial fibrillation with rapid ventricular response    History of encephalopathy 6/13/2018     Acute metabolic encephalopathy    Low serum high density lipoprotein (HDL) 6/14/2018     HDL (6/14/2018) = 12    Needle phobia       from prior IV drug abuse    Obesity, Class I, BMI 30-34. 9      Primary osteoarthritis involving multiple joints       knees and back    Psoriasis      Type 2 diabetes mellitus (HCC)       HbA1c (6/14/2018) = 6.9    Vitamin D deficiency 6/28/2018     Vitamin D 25-Hydroxy (6/28/2018) = 13.5                             Patient taking anticoagulants yes                         Patient has a defibrillator: no      Assessment:  ¨ Changes in Assessment throughout shift: none     ¨ Patient has central line: no Reasons if yes: Insertion date: Last dressing date:  ¨ Patient has Weston Cath: no Reasons if yes:     Insertion date:     ¨ Last Vitals:             Vitals:     07/04/18 1600 07/04/18 2152 07/05/18 0633 07/05/18 0745   BP: 147/83 133/81 145/78 139/85   Pulse: 88 80 62 60   Resp: 17 20   18   Temp: 97.8 °F (36.6 °C) 98.6 °F (37 °C)   98.7 °F (37.1 °C)   SpO2: 96% 94%   96%   Weight:           Height:                 · PAIN                                              Pain Assessment                                              Pain Intensity 1: 0 (07/05/18 0749) Pain Intensity 1: 2 (12/29/14 1105) Pain Location 1: Wrist Pain Location 1: Abdomen                                              Pain Intervention(s) 1: Medication (see MAR) Pain Intervention(s) 1: Medication (see MAR)  Patient Stated Pain Goal: 0 Patient Stated Pain Goal: 0  ¨ Intervention effective: no    ¨ Other actions taken for pain:      · Skin Assessment  Skin color Skin Color: Appropriate for ethnicity  Condition/Temperature Skin Condition/Temp: Dry, Warm  Integrity Skin Integrity: Wound (add Wound LDA)  Turgor Turgor: Non-tenting  Weekly Pressure Ulcer Documentation  Pressure  Injury Documentation: Pressure Injury Noted-See Wound LDA to Document  Wound Prevention & Protection Methods  Orientation of wound Orientation of Wound Prevention: Posterior  Location of Prevention Location of Wound Prevention: Sacrum/Coccyx, Buttocks  Dressing Present Dressing Present : Yes  Dressing Status Dressing Status: Intact  Wound Offloading Wound Offloading (Prevention Methods): Foam silicone     · INTAKE/OUPUT               Date 07/04/18 0700 - 07/05/18 0659 07/05/18 0700 - 07/06/18 0659   Shift 8408-4571 1437-1100 24 Hour Total 6546-7582 3865-6193 24 Hour Total   I  N  T  A  K  E  P. O. 700   700 240   240      P. O. 700   700 240   240    Shift Total  (mL/kg) 700  (7.7)   700  (7.7) 240  (2.6)   240  (2.6)   O  U  T  P  U  T  Urine  (mL/kg/hr)                  Urine Occurrence(s) 8 x 4 x 12 x 3 x   3 x    Stool                  Stool Occurrence(s) 2 x 0 x 2 x 0 x   0 x    Shift Total  (mL/kg)                  700 240   240   Weight (kg) 91 91 91 91 91 91         Recommendations:  1. Patient needs and requests:Supervised assist with transfers, setup, pain control     2. Diet: Dental soft with thin liquida     3. Pending tests/procedures: none      4.  Functional Level/Equipment: 1 person, supervised assist     5. Estimated Discharge Date: 7/11 Posted on Whiteboard in Patients Room: yes      [facility] Safety Check     A safety check occurred in the patient's room between off going nurse and oncoming nurse listed above.     The safety check included the below items  Area Items   H  High Alert Medications § Verify all high alert medication drips (heparin, PCA, etc.)   E  Equipment § Suction is set up for ALL patients (with may)  § Red plugs utilized for all equipment (IV pumps, etc.)  § WOWs wiped down at end of shift. § Room stocked with oxygen, suction, and other unit-specific supplies   A  Alarms § Bed alarm is set for fall risk patients  § Ensure chair alarm is in place and activated if patient is up in a chair   L  Lines § Check IV for any infiltration  § Weston bag is empty if patient has a Weston   § Tubing and IV bags are labeled   S  Safety § Room is clean, patient is clean, and equipment is clean. § Hallways are clear from equipment besides carts. § Fall bracelet on for fall risk patients  § Ensure room is clear and free of clutter  § Suction is set up for ALL patients (with may)  § Hallways are clear from equipment besides carts.    § Isolation precautions followed, supplies available outside room, sign posted

## 2018-07-07 NOTE — ROUTINE PROCESS
[facility]     Bedside and Verbal shift change report given to Yobany Bentley RN (oncoming nurse)  By Neeta Rocha RN   (offgoing nurse). Report included the following information SBAR, Kardex, MAR and Recent Results.     Situation:                        Code Status: Full Code                        Reason for Admission: Rhabdomyolysis  Hospital Day: 8                        Problem List:   Hospital Problems  Date Reviewed: 7/4/2018                         Codes Class Noted POA      Vitamin D deficiency (Chronic) ICD-10-CM: E55.9  ICD-9-CM: 914. 9   6/28/2018 Yes      Overview Signed 6/28/2018  1:12 PM by Shayla Camacho MD        Vitamin D 25-Hydroxy (6/28/2018) = 13.5                 Gout (Chronic) ICD-10-CM: M10.9  ICD-9-CM: 274.9   Unknown Yes             Essential hypertension (Chronic) ICD-10-CM: I10  ICD-9-CM: 401.9   Unknown Yes             Type 2 diabetes mellitus (HCC) (Chronic) ICD-10-CM: E11.9  ICD-9-CM: 250.00   Unknown Yes      Overview Signed 6/27/2018 10:18 PM by Shayla Camacho MD        HbA1c (6/14/2018) = 6.9                Dysphagia ICD-10-CM: R13.10  ICD-9-CM: 787.20   6/14/2018 Yes             History of atrial fibrillation ICD-10-CM: Z86.79  ICD-9-CM: V12.59   6/13/2018 Yes      Overview Signed 6/27/2018 10:23 PM by Shayla Camacho MD        Atrial fibrillation with rapid ventricular response                * (Principal)Rhabdomyolysis ICD-10-CM: M62.82  ICD-9-CM: 728.88   6/13/2018 Yes             History of encephalopathy ICD-10-CM: Z86.69  ICD-9-CM: V12.49   6/13/2018 Yes      Overview Signed 6/27/2018 10:21 PM by Shayla Camacho MD        Acute metabolic encephalopathy                History of acute renal failure ICD-10-CM: Z87.448  ICD-9-CM: V13.09   6/13/2018 Yes             Generalized weakness ICD-10-CM: R53.1  ICD-9-CM: 780.79   6/13/2018 Yes             Impaired mobility and ADLs ICD-10-CM: Z74.09  ICD-9-CM: 799.89   6/13/2018 Yes                    Background: Past Medical History:        Past Medical History:   Diagnosis Date    Alcohol dependence in remission [facility])       alcohol withdrawal discharged 05/31/2013    Bipolar 1 disorder ([facility]      Chronic hepatitis C virus infection ([facility]       prior IV drug use;  Genotype 1a     Chronic obstructive pulmonary disease ([facility]      Depression      Dysphagia 6/14/2018    Essential hypertension      Eustachian tube dysfunction       Dr. Ariane Marroquin Gastroesophageal reflux disease      Gout      History of acute renal failure 6/13/2018    History of atrial fibrillation 6/13/2018     Atrial fibrillation with rapid ventricular response    History of encephalopathy 6/13/2018     Acute metabolic encephalopathy    Low serum high density lipoprotein (HDL) 6/14/2018     HDL (6/14/2018) = 12    Needle phobia       from prior IV drug abuse    Obesity, Class I, BMI 30-34. 9      Primary osteoarthritis involving multiple joints       knees and back    Psoriasis      Type 2 diabetes mellitus (HCC)       HbA1c (6/14/2018) = 6.9    Vitamin D deficiency 6/28/2018     Vitamin D 25-Hydroxy (6/28/2018) = 13.5                             Patient taking anticoagulants yes                         Patient has a defibrillator: no      Assessment:  ¨ Changes in Assessment throughout shift: none     ¨ Patient has central line: no Reasons if yes: Insertion date: Last dressing date:  ¨ Patient has Weston Cath: no Reasons if yes:     Insertion date:     ¨ Last Vitals:             Vitals:     07/04/18 1600 07/04/18 2152 07/05/18 0633 07/05/18 0745   BP: 147/83 133/81 145/78 139/85   Pulse: 88 80 62 60   Resp: 17 20   18   Temp: 97.8 °F (36.6 °C) 98.6 °F (37 °C)   98.7 °F (37.1 °C)   SpO2: 96% 94%   96%   Weight:           Height:                 · PAIN                                              Pain Assessment                                              Pain Intensity 1: 0 (07/05/18 0749) Pain Intensity 1: 2 (12/29/14 1105)                                              Pain Location 1: Wrist Pain Location 1: Abdomen                                              Pain Intervention(s) 1: Medication (see MAR) Pain Intervention(s) 1: Medication (see MAR)  Patient Stated Pain Goal: 0 Patient Stated Pain Goal: 0  ¨ Intervention effective: no    ¨ Other actions taken for pain:      · Skin Assessment  Skin color Skin Color: Appropriate for ethnicity  Condition/Temperature Skin Condition/Temp: Dry, Warm  Integrity Skin Integrity: Wound (add Wound LDA)  Turgor Turgor: Non-tenting  Weekly Pressure Ulcer Documentation  Pressure  Injury Documentation: Pressure Injury Noted-See Wound LDA to Document  Wound Prevention & Protection Methods  Orientation of wound Orientation of Wound Prevention: Posterior  Location of Prevention Location of Wound Prevention: Sacrum/Coccyx, Buttocks  Dressing Present Dressing Present : Yes  Dressing Status Dressing Status: Intact  Wound Offloading Wound Offloading (Prevention Methods): Foam silicone     · INTAKE/OUPUT               Date 07/04/18 0700 - 07/05/18 0659 07/05/18 0700 - 07/06/18 0659   Shift 1242-2952 6099-8547 24 Hour Total 4038-4579 4413-4833 24 Hour Total   I  N  T  A  K  E  P. O. 700   700 240   240      P. O. 700   700 240   240    Shift Total  (mL/kg) 700  (7.7)   700  (7.7) 240  (2.6)   240  (2.6)   O  U  T  P  U  T  Urine  (mL/kg/hr)                  Urine Occurrence(s) 8 x 4 x 12 x 3 x   3 x    Stool                  Stool Occurrence(s) 2 x 0 x 2 x 0 x   0 x    Shift Total  (mL/kg)                  700 240   240   Weight (kg) 91 91 91 91 91 91         Recommendations:  1. Patient needs and requests:Supervised assist with transfers, setup, pain control     2. Diet: Dental soft with thin liquida     3. Pending tests/procedures: none      4.  Functional Level/Equipment: 1 person, supervised assist     5. Estimated Discharge Date: 7/11 Posted on Whiteboard in Patients Room: yes      HEALS Safety Check     A safety check occurred in the patient's room between off going nurse and oncoming nurse listed above.     The safety check included the below items  Area Items   H  High Alert Medications § Verify all high alert medication drips (heparin, PCA, etc.)   E  Equipment § Suction is set up for ALL patients (with may)  § Red plugs utilized for all equipment (IV pumps, etc.)  § WOWs wiped down at end of shift. § Room stocked with oxygen, suction, and other unit-specific supplies   A  Alarms § Bed alarm is set for fall risk patients  § Ensure chair alarm is in place and activated if patient is up in a chair   L  Lines § Check IV for any infiltration  § Weston bag is empty if patient has a Weston   § Tubing and IV bags are labeled   S  Safety § Room is clean, patient is clean, and equipment is clean. § Hallways are clear from equipment besides carts. § Fall bracelet on for fall risk patients  § Ensure room is clear and free of clutter  § Suction is set up for ALL patients (with may)  § Hallways are clear from equipment besides carts.    § Isolation precautions followed, supplies available outside room, sign posted

## 2018-07-07 NOTE — PROGRESS NOTES
Problem: Self Care Deficits Care Plan (Adult)  Goal: *Therapy Goal (Edit Goal, Insert Text)  Occupational Therapy Goals   Long Term Goals = Short Term Goals  Initiated 2018 and to be accomplished within 2 week(s) 2018  1. Pt will perform self-feeding with (I). 2. Pt will perform grooming with (I) to MI.  3. Pt will perform UB bathing with MI.  4. Pt will perform LB bathing with MI.  5. Pt will perform tub/shower transfer with MI.   6. Pt will perform UB dressing with MI.  7. Pt will perform LB dressing with MI.  8. Pt will perform toileting task with MI.  9. Pt will perform toilet transfer with MI.  8. Pt will perform IADL home mgt (ie laundry, laundry folding, medication mgt) with MI.  11. Pt will perform IADL meal prep with MI. Short Term Goals   1. Pt will perform self-feeding with (I). 2. Pt will perform grooming with (I) to MI.  3. Pt will perform UB bathing with Supervision. 4. Pt will perform LB bathing with Supervision. 5. Pt will perform tub/shower transfer with Supervision . 6. Pt will perform UB dressing with Setup. 7. Pt will perform LB dressing with Supervision. 8. Pt will perform toileting task with Supervision. 9. Pt will perform toilet transfer with Supervision. 10. Pt will perform IADL home mgt (ie laundry, laundry folding, medication mgt) with Supervision. 11. Pt will perform IADL meal prep with Supervision. Occupational Therapy TREATMENT    Patient: Bobbi Morgan   59 y.o. Patient identified with name and : yes    Date: 2018    First Tx Session  Time In: 80  Time Out[de-identified] 1100      Diagnosis: Rhabdomyolisis  Rhabdomyolysis   Precautions: Aspiration, Fall  Chart, occupational therapy assessment, plan of care, and goals were reviewed. Pain:  Pt reports 0/10 pain or discomfort prior to treatment. Pt reports 0/10 pain or discomfort post treatment.    Intervention Provided: NA      SUBJECTIVE:   Patient stated My daughters will be here shortly.     OBJECTIVE DATA SUMMARY:       Family training was performed during therapy session with supportive daughters. Educated family on safety within the home (including decluttering and removal of scatter rugs). Pt demonstrated functional mobility to bathroom and safety within the bathroom. Educated family on equipment available and use of equipment (ie TTB). THERAPEUTIC ACTIVITY Daily Assessment    Pt participated in tabletop activity in standing to challenge dynamic standing balance and to increase activity tolerance. Pt did not utilize FWW. Pt was required to leave left hand at side and use right hand to play game on raised tabletop. Pt required minimal cuing to not place left hand on table. Pt stood for 7:46 before requiring seated rest break. THERAPEUTIC EXERCISE Daily Assessment    BUE strengthening to facilitate increased independence and safety with ADLs and functional tasks. Pt used Powerweb to perform punches, flat hand press and finger pulls (x15 each exercise). GROOMING Daily Assessment    Grooming  Grooming Assistance : 6 (Modified independent)  Comments: Pt stood at sink to wash hands after toileting task. TOILETING Daily Assessment    Toileting  Toileting Assistance (FIM Score): 5 (Supervision)  Adaptive Equipment: Grab bars     LOWER BODY DRESSING Daily Assessment    Lower Body Dressing   Dressing Assistance : 5 (Supervision)  Leg Crossed Method Used: Yes  Position Performed: Seated in chair  Comments:  (Pt doffed non-skid socks and donned shoes. )     MOBILITY/TRANSFERS Daily Assessment    Functional Transfers  Toilet Transfer : Grab bars (FWW used to walk to bathroom.)  Amount of Assistance Required: 5 (Supervision/setup)       ASSESSMENT:  Pt making progress with functional mobility and self-care tasks. Family receptive to training provided and were pleased with progress.   Progression toward goals:  [x]          Improving appropriately and progressing toward goals  [] Improving slowly and progressing toward goals  []          Not making progress toward goals and plan of care will be adjusted     PLAN:  Patient continues to benefit from skilled intervention to address the above impairments. Continue treatment per established plan of care. Discharge Recommendations:  Home Health  Further Equipment Recommendations for Discharge:  bedside commode     Activity Tolerance:  Fair      Estimated LOS:July 11, 2018    Please refer to the flowsheet for vital signs taken during this treatment. After treatment:   [x]  Patient left in no apparent distress sitting up in chair   []  Patient left in no apparent distress in bed  []  Call bell left within reach  []  Nursing notified  []  Caregiver present  []  Bed alarm activated    COMMUNICATION/EDUCATION:   [x] Home safety education was provided and the patient/caregiver indicated understanding. [] Patient/family have participated as able in goal setting and plan of care. [] Patient/family agree to work toward stated goals and plan of care. [] Patient understands intent and goals of therapy, but is neutral about his/her participation. [] Patient is unable to participate in goal setting and plan of care.       DEANA Abernathy/JUAN CARLOS

## 2018-07-08 PROCEDURE — 74011250636 HC RX REV CODE- 250/636: Performed by: INTERNAL MEDICINE

## 2018-07-08 PROCEDURE — 65310000000 HC RM PRIVATE REHAB

## 2018-07-08 PROCEDURE — 74011250637 HC RX REV CODE- 250/637: Performed by: INTERNAL MEDICINE

## 2018-07-08 RX ADMIN — AMLODIPINE BESYLATE 5 MG: 5 TABLET ORAL at 06:33

## 2018-07-08 RX ADMIN — LOSARTAN POTASSIUM 100 MG: 50 TABLET ORAL at 08:25

## 2018-07-08 RX ADMIN — ASPIRIN 81 MG CHEWABLE TABLET 81 MG: 81 TABLET CHEWABLE at 08:25

## 2018-07-08 RX ADMIN — METFORMIN HYDROCHLORIDE 500 MG: 500 TABLET ORAL at 08:25

## 2018-07-08 RX ADMIN — HEPARIN SODIUM 5000 UNITS: 5000 INJECTION, SOLUTION INTRAVENOUS; SUBCUTANEOUS at 06:33

## 2018-07-08 RX ADMIN — CHOLECALCIFEROL CAP 125 MCG (5000 UNIT) 5000 UNITS: 125 CAP at 08:25

## 2018-07-08 RX ADMIN — HEPARIN SODIUM 5000 UNITS: 5000 INJECTION, SOLUTION INTRAVENOUS; SUBCUTANEOUS at 13:52

## 2018-07-08 RX ADMIN — COLCHICINE 0.6 MG: 0.6 CAPSULE ORAL at 08:25

## 2018-07-08 RX ADMIN — Medication 1 TABLET: at 08:25

## 2018-07-08 RX ADMIN — HEPARIN SODIUM 5000 UNITS: 5000 INJECTION, SOLUTION INTRAVENOUS; SUBCUTANEOUS at 21:16

## 2018-07-08 RX ADMIN — PANTOPRAZOLE SODIUM 40 MG: 40 TABLET, DELAYED RELEASE ORAL at 06:33

## 2018-07-08 NOTE — PROGRESS NOTES
[facility] PHYSICAL REHABILITATION  [address] [place] [address] [address]     INPATIENT REHABILITATION  DAILY PROGRESS NOTE     Date: 7/8/2018    Name: Yury Chavez Age / Sex: 59 y.o. / female   CSN: [id number] MRN: [id number]   Admit Date: 6/27/2018 Length of Stay: 11 days     Primary Rehab Diagnosis: Impaired Mobility and ADLs secondary to Rhabdomyolysis      Subjective:     No new issues or problems reported. Blood pressure controlled. Objective:     Vital Signs:  Patient Vitals for the past 24 hrs:   BP Temp Pulse Resp SpO2   07/08/18 0728 128/82 99.4 °F (37.4 °C) 70 16 97 %   07/08/18 0633 128/84 - 77 - -   07/07/18 2154 128/64 97.9 °F (36.6 °C) 70 19 97 %   07/07/18 1600 140/84 97.9 °F (36.6 °C) 86 16 98 %        Current Medications:  Current Facility-Administered Medications   Medication Dose Route Frequency    losartan (COZAAR) tablet 100 mg  100 mg Oral DAILY    amLODIPine (NORVASC) tablet 5 mg  5 mg Oral DAILY    metFORMIN (GLUCOPHAGE) tablet 500 mg  500 mg Oral DAILY WITH BREAKFAST    cholecalciferol (VITAMIN D3) capsule 5,000 Units  5,000 Units Oral DAILY    acetaminophen (TYLENOL) tablet 650 mg  650 mg Oral Q4H PRN    bisacodyl (DULCOLAX) tablet 10 mg  10 mg Oral Q48H PRN    aspirin chewable tablet 81 mg  81 mg Oral DAILY WITH BREAKFAST    vitamin B complex tablet  1 Tab Oral DAILY    pantoprazole (PROTONIX) tablet 40 mg  40 mg Oral ACB    heparin (porcine) injection 5,000 Units  5,000 Units SubCUTAneous Q8H    albuterol (PROVENTIL VENTOLIN) nebulizer solution 2.5 mg  2.5 mg Nebulization Q4H PRN    colchicine (MITIGARE) capsule 0.6 mg  0.6 mg Oral DAILY       Allergies: Allergies   Allergen Reactions    Ace Inhibitors Cough    Penicillins Hives       Lab/Data Review:  No results found for this or any previous visit (from the past 24 hour(s)).       Estimated Glomerular Filtration Rate:  On admission, estimated GFR based on a Creatinine of 0.90 mg/dl:   Using CKD-EPI = 81.1 mL/min/1.73m2   Using MDRD = 82.4 mL/min/1.73m2  Most recent estimated GFR, based on a Creatinine of 1.09 mg/dl on 7/5/2018:   Using CKD-EPI = 64.3 mL/min/1.73m2   Using MDRD = 66.1 mL/min/1.73m2       Assessment:     Primary Rehabilitation Diagnosis  1. Impaired Mobility and ADLs  2. Rhabdomyolysis     Comorbidities   Chronic hepatitis C virus infection  B18.2    Bipolar 1 disorder  F31.9    Depression F32.9    Alcohol dependence in remission  F10.21    Needle phobia F40.298    Eustachian tube dysfunction H69.80    Psoriasis L40.9    History of atrial fibrillation Z86.79    History of encephalopathy Z86.69    Primary osteoarthritis involving multiple joints M15.0    Essential hypertension I10    Gastroesophageal reflux disease K21.9    Type 2 diabetes mellitus  E11.9    Chronic obstructive pulmonary disease  J44.9    History of acute renal failure Z87.448    Hypoalbuminemia E88.09    Low serum high density lipoprotein (HDL) R74.8    Obesity, Class I, BMI 30-34.9 E66.9    Dysphagia R13.10    Vitamin D deficiency E55.9    Gout M10.9        Plan:     1. Justification for continued stay: Good progression towards established rehabilitation goals. 2. Medical Issues being followed closely:    [x]  Fall and safety precautions     []  Wound Care     [x]  Bowel and Bladder Function     [x]  Fluid Electrolyte and Nutrition Balance     []  Pain Control      3. Issues that 24 hour rehabilitation nursing is following:    [x]  Fall and safety precautions     []  Wound Care     [x]  Bowel and Bladder Function     [x]  Fluid Electrolyte and Nutrition Balance     []  Pain Control      [x]  Assistance with and education on in-room safety with transfers to and from the bed, wheelchair, toilet and shower. 4. Acute rehabilitation plan of care:    [x]  Continue current care and rehab.            [x]  Physical Therapy           [x]  Occupational Therapy           [x]  Speech Therapy     []  Hold Rehab until further notice     5. Medications:    [x]  MAR Reviewed     [x]  Continue Present Medications     6. DVT Prophylaxis:      []  Lovenox     [x]  Unfractionated Heparin     []  Coumadin     []  NOAC     [x]  EFRAÍN Stockings     [x]  Sequential Compression Device     []  None     7. Orders:   > Rhabdomyolysis; History of acute renal failure; History of atrial fibrillation with rapid ventricular response;  History of encephalopathy   > CK (6/13/2018, on admission to [facility]) = 6954   > CK (6/21/2018) = 183   > BUN/Creatinine (6/13/2018, on admission to [facility]) = 128/2.42   > BUN/Creatinine (6/19/2018) = 11/0.86   > BUN/Creatinine (6/28/2018, on admission to ARU) = 10/0.90    > BUN/Creatinine (7/5/2018) = 13/1.09     > Dysphagia   > Speech and Language Pathology to evaluate and treat     > Gout   > Patient stated she has gout and that during her stay at [facility], she had pain great toes of both feet   > On 6/24/2018, patient was started on Colchicine 0.6 mg PO BID   > Upon admission to the ARU, patient stated the pain has improved significantly   > Uric acid (6/28/2018) = 5.7   > On 6/28/2018, decreased Colchicine from 0.6 mg PO BID to 0.6 mg PO once daily    > Continue Colchicine 0.6 mg PO once daily     > Essential hypertension   > Prior to admission to [facility], patient was on:    > Chlorthalidone 25 mg PO once daily    > Spironolactone 25 mg PO once daily    > Clonidine 0.2 mg PO BID    > Losartan-HCTZ 50-12.5 1 tab PO once daily   > During the patient's stay at [facility], all above antihypertensive medications were put on hold   > Upon discharge from [facility], all above antihypertensives were resumed as per discharge summary   > On 7/3/2018, increased Losartan from 25 mg to 50 mg PO once daily (9AM)   > On 7/4/2018, added Amlodipine 5 mg PO once daily (6AM)   > On 7/7/2018, increased Losartan from 50 mg to 100 mg PO once daily (9AM)   > Continue:    > Amlodipine 5 mg PO once daily (6AM)    > Losartan 100 mg PO once daily (9AM)    > Type 2 diabetes mellitus   > Prior to admission to [facility], patient was on Metformin 500 mg PO BID with meals   > HbA1c (6/14/2018) = 6.9   > During the patient's stay at [facility], Metformin was put on hold (re: acute renal failure)   > Upon discharge from [facility], resumed Metformin 500 mg PO BID with meals as per discharge summary   > On 7/7/2018, discontinued Humalog insulin sliding scale SC TID AC only   > Continue Metformin 500 mg PO once daily with breakfast    > Vitamin D Deficiency   > Vitamin D 25-Hydroxy (6/28/2018) = 13.5   > On 6/28/2018, patient was given Cholecalciferol 50,000 units PO x 1 dose    > On 6/29/2018, started Cholecalciferol 5,000 units PO once daily   > Continue Cholecalciferol 5,000 units PO once daily    > Urinary frequency   > Urinalysis (7/3/2018): ~WNL    > On 6/28/2018, patient stated she had 4 bowel movements since AM   > Discontinued Docusate sodium 100 mg PO BID      Signed:    Phu Dave MD    July 8, 2018

## 2018-07-08 NOTE — ROUTINE PROCESS
[facility]     Bedside and Verbal shift change report given to Tiffanie Villar RN (oncoming nurse)  By Noe norman/Carmen Contreras RN (offgoing nurse). Report included the following information SBAR, Kardex, MAR and Recent Results.     Situation:                        Code Status: Full Code                        Reason for Admission: Rhabdomyolysis  Hospital Day: 8                        Problem List:   Hospital Problems  Date Reviewed: 7/4/2018                         Codes Class Noted POA      Vitamin D deficiency (Chronic) ICD-10-CM: E55.9  ICD-9-CM: 358. 9   6/28/2018 Yes      Overview Signed 6/28/2018  1:12 PM by Monica Metz MD        Vitamin D 25-Hydroxy (6/28/2018) = 13.5                 Gout (Chronic) ICD-10-CM: M10.9  ICD-9-CM: 274.9   Unknown Yes             Essential hypertension (Chronic) ICD-10-CM: I10  ICD-9-CM: 401.9   Unknown Yes             Type 2 diabetes mellitus (HCC) (Chronic) ICD-10-CM: E11.9  ICD-9-CM: 250.00   Unknown Yes      Overview Signed 6/27/2018 10:18 PM by Monica Metz MD        HbA1c (6/14/2018) = 6.9                Dysphagia ICD-10-CM: R13.10  ICD-9-CM: 787.20   6/14/2018 Yes             History of atrial fibrillation ICD-10-CM: Z86.79  ICD-9-CM: V12.59   6/13/2018 Yes      Overview Signed 6/27/2018 10:23 PM by Monica Metz MD        Atrial fibrillation with rapid ventricular response                * (Principal)Rhabdomyolysis ICD-10-CM: M62.82  ICD-9-CM: 728.88   6/13/2018 Yes             History of encephalopathy ICD-10-CM: Z86.69  ICD-9-CM: V12.49   6/13/2018 Yes      Overview Signed 6/27/2018 10:21 PM by Monica Metz MD        Acute metabolic encephalopathy                History of acute renal failure ICD-10-CM: Z87.448  ICD-9-CM: V13.09   6/13/2018 Yes             Generalized weakness ICD-10-CM: R53.1  ICD-9-CM: 780.79   6/13/2018 Yes             Impaired mobility and ADLs ICD-10-CM: Z74.09  ICD-9-CM: 799.89   6/13/2018 Yes              Background:                        Past Medical History:        Past Medical History:   Diagnosis Date    Alcohol dependence in remission [facility])       alcohol withdrawal discharged 05/31/2013    Bipolar 1 disorder ([facility]      Chronic hepatitis C virus infection ([facility]       prior IV drug use;  Genotype 1a     Chronic obstructive pulmonary disease ([facility]      Depression      Dysphagia 6/14/2018    Essential hypertension      Eustachian tube dysfunction       Dr. Lisa Levi Gastroesophageal reflux disease      Gout      History of acute renal failure 6/13/2018    History of atrial fibrillation 6/13/2018     Atrial fibrillation with rapid ventricular response    History of encephalopathy 6/13/2018     Acute metabolic encephalopathy    Low serum high density lipoprotein (HDL) 6/14/2018     HDL (6/14/2018) = 12    Needle phobia       from prior IV drug abuse    Obesity, Class I, BMI 30-34. 9      Primary osteoarthritis involving multiple joints       knees and back    Psoriasis      Type 2 diabetes mellitus (HCC)       HbA1c (6/14/2018) = 6.9    Vitamin D deficiency 6/28/2018     Vitamin D 25-Hydroxy (6/28/2018) = 13.5                             Patient taking anticoagulants yes                         Patient has a defibrillator: no      Assessment:  ¨ Changes in Assessment throughout shift: none     ¨ Patient has central line: no Reasons if yes: Insertion date: Last dressing date:  ¨ Patient has Weston Cath: no Reasons if yes:     Insertion date:     ¨ Last Vitals:             Vitals:     07/04/18 1600 07/04/18 2152 07/05/18 0633 07/05/18 0745   BP: 147/83 133/81 145/78 139/85   Pulse: 88 80 62 60   Resp: 17 20   18   Temp: 97.8 °F (36.6 °C) 98.6 °F (37 °C)   98.7 °F (37.1 °C)   SpO2: 96% 94%   96%   Weight:           Height:                 · PAIN                                              Pain Assessment                                              Pain Intensity 1: 0 (07/05/18 0749) Pain Intensity 1: 2 (12/29/14 1105)                                              Pain Location 1: Wrist Pain Location 1: Abdomen                                              Pain Intervention(s) 1: Medication (see MAR) Pain Intervention(s) 1: Medication (see MAR)  Patient Stated Pain Goal: 0 Patient Stated Pain Goal: 0  ¨ Intervention effective: no    ¨ Other actions taken for pain:      · Skin Assessment  Skin color Skin Color: Appropriate for ethnicity  Condition/Temperature Skin Condition/Temp: Dry, Warm  Integrity Skin Integrity: Wound (add Wound LDA)  Turgor Turgor: Non-tenting  Weekly Pressure Ulcer Documentation  Pressure  Injury Documentation: Pressure Injury Noted-See Wound LDA to Document  Wound Prevention & Protection Methods  Orientation of wound Orientation of Wound Prevention: Posterior  Location of Prevention Location of Wound Prevention: Sacrum/Coccyx, Buttocks  Dressing Present Dressing Present : Yes  Dressing Status Dressing Status: Intact  Wound Offloading Wound Offloading (Prevention Methods): Foam silicone     · INTAKE/OUPUT               Date 07/04/18 0700 - 07/05/18 0659 07/05/18 0700 - 07/06/18 0659   Shift 1445-0009 5136-6343 24 Hour Total 2118-7774 2371-4444 24 Hour Total   I  N  T  A  K  E  P. O. 700   700 240   240      P. O. 700   700 240   240    Shift Total  (mL/kg) 700  (7.7)   700  (7.7) 240  (2.6)   240  (2.6)   O  U  T  P  U  T  Urine  (mL/kg/hr)                  Urine Occurrence(s) 8 x 4 x 12 x 3 x   3 x    Stool                  Stool Occurrence(s) 2 x 0 x 2 x 0 x   0 x    Shift Total  (mL/kg)                  700 240   240   Weight (kg) 91 91 91 91 91 91         Recommendations:  1. Patient needs and requests:Supervised assist with transfers, setup, pain control     2. Diet: Dental soft with thin liquida     3. Pending tests/procedures: none      4.  Functional Level/Equipment: 1 person, supervised assist     5. Estimated Discharge Date: 7/11 Posted on Whiteboard in [facility] Room: yes      [facility] Safety Check     A safety check occurred in the patient's room between off going nurse and oncoming nurse listed above.     The safety check included the below items  Area Items   H  High Alert Medications § Verify all high alert medication drips (heparin, PCA, etc.)   E  Equipment § Suction is set up for ALL patients (with may)  § Red plugs utilized for all equipment (IV pumps, etc.)  § WOWs wiped down at end of shift. § Room stocked with oxygen, suction, and other unit-specific supplies   A  Alarms § Bed alarm is set for fall risk patients  § Ensure chair alarm is in place and activated if patient is up in a chair   L  Lines § Check IV for any infiltration  § Weston bag is empty if patient has a Weston   § Tubing and IV bags are labeled   S  Safety § Room is clean, patient is clean, and equipment is clean. § Hallways are clear from equipment besides carts. § Fall bracelet on for fall risk patients  § Ensure room is clear and free of clutter  § Suction is set up for ALL patients (with may)  § Hallways are clear from equipment besides carts.    § Isolation precautions followed, supplies available outside room, sign posted

## 2018-07-08 NOTE — PROGRESS NOTES
Problem: Pressure Injury - Risk of  Goal: *Prevention of pressure injury  Document Ryan Scale and appropriate interventions in the flowsheet.    Outcome: Progressing Towards Goal  Pressure Injury Interventions:  Sensory Interventions: Assess changes in LOC    Moisture Interventions: Absorbent underpads    Activity Interventions: Increase time out of bed, Pressure redistribution bed/mattress(bed type)    Mobility Interventions: Pressure redistribution bed/mattress (bed type)    Nutrition Interventions: Document food/fluid/supplement intake    Friction and Shear Interventions: Foam dressings/transparent film/skin sealants

## 2018-07-08 NOTE — PROGRESS NOTES
Problem: Falls - Risk of  Goal: *Absence of Falls  Document Radha Fall Risk and appropriate interventions in the flowsheet.    Outcome: Progressing Towards Goal  Fall Risk Interventions:  Mobility Interventions: Bed/chair exit alarm, Communicate number of staff needed for ambulation/transfer, OT consult for ADLs, Patient to call before getting OOB, PT Consult for mobility concerns    Mentation Interventions: Adequate sleep, hydration, pain control, Bed/chair exit alarm, Door open when patient unattended, Evaluate medications/consider consulting pharmacy, Eyeglasses and hearing aids, Familiar objects from home    Medication Interventions: Bed/chair exit alarm, Evaluate medications/consider consulting pharmacy, Patient to call before getting OOB, Teach patient to arise slowly    Elimination Interventions: Bed/chair exit alarm, Call light in reach, Patient to call for help with toileting needs, Toileting schedule/hourly rounds    History of Falls Interventions: Bed/chair exit alarm, Consult care management for discharge planning, Door open when patient unattended, Evaluate medications/consider consulting pharmacy, Room close to nurse's station

## 2018-07-08 NOTE — ROUTINE PROCESS
SHIFT CHANGE NOTE FOR [facility]     Bedside and Verbal shift change report given to Maurice Benitez (oncoming nurse)  By Amandeep Jeter RN   (offgoing nurse). Report included the following information SBAR, Kardex, MAR and Recent Results.     Situation:                        Code Status: Full Code                        Reason for Admission: Rhabdomyolysis  Hospital Day: 8                        Problem List:   Hospital Problems  Date Reviewed: 7/4/2018                         Codes Class Noted POA      Vitamin D deficiency (Chronic) ICD-10-CM: E55.9  ICD-9-CM: 368. 9   6/28/2018 Yes      Overview Signed 6/28/2018  1:12 PM by Suly Mayorga MD        Vitamin D 25-Hydroxy (6/28/2018) = 13.5                 Gout (Chronic) ICD-10-CM: M10.9  ICD-9-CM: 274.9   Unknown Yes             Essential hypertension (Chronic) ICD-10-CM: I10  ICD-9-CM: 401.9   Unknown Yes             Type 2 diabetes mellitus (HCC) (Chronic) ICD-10-CM: E11.9  ICD-9-CM: 250.00   Unknown Yes      Overview Signed 6/27/2018 10:18 PM by Suly Mayorga MD        HbA1c (6/14/2018) = 6.9                Dysphagia ICD-10-CM: R13.10  ICD-9-CM: 787.20   6/14/2018 Yes             History of atrial fibrillation ICD-10-CM: Z86.79  ICD-9-CM: V12.59   6/13/2018 Yes      Overview Signed 6/27/2018 10:23 PM by Suly Mayorga MD        Atrial fibrillation with rapid ventricular response                * (Principal)Rhabdomyolysis ICD-10-CM: M62.82  ICD-9-CM: 728.88   6/13/2018 Yes             History of encephalopathy ICD-10-CM: Z86.69  ICD-9-CM: V12.49   6/13/2018 Yes      Overview Signed 6/27/2018 10:21 PM by Suly Mayorga MD        Acute metabolic encephalopathy                History of acute renal failure ICD-10-CM: Z87.448  ICD-9-CM: V13.09   6/13/2018 Yes             Generalized weakness ICD-10-CM: R53.1  ICD-9-CM: 780.79   6/13/2018 Yes             Impaired mobility and ADLs ICD-10-CM: Z74.09  ICD-9-CM: 799.89   6/13/2018 Yes                    Background: Past Medical History:        Past Medical History:   Diagnosis Date    Alcohol dependence in remission [facility])       alcohol withdrawal discharged 05/31/2013    Bipolar 1 disorder ([facility]      Chronic hepatitis C virus infection ([facility]       prior IV drug use;  Genotype 1a     Chronic obstructive pulmonary disease ([facility]      Depression      Dysphagia 6/14/2018    Essential hypertension      Eustachian tube dysfunction       Dr. Jaelyn Beltran Gastroesophageal reflux disease      Gout      History of acute renal failure 6/13/2018    History of atrial fibrillation 6/13/2018     Atrial fibrillation with rapid ventricular response    History of encephalopathy 6/13/2018     Acute metabolic encephalopathy    Low serum high density lipoprotein (HDL) 6/14/2018     HDL (6/14/2018) = 12    Needle phobia       from prior IV drug abuse    Obesity, Class I, BMI 30-34. 9      Primary osteoarthritis involving multiple joints       knees and back    Psoriasis      Type 2 diabetes mellitus (HCC)       HbA1c (6/14/2018) = 6.9    Vitamin D deficiency 6/28/2018     Vitamin D 25-Hydroxy (6/28/2018) = 13.5                             Patient taking anticoagulants yes                         Patient has a defibrillator: no      Assessment:  ¨ Changes in Assessment throughout shift: none     ¨ Patient has central line: no Reasons if yes: Insertion date: Last dressing date:  ¨ Patient has Weston Cath: no Reasons if yes:     Insertion date:     ¨ Last Vitals:             Vitals:     07/04/18 1600 07/04/18 2152 07/05/18 0633 07/05/18 0745   BP: 147/83 133/81 145/78 139/85   Pulse: 88 80 62 60   Resp: 17 20   18   Temp: 97.8 °F (36.6 °C) 98.6 °F (37 °C)   98.7 °F (37.1 °C)   SpO2: 96% 94%   96%   Weight:           Height:                 · PAIN                                              Pain Assessment                                              Pain Intensity 1: 0 (07/05/18 0749) Pain Intensity 1: 2 (12/29/14 1105)                                              Pain Location 1: Wrist Pain Location 1: Abdomen                                              Pain Intervention(s) 1: Medication (see MAR) Pain Intervention(s) 1: Medication (see MAR)  Patient Stated Pain Goal: 0 Patient Stated Pain Goal: 0  ¨ Intervention effective: no    ¨ Other actions taken for pain:      · Skin Assessment  Skin color Skin Color: Appropriate for ethnicity  Condition/Temperature Skin Condition/Temp: Dry, Warm  Integrity Skin Integrity: Wound (add Wound LDA)  Turgor Turgor: Non-tenting  Weekly Pressure Ulcer Documentation  Pressure  Injury Documentation: Pressure Injury Noted-See Wound LDA to Document  Wound Prevention & Protection Methods  Orientation of wound Orientation of Wound Prevention: Posterior  Location of Prevention Location of Wound Prevention: Sacrum/Coccyx, Buttocks  Dressing Present Dressing Present : Yes  Dressing Status Dressing Status: Intact  Wound Offloading Wound Offloading (Prevention Methods): Foam silicone     · INTAKE/OUPUT               Date 07/04/18 0700 - 07/05/18 0659 07/05/18 0700 - 07/06/18 0659   Shift 7647-5269 3267-7149 24 Hour Total 3940-0581 4644-7025 24 Hour Total   I  N  T  A  K  E  P. O. 700   700 240   240      P. O. 700   700 240   240    Shift Total  (mL/kg) 700  (7.7)   700  (7.7) 240  (2.6)   240  (2.6)   O  U  T  P  U  T  Urine  (mL/kg/hr)                  Urine Occurrence(s) 8 x 4 x 12 x 3 x   3 x    Stool                  Stool Occurrence(s) 2 x 0 x 2 x 0 x   0 x    Shift Total  (mL/kg)                  700 240   240   Weight (kg) 91 91 91 91 91 91         Recommendations:  1. Patient needs and requests:Supervised assist with transfers, setup, pain control     2. Diet: Dental soft with thin liquida     3. Pending tests/procedures: none      4.  Functional Level/Equipment: 1 person, supervised assist     5. Estimated Discharge Date: 7/11 Posted on Whiteboard in Patients Room: yes      [facility] Safety Check     A safety check occurred in the patient's room between off going nurse and oncoming nurse listed above.     The safety check included the below items  Area Items   H  High Alert Medications § Verify all high alert medication drips (heparin, PCA, etc.)   E  Equipment § Suction is set up for ALL patients (with may)  § Red plugs utilized for all equipment (IV pumps, etc.)  § WOWs wiped down at end of shift. § Room stocked with oxygen, suction, and other unit-specific supplies   A  Alarms § Bed alarm is set for fall risk patients  § Ensure chair alarm is in place and activated if patient is up in a chair   L  Lines § Check IV for any infiltration  § Weston bag is empty if patient has a Weston   § Tubing and IV bags are labeled   S  Safety § Room is clean, patient is clean, and equipment is clean. § Hallways are clear from equipment besides carts. § Fall bracelet on for fall risk patients  § Ensure room is clear and free of clutter  § Suction is set up for ALL patients (with may)  § Hallways are clear from equipment besides carts.    § Isolation precautions followed, supplies available outside room, sign posted

## 2018-07-09 LAB
ANION GAP SERPL CALC-SCNC: 7 MMOL/L (ref 3–18)
BUN SERPL-MCNC: 12 MG/DL (ref 7–18)
BUN/CREAT SERPL: 10 (ref 12–20)
CALCIUM SERPL-MCNC: 10.2 MG/DL (ref 8.5–10.1)
CHLORIDE SERPL-SCNC: 104 MMOL/L (ref 100–108)
CO2 SERPL-SCNC: 28 MMOL/L (ref 21–32)
CREAT SERPL-MCNC: 1.17 MG/DL (ref 0.6–1.3)
GLUCOSE SERPL-MCNC: 97 MG/DL (ref 74–99)
HCT VFR BLD AUTO: 41.4 % (ref 35–45)
HGB BLD-MCNC: 13.1 G/DL (ref 12–16)
PLATELET # BLD AUTO: 348 K/UL (ref 135–420)
POTASSIUM SERPL-SCNC: 4.6 MMOL/L (ref 3.5–5.5)
SODIUM SERPL-SCNC: 139 MMOL/L (ref 136–145)

## 2018-07-09 PROCEDURE — 97530 THERAPEUTIC ACTIVITIES: CPT

## 2018-07-09 PROCEDURE — 74011250636 HC RX REV CODE- 250/636: Performed by: INTERNAL MEDICINE

## 2018-07-09 PROCEDURE — 85018 HEMOGLOBIN: CPT | Performed by: INTERNAL MEDICINE

## 2018-07-09 PROCEDURE — 36415 COLL VENOUS BLD VENIPUNCTURE: CPT | Performed by: INTERNAL MEDICINE

## 2018-07-09 PROCEDURE — 65310000000 HC RM PRIVATE REHAB

## 2018-07-09 PROCEDURE — 97116 GAIT TRAINING THERAPY: CPT

## 2018-07-09 PROCEDURE — 97110 THERAPEUTIC EXERCISES: CPT

## 2018-07-09 PROCEDURE — 85049 AUTOMATED PLATELET COUNT: CPT | Performed by: INTERNAL MEDICINE

## 2018-07-09 PROCEDURE — 92507 TX SP LANG VOICE COMM INDIV: CPT

## 2018-07-09 PROCEDURE — 74011250637 HC RX REV CODE- 250/637: Performed by: INTERNAL MEDICINE

## 2018-07-09 PROCEDURE — 80048 BASIC METABOLIC PNL TOTAL CA: CPT | Performed by: INTERNAL MEDICINE

## 2018-07-09 PROCEDURE — 97535 SELF CARE MNGMENT TRAINING: CPT

## 2018-07-09 RX ORDER — AMLODIPINE BESYLATE 10 MG/1
10 TABLET ORAL DAILY
Status: DISCONTINUED | OUTPATIENT
Start: 2018-07-10 | End: 2018-07-11

## 2018-07-09 RX ORDER — LOSARTAN POTASSIUM 50 MG/1
50 TABLET ORAL DAILY
Status: DISCONTINUED | OUTPATIENT
Start: 2018-07-10 | End: 2018-07-11 | Stop reason: HOSPADM

## 2018-07-09 RX ADMIN — LOSARTAN POTASSIUM 100 MG: 50 TABLET ORAL at 09:02

## 2018-07-09 RX ADMIN — CHOLECALCIFEROL CAP 125 MCG (5000 UNIT) 5000 UNITS: 125 CAP at 09:01

## 2018-07-09 RX ADMIN — AMLODIPINE BESYLATE 5 MG: 5 TABLET ORAL at 05:52

## 2018-07-09 RX ADMIN — HEPARIN SODIUM 5000 UNITS: 5000 INJECTION, SOLUTION INTRAVENOUS; SUBCUTANEOUS at 14:51

## 2018-07-09 RX ADMIN — COLCHICINE 0.6 MG: 0.6 CAPSULE ORAL at 09:01

## 2018-07-09 RX ADMIN — Medication 1 TABLET: at 09:01

## 2018-07-09 RX ADMIN — PANTOPRAZOLE SODIUM 40 MG: 40 TABLET, DELAYED RELEASE ORAL at 05:52

## 2018-07-09 RX ADMIN — HEPARIN SODIUM 5000 UNITS: 5000 INJECTION, SOLUTION INTRAVENOUS; SUBCUTANEOUS at 05:57

## 2018-07-09 RX ADMIN — HEPARIN SODIUM 5000 UNITS: 5000 INJECTION, SOLUTION INTRAVENOUS; SUBCUTANEOUS at 21:34

## 2018-07-09 RX ADMIN — ASPIRIN 81 MG CHEWABLE TABLET 81 MG: 81 TABLET CHEWABLE at 09:02

## 2018-07-09 RX ADMIN — METFORMIN HYDROCHLORIDE 500 MG: 500 TABLET ORAL at 09:01

## 2018-07-09 NOTE — PROGRESS NOTES
[facility] PHYSICAL REHABILITATION  [address] [place] [address], [address]     INPATIENT REHABILITATION  DAILY PROGRESS NOTE     Date: 7/9/2018    Name: Jc Flor Age / Sex: 59 y.o. / female   CSN: [id number] MRN: [id number]   Admit Date: 6/27/2018 Length of Stay: 12 days     Primary Rehab Diagnosis: Impaired Mobility and ADLs secondary to Rhabdomyolysis      Subjective:     Patient seen and examined. Blood pressure controlled. Patient's Complaint:   No significant medical complaints     Pain Control: no current joint or muscle symptoms, essentially pain-free      Objective:     Vital Signs:  Patient Vitals for the past 24 hrs:   BP Temp Pulse Resp SpO2   07/09/18 0732 125/77 98.2 °F (36.8 °C) 78 16 100 %   07/09/18 0552 130/74 - 65 - -   07/08/18 2151 141/81 97.8 °F (36.6 °C) 87 20 94 %   07/08/18 1612 133/84 98.8 °F (37.1 °C) 70 16 95 %        Physical Examination:  GENERAL SURVEY: Patient is awake, alert, oriented x 3, sitting comfortably on the chair, not in acute respiratory distress. HEENT: pink palpebral conjunctivae, anicteric sclerae, no nasoaural discharge, moist oral mucosa  NECK: supple, no jugular venous distention, no palpable lymph nodes  CHEST/LUNGS: symmetrical chest expansion, good air entry, clear breath sounds  HEART: adynamic precordium, good S1 S2, no S3, regular rhythm, no murmurs  ABDOMEN: obese, bowel sounds appreciated, soft, non-tender  EXTREMITIES: pink nailbeds, (+) bipedal edema, full and equal pulses, no calf tenderness   NEUROLOGICAL EXAM: The patient is awake, alert and oriented x3, able to answer questions fairly appropriately, able to follow 1 and 2 step commands. Able to tell time from the wall clock. Cranial nerves II-XII are grossly intact. No gross sensory deficit. Motor strength is 4/5 on BUE, 4/5 on BLE.       Current Medications:  Current Facility-Administered Medications   Medication Dose Route Frequency    losartan (COZAAR) tablet 100 mg  100 mg Oral DAILY    amLODIPine (NORVASC) tablet 5 mg  5 mg Oral DAILY    metFORMIN (GLUCOPHAGE) tablet 500 mg  500 mg Oral DAILY WITH BREAKFAST    cholecalciferol (VITAMIN D3) capsule 5,000 Units  5,000 Units Oral DAILY    acetaminophen (TYLENOL) tablet 650 mg  650 mg Oral Q4H PRN    bisacodyl (DULCOLAX) tablet 10 mg  10 mg Oral Q48H PRN    aspirin chewable tablet 81 mg  81 mg Oral DAILY WITH BREAKFAST    vitamin B complex tablet  1 Tab Oral DAILY    pantoprazole (PROTONIX) tablet 40 mg  40 mg Oral ACB    heparin (porcine) injection 5,000 Units  5,000 Units SubCUTAneous Q8H    albuterol (PROVENTIL VENTOLIN) nebulizer solution 2.5 mg  2.5 mg Nebulization Q4H PRN    colchicine (MITIGARE) capsule 0.6 mg  0.6 mg Oral DAILY       Allergies:   Allergies   Allergen Reactions    Ace Inhibitors Cough    Penicillins Hives       Lab/Data Review:  Recent Results (from the past 24 hour(s))   HGB & HCT    Collection Time: 07/09/18  6:17 AM   Result Value Ref Range    HGB 13.1 12.0 - 16.0 g/dL    HCT 41.4 35.0 - 45.0 %   PLATELET COUNT    Collection Time: 07/09/18  6:17 AM   Result Value Ref Range    PLATELET 844 772 - 045 K/uL   METABOLIC PANEL, BASIC    Collection Time: 07/09/18  6:17 AM   Result Value Ref Range    Sodium 139 136 - 145 mmol/L    Potassium 4.6 3.5 - 5.5 mmol/L    Chloride 104 100 - 108 mmol/L    CO2 28 21 - 32 mmol/L    Anion gap 7 3.0 - 18 mmol/L    Glucose 97 74 - 99 mg/dL    BUN 12 7.0 - 18 MG/DL    Creatinine 1.17 0.6 - 1.3 MG/DL    BUN/Creatinine ratio 10 (L) 12 - 20      GFR est AA 57 (L) >60 ml/min/1.73m2    GFR est non-AA 47 (L) >60 ml/min/1.73m2    Calcium 10.2 (H) 8.5 - 10.1 MG/DL       Estimated Glomerular Filtration Rate:  On admission, estimated GFR based on a Creatinine of 0.90 mg/dl:   Using CKD-EPI = 81.1 mL/min/1.73m2   Using MDRD = 82.4 mL/min/1.73m2  Most recent estimated GFR, based on a Creatinine of 1.17 mg/dl on 7/9/2018:   Using CKD-EPI = 59.1 mL/min/1.73m2   Using MDRD = 60.9 mL/min/1.73m2       Assessment:     Primary Rehabilitation Diagnosis  1. Impaired Mobility and ADLs  2. Rhabdomyolysis     Comorbidities   Chronic hepatitis C virus infection  B18.2    Bipolar 1 disorder  F31.9    Depression F32.9    Alcohol dependence in remission  F10.21    Needle phobia F40.298    Eustachian tube dysfunction H69.80    Psoriasis L40.9    History of atrial fibrillation Z86.79    History of encephalopathy Z86.69    Primary osteoarthritis involving multiple joints M15.0    Essential hypertension I10    Gastroesophageal reflux disease K21.9    Type 2 diabetes mellitus  E11.9    Chronic obstructive pulmonary disease  J44.9    History of acute renal failure Z87.448    Hypoalbuminemia E88.09    Low serum high density lipoprotein (HDL) R74.8    Obesity, Class I, BMI 30-34.9 E66.9    Dysphagia R13.10    Vitamin D deficiency E55.9    Gout M10.9        Plan:     1. Justification for continued stay: Good progression towards established rehabilitation goals. 2. Medical Issues being followed closely:    [x]  Fall and safety precautions     []  Wound Care     [x]  Bowel and Bladder Function     [x]  Fluid Electrolyte and Nutrition Balance     []  Pain Control      3. Issues that 24 hour rehabilitation nursing is following:    [x]  Fall and safety precautions     []  Wound Care     [x]  Bowel and Bladder Function     [x]  Fluid Electrolyte and Nutrition Balance     []  Pain Control      [x]  Assistance with and education on in-room safety with transfers to and from the bed, wheelchair, toilet and shower. 4. Acute rehabilitation plan of care:    [x]  Continue current care and rehab. [x]  Physical Therapy           [x]  Occupational Therapy           [x]  Speech Therapy     []  Hold Rehab until further notice     5. Medications:    [x]  MAR Reviewed     [x]  Continue Present Medications     6.  DVT Prophylaxis:      []  Lovenox     [x]  Unfractionated Heparin     []  Coumadin []  NOAC     [x]  EFRAÍN Stockings     [x]  Sequential Compression Device     []  None     7. Orders:   > Rhabdomyolysis; History of acute renal failure; History of atrial fibrillation with rapid ventricular response;  History of encephalopathy   > CK (6/13/2018, on admission to [facility]) = 6954   > CK (6/21/2018) = 183   > BUN/Creatinine (6/13/2018, on admission to [facility]) = 128/2.42   > BUN/Creatinine (6/19/2018) = 11/0.86   > BUN/Creatinine (6/28/2018, on admission to ARU) = 10/0.90    > BUN/Creatinine (7/5/2018) = 13/1.09    > BUN/Creatinine (7/9/2018) = 12/1.17   > Increase oral fluid intake     > Dysphagia   > Speech and Language Pathology to evaluate and treat     > Gout   > Patient stated she has gout and that during her stay at [facility], she had pain great toes of both feet   > On 6/24/2018, patient was started on Colchicine 0.6 mg PO BID   > Upon admission to the ARU, patient stated the pain has improved significantly   > Uric acid (6/28/2018) = 5.7   > On 6/28/2018, decreased Colchicine from 0.6 mg PO BID to 0.6 mg PO once daily    > Continue Colchicine 0.6 mg PO once daily     > Essential hypertension   > Prior to admission to [facility], patient was on:    > Chlorthalidone 25 mg PO once daily    > Spironolactone 25 mg PO once daily    > Clonidine 0.2 mg PO BID    > Losartan-HCTZ 50-12.5 1 tab PO once daily   > During the patient's stay at [facility], all above antihypertensive medications were put on hold   > Upon discharge from [facility], all above antihypertensives were resumed as per discharge summary   > On 7/3/2018, increased Losartan from 25 mg to 50 mg PO once daily (9AM)   > On 7/4/2018, added Amlodipine 5 mg PO once daily (6AM)   > On 7/7/2018, increased Losartan from 50 mg to 100 mg PO once daily (9AM)   > Continue:    > Increase Amlodipine from 5 mg to 10 mg PO once daily (6AM)    > Decrease Losartan from 100 mg to 50 mg PO once daily (9AM)    > Type 2 diabetes mellitus   > Prior to admission to [facility], patient was on Metformin 500 mg PO BID with meals   > HbA1c (6/14/2018) = 6.9   > During the patient's stay at [facility], Metformin was put on hold (re: acute renal failure)   > Upon discharge from [facility], resumed Metformin 500 mg PO BID with meals as per discharge summary   > On admission to the ARU, resumed Metformin 500 mg PO once daily with breakfast   > BUN/Creatinine (6/28/2018, on admission to ARU) = 10/0.90    > BUN/Creatinine (7/5/2018) = 13/1.09    > On 7/7/2018, discontinued Humalog insulin sliding scale SC TID AC only   > BUN/Creatinine (7/9/2018) = 12/1.17   > Discontinue Metformin 500 mg PO once daily with breakfast    > Vitamin D Deficiency   > Vitamin D 25-Hydroxy (6/28/2018) = 13.5   > On 6/28/2018, patient was given Cholecalciferol 50,000 units PO x 1 dose    > On 6/29/2018, started Cholecalciferol 5,000 units PO once daily   > Continue Cholecalciferol 5,000 units PO once daily    > Urinary frequency   > Urinalysis (7/3/2018): ~WNL    > On 6/28/2018, patient stated she had 4 bowel movements since AM   > Discontinued Docusate sodium 100 mg PO BID      8. Patient's progress in rehabilitation and medical issues discussed with the patient. All questions answered to the best of my ability. Care plan discussed with patient and nurse.       Signed:    Balwinder Burnette MD    July 9, 2018

## 2018-07-09 NOTE — INTERDISCIPLINARY ROUNDS
[facility]  [address] [place] [address], [address]    INPATIENT REHABILITATION  PRE-TEAM CONFERENCE SUMMARY     Date of Conference: 7/10/18    Name: Linda Garcia Age / Sex: 59 y.o. / female   CSN: [id number] MRN: [id number]   Admit Date: 6/27/2018 Length of Stay: 12 days     Primary Rehabilitation Diagnosis  1. Impaired Mobility and ADLs  2. Rhabdomyolysis      Comorbidities   Chronic hepatitis C virus infection  B18.2    Bipolar 1 disorder  F31.9    Depression F32.9    Alcohol dependence in remission  F10.21    Needle phobia F40.298    Eustachian tube dysfunction H69.80    Psoriasis L40.9    History of atrial fibrillation Z86.79    History of encephalopathy Z86.69    Primary osteoarthritis involving multiple joints M15.0    Essential hypertension I10    Gastroesophageal reflux disease K21.9    Type 2 diabetes mellitus  E11.9    Chronic obstructive pulmonary disease  J44.9    History of acute renal failure Z87.448    Hypoalbuminemia E88.09    Low serum high density lipoprotein (HDL) R74.8    Obesity, Class I, BMI 30-34.9 E66.9    Dysphagia R13.10    Vitamin D deficiency E55.9    Gout M10.9          Therapy:     FIM SCORES Initial Assessment Weekly Progress Assessment 7/9/2018   Eating Functional Level: 7  Comments: (I) for self feeding of breakfast meal. No reports from pt about difficulty with cutting, bringing food to mouth, or swallowing. Functional Level: 7  Independent   Swallowing     Grooming 5 Grooming Assistance : 6 (Modified independent)  Comments: Pt able to complete hand hygeine seated in w/c   Bathing 4  6 Modified [place]      Upper Body Dressing Functional Level: 4  Items Applied/Steps Completed: Pullover (4 steps)  Comments: Supervision to doff pullover shirt and Min A to don pullover shirt. Not challenged with bra due to pt not having in her personal possession.   Functional Level: 6    Items Applied/Steps Completed: Pullover shirt (6 steps)   Lower Body Dressing Functional Level: 4  Items Applied/Steps Completed: Elastic waist pants (3 steps), Underpants (3 steps)  Comments: SBA to doff pants and underwear. Min A to don pants and underwear. Functional Level: 6  Items Applied/Steps Completed: Elastic waist pants with fasteners (3 steps)   Toileting Functional Level: 0  Comments: Min A for clothing mgt. Supervision for full pericare hygiene. Toileting Assistance (FIM Score): 5 (Supervision)  Adaptive Equipment: Grab bars   Bladder - level of assist 2 5   Bladder - accident frequency score 5 6   Bowel - level of assist 1 4   Bowel - accident frequency score 6     Toilet Transfer Snyder Toilet Transfer Score: 4  Comments: Min A secondary to require vcs and tactile cues for proper hand placement. Tendency to pull up from seat surface vs push from arm rest. 5 (Supervision/setup)   Tub/Shower Transfer Snyder Tub or Shower Type: Tub/Shower combination  Tub/Shower Transfer Score: 4  Comments: Min A for safety cues as well as utilization of proper hand placement with transfers. Tendency to want to pull up from seated position to stand. Education provided about proper hand placement. Tub or Shower Type: N/A  Comments: patient does not utilize tub or shower in her home environment. Instead she bathe at sink/vanity.    Comprehension Primary Mode of Comprehension: Auditory  Score: 5 Auditory  6   Expression Primary Mode of Expression: Verbal  Score: 5 Verbal  5   Social Interaction Score: 4 5   Problem Solving Score: 4 5   Memory Score: 3 5     FIM SCORES Initial Assessment Weekly Progress Assessment 7/9/2018   Bed/Chair/Wheelchair Transfers Transfer Type: SPT without device (stand step w/out AD, with gait belt)  Other: toilet transfer with min A with use of grab bar  Transfer Assistance : 3 (Moderate assistance ) (w/out AD; min A with RW)  Sit to Stand Assistance: Minimal assistance  Car Transfers: Not tested  Car Type: n/a Sit <> Stand: Supervision  Stand Step without AD: Supervision   Bed Mobility Rolling Right 5 (Stand-by assistance)   Rolling Left 5 (Stand-by assistance)   Supine to Sit 4 (Minimal assistance)   Sit to Stand Minimal assistance   Sit to Supine 4 (Minimal assistance)    Rolling Right   Modified Independent   Rolling Left   Modified Independent   Supine to Sit   Modified Independent   Sit to Stand   Supervision   Sit to Supine   Modified Independent      Locomotion (W/C) Able to Propel (ft): 50 feet  Functional Level: 2  Curbs/Ramps Assist Required (FIM Score): 0 (Not tested)  Wheelchair Setup Assist Required : 2 (Maximal assistance)  Wheelchair Management: Manages left brake, Manages right brake (with verbal cues and demonstration) Function 6  Setup Assistance: Modified Independent         Locomotion (W/C distance) 55 Feet 150 Feet   Locomotion (Walk) 4 (Minimal assistance) Superivsion   Locomotion (Walk dist.) 18 Feet (ft) 80 Feet   Steps/Stairs Steps/Stairs Ambulated (#): 0  Level of Assist : 0 (Not tested) (deferred due to dizziness with gait training) Pt declines assessment         Nursing:     Neuro:   A&O x_4___                   Respiratory:   [x] WNL   [] O2   [] LPM ______   Other:  Peripheral Vascular:   [] TEDS present   [] Edema present ____ Grade   Cardiac:   [x] WNL   [] Other  Genitourinary:   [x] continent   [] incontinent   [] diallo  Abdominal __7/9/18_ LBM  GI: Diabetic_ Diet __Regular____ Liquids _____ tube feeds  Musculoskeletal: ____ ROM Transfers _W/C____ Assistive Device Used  _SBA/CGA___ Level of Assistance  Skin Integumentary:   [] Intact   [x] Not Intact   _Skin/Fall/Aspiration__Preventative Measures  Details_Prevalon boots in bed, silicone dressings on heels and right buttock.   Pain: [x] Controlled   [] Not Controlled   Pain Meds:   [] Scheduled   [x] PRN        Registered Dietitian / Nutrition:     Patient Vitals for the past 100 hrs:   % Diet Eaten   07/09/18 1215 80 %   07/09/18 0900 80 %   07/06/18 1813 75 %   07/06/18 1400 90 %   07/06/18 0951 85 %   07/05/18 1755 95 %   07/05/18 1345 80 %     Pt unavailable at time of visit. Has good meal intake per chart. Supplements:          [] Yes   [x] No      Amount of supplement consumed:  not applicable      Intake/Output Summary (Last 24 hours) at 07/09/18 1519  Last data filed at 07/09/18 1215   Gross per 24 hour   Intake              640 ml   Output                0 ml   Net              640 ml                                Last bowel movement: 7/9      Interdisciplinary Team Goals:     1. Goal  Encourage pt to consistently perform mobility (transfers and ambulation) at a distant supervision within the room and with supervision on the unit. Barrier  Impaired functional strength, Impaired endurance, Impaired balance    Intervention  Gait and balance training, Transfer training, Strength training     2. Goal  Encourage patient to participate in HEP for continuation of UE strength and coordination. Barrier  Deficits in endurance and strength    Intervention  IADL training, TE     3. Goal  Patient will take a full breath prior to speaking, 80% consistency. Barrier  Spastic dysarthria, dysprosodia, mild cognitive deficits    Intervention  speech production activities, cognitive retraining     4. Goal  Patient will be 100% continent of bladder day & night    Barrier  leakage at times    Intervention  progressing toward panties 7/9/18. May need pads for short time. 5. Goal  Improve self esteem and maintain mood stability    Barrier  Situational stress and difficulty in accepting immediate circumstances    Intervention  Support       6. Goal  Po intake of meals will meet >75% of patient estimated nutritional needs within the next 7 days. Outcome:  [x] Met/Ongoing    []  Not Met    [] New/Initial Goal     Barrier  none known     Intervention  Meals/snacks: modified composition       Disposition / Discharge Planning:      Follow-up therapy services:  home health   DME recommendations:  bedside commode   Estimated discharge date:  7/11/18   Discharge Location:  home         Electronic Signatures:      Signature Date Signed   Physical Therapist    Dejon Gomes PT, DPT  7/9/2018   Occupational Therapist    Avelino Pena, OTR/L 7/10/2018   Speech Therapist    Domonique Dawson, [address]  7/9/18   Recreational Therapist    Madeleine Flowers, [address] 7/9/2018   Nursing   Johnnie Tinsley, RN BSN  7/9/18   Dietitian    Pippa Young, RD  7/9/18   Clinical Psychologist    Harshil Hanna, PhD 7/10/18    Physician    Suly Mayorga MD   7/9/2018        Hilarie Seip, LUIS MIGUEL  7/9/18         The above information has been reviewed with the patient in a language that they can understand. Opportunity for comments and questions has been provided and a signed attestation has been scanned into the \"media tab\" of the EMR.       Patient Signature: ______________________________________________________    Date Signed: __________________________________________________________

## 2018-07-09 NOTE — PROGRESS NOTES
Problem: Self Care Deficits Care Plan (Adult)  Goal: *Therapy Goal (Edit Goal, Insert Text)  Occupational Therapy Goals   Long Term Goals = Short Term Goals  Initiated 2018 and to be accomplished within 2 week(s) 2018  1. Pt will perform self-feeding with (I). 2. Pt will perform grooming with (I) to MI.  3. Pt will perform UB bathing with MI.  4. Pt will perform LB bathing with MI.  5. Pt will perform tub/shower transfer with MI.   6. Pt will perform UB dressing with MI.  7. Pt will perform LB dressing with MI.  8. Pt will perform toileting task with MI.  9. Pt will perform toilet transfer with MI.  8. Pt will perform IADL home mgt (ie laundry, laundry folding, medication mgt) with MI.  11. Pt will perform IADL meal prep with MI. Short Term Goals   1. Pt will perform self-feeding with (I). 2. Pt will perform grooming with (I) to MI.  3. Pt will perform UB bathing with Supervision. 4. Pt will perform LB bathing with Supervision. 5. Pt will perform tub/shower transfer with Supervision . 6. Pt will perform UB dressing with Setup. 7. Pt will perform LB dressing with Supervision. 8. Pt will perform toileting task with Supervision. 9. Pt will perform toilet transfer with Supervision. 10. Pt will perform IADL home mgt (ie laundry, laundry folding, medication mgt) with Supervision. 11. Pt will perform IADL meal prep with Supervision. Occupational Therapy TREATMENT    Patient: Prachi Mejia   59 y.o. Patient identified with name and : yes    Date: 2018    First Tx Session  Time In: 0930   Time Out[de-identified] 1100        Diagnosis: Rhabdomyolisis  Rhabdomyolysis   Precautions: Aspiration, Fall  Chart, occupational therapy assessment, plan of care, and goals were reviewed. Pain:  Pt reports 0/10 pain or discomfort prior to treatment. Pt reports 0/10 pain or discomfort post treatment.    Intervention Provided: NA      SUBJECTIVE:   Patient stated I am feeling more confident Wendie Jasso     OBJECTIVE DATA SUMMARY:     THERAPEUTIC ACTIVITY Daily Assessment    Visuomotor coloring activity with color utensil to restore fine motor endurance for preparation of writing important documents. Increased time required. Pt able to print and sign name legibly. Pt completed in table task activity with focus on FM manipulation, B coordination and motor planning. Pt able to shuffle cards with min difficulty and increased time. THERAPEUTIC EXERCISE Daily Assessment   B  strengthening, B UE Strengthening  Pt used B Hands to manipulate firm theraputty to reveal and remove pegs. Pt used B UE with #3 dowel to complete B Shoulder Flexion, B elbow flexion/extension, B overhead press, and Horizontal Abduction/Adduction. Pt required rest breaks to complete tasks. GROOMING Daily Assessment    Grooming  Grooming Assistance : 6 (Modified independent)  Comments: Pt able to complete hand hygeine seated in w/c       TOILETING Daily Assessment    Toileting  Toileting Assistance (FIM Score): 5 (Supervision)  Adaptive Equipment: Grab bars       MOBILITY/TRANSFERS Daily Assessment    Functional Transfers  Toilet Transfer : Grab bars  Amount of Assistance Required: 5 (Supervision/setup)       ASSESSMENT:  Pt active and engaged in therapy session and motivated to improve to return to PLOF. Pt concerned about ability to prepare meals for self while managing walker. Pt educated on various techniques with patient voicing understanding. Progression toward goals:  [x]          Improving appropriately and progressing toward goals  []          Improving slowly and progressing toward goals  []          Not making progress toward goals and plan of care will be adjusted     PLAN:  Patient continues to benefit from skilled intervention to address the above impairments. Continue treatment per established plan of care.   Discharge Recommendations:  Home Health  Further Equipment Recommendations for Discharge:  TBD Activity Tolerance:  Fair      Estimated LOS:TBD    Please refer to the flowsheet for vital signs taken during this treatment.   After treatment:   [x]  Patient left in no apparent distress sitting up in chair   []  Patient left in no apparent distress in bed  [x]  Call bell left within reach  []  Nursing notified  []  Caregiver present  [x]  Chair alarm activated      Bea Fearing, LELBANC/L

## 2018-07-09 NOTE — ROUTINE PROCESS
SHIFT CHANGE NOTE FOR [facility]     Bedside and Verbal shift change report given to Chaya Cabrera RN (oncoming nurse)  By ,Shola Rogers RN(offgoing nurse). Report included the following information SBAR, Kardex, MAR and Recent Results.     Situation:                        Code Status: Full Code                        Reason for Admission: Rhabdomyolysis  Hospital Day: 8                        Problem List:   Hospital Problems  Date Reviewed: 7/4/2018                         Codes Class Noted POA      Vitamin D deficiency (Chronic) ICD-10-CM: E55.9  ICD-9-CM: 894. 9   6/28/2018 Yes      Overview Signed 6/28/2018  1:12 PM by Dolores Lomax MD        Vitamin D 25-Hydroxy (6/28/2018) = 13.5                 Gout (Chronic) ICD-10-CM: M10.9  ICD-9-CM: 274.9   Unknown Yes             Essential hypertension (Chronic) ICD-10-CM: I10  ICD-9-CM: 401.9   Unknown Yes             Type 2 diabetes mellitus (HCC) (Chronic) ICD-10-CM: E11.9  ICD-9-CM: 250.00   Unknown Yes      Overview Signed 6/27/2018 10:18 PM by Dolores Lomax MD        HbA1c (6/14/2018) = 6.9                Dysphagia ICD-10-CM: R13.10  ICD-9-CM: 787.20   6/14/2018 Yes             History of atrial fibrillation ICD-10-CM: Z86.79  ICD-9-CM: V12.59   6/13/2018 Yes      Overview Signed 6/27/2018 10:23 PM by Dolores Lomax MD        Atrial fibrillation with rapid ventricular response                * (Principal)Rhabdomyolysis ICD-10-CM: M62.82  ICD-9-CM: 728.88   6/13/2018 Yes             History of encephalopathy ICD-10-CM: Z86.69  ICD-9-CM: V12.49   6/13/2018 Yes      Overview Signed 6/27/2018 10:21 PM by Dolores Lomax MD        Acute metabolic encephalopathy                History of acute renal failure ICD-10-CM: Z87.448  ICD-9-CM: V13.09   6/13/2018 Yes             Generalized weakness ICD-10-CM: R53.1  ICD-9-CM: 780.79   6/13/2018 Yes             Impaired mobility and ADLs ICD-10-CM: Z74.09  ICD-9-CM: 799.89   6/13/2018 Yes                    Background: Past Medical History:        Past Medical History:   Diagnosis Date    Alcohol dependence in remission [facility])       alcohol withdrawal discharged 05/31/2013    Bipolar 1 disorder ([facility]      Chronic hepatitis C virus infection ([facility]       prior IV drug use;  Genotype 1a     Chronic obstructive pulmonary disease ([facility]      Depression      Dysphagia 6/14/2018    Essential hypertension      Eustachian tube dysfunction       Dr. Mily Underwood Gastroesophageal reflux disease      Gout      History of acute renal failure 6/13/2018    History of atrial fibrillation 6/13/2018     Atrial fibrillation with rapid ventricular response    History of encephalopathy 6/13/2018     Acute metabolic encephalopathy    Low serum high density lipoprotein (HDL) 6/14/2018     HDL (6/14/2018) = 12    Needle phobia       from prior IV drug abuse    Obesity, Class I, BMI 30-34. 9      Primary osteoarthritis involving multiple joints       knees and back    Psoriasis      Type 2 diabetes mellitus (HCC)       HbA1c (6/14/2018) = 6.9    Vitamin D deficiency 6/28/2018     Vitamin D 25-Hydroxy (6/28/2018) = 13.5                             Patient taking anticoagulants yes                         Patient has a defibrillator: no      Assessment:  ¨ Changes in Assessment throughout shift: none     ¨ Patient has central line: no Reasons if yes: Insertion date: Last dressing date:  ¨ Patient has Weston Cath: no Reasons if yes:     Insertion date:     ¨ Last Vitals:             Vitals:     07/04/18 1600 07/04/18 2152 07/05/18 0633 07/05/18 0745   BP: 147/83 133/81 145/78 139/85   Pulse: 88 80 62 60   Resp: 17 20   18   Temp: 97.8 °F (36.6 °C) 98.6 °F (37 °C)   98.7 °F (37.1 °C)   SpO2: 96% 94%   96%   Weight:           Height:                 · PAIN                                              Pain Assessment                                              Pain Intensity 1: 0 (07/05/18 0749) Pain Intensity 1: 2 (12/29/14 1105)                                              Pain Location 1: Wrist Pain Location 1: Abdomen                                              Pain Intervention(s) 1: Medication (see MAR) Pain Intervention(s) 1: Medication (see MAR)  Patient Stated Pain Goal: 0 Patient Stated Pain Goal: 0  ¨ Intervention effective: no    ¨ Other actions taken for pain:      · Skin Assessment  Skin color Skin Color: Appropriate for ethnicity  Condition/Temperature Skin Condition/Temp: Dry, Warm  Integrity Skin Integrity: Wound (add Wound LDA)  Turgor Turgor: Non-tenting  Weekly Pressure Ulcer Documentation  Pressure  Injury Documentation: Pressure Injury Noted-See Wound LDA to Document  Wound Prevention & Protection Methods  Orientation of wound Orientation of Wound Prevention: Posterior  Location of Prevention Location of Wound Prevention: Sacrum/Coccyx, Buttocks  Dressing Present Dressing Present : Yes  Dressing Status Dressing Status: Intact  Wound Offloading Wound Offloading (Prevention Methods): Foam silicone     · INTAKE/OUPUT               Date 07/04/18 0700 - 07/05/18 0659 07/05/18 0700 - 07/06/18 0659   Shift 3475-7085 1146-1898 24 Hour Total 7567-0395 5672-1695 24 Hour Total   I  N  T  A  K  E  P. O. 700   700 240   240      P. O. 700   700 240   240    Shift Total  (mL/kg) 700  (7.7)   700  (7.7) 240  (2.6)   240  (2.6)   O  U  T  P  U  T  Urine  (mL/kg/hr)                  Urine Occurrence(s) 8 x 4 x 12 x 3 x   3 x    Stool                  Stool Occurrence(s) 2 x 0 x 2 x 0 x   0 x    Shift Total  (mL/kg)                  700 240   240   Weight (kg) 91 91 91 91 91 91         Recommendations:  1. Patient needs and requests:Supervised assist with transfers, setup, pain control     2. Diet: Dental soft with thin liquida     3. Pending tests/procedures: none      4.  Functional Level/Equipment: 1 person, supervised assist     5. Estimated Discharge Date: 7/11 Posted on Whiteboard in Patients Room: yes      HEALS Safety Check     A safety check occurred in the patient's room between off going nurse and oncoming nurse listed above.     The safety check included the below items  Area Items   H  High Alert Medications § Verify all high alert medication drips (heparin, PCA, etc.)   E  Equipment § Suction is set up for ALL patients (with may)  § Red plugs utilized for all equipment (IV pumps, etc.)  § WOWs wiped down at end of shift. § Room stocked with oxygen, suction, and other unit-specific supplies   A  Alarms § Bed alarm is set for fall risk patients  § Ensure chair alarm is in place and activated if patient is up in a chair   L  Lines § Check IV for any infiltration  § Weston bag is empty if patient has a Weston   § Tubing and IV bags are labeled   S  Safety § Room is clean, patient is clean, and equipment is clean. § Hallways are clear from equipment besides carts. § Fall bracelet on for fall risk patients  § Ensure room is clear and free of clutter  § Suction is set up for ALL patients (with may)  § Hallways are clear from equipment besides carts.    § Isolation precautions followed, supplies available outside room, sign posted

## 2018-07-09 NOTE — ROUTINE PROCESS
SHIFT CHANGE NOTE FOR [place]     Bedside and Verbal shift change report given to Cielo Christiansen RN (oncoming nurse)  By ,Bea Cristina RN(offgoing nurse). Report included the following information SBAR, Kardex, MAR and Recent Results.     Situation:                        Code Status: Full Code                        Reason for Admission: Rhabdomyolysis  Hospital Day: 8                        Problem List:   Hospital Problems  Date Reviewed: 7/4/2018                         Codes Class Noted POA      Vitamin D deficiency (Chronic) ICD-10-CM: E55.9  ICD-9-CM: 244. 9   6/28/2018 Yes      Overview Signed 6/28/2018  1:12 PM by Dahlia Lynch MD        Vitamin D 25-Hydroxy (6/28/2018) = 13.5                 Gout (Chronic) ICD-10-CM: M10.9  ICD-9-CM: 274.9   Unknown Yes             Essential hypertension (Chronic) ICD-10-CM: I10  ICD-9-CM: 401.9   Unknown Yes             Type 2 diabetes mellitus (HCC) (Chronic) ICD-10-CM: E11.9  ICD-9-CM: 250.00   Unknown Yes      Overview Signed 6/27/2018 10:18 PM by Dahlia Lynch MD        HbA1c (6/14/2018) = 6.9                Dysphagia ICD-10-CM: R13.10  ICD-9-CM: 787.20   6/14/2018 Yes             History of atrial fibrillation ICD-10-CM: Z86.79  ICD-9-CM: V12.59   6/13/2018 Yes      Overview Signed 6/27/2018 10:23 PM by Dahlia Lynch MD        Atrial fibrillation with rapid ventricular response                * (Principal)Rhabdomyolysis ICD-10-CM: M62.82  ICD-9-CM: 728.88   6/13/2018 Yes             History of encephalopathy ICD-10-CM: Z86.69  ICD-9-CM: V12.49   6/13/2018 Yes      Overview Signed 6/27/2018 10:21 PM by Dahlia Lynch MD        Acute metabolic encephalopathy                History of acute renal failure ICD-10-CM: Z87.448  ICD-9-CM: V13.09   6/13/2018 Yes             Generalized weakness ICD-10-CM: R53.1  ICD-9-CM: 780.79   6/13/2018 Yes             Impaired mobility and ADLs ICD-10-CM: Z74.09  ICD-9-CM: 799.89   6/13/2018 Yes                    Background: Past Medical History:        Past Medical History:   Diagnosis Date    Alcohol dependence in remission [facility])       alcohol withdrawal discharged 05/31/2013    Bipolar 1 disorder ([facility]      Chronic hepatitis C virus infection ([facility]       prior IV drug use;  Genotype 1a     Chronic obstructive pulmonary disease ([facility]      Depression      Dysphagia 6/14/2018    Essential hypertension      Eustachian tube dysfunction       Dr. Ganesh Fletcher Gastroesophageal reflux disease      Gout      History of acute renal failure 6/13/2018    History of atrial fibrillation 6/13/2018     Atrial fibrillation with rapid ventricular response    History of encephalopathy 6/13/2018     Acute metabolic encephalopathy    Low serum high density lipoprotein (HDL) 6/14/2018     HDL (6/14/2018) = 12    Needle phobia       from prior IV drug abuse    Obesity, Class I, BMI 30-34. 9      Primary osteoarthritis involving multiple joints       knees and back    Psoriasis      Type 2 diabetes mellitus (HCC)       HbA1c (6/14/2018) = 6.9    Vitamin D deficiency 6/28/2018     Vitamin D 25-Hydroxy (6/28/2018) = 13.5                             Patient taking anticoagulants yes                         Patient has a defibrillator: no      Assessment:  ¨ Changes in Assessment throughout shift: none     ¨ Patient has central line: no Reasons if yes: Insertion date: Last dressing date:  ¨ Patient has Weston Cath: no Reasons if yes:     Insertion date:     ¨ Last Vitals:             Vitals:     07/04/18 1600 07/04/18 2152 07/05/18 0633 07/05/18 0745   BP: 147/83 133/81 145/78 139/85   Pulse: 88 80 62 60   Resp: 17 20   18   Temp: 97.8 °F (36.6 °C) 98.6 °F (37 °C)   98.7 °F (37.1 °C)   SpO2: 96% 94%   96%   Weight:           Height:                 · PAIN                                              Pain Assessment                                              Pain Intensity 1: 0 (07/05/18 0749) Pain Intensity 1: 2 (12/29/14 1105)                                              Pain Location 1: Wrist Pain Location 1: Abdomen                                              Pain Intervention(s) 1: Medication (see MAR) Pain Intervention(s) 1: Medication (see MAR)  Patient Stated Pain Goal: 0 Patient Stated Pain Goal: 0  ¨ Intervention effective: no    ¨ Other actions taken for pain:      · Skin Assessment  Skin color Skin Color: Appropriate for ethnicity  Condition/Temperature Skin Condition/Temp: Dry, Warm  Integrity Skin Integrity: Wound (add Wound LDA)  Turgor Turgor: Non-tenting  Weekly Pressure Ulcer Documentation  Pressure  Injury Documentation: Pressure Injury Noted-See Wound LDA to Document  Wound Prevention & Protection Methods  Orientation of wound Orientation of Wound Prevention: Posterior  Location of Prevention Location of Wound Prevention: Sacrum/Coccyx, Buttocks  Dressing Present Dressing Present : Yes  Dressing Status Dressing Status: Intact  Wound Offloading Wound Offloading (Prevention Methods): Foam silicone     · INTAKE/OUPUT               Date 07/04/18 0700 - 07/05/18 0659 07/05/18 0700 - 07/06/18 0659   Shift 6563-8968 0517-1528 24 Hour Total 4964-4833 9423-9628 24 Hour Total   I  N  T  A  K  E  P. O. 700   700 240   240      P. O. 700   700 240   240    Shift Total  (mL/kg) 700  (7.7)   700  (7.7) 240  (2.6)   240  (2.6)   O  U  T  P  U  T  Urine  (mL/kg/hr)                  Urine Occurrence(s) 8 x 4 x 12 x 3 x   3 x    Stool                  Stool Occurrence(s) 2 x 0 x 2 x 0 x   0 x    Shift Total  (mL/kg)                  700 240   240   Weight (kg) 91 91 91 91 91 91         Recommendations:  1. Patient needs and requests:Supervised assist with transfers, setup, pain control     2. Diet: Dental soft with thin liquida     3. Pending tests/procedures: none      4.  Functional Level/Equipment: 1 person, supervised assist     5. Estimated Discharge Date: 7/11 Posted on Whiteboard in Patients Room: yes      HEALS Safety Check     A safety check occurred in the patient's room between off going nurse and oncoming nurse listed above.     The safety check included the below items  Area Items   H  High Alert Medications § Verify all high alert medication drips (heparin, PCA, etc.)   E  Equipment § Suction is set up for ALL patients (with may)  § Red plugs utilized for all equipment (IV pumps, etc.)  § WOWs wiped down at end of shift. § Room stocked with oxygen, suction, and other unit-specific supplies   A  Alarms § Bed alarm is set for fall risk patients  § Ensure chair alarm is in place and activated if patient is up in a chair   L  Lines § Check IV for any infiltration  § Weston bag is empty if patient has a Weston   § Tubing and IV bags are labeled   S  Safety § Room is clean, patient is clean, and equipment is clean. § Hallways are clear from equipment besides carts. § Fall bracelet on for fall risk patients  § Ensure room is clear and free of clutter  § Suction is set up for ALL patients (with may)  § Hallways are clear from equipment besides carts.    § Isolation precautions followed, supplies available outside room, sign posted

## 2018-07-09 NOTE — PROGRESS NOTES
Problem: Neurolinguistics Impaired (Adult)  Goal: *Speech Goal: (INSERT TEXT)  Long term goals:  1. Patient will use strategies of slowed reate, over-articulation and increased loudness to improve speech production, supervision. 2. Patient will be 100% intelligible in casual conversation, supervision. 3. Patient will recall 3 words after 5 minutes in short term memory task, supervision. 4. Patient will perform functional problem solving and reasoning tasks with 90% accuracy. 5. Patient will write single words and short sentences with 90% accuracy and legibility. Short term goals  (by 7/12/18):  1. Patient will use strategies of slowed reate, over-articulation and increased loudness to improve speech production, min cues. 2. Patient will be 90% intelligible in production of polysyllabic words and phrases, supervision. 3. Patient will be 80% intelligible in casual conversation, min cues for use of strategies. 4. Patient will recall 3 words after 5 minutes in short term memory task, min assist-supervision. 5. Patient will perform functional problem solving and reasoning tasks with 80-90% accuracy. 6. Patient will write single words with 75-80% accuracy and legibility. Speech language pathology treatment    Patient: Derick Santo (8 y.o. female)  Date: 7/9/2018  Diagnosis: Rhabdomyolisis  Rhabdomyolysis Rhabdomyolysis       SUBJECTIVE:   Patient stated Is it that time already? . OBJECTIVE:   Mental Status:  Mrs. Anny Cobb was awake, alert and motivated in treatment sessions today. Treatment & Interventions:   Patient was seen for two half hour speech therapy sessions today. The following treatment tasks were presented:   Motor Speech:   Speech breathing exercise: Supervision  Neck/shoulder relaxation: Introduced and practiced with the SLP  Sentence productions: Min-mod cues for use of full breath      Sounding more natural re: prosody    Neuro-Linguistics:  Orientation:   Independent  Recent memory:  Independent  Recall 3 words:  Supervision  \"Trivia\":   Supervision, good responses    Response & Tolerance to Activities:  Mrs. Hiwot Ornelas continues to demonstrate improved speech production and more natural prosody. She needs cues to take a full breath to produce sentences though she reports that she practices the breathing technique between sessions (usually without speaking). Upper body relaxation techniques were presented to reduce referred tension to the vocal tract. Pain:  Pain Scale 1: Numeric (0 - 10)  No report of pain     After treatment:   [x]       Patient left in no apparent distress sitting up in chair  []       Patient left in no apparent distress in bed  []       Call bell left within reach  []       Nursing notified  []       Caregiver present  []       Bed alarm activated    ASSESSMENT:   Progression toward goals:  [x]       Improving appropriately and progressing toward goals  []       Improving slowly and progressing toward goals  []       Not making progress toward goals and plan of care will be adjusted    PLAN:   Patient continues to benefit from skilled intervention to address the above impairments. Continue treatment per established plan of care.   Discharge Recommendations:  Home Health    Estimated LOS: Through 7/11/18    Jim Gutierrez SLP  Time Calculation:  30 Minutes

## 2018-07-09 NOTE — PROGRESS NOTES
Problem: Mobility Impaired (Adult and Pediatric)  Goal: *Acute Goals and Plan of Care (Insert Text)  Physical Therapy Goals  STGs = LTGs  Initiated 2018, updated 2018, and to be accomplished within 14 day(s)  1. Patient will move from supine to sit and sit to supine , scoot up and down and roll side to side in bed with independence. 2.  Patient will transfer from bed to chair and chair to bed with modified independence using the least restrictive device. 3.  Patient will perform sit to stand with modified independence. 4.  Patient will ambulate with supervision for 150 feet with the least restrictive device. 5.  Patient will ascend/descend 3 stairs with bilateral handrail(s) with supervision/set-up. physical Therapy TREATMENT    Patient: Maurilio Johnson (73 y.o. female)  Date: 2018  Diagnosis: Rhabdomyolisis  Rhabdomyolysis Rhabdomyolysis  Precautions: Aspiration, Fall  Chart, physical therapy assessment, plan of care and goals were reviewed. Time In: 1432  Time Out: 6801  Patient Seen For: Balance activities; Patient education;Gait training;Transfer training  Pain:  Pt pain was reported as 0/10 pre-treatment. Pt pain was reported as intermittent knee pain with transfers from lower surfaces. Intervention: Education re: safe and efficient movement patterns and use of UEs for support as needed. Patient identified with name and : yes    SUBJECTIVE:     Pt notes she is pleased with her progress in physical therapy and prepared for d/c home.     OBJECTIVE DATA SUMMARY:   Objective:     TRANSFERS Daily Assessment    Other: stand step without AD  Transfer Assistance : 5 (Supervision/setup)  Sit to Stand Assistance: Supervision   Pt requires supervision for safety when performing transfers utilizing B UEs to assist.  Pt requires CGA, mod verbal cues for B foot positioning, and min manual cues for weight shifting with sit <> stand with B hands on distal third of  B femurs to promote improved functional B LE strength. Pt educated to utilize B UEs as needed upon d/c home for increased independence and increased safety with functional transfers from lower surface heights. GAIT Daily Assessment    Amount of Assistance: 5 (Supervision/setup) without AD or with RW  Distance (ft): 80 Feet (ft) x 1 trial (without AD), 30 Feet (ft) x 2 trials, 120 Feet (ft) x 2 trials, 20 Feet (ft) x 2 trials (without AD)  Pt requires supervision for safety with ambulation with or without RW. When pt ambulates without AD she demonstrates hesitant gait with altered arm swing, decreased B foot clearance and stance time and fwd flexed posture. When pt ambulates with RW, she ambulates with fwd flexed posture which she self identifies and corrects. Pt requires intermittent verbal cues for safe RW management in narrow spaces/around obstacles. STEPS or STAIRS Daily Assessment    Pt agreeable to attempt curb negotiation and then self selects side stepping up/down 4\" steps with PT providing handheld/minimal assistance for balance as pt side steps with left LE leading ascending and right LE leading descending. BALANCE Daily Assessment    Sitting - Static: Good (unsupported)  Sitting - Dynamic: Good (unsupported)  Standing - Static: Good  Standing - Dynamic : Impaired       WHEELCHAIR MOBILITY Daily Assessment    Functional Level: 6   Pt propels w/c on unit with modified independence. ASSESSMENT:  Pt is progressing well with improved quality of gait and distance of ambulation without AD and progression to stair/curb negotiation training as well as car transfer  training. Progression toward goals:  [x]      Improving appropriately and progressing toward goals  []      Improving slowly and progressing toward goals  []      Not making progress toward goals and plan of care will be adjusted     PLAN:  Patient continues to benefit from skilled intervention to address the above impairments.   Continue treatment per established plan of care. Discharge Recommendations:  Home Health Physical Therapy  Further Equipment Recommendations for Discharge:  Merle Nguyen     Estimated LOS: 7/11/2018    Activity Tolerance:   Fair/Good  Please refer to the flowsheet for vital signs taken during this treatment. After treatment:   Patient left self propelling w/c from gym back to room.       Adan Leo PT, DPT  7/9/2018

## 2018-07-10 ENCOUNTER — HOME HEALTH ADMISSION (OUTPATIENT)
Dept: HOME HEALTH SERVICES | Facility: HOME HEALTH | Age: 58
End: 2018-07-10
Payer: MEDICARE

## 2018-07-10 PROCEDURE — 97116 GAIT TRAINING THERAPY: CPT

## 2018-07-10 PROCEDURE — 77030011256 HC DRSG MEPILEX <16IN NO BORD MOLN -A

## 2018-07-10 PROCEDURE — 92507 TX SP LANG VOICE COMM INDIV: CPT

## 2018-07-10 PROCEDURE — 97530 THERAPEUTIC ACTIVITIES: CPT

## 2018-07-10 PROCEDURE — 74011250636 HC RX REV CODE- 250/636: Performed by: INTERNAL MEDICINE

## 2018-07-10 PROCEDURE — 65310000000 HC RM PRIVATE REHAB

## 2018-07-10 PROCEDURE — 97535 SELF CARE MNGMENT TRAINING: CPT

## 2018-07-10 PROCEDURE — 74011250637 HC RX REV CODE- 250/637: Performed by: INTERNAL MEDICINE

## 2018-07-10 RX ADMIN — ASPIRIN 81 MG CHEWABLE TABLET 81 MG: 81 TABLET CHEWABLE at 08:59

## 2018-07-10 RX ADMIN — COLCHICINE 0.6 MG: 0.6 CAPSULE ORAL at 08:59

## 2018-07-10 RX ADMIN — HEPARIN SODIUM 5000 UNITS: 5000 INJECTION, SOLUTION INTRAVENOUS; SUBCUTANEOUS at 13:20

## 2018-07-10 RX ADMIN — Medication 1 TABLET: at 09:51

## 2018-07-10 RX ADMIN — AMLODIPINE BESYLATE 10 MG: 10 TABLET ORAL at 06:30

## 2018-07-10 RX ADMIN — HEPARIN SODIUM 5000 UNITS: 5000 INJECTION, SOLUTION INTRAVENOUS; SUBCUTANEOUS at 06:30

## 2018-07-10 RX ADMIN — HEPARIN SODIUM 5000 UNITS: 5000 INJECTION, SOLUTION INTRAVENOUS; SUBCUTANEOUS at 21:21

## 2018-07-10 RX ADMIN — PANTOPRAZOLE SODIUM 40 MG: 40 TABLET, DELAYED RELEASE ORAL at 06:30

## 2018-07-10 RX ADMIN — CHOLECALCIFEROL CAP 125 MCG (5000 UNIT) 5000 UNITS: 125 CAP at 09:00

## 2018-07-10 RX ADMIN — ACETAMINOPHEN 650 MG: 325 TABLET, FILM COATED ORAL at 10:49

## 2018-07-10 RX ADMIN — LOSARTAN POTASSIUM 50 MG: 50 TABLET ORAL at 08:59

## 2018-07-10 NOTE — PROGRESS NOTES
Problem: Self Care Deficits Care Plan (Adult)  Goal: *Therapy Goal (Edit Goal, Insert Text)  Occupational Therapy Goals   Long Term Goals = Short Term Goals  Initiated 2018 and to be accomplished within 2 week(s) 2018  1. Pt will perform self-feeding with (I). 2. Pt will perform grooming with (I) to MI.  3. Pt will perform UB bathing with MI.  4. Pt will perform LB bathing with MI.  5. Pt will perform tub/shower transfer with MI.   6. Pt will perform UB dressing with MI.  7. Pt will perform LB dressing with MI.  8. Pt will perform toileting task with MI.  9. Pt will perform toilet transfer with MI.  8. Pt will perform IADL home mgt (ie laundry, laundry folding, medication mgt) with MI.  11. Pt will perform IADL meal prep with MI. Short Term Goals   1. Pt will perform self-feeding with (I). 2. Pt will perform grooming with (I) to MI.  3. Pt will perform UB bathing with Supervision. 4. Pt will perform LB bathing with Supervision. 5. Pt will perform tub/shower transfer with Supervision . 6. Pt will perform UB dressing with Setup. 7. Pt will perform LB dressing with Supervision. 8. Pt will perform toileting task with Supervision. 9. Pt will perform toilet transfer with Supervision. 10. Pt will perform IADL home mgt (ie laundry, laundry folding, medication mgt) with Supervision. 11. Pt will perform IADL meal prep with Supervision.        Occupational Therapy discharge    Patient: Francesca Javier (63 y.o. female)  Date: 7/10/2018    Time In: 5  Time Out[de-identified] 900    Primary Diagnosis: Rhabdomyolisis  Rhabdomyolysis Rhabdomyolysis    Precautions:  Aspiration, Fall    Barriers to Learning/Limitations: yes;  physical  Compensate with: visual, verbal, tactile, kinesthetic cues/model     Patient identified with name and : Yes    SUBJECTIVE:   Patient reported that her daughter will be temporarily staying with her when she leaves the hospital.     OBJECTIVE DATA SUMMARY:     Past Medical History: Diagnosis Date    Alcohol dependence in remission ([facility]     alcohol withdrawal discharged 2013    Bipolar 1 disorder (HCC)     Chronic hepatitis C virus infection ([facility]     prior IV drug use;  Genotype 1a     Chronic obstructive pulmonary disease ([facility]     Depression     Dysphagia 2018    Essential hypertension     Eustachian tube dysfunction     Dr. Lisa Levi Gastroesophageal reflux disease     Gout     History of acute renal failure 2018    History of atrial fibrillation 2018    Atrial fibrillation with rapid ventricular response    History of encephalopathy     Acute metabolic encephalopathy    Low serum high density lipoprotein (HDL) 2018    HDL (2018) = 12    Needle phobia     from prior IV drug abuse    Obesity, Class I, BMI 30-34.9     Primary osteoarthritis involving multiple joints     knees and back    Psoriasis     Type 2 diabetes mellitus (HCC)     HbA1c (2018) = 6.9    Vitamin D deficiency 2018    Vitamin D 25-Hydroxy (2018) = 13.5      Past Surgical History:   Procedure Laterality Date    HX  SECTION      x 3    HX COLONOSCOPY      Dr. Nix - due for repeat     HX CYST REMOVAL      Hydrocystoma R eye removed    HX PARTIAL HYSTERECTOMY      for fibroid tumors     Prior Level of Function/Home Situation:   Home Situation  Home Environment: Apartment  # Steps to Enter: 0 (elevator to 9th floor apt)  One/Two Story Residence: One story  Living Alone: Yes  Support Systems: Child(shira)  Patient Expects to be Discharged to[de-identified] Apartment  Current DME Used/Available at Home: None  Tub or Shower Type: Tub/Shower combination  [x]     Right hand dominant   []     Left hand dominant    Therapeutic Exercise:  UE fine motor strengthening and UE fine motor coordination activities    Pain:  Pt reports 3/10 pain or discomfort prior to treatment in Left LE knee.     Pt reports 3/10 pain or discomfort post treatment in Left LE knee.   Problem List:    Decreased strength B UE  []     Decreased strength trunk/core  []     Decreased AROM   []     Decreased PROM  []     Decreased balance sitting  []     Decreased balance standing  []     Decreased endurance  [x]     Pain  [x]  Left LE knee     Functional Limitations:   Decreased independence with ADL  []     Decreased independence with functional transfers  []     Decreased independence with ambulation  [x]     Decreased independence with IADL  [x]       Outcome Measures:      MMT Initial Assessment   Right Upper Extremity  Left Upper Extremity    UE AROM WNL WNL   Shoulder flexion 4/5 MMT 4/5 MMT   Shoulder extension 3+/5 MMT 3+/5 MMT   Shoulder ABDuction 3/5 MMT 3/5 MMT   Shoulder ADDUction 3/5 MMT 3/5 MMT   Elbow Flexion 3+/5 MMT 3+/5 MMT   Elbow Extension 3/5 MMT 3/5 MMT   Wrist Extension/Flexion WNL WNL    [facility]       MMT Discharge Assessment   Right Upper Extremity  Left Upper Extermity    UE AROM WNL WNL   Shoulder flexion 4+/5 MMT 4+/5 MMT   Shoulder extension 4+/5 MMT 4+/5 MMT   Shoulder ABDuction 4/5 MMT 4/5 MMT   Shoulder ADDUction 5/5 MMT 5/5 MMT   Elbow Flexion 5/5 MMT 5/5 MMT   Elbow Extension 5/5 MMT 5/5 MMT   Wrist Extension/Flexion WNL WNL    Intact Intact       0/5 No palpable muscle contraction  1/5 Palpable muscle contraction, no joint movement  2-/5 Less than full range of motion in gravity eliminated position  2/5 Able to complete full range of motion in gravity eliminated position  2+/5 Able to initiate movement against gravity  3-/5 More than half but not full range of motion against gravity  3/5 Able to complete full range of motion against gravity  3+/5 Completes full range of motion against gravity with minimal resistance  4-/5 Completes full range of motion against gravity with minimal resistance  4/5 Completes full range of motion against gravity with moderate resistance  5/5 Completes full range of motion against gravity with maximum resistance    Coordination: UE WNL  Sensation: Intact    FIM SCORES Initial Assessment Discharge Assessment   Eating 7 Feeding/Eating  Feeding/Eating Assistance: 7 (Independent)  Comments: (I) to make food to taste. (I) to bring cup of hot coffee to mouth without spills. Grooming 5 Grooming  Grooming Assistance : 6 (Modified independent)  Comments: MI for grooming for oral care, hair care, and face wash. Oral Hygiene FIM: 6    Bathing 4 Upper Body Bathing  Bathing Assistance, Upper: 6 (Modified independent)  Position Performed: Seated in chair  Comments: Utilize sink/vanity for UB bathing secondary to pt routine at home environment. Performed UB bathing: right arm/axillary, left arm/axillary, chest and stomach. Lower Body Bathing  Bathing Assistance, Lower : 6 (Modified independent)  Position Performed: Seated in chair;Standing  Comments: Utilize sink/vanity secondary to pt's routine at home. Performed the following LB: right thigh, left thigh, right lower leg/feet, left lower leg/feet, pericare regions, buttocks. Upper Body Dressing 4 Upper Body Dressing   Dressing Assistance : 6 (Modified independent)  Comments: MI to retrieve clothing, doff old pullover shirt and don clean pullover shirt. Lower Body Dressing 4 Lower Body Dressing   Dressing Assistance : 6 (Modified independent)  Leg Crossed Method Used: Yes  Position Performed: Seated in chair;Standing  Comments: MI to retrieve clothing, doff soiled pants and don clean pants. Don non skid socks.     Sock and/or Shoe Management FIM: 6   Toileting 0  5 Supervision with fww   Bladder - level of assist 2    Bladder - accident frequency score 5    Bowel - level of assist 1    Bowel - accident frequency score 6    Tub/Shower Transfer 4 N/A   Toilet Transfer 4  5 Supervision   Comprehension 5 5   Expression 5 5   Social Interaction 4 5   Problem Solving 4 5   Memory 3 5   Please see [facility] Interdisciplinary Eval: Coordination/Balance Section for details regarding FIM score description. Activity Tolerance:   Fair    ASSESSMENT:  Patient is making progress in skilled OT services. Patient has met 6 out of 11 LTGs. Recommendation to continue to distant supervision for toileting and IADL occupations. Adult daughter will reside temporarily with patient for ease of transition back to home environment. Patient continues to demonstrate limitations in endurance and standing tolerance with participation with ADLs/IADLs. Progression toward goals:  [x]      Improving appropriately and progressing toward goals  []      Improving slowly and progressing toward goals  []      Not making progress toward goals and plan of care will be adjusted     PLAN:  Pt would benefit from continued skilled physical therapy in order to improve independent functional mobility within the home with use of least restrictive device. Interventions may include range of motion (AROM, PROM B LE/trunk), motor function (B UE/trunk strengthening/coordination), activity tolerance (vitals, oxygen saturation levels), bed mobility training, balance activities, gait training (progressive ambulation program), and functional transfer training. Discharge Recommendations: Home Health  Further Equipment Recommendations for Discharge: bedside commode       Please refer to the flowsheet for vital signs taken during this treatment. After treatment:   [x]  Patient left in no apparent distress sitting up in chair  []  Patient left in no apparent distress in bed  [x]  Call bell left within reach  [x]  Nursing notified  []  Caregiver present  []  Bed alarm activated    COMMUNICATION/EDUCATION:   Communication/Collaboration:  []      Home safety education was provided and the patient/caregiver indicated understanding. [x]      Patient/family have participated as able and agree with findings and recommendations. []      Patient is unable to participate in plan of care at this time.     Addie Jimenez MS OTR/L  7/10/2018

## 2018-07-10 NOTE — PROGRESS NOTES
Problem: Neurolinguistics Impaired (Adult)  Goal: *Speech Goal: (INSERT TEXT)  Long term goals:  1. Patient will use strategies of slowed reate, over-articulation and increased loudness to improve speech production, supervision. 2. Patient will be 100% intelligible in casual conversation, supervision. 3. Patient will recall 3 words after 5 minutes in short term memory task, supervision. 4. Patient will perform functional problem solving and reasoning tasks with 90% accuracy. 5. Patient will write single words and short sentences with 90% accuracy and legibility. Short term goals  (by 7/12/18):  1. Patient will use strategies of slowed reate, over-articulation and increased loudness to improve speech production, min cues. 2. Patient will be 90% intelligible in production of polysyllabic words and phrases, supervision. 3. Patient will be 80% intelligible in casual conversation, min cues for use of strategies. 4. Patient will recall 3 words after 5 minutes in short term memory task, min assist-supervision. 5. Patient will perform functional problem solving and reasoning tasks with 80-90% accuracy. 6. Patient will write single words with 75-80% accuracy and legibility. Outcome: Resolved/Met Date Met: 07/10/18  Speech language pathology treatment    Patient: Luis Swain (76 y.o. female)  Date: 7/10/2018  Diagnosis: Rhabdomyolisis  Rhabdomyolysis Rhabdomyolysis       SUBJECTIVE:   Patient stated So I ain't gonna worry about that no more. OBJECTIVE:   Mental Status:  Mrs. Natacha Lieberman was awake, alert and motivated in today's sessions. Treatment & Interventions:   Patient was seen for two thirty minute speech therapy sessions. The following treatment tasks were presented:   Motor Speech:  Speech Breathing:  Supervision for practice in isolation       Min cues for use in speaking tasks  Upper body relaxation: Min cues   Sentence productions: Min assist for use of strategies  Phrase productions:  100% accuracy for functional phrases  Conversation:   Breath support, word production and intonation significantly improved since admission    Neuro-Linguistics:   Orientation:   Independent  Recent memory:  Independent  Recall 3 words:  Supervision    Response & Tolerance to Activities:  Mrs. Inna Araujo has been consistently motivated to improve speech production and puts forth her best in each session. Significant improvement is noted in conversation and structured tasks as well. She needs some cuing to speak on a full breath to avoid strain. She will benefit from ongoing SLP follow up to address articulatory and vocal spasticity. Pain:  Pain Scale 1: Numeric (0 - 10)  Pain Orientation 1: Right  Pain Description 1: Aching  After treatment:   [x]       Patient left in no apparent distress sitting up in chair  []       Patient left in no apparent distress in bed  [x]       Call bell left within reach  []       Nursing notified  []       Caregiver present  []       Bed alarm activated    ASSESSMENT:   Progression toward goals:  [x]       Improving appropriately and progressing toward goals  []       Improving slowly and progressing toward goals  []       Not making progress toward goals and plan of care will be adjusted    PLAN:   Patient continues to benefit from skilled intervention to address the above impairments. Continue treatment with home health therapy. Discharge Recommendations:  Home Health    Estimated LOS: Patient to be discharged home tomorrow, 7/11/18 with home health therapy.     Len Agrawal, SLP  Time Calculation:  61 Minutes

## 2018-07-10 NOTE — PROGRESS NOTES
Problem: Mobility Impaired (Adult and Pediatric)  Goal: *Acute Goals and Plan of Care (Insert Text)  Physical Therapy Goals  STGs = LTGs  Initiated 2018, updated 2018, and to be accomplished within 14 day(s)  1. Patient will move from supine to sit and sit to supine , scoot up and down and roll side to side in bed with independence. 2.  Patient will transfer from bed to chair and chair to bed with modified independence using the least restrictive device. 3.  Patient will perform sit to stand with modified independence. 4.  Patient will ambulate with supervision for 150 feet with the least restrictive device. 5.  Patient will ascend/descend 3 stairs with bilateral handrail(s) with supervision/set-up. physical Therapy Discharge note    Patient: Kathleen Carrion (86 y.o. female)  Date: 7/10/2018  Diagnosis: Rhabdomyolisis  Rhabdomyolysis Rhabdomyolysis  Precautions: Aspiration, Fall  Chart, physical therapy assessment, plan of care and goals were reviewed. Time In: 1334  Time Out: 1434     Pain:  Pt pain was reported as 6/10/0/10 pre-treatment - pt notes she has right wrist and thumb pain intermittently but states, \"I'm ok now,\" noting she had received tylenol prior to treatment. Pt with no c/o pain at end of treatment session. Intervention: NA    Patient identified with name and : yes    SUBJECTIVE:     Patient stated she has no concerns about d/c home and verbalizes understanding of education provided. Pt reports, \"I get excited,\" when cued for deep breathing prior to initiating mobility or conversation. Pt is pleasant and cooperative throughout treatment session.     OBJECTIVE DATA SUMMARY:   AROM: WFL    FIM SCORES Initial Assessment Discharge Assessment   Bed/Chair/Wheelchair Transfers 3 6   Wheelchair Mobility 2 6   Walking [place] 1 6   Steps/Stairs 0 2   PRIMARY MODE OF LOCOMOTION: ambulation  Please see [facility] Interdisciplinary Eval: Coordination/Balance Section for details regarding FIM score description. BED/CHAIR/WHEELCHAIR TRANSFERS Initial Assessment Discharge Assessment   Rolling Right 5 (Stand-by assistance) 6 (Modified independent)   Rolling Left 5 (Stand-by assistance) 6 (Modified independent)   Supine to Sit 4 (Minimal assistance) 6 (Modified independent)   Sit to Stand Minimal assistance Modified independent   Sit to Supine 4 (Minimal assistance) 6 (Modified independent)   Transfer Assist Score 3 6   Transfer Type SPT without device (stand step w/out AD, with gait belt) Stand step without AD   Comments mod A for stand step transfer w/out AD, due to balance issues and weakness in LE's; min A with support of RW Pt performs functional transfers and mobilizes in bed at a modified independent level requiring increased time to perform and with slight safety concern due to continued functional weakness. Pt requires B UE assistance for sit <> stand due to functional weakness with transfers from lower surface heights but is able to perform with B hands on distal third of B femurs to promote increased B LE weight bearing and functional strengthening from higher surface heights. Car Transfer Not tested Modified independent with stand step transfer without AD   Car Type n/a Car Transfer [place]       [facility] MOBILITY/MANAGEMENT Initial Assessment Discharge Assessment   Able to [place] 50 feet Greater than or equal to 150 Feet with modified independence over level surfaces.    Functional Level 2 6   Curbs/ramps assistance required 0 (Not tested) NT   Wheelchair set up assistance required 2 (Maximal assistance) Modified independence   Wheelchair management Manages left brake, Manages right brake (with verbal cues and demonstration) Manages B brakes       WALKING INDEPENDENCE Initial Assessment Discharge Assessment   Assistive device Walker, rolling, Gait belt (w/c follow) None/RW   Ambulation assistance - level surface Minimal assistance Modified Independent   Distance 18 Feet (ft) 15 feet x 2 trials to and from bathroom at start of session and 15 feet x 2 trials to and from bathroom at end of treatment session, 150 Feet (ft) x 1 trial/123 feet x 2 trials   Functional Level 1 6   Comments pt able to ambulate with min A  and w/c follow with RW, but <50 ft due to fatigue and dizziness Pt ambulates with and without RW with modified independence with increased time and verbal cuing for quality of movement. Without AD, pt ambulates with wide NATALIO with excessive lateral weight shifts and decreased B foot clearance with decreased left stance time. With RW, pt ambulates with decreased left stance time. Ambulation assistance - unlevel surface NT Pt declined ambulation outside       STEPS/STAIRS Initial Assessment Discharge Assessment   Steps/Stairs ambulated 0 6 (4\") steps   Rail Use None None   Functional Level 0 2   Comments not tested due to issues with dizziness with gait training Pt negotiates stairs side stepping to ascend with left LE leading and sidestepping to descent with right LE leading to mimic PLOF and pt's negotiation of curbs. Pt requires hand held assistance for balance. Curbs/Ramps NT NT     LTGs: Pt has progressed well during her stay to achieve 3/5 long term goals. Pt did not achieve goal for bed mobility due to requiring increased time to perform and did not fully achieve stair goal for supervision due to functional weakness. However, pt is progressing toward achieving these goals. Pt participated in standing balance activity to promote functional B LE strengthening with pt standing in narrowed NATALIO for one trial of 5 minutes 38 seconds and one trial of 3 minutes 15 seconds while performing bimanual reaching activity with modified independence and no major LOB noted. Pt did need intermittent verbal cues to avoid weight bearing on furniture for safety.     ASSESSMENT:  Pt is progressing well and is appropriate to advance to next level of care at a home health level to progress to outpatient PT. PLAN:  Pt would benefit from continued skilled physical therapy in order to improve independent functional mobility at a home health level. Interventions may include range of motion (AROM, PROM B LE/trunk), motor function (B LE/trunk strengthening/coordination), activity tolerance (vitals, oxygen saturation levels), bed mobility training, balance activities, gait training (progressive ambulation program), and functional transfer training. Discharge Recommendations:  Home Health Physical Therapy to progress to Outpatient Physical Therapy  Further Equipment Recommendations for Discharge:  rolling walker       Activity Tolerance:    Good    Please refer to the flowsheet for vital signs taken during this treatment.   After treatment:   [x] Patient left in no apparent distress sitting up in chair  [] Patient left in no apparent distress in bed  [] Call bell left within reach  [] Nursing notified  [] Caregiver present  [] Bed alarm activated      Aviva Cho, PT  7/10/2018

## 2018-07-10 NOTE — INTERDISCIPLINARY ROUNDS
[facility] PHYSICAL REHABILITATION  [address] [place] [address], [address]    INPATIENT REHABILITATION  TEAM CONFERENCE SUMMARY     Date of Conference: 7/10/2018    Name: Jc Flor Age / Sex: 59 y.o. / female   CSN: [id number] MRN: [id number]   Admit Date: 6/27/2018 Length of Stay: 13 days     Primary Rehabilitation Diagnosis  1. Impaired Mobility and ADLs  2. Rhabdomyolysis      Comorbidities   Chronic hepatitis C virus infection  B18.2    Bipolar 1 disorder  F31.9    Depression F32.9    Alcohol dependence in remission  F10.21    Needle phobia F40.298    Eustachian tube dysfunction H69.80    Psoriasis L40.9    History of atrial fibrillation Z86.79    History of encephalopathy Z86.69    Primary osteoarthritis involving multiple joints M15.0    Essential hypertension I10    Gastroesophageal reflux disease K21.9    Type 2 diabetes mellitus  E11.9    Chronic obstructive pulmonary disease  J44.9    History of acute renal failure Z87.448    Hypoalbuminemia E88.09    Low serum high density lipoprotein (HDL) R74.8    Obesity, Class I, BMI 30-34.9 E66.9    Dysphagia R13.10    Vitamin D deficiency E55.9    Gout M10.9          Therapy:     FIM SCORES Initial Assessment Weekly Progress Assessment 7/10/2018   Eating Functional Level: 7  Comments: (I) for self feeding of breakfast meal. No reports from pt about difficulty with cutting, bringing food to mouth, or swallowing. Feeding/Eating Assistance: 7 (Independent)  Comments: (I) to make food to taste. (I) to bring cup of hot coffee to mouth without spills. Functional Level: 7  Independent   Swallowing     Grooming 5 Grooming Assistance : 6 (Modified independent)  Comments: MI for grooming for oral care, hair care, and face wash. Bathing 4 Bathing Assistance, Upper: 6 (Modified independent)  Position Performed: Seated in chair  Comments: Utilize sink/vanity for UB bathing secondary to pt routine at home environment.  Performed UB bathing: right arm/axillary, left arm/axillary, chest and stomach. 6 Modified [place]  Bathing Assistance, Lower : 6 (Modified independent)  Position Performed: Seated in chair;Standing  Comments: Utilize sink/vanity secondary to pt's routine at home. Performed the following LB: right thigh, left thigh, right lower leg/feet, left lower leg/feet, pericare regions, buttocks. Upper Body Dressing Functional Level: 4  Items Applied/Steps Completed: Pullover (4 steps)  Comments: Supervision to doff pullover shirt and Min A to don pullover shirt. Not challenged with bra due to pt not having in her personal possession. Dressing Assistance : 6 (Modified independent)  Comments: MI to retrieve clothing, doff old pullover shirt and don clean pullover shirt. Functional Level: 6    Items Applied/Steps Completed: Pullover shirt (6 steps)   Lower Body Dressing Functional Level: 4  Items Applied/Steps Completed: Elastic waist pants (3 steps), Underpants (3 steps)  Comments: SBA to doff pants and underwear. Min A to don pants and underwear. Dressing Assistance : 6 (Modified independent)  Leg Crossed Method Used: Yes  Position Performed: Seated in chair;Standing  Comments: MI to retrieve clothing, doff soiled pants and don clean pants. Don non skid socks. Functional Level: 6  Items Applied/Steps Completed: Elastic waist pants with fasteners (3 steps)   Toileting Functional Level: 0  Comments: Min A for clothing mgt. Supervision for full pericare hygiene. Bladder - level of assist 2     Bladder - accident frequency score 5     Bowel - level of assist 1     Bowel - accident frequency score 6     Toilet Transfer [place] Toilet Transfer Score: 4  Comments: Min A secondary to require vcs and tactile cues for proper hand placement.  Tendency to pull up from seat surface vs push from arm rest.     Tub/Shower Transfer [place] Tub or Shower Type: Tub/Shower combination  Tub/Shower Transfer Score: 4  Comments: Min A for safety cues as well as utilization of proper hand placement with transfers. Tendency to want to pull up from seated position to stand. Education provided about proper hand placement. Tub or Shower Type: N/A  Comments: patient does not utilize tub or shower in her home environment. Instead she bathe at sink/vanity.    Comprehension Primary Mode of Comprehension: Auditory  Score: 5 Auditory  6   Expression Primary Mode of Expression: Verbal  Score: 5 Verbal  5   Social Interaction Score: 4 5   Problem Solving Score: 4 5   Memory Score: 3 5     FIM SCORES Initial Assessment Weekly Progress Assessment 7/10/2018   Bed/Chair/Wheelchair Transfers Transfer Type: SPT without device (stand step w/out AD, with gait belt)  Other: toilet transfer with min A with use of grab bar  Transfer Assistance : 3 (Moderate assistance ) (w/out AD; min A with RW)  Sit to Stand Assistance: Minimal assistance  Car Transfers: Not tested  Car Type: n/a Sit <> Stand: Supervision  Stand Step without AD: Supervision   Bed Mobility Rolling Right 5 (Stand-by assistance)   Rolling Left 5 (Stand-by assistance)   Supine to Sit 4 (Minimal assistance)   Sit to Stand Minimal assistance   Sit to Supine 4 (Minimal assistance)    Rolling Right   Modified Independent   Rolling Left   Modified Independent   Supine to Sit   Modified Independent   Sit to Stand   Supervision   Sit to Supine   Modified Independent      Locomotion (W/C) Able to Propel (ft): 50 feet  Functional Level: 2  Curbs/Ramps Assist Required (FIM Score): 0 (Not tested)  Wheelchair Setup Assist Required : 2 (Maximal assistance)  Wheelchair Management: Manages left brake, Manages right brake (with verbal cues and demonstration) Function 6  Setup Assistance: Modified Independent         Locomotion (W/C distance) 55 Feet 150 Feet   Locomotion (Walk) 4 (Minimal assistance) Superivsion   Locomotion (Walk dist.) 18 Feet (ft) 80 Feet   Steps/Stairs Steps/Stairs Ambulated (#): 0  Level of Assist : 0 (Not tested) (deferred due to dizziness with gait training)  Rail Use: None Pt declines assessment         Nursing:     Neuro:   A&O x_4___                   Respiratory:   [x] WNL   [] O2   [] LPM ______   Other:  Peripheral Vascular:   [] TEDS present   [] Edema present ____ Grade   Cardiac:   [x] WNL   [] Other  Genitourinary:   [x] continent   [] incontinent   [] diallo  Abdominal __7/9/18_ LBM  GI: Diabetic_ Diet __Regular____ Liquids _____ tube feeds  Musculoskeletal: ____ ROM Transfers _W/C____ Assistive Device Used  _SBA/CGA___ Level of Assistance  Skin Integumentary:   [] Intact   [x] Not Intact   _Skin/Fall/Aspiration__Preventative Measures  Details_Prevalon boots in bed, silicone dressings on heels and right buttock. Pain: [x] Controlled   [] Not Controlled   Pain Meds:   [] Scheduled   [x] PRN        Registered Dietitian / Nutrition:     Patient Vitals for the past 100 hrs:   % Diet Eaten   07/10/18 0930 90 %   07/09/18 1215 80 %   07/09/18 0900 80 %   07/06/18 1813 75 %   07/06/18 1400 90 %     Pt unavailable at time of visit. Has good meal intake per chart. Supplements:          [] Yes   [x] No      Amount of supplement consumed:  not applicable      Intake/Output Summary (Last 24 hours) at 07/10/18 1529  Last data filed at 07/10/18 0930   Gross per 24 hour   Intake              720 ml   Output                0 ml   Net              720 ml                                Last bowel movement: 7/9      Interdisciplinary Team Goals:     1. Goal  Encourage pt to consistently perform mobility (transfers and ambulation) at a distant supervision within the room and with supervision on the unit. Barrier  Impaired functional strength, Impaired endurance, Impaired balance    Intervention  Gait and balance training, Transfer training, Strength training     2. Goal  Encourage patient to participate in HEP for continuation of UE strength and coordination.      Barrier  Deficits in endurance and strength Intervention  IADL training, TE     3. Goal  Patient will take a full breath prior to speaking, 80% consistency. Barrier  Spastic dysarthria, dysprosodia, mild cognitive deficits    Intervention  speech production activities, cognitive retraining     4. Goal  Patient will be 100% continent of bladder day & night    Barrier  leakage at times    Intervention  progressing toward panties 7/9/18. May need pads for short time. 5. Goal  Improve self esteem and maintain mood stability    Barrier  Situational stress and difficulty in accepting immediate circumstances    Intervention  Support       6. Goal  Po intake of meals will meet >75% of patient estimated nutritional needs within the next 7 days. Outcome:  [x] Met/Ongoing    []  Not Met    [] New/Initial Goal     Barrier  none known     Intervention  Meals/snacks: modified composition       Disposition / Discharge Planning:      Follow-up therapy services:  home health   DME recommendations:  bedside commode   Estimated discharge date:  7/11/18   Discharge Location:  home         Interdisciplinary team rounds were held this PM with the following team members:       Name   Physical Therapist    Elene Opitz PT, DPT   Occupational Therapist    Wade Jean Baptiste, OTR/L   Speech Therapist    Kari Shook, [address] [place] [address], RN BSN    [address]   Physician    Yomaira Grace MD        LUIS MIGUEL Borden       Signed:  Yomaira Grace MD    July 10, 2018

## 2018-07-10 NOTE — ROUTINE PROCESS
SHIFT CHANGE NOTE FOR [facility]    Bedside and Verbal shift change report given to Franklin Boothe, SHANNAN (oncoming nurse) by Chun Shen RN (offgoing nurse). Report included the following information SBAR, Kardex, MAR and Recent Results.     Situation:   Code Status: Full Code   Reason for Admission: Rhabdomyolysis  Hospital Day: 13   Problem List:   Hospital Problems  Date Reviewed: 7/10/2018          Codes Class Noted POA    Vitamin D deficiency (Chronic) ICD-10-CM: E55.9  ICD-9-CM: 268.9  6/28/2018 Yes    Overview Signed 6/28/2018  1:12 PM by Nora Mata MD     Vitamin D 25-Hydroxy (6/28/2018) = 13.5              Gout (Chronic) ICD-10-CM: M10.9  ICD-9-CM: 274.9  Unknown Yes        Essential hypertension (Chronic) ICD-10-CM: I10  ICD-9-CM: 401.9  Unknown Yes        Type 2 diabetes mellitus ([facility] (Chronic) ICD-10-CM: E11.9  ICD-9-CM: 250.00  Unknown Yes    Overview Signed 6/27/2018 10:18 PM by Nora Mata MD     HbA1c (6/14/2018) = 6.9             Dysphagia ICD-10-CM: R13.10  ICD-9-CM: 787.20  6/14/2018 Yes        History of atrial fibrillation ICD-10-CM: Z86.79  ICD-9-CM: V12.59  6/13/2018 Yes    Overview Signed 6/27/2018 10:23 PM by Nora Mata MD     Atrial fibrillation with rapid ventricular response             * (Principal)Rhabdomyolysis ICD-10-CM: M62.82  ICD-9-CM: 728.88  6/13/2018 Yes        History of encephalopathy ICD-10-CM: Z86.69  ICD-9-CM: V12.49  6/13/2018 Yes    Overview Signed 6/27/2018 10:21 PM by Nora Mata MD     Acute metabolic encephalopathy             History of acute renal failure ICD-10-CM: Z87.448  ICD-9-CM: V13.09  6/13/2018 Yes        Generalized weakness ICD-10-CM: R53.1  ICD-9-CM: 780.79  6/13/2018 Yes        Impaired mobility and ADLs ICD-10-CM: Z74.09  ICD-9-CM: 799.89  6/13/2018 Yes              Background:   Past Medical History:   Past Medical History:   Diagnosis Date    Alcohol dependence in remission ([facility]     alcohol withdrawal discharged 05/31/2013    Bipolar 1 disorder ([facility]     Chronic hepatitis C virus infection ([facility]     prior IV drug use;  Genotype 1a     Chronic obstructive pulmonary disease ([facility]     Depression     Dysphagia 6/14/2018    Essential hypertension     Eustachian tube dysfunction     Dr. Morgan Mckeon Gastroesophageal reflux disease     Gout     History of acute renal failure 6/13/2018    History of atrial fibrillation 6/13/2018    Atrial fibrillation with rapid ventricular response    History of encephalopathy 4/27/2480    Acute metabolic encephalopathy    Low serum high density lipoprotein (HDL) 6/14/2018    HDL (6/14/2018) = 12    Needle phobia     from prior IV drug abuse    Obesity, Class I, BMI 30-34.9     Primary osteoarthritis involving multiple joints     knees and back    Psoriasis     Type 2 diabetes mellitus (HCC)     HbA1c (6/14/2018) = 6.9    Vitamin D deficiency 6/28/2018    Vitamin D 25-Hydroxy (6/28/2018) = 13.5       Patient taking anticoagulants yes Heparin   Patient has a defibrillator: no     Assessment:   Changes in Assessment throughout shift: none     Patient has central line: no Reasons if yes: Insertion date: Last dressing date:   Patient has Weston Cath: no Reasons if yes:     Insertion date:     Last Vitals:     Vitals:    07/09/18 1600 07/09/18 2200 07/10/18 0630 07/10/18 0739   BP: 128/83 126/85 127/82 134/87   Pulse: 84 70 72 87   Resp: 16 16  20   Temp: 98.2 °F (36.8 °C) 98.8 °F (37.1 °C)  99.2 °F (37.3 °C)   SpO2: 95% 97%  95%   Weight:       Height:            PAIN    Pain Assessment    Pain Intensity 1: 0 (07/10/18 1146) Pain Intensity 1: 2 (12/29/14 1105)    Pain Location 1: Wrist Pain Location 1: Abdomen    Pain Intervention(s) 1: Medication (see MAR) Pain Intervention(s) 1: Medication (see MAR)  Patient Stated Pain Goal: 0 Patient Stated Pain Goal: 0  o Intervention effective: yes    o Other actions taken for pain: turn, reposition, rest     Skin Assessment  Skin color Skin Color: Appropriate for ethnicity  Condition/Temperature Skin Condition/Temp: Warm, Dry  Integrity Skin Integrity: Wound (add Wound LDA)  Turgor Turgor: Non-tenting  Weekly Pressure Ulcer Documentation  Pressure  Injury Documentation: Pressure Injury Noted-See Wound LDA to Document  Wound Prevention & Protection Methods  Orientation of wound Orientation of Wound Prevention: Posterior  Location of Prevention Location of Wound Prevention: Buttocks, Sacrum/Coccyx  Dressing Present Dressing Present : Yes, Intact, not due to be changed  Dressing Status Dressing Status: Intact  Wound Offloading Wound Offloading (Prevention Methods): Bed, pressure reduction mattress, Turning, Repositioning     INTAKE/OUPUT    Date 07/09/18 0700 - 07/10/18 0659 07/10/18 0700 - 07/11/18 0659   Shift 4268-3078 0203-1664 24 Hour Total 4529-9453 0110-7622 24 Hour Total   I  N  T  A  K  E   P. O.  240  240      P. O.  240  240    Shift Total  (mL/kg) 640  (7) 480  (5.3) 1120  (12.3) 240  (2.6)  240  (2.6)   O  U  T  P  U  T   Urine  (mL/kg/hr)            Urine Occurrence(s) 2 x 5 x 7 x 5 x  5 x    Stool            Stool Occurrence(s) 1 x 0 x 1 x 1 x  1 x    Shift Total  (mL/kg)          256 7140 240  240   Weight (kg) 91 91 91 91 91 91       Recommendations:  1. Patient needs and requests: Supervised transfer to [facility],     2. Diet: Diabetic with thin liquids    3. Pending tests/procedures: none     4. Functional Level/Equipment: BSC, grab bars    5. Estimated Discharge Date: 7/11 Posted on Whiteboard in Patients Room: yes     HEALS Safety Check    A safety check occurred in the patient's room between off going nurse and oncoming nurse listed above.     The safety check included the below items  Area Items   H  High Alert Medications - Verify all high alert medication drips (heparin, PCA, etc.)   E  Equipment - Suction is set up for ALL patients (with yanker)  - Red plugs utilized for all equipment (IV pumps, etc.)  - WOWs wiped down at end of shift.  - Room stocked with oxygen, suction, and other unit-specific supplies   A  Alarms - Bed alarm is set for fall risk patients  - Ensure chair alarm is in place and activated if patient is up in a chair   L  Lines - Check IV for any infiltration  - Weston bag is empty if patient has a Weston   - Tubing and IV bags are labeled   S  Safety   - Room is clean, patient is clean, and equipment is clean. - Hallways are clear from equipment besides carts. - Fall bracelet on for fall risk patients  - Ensure room is clear and free of clutter  - Suction is set up for ALL patients (with yanker)  - Hallways are clear from equipment besides carts.    - Isolation precautions followed, supplies available outside room, sign posted

## 2018-07-10 NOTE — PROGRESS NOTES
[facility] PHYSICAL REHABILITATION  [address] [place] [address], [address]     INPATIENT REHABILITATION  DAILY PROGRESS NOTE     Date: 7/10/2018    Name: Noa Dudley Age / Sex: 59 y.o. / female   CSN: [id number] MRN: [id number]   Admit Date: 6/27/2018 Length of Stay: 13 days     Primary Rehab Diagnosis: Impaired Mobility and ADLs secondary to Rhabdomyolysis      Subjective:     Patient seen and examined. Blood pressure controlled. Team conference was held at bedside this PM.     Patient's Complaint:   No significant medical complaints     Pain Control: no current joint or muscle symptoms, essentially pain-free      Objective:     Vital Signs:  Patient Vitals for the past 24 hrs:   BP Temp Pulse Resp SpO2   07/10/18 0739 134/87 99.2 °F (37.3 °C) 87 20 95 %   07/10/18 0630 127/82 - 72 - -   07/09/18 2200 126/85 98.8 °F (37.1 °C) 70 16 97 %   07/09/18 1600 128/83 98.2 °F (36.8 °C) 84 16 95 %        Physical Examination:  GENERAL SURVEY: Patient is awake, alert, oriented x 3, sitting comfortably on the chair, not in acute respiratory distress. HEENT: pink palpebral conjunctivae, anicteric sclerae, no nasoaural discharge, moist oral mucosa  NECK: supple, no jugular venous distention, no palpable lymph nodes  CHEST/LUNGS: symmetrical chest expansion, good air entry, clear breath sounds  HEART: adynamic precordium, good S1 S2, no S3, regular rhythm, no murmurs  ABDOMEN: obese, bowel sounds appreciated, soft, non-tender  EXTREMITIES: pink nailbeds, (+) bipedal edema, full and equal pulses, no calf tenderness   NEUROLOGICAL EXAM: The patient is awake, alert and oriented x3, able to answer questions fairly appropriately, able to follow 1 and 2 step commands. Able to tell time from the wall clock. Cranial nerves II-XII are grossly intact. No gross sensory deficit. Motor strength is 4/5 on BUE, 4/5 on BLE.       Current Medications:  Current Facility-Administered Medications   Medication Dose Route Frequency    amLODIPine (NORVASC) tablet 10 mg  10 mg Oral DAILY    losartan (COZAAR) tablet 50 mg  50 mg Oral DAILY    cholecalciferol (VITAMIN D3) capsule 5,000 Units  5,000 Units Oral DAILY    acetaminophen (TYLENOL) tablet 650 mg  650 mg Oral Q4H PRN    bisacodyl (DULCOLAX) tablet 10 mg  10 mg Oral Q48H PRN    aspirin chewable tablet 81 mg  81 mg Oral DAILY WITH BREAKFAST    vitamin B complex tablet  1 Tab Oral DAILY    pantoprazole (PROTONIX) tablet 40 mg  40 mg Oral ACB    heparin (porcine) injection 5,000 Units  5,000 Units SubCUTAneous Q8H    albuterol (PROVENTIL VENTOLIN) nebulizer solution 2.5 mg  2.5 mg Nebulization Q4H PRN    colchicine (MITIGARE) capsule 0.6 mg  0.6 mg Oral DAILY       Allergies: Allergies   Allergen Reactions    Ace Inhibitors Cough    Penicillins Hives       Functional Progress:    OCCUPATIONAL THERAPY    ON ADMISSION MOST RECENT   Eating  Functional Level: 7   Eating  Functional Level: 7     Grooming  Functional Level: 5   Grooming  Functional Level: 5     Bathing  Functional Level: 4   Bathing  Functional Level: 6     Upper Body Dressing  Functional Level: 4   Upper Body Dressing  Functional Level: 5     Lower Body Dressing  Functional Level: 4   Lower Body Dressing  Functional Level: 5     Toileting  Functional Level: 0   Toileting  Functional Level: 6     Toilet Transfers  Toilet Transfer Score: 4   Toilet Transfers  Toilet Transfer Score: 6     Tub /Shower Transfers  Tub/Shower Transfer Score: 4   Tub/Shower Transfers  Tub/Shower Transfer Score: 4       Legend:   7 - Independent   6 - Modified Independent   5 - Standby Assistance / Supervision / Set-up   4 - Minimum Assistance / Contact Guard Assistance   3 - Moderate Assistance   2 - Maximum Assistance   1 - Total Assistance / Dependent       Lab/Data Review:  No results found for this or any previous visit (from the past 24 hour(s)).       Estimated Glomerular Filtration Rate:  On admission, estimated GFR based on a Creatinine of 0.90 mg/dl:   Using CKD-EPI = 81.1 mL/min/1.73m2   Using MDRD = 82.4 mL/min/1.73m2  Most recent estimated GFR, based on a Creatinine of 1.17 mg/dl on 7/9/2018:   Using CKD-EPI = 59.1 mL/min/1.73m2   Using MDRD = 60.9 mL/min/1.73m2       Assessment:     Primary Rehabilitation Diagnosis  1. Impaired Mobility and ADLs  2. Rhabdomyolysis     Comorbidities   Chronic hepatitis C virus infection  B18.2    Bipolar 1 disorder  F31.9    Depression F32.9    Alcohol dependence in remission  F10.21    Needle phobia F40.298    Eustachian tube dysfunction H69.80    Psoriasis L40.9    History of atrial fibrillation Z86.79    History of encephalopathy Z86.69    Primary osteoarthritis involving multiple joints M15.0    Essential hypertension I10    Gastroesophageal reflux disease K21.9    Type 2 diabetes mellitus  E11.9    Chronic obstructive pulmonary disease  J44.9    History of acute renal failure Z87.448    Hypoalbuminemia E88.09    Low serum high density lipoprotein (HDL) R74.8    Obesity, Class I, BMI 30-34.9 E66.9    Dysphagia R13.10    Vitamin D deficiency E55.9    Gout M10.9        Plan:     1. Justification for continued stay: Good progression towards established rehabilitation goals. 2. Medical Issues being followed closely:    [x]  Fall and safety precautions     []  Wound Care     [x]  Bowel and Bladder Function     [x]  Fluid Electrolyte and Nutrition Balance     []  Pain Control      3. Issues that 24 hour rehabilitation nursing is following:    [x]  Fall and safety precautions     []  Wound Care     [x]  Bowel and Bladder Function     [x]  Fluid Electrolyte and Nutrition Balance     []  Pain Control      [x]  Assistance with and education on in-room safety with transfers to and from the bed, wheelchair, toilet and shower. 4. Acute rehabilitation plan of care:    [x]  Continue current care and rehab.            [x]  Physical Therapy           [x]  Occupational Therapy [x]  Speech Therapy     []  Hold Rehab until further notice     5. Medications:    [x]  MAR Reviewed     [x]  Continue Present Medications     6. DVT Prophylaxis:      []  Lovenox     [x]  Unfractionated Heparin     []  Coumadin     []  NOAC     [x]  EFRAÍN Stockings     [x]  Sequential Compression Device     []  None     7. Orders:   > Rhabdomyolysis; History of acute renal failure; History of atrial fibrillation with rapid ventricular response;  History of encephalopathy   > CK (6/13/2018, on admission to [facility]) = 6954   > CK (6/21/2018) = 183   > BUN/Creatinine (6/13/2018, on admission to [facility]) = 128/2.42   > BUN/Creatinine (6/19/2018) = 11/0.86   > BUN/Creatinine (6/28/2018, on admission to ARU) = 10/0.90    > BUN/Creatinine (7/5/2018) = 13/1.09    > BUN/Creatinine (7/9/2018) = 12/1.17   > Increase oral fluid intake     > Dysphagia   > Speech and Language Pathology to evaluate and treat     > Gout   > Patient stated she has gout and that during her stay at [facility], she had pain great toes of both feet   > On 6/24/2018, patient was started on Colchicine 0.6 mg PO BID   > Upon admission to the ARU, patient stated the pain has improved significantly   > Uric acid (6/28/2018) = 5.7   > On 6/28/2018, decreased Colchicine from 0.6 mg PO BID to 0.6 mg PO once daily    > Continue Colchicine 0.6 mg PO once daily     > Essential hypertension   > Prior to admission to [facility], patient was on:    > Chlorthalidone 25 mg PO once daily    > Spironolactone 25 mg PO once daily    > Clonidine 0.2 mg PO BID    > Losartan-HCTZ 50-12.5 1 tab PO once daily   > During the patient's stay at [facility], all above antihypertensive medications were put on hold   > Upon discharge from [facility], all above antihypertensives were resumed as per discharge summary   > On 7/3/2018, increased Losartan from 25 mg to 50 mg PO once daily (9AM)   > On 7/4/2018, added Amlodipine 5 mg PO once daily (6AM)   > On 7/7/2018, increased Losartan from 50 mg to 100 mg PO once daily (9AM)   > Continue:    > Increased Amlodipine from 5 mg to 10 mg PO once daily (6AM)    > Decreased Losartan from 100 mg to 50 mg PO once daily (9AM)   > Continue:    > Amlodipine 10 mg PO once daily (6AM)    > Losartan 50 mg PO once daily (9AM)    > Type 2 diabetes mellitus   > Prior to admission to [facility], patient was on Metformin 500 mg PO BID with meals   > HbA1c (6/14/2018) = 6.9   > During the patient's stay at [facility], Metformin was put on hold (re: acute renal failure)   > Upon discharge from [facility], resumed Metformin 500 mg PO BID with meals as per discharge summary   > On admission to the ARU, resumed Metformin 500 mg PO once daily with breakfast   > BUN/Creatinine (6/28/2018, on admission to ARU) = 10/0.90    > BUN/Creatinine (7/5/2018) = 13/1.09    > On 7/7/2018, discontinued Humalog insulin sliding scale SC TID AC only   > BUN/Creatinine (7/9/2018) = 12/1.17   > On 7/9/2018, discontinued Metformin 500 mg PO once daily with breakfast    > Vitamin D Deficiency   > Vitamin D 25-Hydroxy (6/28/2018) = 13.5   > On 6/28/2018, patient was given Cholecalciferol 50,000 units PO x 1 dose    > On 6/29/2018, started Cholecalciferol 5,000 units PO once daily   > Continue Cholecalciferol 5,000 units PO once daily    > Urinary frequency   > Urinalysis (7/3/2018): ~WNL    > On 6/28/2018, patient stated she had 4 bowel movements since AM   > Discontinued Docusate sodium 100 mg PO BID      8. Patient's progress in rehabilitation and medical issues discussed with the patient. All questions answered to the best of my ability. Care plan discussed with patient and nurse.     9. Discharge Planning:   > For discharge to home tomorrow   [address] [place] [address] Physical Therapy, Occupational Therapy and Speech Therapy   > F/U: PCP (Dr. Alivia Lopez)        Signed:    Tyree Louis MD    July 10, 2018

## 2018-07-10 NOTE — ROUTINE PROCESS
[facility]    Bedside and Verbal shift change report given to Parvin Escobar RN (oncoming nurse) by Vale Whaley Rn (offgoing nurse). Report included the following information SBAR, Kardex, MAR and Recent Results.     Situation:   Code Status: Full Code   Reason for Admission: Rhabdomyolysis  Hospital Day: 13   Problem List:   Hospital Problems  Date Reviewed: 7/9/2018          Codes Class Noted POA    Vitamin D deficiency (Chronic) ICD-10-CM: E55.9  ICD-9-CM: 268.9  6/28/2018 Yes    Overview Signed 6/28/2018  1:12 PM by Luis Mortensen MD     Vitamin D 25-Hydroxy (6/28/2018) = 13.5              Gout (Chronic) ICD-10-CM: M10.9  ICD-9-CM: 274.9  Unknown Yes        Essential hypertension (Chronic) ICD-10-CM: I10  ICD-9-CM: 401.9  Unknown Yes        Type 2 diabetes mellitus ([facility] (Chronic) ICD-10-CM: E11.9  ICD-9-CM: 250.00  Unknown Yes    Overview Signed 6/27/2018 10:18 PM by Luis Mortensen MD     HbA1c (6/14/2018) = 6.9             Dysphagia ICD-10-CM: R13.10  ICD-9-CM: 787.20  6/14/2018 Yes        History of atrial fibrillation ICD-10-CM: Z86.79  ICD-9-CM: V12.59  6/13/2018 Yes    Overview Signed 6/27/2018 10:23 PM by Luis Mortensen MD     Atrial fibrillation with rapid ventricular response             * (Principal)Rhabdomyolysis ICD-10-CM: M62.82  ICD-9-CM: 728.88  6/13/2018 Yes        History of encephalopathy ICD-10-CM: Z86.69  ICD-9-CM: V12.49  6/13/2018 Yes    Overview Signed 6/27/2018 10:21 PM by Luis Mortensen MD     Acute metabolic encephalopathy             History of acute renal failure ICD-10-CM: Z87.448  ICD-9-CM: V13.09  6/13/2018 Yes        Generalized weakness ICD-10-CM: R53.1  ICD-9-CM: 780.79  6/13/2018 Yes        Impaired mobility and ADLs ICD-10-CM: Z74.09  ICD-9-CM: 799.89  6/13/2018 Yes              Background:   Past Medical History:   Past Medical History:   Diagnosis Date    Alcohol dependence in remission ([facility]     alcohol withdrawal discharged 05/31/2013    Bipolar 1 disorder ([facility]     Chronic hepatitis C virus infection ([facility]     prior IV drug use;  Genotype 1a     Chronic obstructive pulmonary disease ([facility]     Depression     Dysphagia 6/14/2018    Essential hypertension     Eustachian tube dysfunction     Dr. Aniket Bar Gastroesophageal reflux disease     Gout     History of acute renal failure 6/13/2018    History of atrial fibrillation 6/13/2018    Atrial fibrillation with rapid ventricular response    History of encephalopathy 7/75/2244    Acute metabolic encephalopathy    Low serum high density lipoprotein (HDL) 6/14/2018    HDL (6/14/2018) = 12    Needle phobia     from prior IV drug abuse    Obesity, Class I, BMI 30-34.9     Primary osteoarthritis involving multiple joints     knees and back    Psoriasis     Type 2 diabetes mellitus (HCC)     HbA1c (6/14/2018) = 6.9    Vitamin D deficiency 6/28/2018    Vitamin D 25-Hydroxy (6/28/2018) = 13.5       Patient taking anticoagulants yes Heparin   Patient has a defibrillator: no     Assessment:   Changes in Assessment throughout shift: none     Patient has central line: no Reasons if yes: Insertion date: Last dressing date:   Patient has Weston Cath: no Reasons if yes:     Insertion date:     Last Vitals:     Vitals:    07/09/18 0552 07/09/18 0732 07/09/18 1600 07/09/18 2200   BP: 130/74 125/77 128/83 126/85   Pulse: 65 78 84 70   Resp:  16 16 16   Temp:  98.2 °F (36.8 °C) 98.2 °F (36.8 °C) 98.8 °F (37.1 °C)   SpO2:  100% 95% 97%   Weight:       Height:            PAIN    Pain Assessment    Pain Intensity 1: 0 (07/10/18 0528) Pain Intensity 1: 2 (12/29/14 1105)    Pain Location 1: Wrist Pain Location 1: Abdomen    Pain Intervention(s) 1: Medication (see MAR) Pain Intervention(s) 1: Medication (see MAR)  Patient Stated Pain Goal: 0 Patient Stated Pain Goal: 0  o Intervention effective: yes    o Other actions taken for pain: turn, reposition, rest     Skin Assessment  Skin color Skin Color: Appropriate for ethnicity  Condition/Temperature Skin Condition/Temp: Warm, Dry  Integrity Skin Integrity: Wound (add Wound LDA)  Turgor Turgor: Non-tenting  Weekly Pressure Ulcer Documentation  Pressure  Injury Documentation: Pressure Injury Noted-See Wound LDA to Document  Wound Prevention & Protection Methods  Orientation of wound Orientation of Wound Prevention: Posterior  Location of Prevention Location of Wound Prevention: Buttocks, Sacrum/Coccyx  Dressing Present Dressing Present : Yes, Intact, not due to be changed  Dressing Status Dressing Status: Intact  Wound Offloading Wound Offloading (Prevention Methods): Bed, pressure reduction mattress, Turning, Repositioning     INTAKE/OUPUT    Date 07/09/18 0700 - 07/10/18 0659 07/10/18 0700 - 07/11/18 0659   Shift 1856-0710 9343-7348 24 Hour Total 7529-1373 6240-6575 24 Hour Total   I  N  T  A  K  E   P. O.          P. O.        Shift Total  (mL/kg) 640  (7) 360  (4) 1000  (11)      O  U  T  P  U  T   Urine  (mL/kg/hr)            Urine Occurrence(s) 2 x 4 x 6 x       Stool            Stool Occurrence(s) 1 x 0 x 1 x       Shift Total  (mL/kg)          615 1440      Weight (kg) 91 91 91 91 91 91       Recommendations:  1. Patient needs and requests: Supervised transfer to [facility],     2. Diet: Diabetic with thin liquids    3. Pending tests/procedures: none     4. Functional Level/Equipment: BSC, grab bars    5. Estimated Discharge Date: 7/11 Posted on Whiteboard in Patients Room: yes     HEALS Safety Check    A safety check occurred in the patient's room between off going nurse and oncoming nurse listed above.     The safety check included the below items  Area Items   H  High Alert Medications - Verify all high alert medication drips (heparin, PCA, etc.)   E  Equipment - Suction is set up for ALL patients (with yanker)  - Red plugs utilized for all equipment (IV pumps, etc.)  - WOWs wiped down at end of shift.  - Room stocked with oxygen, suction, and other unit-specific supplies   A  Alarms - Bed alarm is set for fall risk patients  - Ensure chair alarm is in place and activated if patient is up in a chair   L  Lines - Check IV for any infiltration  - Weston bag is empty if patient has a Weston   - Tubing and IV bags are labeled   S  Safety   - Room is clean, patient is clean, and equipment is clean. - Hallways are clear from equipment besides carts. - Fall bracelet on for fall risk patients  - Ensure room is clear and free of clutter  - Suction is set up for ALL patients (with may)  - Hallways are clear from equipment besides carts.    - Isolation precautions followed, supplies available outside room, sign posted

## 2018-07-11 VITALS
HEART RATE: 87 BPM | RESPIRATION RATE: 18 BRPM | SYSTOLIC BLOOD PRESSURE: 124 MMHG | HEIGHT: 65 IN | OXYGEN SATURATION: 97 % | TEMPERATURE: 99 F | DIASTOLIC BLOOD PRESSURE: 83 MMHG | BODY MASS INDEX: 33.43 KG/M2 | WEIGHT: 200.62 LBS

## 2018-07-11 PROCEDURE — 74011250637 HC RX REV CODE- 250/637: Performed by: INTERNAL MEDICINE

## 2018-07-11 PROCEDURE — 77030011256 HC DRSG MEPILEX <16IN NO BORD MOLN -A

## 2018-07-11 RX ORDER — GUAIFENESIN 100 MG/5ML
81 LIQUID (ML) ORAL
Qty: 15 TAB | Refills: 0 | Status: SHIPPED | OUTPATIENT
Start: 2018-07-12

## 2018-07-11 RX ORDER — COLCHICINE 0.6 MG/1
0.6 CAPSULE ORAL DAILY
Qty: 15 CAP | Refills: 0 | Status: SHIPPED | OUTPATIENT
Start: 2018-07-12

## 2018-07-11 RX ORDER — AMLODIPINE BESYLATE 5 MG/1
5 TABLET ORAL DAILY
Status: DISCONTINUED | OUTPATIENT
Start: 2018-07-12 | End: 2018-07-11 | Stop reason: HOSPADM

## 2018-07-11 RX ORDER — METFORMIN HYDROCHLORIDE 500 MG/1
500 TABLET ORAL
Qty: 15 TAB | Refills: 0 | Status: SHIPPED | OUTPATIENT
Start: 2018-07-11

## 2018-07-11 RX ORDER — MULTIVIT WITH MINERALS/HERBS
1 TABLET ORAL DAILY
Qty: 15 TAB | Refills: 0 | Status: SHIPPED | OUTPATIENT
Start: 2018-07-12

## 2018-07-11 RX ORDER — CYCLOBENZAPRINE HCL 10 MG
10 TABLET ORAL
COMMUNITY
End: 2018-07-11

## 2018-07-11 RX ORDER — CHOLECALCIFEROL (VITAMIN D3) 125 MCG
5000 CAPSULE ORAL DAILY
Qty: 15 CAP | Refills: 0 | Status: SHIPPED | OUTPATIENT
Start: 2018-07-12

## 2018-07-11 RX ORDER — LOSARTAN POTASSIUM 50 MG/1
50 TABLET ORAL DAILY
Qty: 15 TAB | Refills: 0 | Status: SHIPPED | OUTPATIENT
Start: 2018-07-12

## 2018-07-11 RX ORDER — AMLODIPINE BESYLATE 5 MG/1
5 TABLET ORAL DAILY
Qty: 15 TAB | Refills: 0 | Status: SHIPPED | OUTPATIENT
Start: 2018-07-12

## 2018-07-11 RX ADMIN — Medication 1 TABLET: at 09:47

## 2018-07-11 RX ADMIN — ASPIRIN 81 MG CHEWABLE TABLET 81 MG: 81 TABLET CHEWABLE at 09:47

## 2018-07-11 RX ADMIN — PANTOPRAZOLE SODIUM 40 MG: 40 TABLET, DELAYED RELEASE ORAL at 06:30

## 2018-07-11 RX ADMIN — CHOLECALCIFEROL CAP 125 MCG (5000 UNIT) 5000 UNITS: 125 CAP at 09:47

## 2018-07-11 RX ADMIN — COLCHICINE 0.6 MG: 0.6 CAPSULE ORAL at 09:47

## 2018-07-11 RX ADMIN — LOSARTAN POTASSIUM 50 MG: 50 TABLET ORAL at 09:48

## 2018-07-11 NOTE — PROGRESS NOTES
Christina spoke to pt yesterday regarding dc needs. Pt is requesting a rolling walker be added to her DME order of bedside commode. Orders provided to [facility] and was delivered last night to the pt's room. Pt will need home health at dc and states that she wants to use [address]. Christina placed referral and notified liaisons. Pt will be transported to home by family.

## 2018-07-11 NOTE — DISCHARGE INSTRUCTIONS
DISCHARGE SUMMARY from Nurse    PATIENT INSTRUCTIONS:    After general anesthesia or intravenous sedation, for 24 hours or while taking prescription Narcotics:  · Limit your activities  · Do not drive and operate hazardous machinery  · Do not make important personal or business decisions  · Do  not drink alcoholic beverages  · If you have not urinated within 8 hours after discharge, please contact your surgeon on call. Report the following to your surgeon:  · Excessive pain, swelling, redness or odor of or around the surgical area  · Temperature over 100.5  · Nausea and vomiting lasting longer than 4 hours or if unable to take medications  · Any signs of decreased circulation or nerve impairment to extremity: change in color, persistent  numbness, tingling, coldness or increase pain  · Any questions    What to do at Home:  Recommended activity: Activity as tolerated, and as directed by your physician. If you experience any of the following symptoms chest pain or difficulty breathing, please follow up with the e.    *  Please give a list of your current medications to your Primary Care Provider. *  Please update this list whenever your medications are discontinued, doses are      changed, or new medications (including over-the-counter products) are added. *  Please carry medication information at all times in case of emergency situations. These are general instructions for a healthy lifestyle:    No smoking/ No tobacco products/ Avoid exposure to second hand smoke  Surgeon General's Warning:  Quitting smoking now greatly reduces serious risk to your health.     Obesity, smoking, and sedentary lifestyle greatly increases your risk for illness    A healthy diet, regular physical exercise & weight monitoring are important for maintaining a healthy lifestyle    You may be retaining fluid if you have a history of heart failure or if you experience any of the following symptoms:  Weight gain of 3 pounds or more overnight or 5 pounds in a week, increased swelling in our hands or feet or shortness of breath while lying flat in bed. Please call your doctor as soon as you notice any of these symptoms; do not wait until your next office visit. Recognize signs and symptoms of STROKE:    F-face looks uneven    A-arms unable to move or move unevenly    S-speech slurred or non-existent    T-time-call 911 as soon as signs and symptoms begin-DO NOT go       Back to bed or wait to see if you get better-TIME IS BRAIN. Warning Signs of HEART ATTACK     Call 911 if you have these symptoms:   Chest discomfort. Most heart attacks involve discomfort in the center of the chest that lasts more than a few minutes, or that goes away and comes back. It can feel like uncomfortable pressure, squeezing, fullness, or pain.  Discomfort in other areas of the upper body. Symptoms can include pain or discomfort in one or both arms, the back, neck, jaw, or stomach.  Shortness of breath with or without chest discomfort.  Other signs may include breaking out in a cold sweat, nausea, or lightheadedness. Don't wait more than five minutes to call 911 - MINUTES MATTER! Fast action can save your life. Calling 911 is almost always the fastest way to get lifesaving treatment. Emergency Medical Services staff can begin treatment when they arrive -- up to an hour sooner than if someone gets to the hospital by car. The discharge information has been reviewed with the patient. The patient verbalized understanding. Discharge medications reviewed with the patient and appropriate educational materials and side effects teaching were provided. ___________________________________________________________________________________________________________________________________    ------------------------------------------------------------------------------------------------------------    DISCHARGE INSTRUCTIONS    1.  Make sure that when you request refills at the pharmacy that the refill requests are sent to your PCP (NOT to the prescriber at the [facility]) to avoid any delays in getting your medication refills. The physician at the [facility] will not able to order medications or refills after discharge -- Please understand that though we would like to help, it is simply not safe for our physician to order you a medication that we cannot monitor. 2. If any of the prescribed medications require a prior authorization, contact your Primary Care Physician or specialist to EITHER complete prior authorizations and paperwork on your behalf OR prescribe an alternative medication. -- Please understand that though we would like to help, it is simply not safe for our physician to order you a medication that we cannot monitor. 3. Over-the-counter (OTC) medications will NOT be prescribed. You need to buy these medications over-the-counter. -------------------------------------------------------------------------------------------------------------------    Patient armband removed and shredded        [employer] Activation    Thank you for requesting access to [employer]. Please follow the instructions below to securely access and download your online medical record. [employer] allows you to send messages to your doctor, view your test results, renew your prescriptions, schedule appointments, and more. How Do I Sign Up? 1. In your internet browser, go to [email or web address]  2. Click on the First Time User? Click Here link in the Sign In box. You will be redirect to the New Member Sign Up page. 3. Enter your [employer] Access Code exactly as it appears below. You will not need to use this code after youve completed the sign-up process. If you do not sign up before the expiration date, you must request a new code.     [employer] Access Code: [email or web address]  Expires: 9/25/2018 11:27 AM (This is the date your [employer] access code will )    4. Enter the last four digits of your Social Security Number (xxxx) and Date of Birth (mm/dd/yyyy) as indicated and click Submit. You will be taken to the next sign-up page. 5. Create a [email or web address] ID. This will be your [employer] login ID and cannot be changed, so think of one that is secure and easy to remember. 6. Create a [employer] password. You can change your password at any time. 7. Enter your Password Reset Question and Answer. This can be used at a later time if you forget your password. 8. Enter your e-mail address. You will receive e-mail notification when new information is available in [address] 9. Click Sign Up. You can now view and download portions of your medical record. 10. Click the Download Summary menu link to download a portable copy of your medical information. Additional Information    If you have questions, please visit the Frequently Asked Questions section of the [employer] website at [email or web address] Remember, [employer] is NOT to be used for urgent needs. For medical emergencies, dial 911.

## 2018-07-11 NOTE — FACE TO FACE
[address]  [address] [facility] [place] [address]    [address] [place] [address]    Name: Marcello Roman Age / Sex: 59 y.o. / female   CSN: [id number] MRN: [id number]   Admit Date: 6/27/2018 Discharge Date: 7/11/2018      Primary Care Provider: Renee Perkins MD      Primary Rehabilitation Diagnosis  1. Impaired Mobility and ADLs  2. Rhabdomyolysis      Comorbidities   Chronic hepatitis C virus infection  B18.2    Bipolar 1 disorder  F31.9    Depression F32.9    Alcohol dependence in remission  F10.21    Needle phobia F40.298    Eustachian tube dysfunction H69.80    Psoriasis L40.9    History of atrial fibrillation Z86.79    History of encephalopathy Z86.69    Primary osteoarthritis involving multiple joints M15.0    Essential hypertension I10    Gastroesophageal reflux disease K21.9    Type 2 diabetes mellitus  E11.9    Chronic obstructive pulmonary disease  J44.9    History of acute renal failure Z87.448    Hypoalbuminemia E88.09    Low serum high density lipoprotein (HDL) R74.8    Obesity, Class I, BMI 30-34.9 E66.9    Dysphagia R13.10    Vitamin D deficiency E55.9    Gout M10.9        History of the Present Illness: The patient is a 49-year-old, right-handed, Black female with multiple medical comorbidities who was admitted to [address] [place] [address] on 6/13/2018 due to confusion.  The patient was apparently well until the morning of admission, the patient was found by daughter to be confused and on the living room floor.     The patient was brought to the [address] [place] [address] Emergency Department for further evaluation.     Excerpt from the ED Provider Note by Anita Koenig NP (reviewed and attested by Dr. Huey Willard):  \"Kya Lorenzo is a 59 y.o. female with a PMHX HTN, Arthritis, DM type 2, Hepatitis C, GERD, COPD, Bipolar 1 Disorder, Depression, Alcohol Dependence in Remission, DJD, Eustachian Tube Dysfunction, and Psoriasis arrived to ER via EMS from home for medical evaluation. Gaby Delgadillo reports patient was found on living room floor this am and confused.  Daughter reports the last time she had spoke or seen her mother was six days ago. Portia Haywood currently alert to person, place, and location.  Daughter reports patient had ripped off the labels on her prescription bottles .  Patient not alert to time or day.  Patient states, \"not sure what happened, I cant remember why I was on the floor. \"  Patient c/o neck pain and generalize bodyache.  Patient smkoes less than 1/2 pack of cigarettes per day.  Patient denies fever, chills, headache, dizziness, visual disturbance, CP, Palpitations, SOB, abdominal pain, N/V/D, flank pain, back pain, urinary sx's, or any other concerns. \"     12-lead EKG (6/13/2018, 8:30 AM) showed atrial fibrillation with rapid ventricular response at 154 bpm.      CT scan of the head (6/13/2018) showed no acute intracranial abnormality.     CT scan of the cervical spine (6/13/2018) showed straightening of the usual cervical lordosis without evidence of fracture or acute traumatic listhesis; cervical spondylosis and neuroforaminal narrowing; patchy groundglass opacity at the periphery of the imaged right upper lobe, potentially reflecting pneumonitis or pneumonia given the presenting history.     For the AF with RVR, patient was given Digoxin 500 mcg IV x 1 dose and she was started on a Diltiazem drip.      CK (6/13/2018) = 6954. BUN/Creatinine (6/13/2018) = 128/2. 42. Mg (6/13/2018) = 3.4. WBC count (6/13/2018) = 9.8. Lactic acid (6/13/2018) = 2.7. Urine toxicology (6/13/2018) was positive for THC. ALT was slightly elevated at 78. AST was elevated at 282.  ALP was normal at 46.     Repeat EKG at the ER (6/13/2018, 12:45 PM) showed conversion of the rhythm to sinus tachycardia at  105 bpm.     The patient was admitted under the service of the [address] [place] [address] Group Hospitalist Division (Dr. Abram Cooks).     Patient was started on intravenous fluids. Patient was also empirically started on Levofloxacin.     Metformin and all antihypertensive medications were put on hold. Patient was started on Aspirin 325 mg PO once daily and Atorvastatin 80 mg PO q HS.     Unfractionated heparin, SCDs and EFRAÍN stockings were used for DVT prophylaxis.     MRI of the brain (6/14/2018) showed mild atrophy and minimal chronic small vessel changes; otherwise, unremarkable evaluation; specifically, no acute intracranial abnormalities are identified.     Neurology consult (Dr. Misael Celis) was called for evaluation and comanagement. Dr. Misael Celis did not feel the patient had a stroke. Aspirin dose was decreased to 81 mg PO once daily. Atorvastatin dose was decreased (and eventually discontinued). Impression was \"encephalopathy\".     Ammonia level (6/15/2018) = 53. Patient was started on Lactulose.     TelePsychiatry consult (Dr. Michelle Castellano) was called for evaluation and comanagement. Impression was \"unspecified delirium, resolving\".     Patient's mental status/cognition had continued to improve during her hospital stay.     The patient had remained hemodynamically stable but due to the above events, the patient was noted to be generally weak and with impaired mobility and ADLs. Patient was felt to be a good candidate for acute inpatient rehabilitation. Upon evaluation by Physical Therapy and Occupational Therapy, the patient was recommended for acute inpatient rehabilitation. The patient was discharged and was subsequently admitted to the [facility] for intensive rehabilitation to help recover strength, function and mobility.       Past Medical History:  Past Medical History:   Diagnosis Date    Alcohol dependence in remission ([facility]     alcohol withdrawal discharged 05/31/2013    Bipolar 1 disorder ([facility]     Chronic hepatitis C virus infection [facility])     prior IV drug use;  Genotype 1a     [facility] obstructive pulmonary disease [facility]     Depression     Dysphagia 2018    Essential hypertension     Eustachian tube dysfunction     Dr. Mahnaz Farfan Gastroesophageal reflux disease     Gout     History of acute renal failure 2018    History of atrial fibrillation 2018    Atrial fibrillation with rapid ventricular response    History of encephalopathy 3021    Acute metabolic encephalopathy    Low serum high density lipoprotein (HDL) 2018    HDL (2018) = 12    Needle phobia     from prior IV drug abuse    Obesity, Class I, BMI 30-34.9     Primary osteoarthritis involving multiple joints     knees and back    Psoriasis     Type 2 diabetes mellitus (HCC)     HbA1c (2018) = 6.9    Vitamin D deficiency 2018    Vitamin D 25-Hydroxy (2018) = 13.5        Past Surgical History:  Past Surgical History:   Procedure Laterality Date    HX  SECTION      x 3    HX COLONOSCOPY      Dr. Ghada Sheth - due for repeat     HX CYST REMOVAL      Hydrocystoma R eye removed    HX PARTIAL HYSTERECTOMY      for fibroid tumors       Medications on Discharge:    Current Discharge Medication List      START taking these medications    Details   losartan (COZAAR) 50 mg tablet Take 1 Tab by mouth daily. Indications: hypertension  Qty: 15 Tab, Refills: 0    Associated Diagnoses: Essential hypertension      amLODIPine (NORVASC) 5 mg tablet Take 1 Tab by mouth daily. Indications: hypertension  Qty: 15 Tab, Refills: 0    Associated Diagnoses: Essential hypertension      colchicine (MITIGARE) 0.6 mg capsule Take 1 Cap by mouth daily. Indications: prevention of acute gout attack  Qty: 15 Cap, Refills: 0    Associated Diagnoses: Acute gout involving toe, unspecified cause, unspecified laterality      aspirin 81 mg chewable tablet Take 1 Tab by mouth daily (with breakfast).  Indications: prevention of cerebrovascular accident  Qty: 15 Tab, Refills: 0      b complex vitamins tablet Take 1 Tab by mouth daily. Indications: VITAMIN DEFICIENCY PREVENTION  Qty: 15 Tab, Refills: 0      cholecalciferol (VITAMIN D3) 5,000 unit capsule Take 1 Cap by mouth daily. Indications: Vitamin D Deficiency  Qty: 15 Cap, Refills: 0    Associated Diagnoses: Vitamin D deficiency         CONTINUE these medications which have CHANGED    Details   metFORMIN (GLUCOPHAGE) 500 mg tablet Take 1 Tab by mouth daily (with breakfast). Indications: type 2 diabetes mellitus  Qty: 15 Tab, Refills: 0         CONTINUE these medications which have NOT CHANGED    Details   Lancets misc Use daily or as directed for blood sugar monitoring, dx 250.00  Qty: 50 Each, Refills: 11      glucose blood VI test strips (BLOOD GLUCOSE TEST) strip Use daily or as directed for blood sugar monitoring. .00  Qty: 50 Strip, Refills: 11      Blood-Glucose Meter monitoring kit Use as directed. Qty: 1 Kit, Refills: 0      Shower Chair XX clemencia Use daily with showering. Dx: djd of knee  Qty: 1 each, Refills: 0      Omeprazole delayed release (PRILOSEC D/R) 20 mg tablet Take 1 tablet by mouth daily.   Qty: 30 tablet, Refills: 5         STOP taking these medications       cyclobenzaprine (FLEXERIL) 10 mg tablet Comments:   Reason for Stopping:         folic acid (FOLVITE) 1 mg tablet Comments:   Reason for Stopping:         meloxicam (MOBIC) 15 mg tablet Comments:   Reason for Stopping:         chlorthalidone (HYGROTEN) 25 mg tablet Comments:   Reason for Stopping:         predniSONE (DELTASONE) 20 mg tablet Comments:   Reason for Stopping:         spironolactone (ALDACTONE) 25 mg tablet Comments:   Reason for Stopping:         ipratropium-albuterol (COMBIVENT RESPIMAT)  mcg/actuation inhaler Comments:   Reason for Stopping:         cloNIDine HCl (CATAPRES) 0.2 mg tablet Comments:   Reason for Stopping:         HYDROcodone-acetaminophen (NORCO) 5-325 mg per tablet Comments:   Reason for Stopping:         naproxen (NAPROSYN) 375 mg tablet Comments:   Reason for Stopping:         conjugated estrogens (PREMARIN) 0.625 mg/gram vaginal cream Comments:   Reason for Stopping:         hydrocortisone valerate (WEST-FELISHA) 0.2 % ointment Comments:   Reason for Stopping:         losartan-hydrochlorothiazide (HYZAAR) 50-12.5 mg per tablet Comments:   Reason for Stopping:         cetirizine (ZYRTEC) 10 mg tablet Comments:   Reason for Stopping:         traMADol (ULTRAM) 50 mg tablet Comments:   Reason for Stopping:               Condition on Discharge: Stable. Ambulation Gait  Amount of Assistance: 5 (Supervision/setup)  Distance (ft): 150 Feet (ft)  Assistive Device:  (without AD)     Wheelchair Mobility Wheelchair Mobility/Management  Able to Propel (ft): 186 feet  Functional Level: 6  Curbs/Ramps Assist Required (FIM Score): 0 (Not tested)  Wheelchair Setup Assist Required : 2 (Maximal assistance)  Wheelchair Management: Manages left brake, Manages right brake (with verbal cues and demonstration)         Disposition: Patient clinically improved and was discharged to home with home health physical therapy, occupational therapy and speech therapy. The patient is temporarily homebound secondary to functional deficits due to rhabdomyolysis. She can ambulate without using an assistive device (see above). The patient would benefit from continued skilled physical therapy in order to improve independent functional mobility within the home with use of least restrictive device. The patient would also benefit from continued skilled occupational therapy in order to improve self care and functional mobility within the home with use of least restrictive device. The patient would also benefit from continued skilled speech therapy in order to work on cognition, swallowing and speech restoration.        Due to the abovementioned data, I certify that the patient needs intermittent Physical Therapy, Speech Language Pathology  and Occupational Therapy. I will NOT be following this patient in the Community and Dr. Kiara Walter will be responsible for signing the [facility] 133 of Care. In compliance with the Affordable Care Act, I certify that this patient was managed by me during this hospitalization and that I had a Face-to-Face Encounter that meets the physician Face-to-Face Encounter requirements.       Signed:    Yomaira Grace MD    July 11, 2018

## 2018-07-11 NOTE — ROUTINE PROCESS
0800 Pt. Awake sitting up in bed alert and oriented x 4 no change in assessment no signs of distress pt. Reported to be feeling fine. 0930 Pt. Sitting in chair eating breakfast.  1030 pt. Reported to be ready for discharge home accompanied with family. Dr. Joselin Lemus was notified. 1130 Pt. Verbalized understanding of discharge instructions. Accompanied with family upon discharged home.

## 2018-07-11 NOTE — DISCHARGE SUMMARY
[facility] PHYSICAL [address], [address]     INPATIENT REHABILITATION  DISCHARGE SUMMARY    Name: Jorge A Acevedo MRN: [id number]   Age / Sex: 59 y.o. / female CSN: [id number]   YOB: 1960 Length of Stay: 14 days   Admit Date: 6/27/2018 Discharge Date: 7/11/2018       PRIMARY CARE PHYSICIAN: Sisi Keller MD      DISCHARGE DIAGNOSES:    Primary Rehabilitation Diagnosis  1. Impaired Mobility and ADLs  2. Rhabdomyolysis      Comorbidities   Chronic hepatitis C virus infection  B18.2    Bipolar 1 disorder  F31.9    Depression F32.9    Alcohol dependence in remission  F10.21    Needle phobia F40.298    Eustachian tube dysfunction H69.80    Psoriasis L40.9    History of atrial fibrillation Z86.79    History of encephalopathy Z86.69    Primary osteoarthritis involving multiple joints M15.0    Essential hypertension I10    Gastroesophageal reflux disease K21.9    Type 2 diabetes mellitus  E11.9    Chronic obstructive pulmonary disease  J44.9    History of acute renal failure Z87.448    Hypoalbuminemia E88.09    Low serum high density lipoprotein (HDL) R74.8    Obesity, Class I, BMI 30-34.9 E66.9    Dysphagia R13.10    Vitamin D deficiency E55.9    Gout M10.9        CONSULTS CALLED: None      PROCEDURES DONE: None      BRIEF HISTORY: The patient is a 38-year-old, right-handed, Black female with multiple medical comorbidities who was admitted to [facility] on 6/13/2018 due to confusion.  The patient was apparently well until the morning of admission, the patient was found by daughter to be confused and on the living room floor.     The patient was brought to the [facility] Emergency Department for further evaluation.     Excerpt from the ED Provider Note by Oscar Lopez NP (reviewed and attested by Dr. Eusebio Weinberg):  \"Kya Lorenzo is a 59 y.o. female with a PMHX HTN, Arthritis, DM type 2, Hepatitis C, GERD, COPD, Bipolar 1 Disorder, Depression, Alcohol Dependence in Remission, DJD, Eustachian Tube Dysfunction, and Psoriasis arrived to ER via EMS from home for medical evaluation.  Daughter reports patient was found on living room floor this am and confused.  Daughter reports the last time she had spoke or seen her mother was six days ago. Charli Moreno currently alert to person, place, and location.  Daughter reports patient had ripped off the labels on her prescription bottles .  Patient not alert to time or day.  Patient states, \"not sure what happened, I cant remember why I was on the floor. \"  Patient c/o neck pain and generalize bodyache.  Patient smkoes less than 1/2 pack of cigarettes per day.  Patient denies fever, chills, headache, dizziness, visual disturbance, CP, Palpitations, SOB, abdominal pain, N/V/D, flank pain, back pain, urinary sx's, or any other concerns. \"     12-lead EKG (6/13/2018, 8:30 AM) showed atrial fibrillation with rapid ventricular response at 154 bpm.      CT scan of the head (6/13/2018) showed no acute intracranial abnormality.     CT scan of the cervical spine (6/13/2018) showed straightening of the usual cervical lordosis without evidence of fracture or acute traumatic listhesis; cervical spondylosis and neuroforaminal narrowing; patchy groundglass opacity at the periphery of the imaged right upper lobe, potentially reflecting pneumonitis or pneumonia given the presenting history.     For the AF with RVR, patient was given Digoxin 500 mcg IV x 1 dose and she was started on a Diltiazem drip.      CK (6/13/2018) = 6954. BUN/Creatinine (6/13/2018) = 128/2. 42. Mg (6/13/2018) = 3.4. WBC count (6/13/2018) = 9.8. Lactic acid (6/13/2018) = 2.7. Urine toxicology (6/13/2018) was positive for THC. ALT was slightly elevated at 78. AST was elevated at 282.  ALP was normal at 46.     Repeat EKG at the ER (6/13/2018, 12:45 PM) showed conversion of the rhythm to sinus tachycardia at  105 bpm.     The patient was admitted under the service of the [facility] (Dr. Karly Ortega).     Patient was started on intravenous fluids. Patient was also empirically started on Levofloxacin.     Metformin and all antihypertensive medications were put on hold. Patient was started on Aspirin 325 mg PO once daily and Atorvastatin 80 mg PO q HS.     Unfractionated heparin, SCDs and EFRAÍN stockings were used for DVT prophylaxis.     MRI of the brain (6/14/2018) showed mild atrophy and minimal chronic small vessel changes; otherwise, unremarkable evaluation; specifically, no acute intracranial abnormalities are identified.     Neurology consult (Dr. Kaitlyn Hodgson) was called for evaluation and comanagement. Dr. Kaitlyn Hodgson did not feel the patient had a stroke. Aspirin dose was decreased to 81 mg PO once daily. Atorvastatin dose was decreased (and eventually discontinued). Impression was \"encephalopathy\".     Ammonia level (6/15/2018) = 53. Patient was started on Lactulose.     TelePsychiatry consult (Dr. Twan Mejia) was called for evaluation and comanagement. Impression was \"unspecified delirium, resolving\".     Patient's mental status/cognition had continued to improve during her hospital stay.     The patient had remained hemodynamically stable but due to the above events, the patient was noted to be generally weak and with impaired mobility and ADLs. Patient was felt to be a good candidate for acute inpatient rehabilitation. Upon evaluation by Physical Therapy and Occupational Therapy, the patient was recommended for acute inpatient rehabilitation. The patient was discharged and was subsequently admitted to the [facility] for intensive rehabilitation to help recover strength, function and mobility.       COURSE IN THE HOSPITAL: Upon admission to the [facility], the patient underwent physical therapy, occupational therapy and speech therapy. The patient was able to actively participate in the rehabilitation activities and progressed well. On discharge, the patient was able to perform the following activities:    1. Occupational Therapy    ON ADMISSION ON DISCHARGE   Eating  Functional Level: 7   Eating  Functional Level: 5     Grooming  Functional Level: 5   Grooming  Functional Level: 5     Bathing  Functional Level: 4   Bathing  Functional Level: 6     Upper Body Dressing  Functional Level: 4   Upper Body Dressing  Functional Level: 5     Lower Body Dressing  Functional Level: 4   Lower Body Dressing  Functional Level: 5     Toileting  Functional Level: 0   Toileting  Functional Level: 6     Toilet Transfers  Toilet Transfer Score: 4   Toilet Transfers  Toilet Transfer Score: 6     Tub /Shower Transfers  Tub/Shower Transfer Score: 4   Tub/Shower Transfers  Tub/Shower Transfer Score: 4       2.  Physical Therapy    ON ADMISSION ON DISCHARGE   Wheelchair Mobility/Management  Able to Propel (ft): 50 feet  Functional Level: 2  Curbs/Ramps Assist Required (FIM Score): 0 (Not tested)  Wheelchair Setup Assist Required : 2 (Maximal assistance)  Wheelchair Management: Manages left brake, Manages right brake (with verbal cues and demonstration) Wheelchair Mobility/Management  Able to Propel (ft): 186 feet  Functional Level: 6  Curbs/Ramps Assist Required (FIM Score): 0 (Not tested)  Wheelchair Setup Assist Required : 2 (Maximal assistance)  Wheelchair Management: Manages left brake, Manages right brake (with verbal cues and demonstration)     Gait  Amount of Assistance: 4 (Minimal assistance)  Distance (ft): 18 Feet (ft)  Assistive Device: Walker, rolling, Gait belt (w/c follow) Gait  Amount of Assistance: 5 (Supervision/setup)  Distance (ft): 150 Feet (ft)  Assistive Device:  (without AD)     Balance-Sitting/Standing  Sitting - Static: Good (unsupported)  Sitting - Dynamic: Fair (occasional)  Standing - Static: Fair  Standing - Dynamic : Impaired Balance-Sitting/Standing  Sitting - Static: Good (unsupported)  Sitting - Dynamic: Good (unsupported)  Standing - Static: Good  Standing - Dynamic : Impaired     Bed/Mat Mobility  Rolling Right : 5 (Stand-by assistance)  Rolling Left : 5 (Stand-by assistance)  Supine to Sit : 4 (Minimal assistance)  Sit to Supine : 4 (Minimal assistance) Bed/Mat Mobility  Rolling Right : 6 (Modified independent)  Rolling Left : 6 (Modified independent)  Supine to Sit : 6 (Modified independent)  Sit to Supine : 6 (Modified independent)     Transfers  Transfer Type: SPT without device (stand step w/out AD, with gait belt)  Other: toilet transfer with min A with use of grab bar  Transfer Assistance : 3 (Moderate assistance ) (w/out AD; min A with RW)  Sit to Stand Assistance: Minimal assistance  Car Transfers: Not tested  Car Type: n/a Transfers  Transfer Type: SPT without device (stand step w/out AD, with gait belt)  Other: stand step without AD  Transfer Assistance : 6 (Modified independent)  Sit to Stand Assistance: Modified independent  Car Transfers: Not tested  Car Type: n/a     Steps or Stairs  Steps/Stairs Ambulated (#): 0  Level of Assist : 0 (Not tested) (deferred due to dizziness with gait training)  Rail Use: None Steps or Stairs  Steps/Stairs Ambulated (#): 5  Level of Assist : 4 (Minimal assistance)  Rail Use: None       3.  Speech and Language Pathology    ON ADMISSION ON DISCHARGE   Comprehension (Native Language)  Primary Mode of Comprehension: Auditory  Score: 5 Comprehension (Native Language)  Primary Mode of Comprehension: Auditory  Score: 6     Expression (Native Language)  Primary Mode of Expression: Verbal  Score: 5   Expression (Native Language)  Primary Mode of Expression: Verbal  Score: 6     Social Interaction/Pragmatics  Score: 4 Social Interaction/Pragmatics  Score: 6     Problem Solving  Score: 4   Problem Solving  Score: 6     Memory  Score: 3 Memory  Score: 6 Legend:   7 - Independent   6 - Modified Independent   5 - Standby Assistance / Supervision / Set-up   4 - Minimum Assistance / Contact Guard Assistance   3 - Moderate Assistance   2 - Maximum Assistance   1 - Total Assistance / Dependent       ACUTE MEDICAL ISSUES ADDRESSED IN INPATIENT REHABILITATION FACILITY:     > Rhabdomyolysis; History of acute renal failure; History of atrial fibrillation with rapid ventricular response;  History of encephalopathy   > CK (6/13/2018, on admission to [facility]) = 6954   > CK (6/21/2018) = 183   > BUN/Creatinine (6/13/2018, on admission to [facility]) = 128/2.42   > BUN/Creatinine (6/19/2018) = 11/0.86   > BUN/Creatinine (6/28/2018, on admission to ARU) = 10/0.90    > BUN/Creatinine (7/5/2018) = 13/1.09    > BUN/Creatinine (7/9/2018) = 12/1.17   > Increase oral fluid intake     > Dysphagia   > Speech and Language Pathology to evaluate and treat     > Gout   > Patient stated she has gout and that during her stay at [facility], she had pain great toes of both feet   > On 6/24/2018, patient was started on Colchicine 0.6 mg PO BID   > Upon admission to the ARU, patient stated the pain has improved significantly   > Uric acid (6/28/2018) = 5.7   > On 6/28/2018, decreased Colchicine from 0.6 mg PO BID to 0.6 mg PO once daily    > Continue Colchicine 0.6 mg PO once daily     > Essential hypertension   > Prior to admission to [facility], patient was on:    > Chlorthalidone 25 mg PO once daily    > Spironolactone 25 mg PO once daily    > Clonidine 0.2 mg PO BID    > Losartan-HCTZ 50-12.5 1 tab PO once daily   > During the patient's stay at [facility], all above antihypertensive medications were put on hold   > Upon discharge from [facility], all above antihypertensives were resumed as per discharge summary   > On 7/3/2018, increased Losartan from 25 mg to 50 mg PO once daily (9AM)   > On 7/4/2018, added Amlodipine 5 mg PO once daily (6AM)   > On 7/7/2018, increased Losartan from 50 mg to 100 mg PO once daily (9AM)   > On 7/10/2018:    > Increased Amlodipine from 5 mg to 10 mg PO once daily (6AM)    > Decreased Losartan from 100 mg to 50 mg PO once daily (9AM)   > On 7/11/2018, decreased Amlodipine from 10 mg to 5 mg PO once daily (6AM)   > Continue:    > Amlodipine 5 mg PO once daily (6AM)    > Losartan 50 mg PO once daily (9AM)    > Type 2 diabetes mellitus   > Prior to admission to [facility], patient was on Metformin 500 mg PO BID with meals   > HbA1c (6/14/2018) = 6.9   > During the patient's stay at [facility], Metformin was put on hold (re: acute renal failure)   > Upon discharge from [facility], resumed Metformin 500 mg PO BID with meals as per discharge summary   > On admission to the ARU, resumed Metformin 500 mg PO once daily with breakfast   > BUN/Creatinine (6/28/2018, on admission to ARU) = 10/0.90    > BUN/Creatinine (7/5/2018) = 13/1.09    > On 7/7/2018, discontinued Humalog insulin sliding scale SC TID AC only   > BUN/Creatinine (7/9/2018) = 12/1.17   > On 7/9/2018, discontinued Metformin 500 mg PO once daily with breakfast   > On discharge, resumed Metformin 500 mg PO once daily with breakfast    > Vitamin D Deficiency   > Vitamin D 25-Hydroxy (6/28/2018) = 13.5   > On 6/28/2018, patient was given Cholecalciferol 50,000 units PO x 1 dose    > On 6/29/2018, started Cholecalciferol 5,000 units PO once daily   > Continue Cholecalciferol 5,000 units PO once daily    > Urinary frequency   > Urinalysis (7/3/2018): ~WNL    > On 6/28/2018, patient stated she had 4 bowel movements since AM   > Discontinued Docusate sodium 100 mg PO BID      MEDICATIONS ON DISCHARGE:    Current Discharge Medication List      START taking these medications    Details   losartan (COZAAR) 50 mg tablet Take 1 Tab by mouth daily. Indications: hypertension  Qty: 15 Tab, Refills: 0    Associated Diagnoses: Essential hypertension      amLODIPine (NORVASC) 5 mg tablet Take 1 Tab by mouth daily.  Indications: hypertension  Qty: 15 Tab, Refills: 0    Associated Diagnoses: Essential hypertension      colchicine (MITIGARE) 0.6 mg capsule Take 1 Cap by mouth daily. Indications: prevention of acute gout attack  Qty: 15 Cap, Refills: 0    Associated Diagnoses: Acute gout involving toe, unspecified cause, unspecified laterality      aspirin 81 mg chewable tablet Take 1 Tab by mouth daily (with breakfast). Indications: prevention of cerebrovascular accident  Qty: 15 Tab, Refills: 0      b complex vitamins tablet Take 1 Tab by mouth daily. Indications: VITAMIN DEFICIENCY PREVENTION  Qty: 15 Tab, Refills: 0      cholecalciferol (VITAMIN D3) 5,000 unit capsule Take 1 Cap by mouth daily. Indications: Vitamin D Deficiency  Qty: 15 Cap, Refills: 0    Associated Diagnoses: Vitamin D deficiency         CONTINUE these medications which have CHANGED    Details   metFORMIN (GLUCOPHAGE) 500 mg tablet Take 1 Tab by mouth daily (with breakfast). Indications: type 2 diabetes mellitus  Qty: 15 Tab, Refills: 0         CONTINUE these medications which have NOT CHANGED    Details   Lancets misc Use daily or as directed for blood sugar monitoring, dx 250.00  Qty: 50 Each, Refills: 11      glucose blood VI test strips (BLOOD GLUCOSE TEST) strip Use daily or as directed for blood sugar monitoring. .00  Qty: 50 Strip, Refills: 11      Blood-Glucose Meter monitoring kit Use as directed. Qty: 1 Kit, Refills: 0      Shower Chair XX clemencia Use daily with showering. Dx: djd of knee  Qty: 1 each, Refills: 0      Omeprazole delayed release (PRILOSEC D/R) 20 mg tablet Take 1 tablet by mouth daily.   Qty: 30 tablet, Refills: 5         STOP taking these medications       cyclobenzaprine (FLEXERIL) 10 mg tablet Comments:   Reason for Stopping:         folic acid (FOLVITE) 1 mg tablet Comments:   Reason for Stopping:         meloxicam (MOBIC) 15 mg tablet Comments:   Reason for Stopping:         chlorthalidone (HYGROTEN) 25 mg tablet Comments:   Reason for Stopping:         predniSONE (DELTASONE) 20 mg tablet Comments:   Reason for Stopping:         spironolactone (ALDACTONE) 25 mg tablet Comments:   Reason for Stopping:         ipratropium-albuterol (COMBIVENT RESPIMAT)  mcg/actuation inhaler Comments:   Reason for Stopping:         cloNIDine HCl (CATAPRES) 0.2 mg tablet Comments:   Reason for Stopping:         HYDROcodone-acetaminophen (NORCO) 5-325 mg per tablet Comments:   Reason for Stopping:         naproxen (NAPROSYN) 375 mg tablet Comments:   Reason for Stopping:         conjugated estrogens (PREMARIN) 0.625 mg/gram vaginal cream Comments:   Reason for Stopping:         hydrocortisone valerate (WEST-FELISHA) 0.2 % ointment Comments:   Reason for Stopping:         losartan-hydrochlorothiazide (HYZAAR) 50-12.5 mg per tablet Comments:   Reason for Stopping:         cetirizine (ZYRTEC) 10 mg tablet Comments:   Reason for Stopping:         traMADol (ULTRAM) 50 mg tablet Comments:   Reason for Stopping:           Estimated Glomerular Filtration Rate:  On admission, estimated GFR based on a Creatinine of 0.90 mg/dl:   Using CKD-EPI = 81.1 mL/min/1.73m2   Using MDRD = 82.4 mL/min/1.73m2  Most recent estimated GFR, based on a Creatinine of 1.17 mg/dl on 7/9/2018:   Using CKD-EPI = 59.1 mL/min/1.73m2   Using MDRD = 60.9 mL/min/1.73m2       DISCHARGE VITAL SIGNS:  Visit Vitals    /83 (BP 1 Location: Right arm, BP Patient Position: At rest)    Pulse 87    Temp 99 °F (37.2 °C)    Resp 18    Ht 5' 5\" (1.651 m)    Wt 91 kg (200 lb 9.9 oz)    SpO2 97%    Breastfeeding No    BMI 33.38 kg/m2       DISCHARGE PHYSICAL EXAMINATION:  GENERAL SURVEY: Patient is awake, alert, oriented x 3, sitting comfortably on the chair, not in acute respiratory distress.   HEENT: pink palpebral conjunctivae, anicteric sclerae, no nasoaural discharge, moist oral mucosa  NECK: supple, no jugular venous distention, no palpable lymph nodes  CHEST/LUNGS: symmetrical chest expansion, good air entry, clear breath sounds  HEART: adynamic precordium, good S1 S2, no S3, regular rhythm, no murmurs  ABDOMEN: obese, bowel sounds appreciated, soft, non-tender  EXTREMITIES: pink nailbeds, (+) bipedal edema, full and equal pulses, no calf tenderness   NEUROLOGICAL EXAM: The patient is awake, alert and oriented x3, able to answer questions fairly appropriately, able to follow 1 and 2 step commands.  Able to tell time from the wall clock.  Cranial nerves II-XII are grossly intact.  No gross sensory deficit.  Motor strength is 4/5 on BUE, 4/5 on BLE. CONDITION ON DISCHARGE: Stable. DISPOSITION: Patient clinically improved and was discharged to home with home health physical therapy, occupational therapy and speech therapy. The patient is temporarily homebound secondary to functional deficits due to rhabdomyolysis. She can ambulate without using an assistive device (see above). The patient would benefit from continued skilled physical therapy in order to improve independent functional mobility within the home with use of least restrictive device. The patient would also benefit from continued skilled occupational therapy in order to improve self care and functional mobility within the home with use of least restrictive device. The patient would also benefit from continued skilled speech therapy in order to work on cognition, swallowing and speech restoration. FOLLOW-UP RECOMMENDATIONS:   Follow-up Information     Follow up With Details Comments Contact [address]  [address]    Fe Blackmon MD On 7/19/2018 Patient has an appointment scheduled with PCP Dr.D. Anne Gotti on July 19, 2018 @ 10:00am. [facility]            OTHER INSTRUCTIONS:  1. Diet. Soft-solids-consistency, diabetic diet with thin liquids. 2. Activity. As tolerated. 3. Safety / fall precautions. 4. Aspiration precautions.        TIME SPENT ON DISCHARGE ACTIVITIES: More than 30 minutes.       Signed:  Bharati Lynn MD    7/11/2018

## 2018-07-11 NOTE — HOME CARE
Spoke to patient. Verified address and telephone numbers. Explained services. Home care orders noted and arranged. Patient's PCP is not [facility] enrolled. Spoke to Dr. Eun Acharya, who agrees to sign first POT for home care.     Sana Heard RN, [address] [phone]

## 2018-07-11 NOTE — PROGRESS NOTES
conducted a Follow up consultation and Spiritual Assessment for Chu Fonseca, who is a 59 y.o.,female. The  provided the following Interventions:  Continued the relationship of care and support. Listened empathically. Offered prayer and assurance of continued prayer on patients behalf. Chart reviewed. The following outcomes were achieved:  Patient expressed gratitude for pastoral care visit. Assessment:  There are no further spiritual or Yazidi issues which require Spiritual Care Services interventions at this time. Plan:  Chaplains will continue to follow and will provide pastoral care on an as needed/requested basis.  recommends bedside caregivers page  on duty if patient shows signs of acute spiritual or emotional distress.    Ricardo Diallo, [address] [facility]  Spiritual Care  [phone]

## 2018-07-11 NOTE — PROGRESS NOTES
Christina left a message for pt's  to advise of pt's dc to home with home health, dme and a referral for personal care services. Christina spoke with pt's daughter requested that [facility] be sent to [facility]. Christina will follow.

## 2018-07-11 NOTE — ROUTINE PROCESS
[facility]    Bedside and Verbal shift change report given to Zeynep Carmona RN (oncoming nurse) by Danna Neely (offgoing nurse). Report included the following information SBAR, Kardex, MAR and Recent Results.     Situation:   Code Status: Full Code   Reason for Admission: Rhabdomyolysis  Hospital Day: 14   Problem List:   Hospital Problems  Date Reviewed: 7/10/2018          Codes Class Noted POA    Vitamin D deficiency (Chronic) ICD-10-CM: E55.9  ICD-9-CM: 268.9  6/28/2018 Yes    Overview Signed 6/28/2018  1:12 PM by Jossy Thomas MD     Vitamin D 25-Hydroxy (6/28/2018) = 13.5              Gout (Chronic) ICD-10-CM: M10.9  ICD-9-CM: 274.9  Unknown Yes        Essential hypertension (Chronic) ICD-10-CM: I10  ICD-9-CM: 401.9  Unknown Yes        Type 2 diabetes mellitus ([facility] (Chronic) ICD-10-CM: E11.9  ICD-9-CM: 250.00  Unknown Yes    Overview Signed 6/27/2018 10:18 PM by Jossy Thomas MD     HbA1c (6/14/2018) = 6.9             Dysphagia ICD-10-CM: R13.10  ICD-9-CM: 787.20  6/14/2018 Yes        History of atrial fibrillation ICD-10-CM: Z86.79  ICD-9-CM: V12.59  6/13/2018 Yes    Overview Signed 6/27/2018 10:23 PM by Jossy Thomas MD     Atrial fibrillation with rapid ventricular response             * (Principal)Rhabdomyolysis ICD-10-CM: M62.82  ICD-9-CM: 728.88  6/13/2018 Yes        History of encephalopathy ICD-10-CM: Z86.69  ICD-9-CM: V12.49  6/13/2018 Yes    Overview Signed 6/27/2018 10:21 PM by Jossy Thomas MD     Acute metabolic encephalopathy             History of acute renal failure ICD-10-CM: Z87.448  ICD-9-CM: V13.09  6/13/2018 Yes        Generalized weakness ICD-10-CM: R53.1  ICD-9-CM: 780.79  6/13/2018 Yes        Impaired mobility and ADLs ICD-10-CM: Z74.09  ICD-9-CM: 799.89  6/13/2018 Yes              Background:   Past Medical History:   Past Medical History:   Diagnosis Date    Alcohol dependence in remission ([facility]     alcohol withdrawal discharged 05/31/2013    Bipolar 1 disorder ([facility]     Chronic hepatitis C virus infection ([facility]     prior IV drug use;  Genotype 1a     Chronic obstructive pulmonary disease ([facility]     Depression     Dysphagia 6/14/2018    Essential hypertension     Eustachian tube dysfunction     Dr. Flory Juárez Gastroesophageal reflux disease     Gout     History of acute renal failure 6/13/2018    History of atrial fibrillation 6/13/2018    Atrial fibrillation with rapid ventricular response    History of encephalopathy 7/65/7065    Acute metabolic encephalopathy    Low serum high density lipoprotein (HDL) 6/14/2018    HDL (6/14/2018) = 12    Needle phobia     from prior IV drug abuse    Obesity, Class I, BMI 30-34.9     Primary osteoarthritis involving multiple joints     knees and back    Psoriasis     Type 2 diabetes mellitus (HCC)     HbA1c (6/14/2018) = 6.9    Vitamin D deficiency 6/28/2018    Vitamin D 25-Hydroxy (6/28/2018) = 13.5       Patient taking anticoagulants yes Heparin   Patient has a defibrillator: no     Assessment:   Changes in Assessment throughout shift: none     Patient has central line: no Reasons if yes: Insertion date: Last dressing date:   Patient has Weston Cath: no Reasons if yes:     Insertion date:     Last Vitals:     Vitals:    07/10/18 0739 07/10/18 1542 07/10/18 2143 07/11/18 0623   BP: 134/87 128/81 128/81 117/83   Pulse: 87 85 85 83   Resp: 20 16 17    Temp: 99.2 °F (37.3 °C) 98.8 °F (37.1 °C) 98.6 °F (37 °C)    SpO2: 95% 95% 99%    Weight:       Height:            PAIN    Pain Assessment    Pain Intensity 1: 0 (07/11/18 0400) Pain Intensity 1: 2 (12/29/14 1105)    Pain Location 1: Wrist Pain Location 1: Abdomen    Pain Intervention(s) 1: Medication (see MAR) Pain Intervention(s) 1: Medication (see MAR)  Patient Stated Pain Goal: 0 Patient Stated Pain Goal: 0  o Intervention effective: yes    o Other actions taken for pain: turn, reposition, rest     Skin Assessment  Skin color Skin Color: Appropriate for ethnicity  Condition/Temperature Skin Condition/Temp: Dry, Warm  Integrity Skin Integrity: Blister  Turgor Turgor: Non-tenting  Weekly Pressure Ulcer Documentation  Pressure  Injury Documentation: Pressure Injury Noted-See Wound LDA to Document  Wound Prevention & Protection Methods  Orientation of wound Orientation of Wound Prevention: Posterior  Location of Prevention Location of Wound Prevention: Buttocks, Sacrum/Coccyx  Dressing Present Dressing Present : Intact, not due to be changed  Dressing Status Dressing Status: Intact  Wound Offloading Wound Offloading (Prevention Methods): Bed, pressure redistribution/air, Chair cushion, Elevate heels, Foam silicone     INTAKE/OUPUT    Date 07/10/18 0700 - 07/11/18 0659 07/11/18 0700 - 07/12/18 0659   Shift 0358-4647 3800-0528 24 Hour Total 1353-6409 1032-8008 24 Hour Total   I  N  T  A  K  E   P. O. 720  720         P. O. 720  720       Shift Total  (mL/kg) 720  (7.9)  720  (7.9)      O  U  T  P  U  T   Urine  (mL/kg/hr)  1 1         Urine Voided  1 1         Urine Occurrence(s) 10 x 12 x 22 x       Emesis/NG output  0 0         Emesis  0 0       Stool            Stool Occurrence(s) 1 x 0 x 1 x       Shift Total  (mL/kg)  1  (0) 1  (0)       -1 719      Weight (kg) 91 91 91 91 91 91       Recommendations:  1. Patient needs and requests: Supervised transfer to [facility],     2. Diet: Diabetic with thin liquids    3. Pending tests/procedures: none     4. Functional Level/Equipment: BSC, grab bars    5. Estimated Discharge Date: 7/11 Posted on Whiteboard in Patients Room: yes     HEALS Safety Check    A safety check occurred in the patient's room between off going nurse and oncoming nurse listed above.     The safety check included the below items  Area Items   H  High Alert Medications - Verify all high alert medication drips (heparin, PCA, etc.)   E  Equipment - Suction is set up for ALL patients (with jamiker)  - Red plugs utilized for all equipment (IV pumps, etc.)  - WOWs wiped down at end of shift.  - Room stocked with oxygen, suction, and other unit-specific supplies   A  Alarms - Bed alarm is set for fall risk patients  - Ensure chair alarm is in place and activated if patient is up in a chair   L  Lines - Check IV for any infiltration  - Weston bag is empty if patient has a Weston   - Tubing and IV bags are labeled   S  Safety   - Room is clean, patient is clean, and equipment is clean. - Hallways are clear from equipment besides carts. - Fall bracelet on for fall risk patients  - Ensure room is clear and free of clutter  - Suction is set up for ALL patients (with yanker)  - Hallways are clear from equipment besides carts.    - Isolation precautions followed, supplies available outside room, sign posted

## 2018-07-12 ENCOUNTER — HOME CARE VISIT (OUTPATIENT)
Dept: SCHEDULING | Facility: HOME HEALTH | Age: 58
End: 2018-07-12
Payer: MEDICARE

## 2018-07-12 ENCOUNTER — HOME CARE VISIT (OUTPATIENT)
Dept: HOME HEALTH SERVICES | Facility: HOME HEALTH | Age: 58
End: 2018-07-12
Payer: MEDICARE

## 2018-07-12 VITALS
OXYGEN SATURATION: 95 % | SYSTOLIC BLOOD PRESSURE: 140 MMHG | HEART RATE: 106 BPM | DIASTOLIC BLOOD PRESSURE: 86 MMHG | TEMPERATURE: 98 F

## 2018-07-12 PROCEDURE — G0151 HHCP-SERV OF PT,EA 15 MIN: HCPCS

## 2018-07-12 PROCEDURE — 400013 HH SOC

## 2018-07-13 NOTE — PROGRESS NOTES
Christina notified by [facility] that [facility] will not be able to provide care to the pt as they are out of network. Sw called to speak with [facility] and they advise that they do not re-send the orders to another agency for continued care. Christina called pt's insurance and asked for a return call or fax of home health's in network. Christina spoke with pt's daughter who understands explanation and will remain available to support pt in [facility] once a list is available. Christina will continue to support.

## 2018-07-13 NOTE — PROGRESS NOTES
[x] Psychology  [] Social Work [] Recreational Therapy    INTERVENTION  UNITS/TIME OF SERVICE   Assessment    Supportive Counseling July 10, 2018   Orientation    Discharge Planning    Resource Linkage              Progress/Current Status    Late entry for individual support  with patient on ARU in anticipation of scheduled discharge. Patient entirely pleasant and cooperative and fully engaged in conversation about herself and her recovery effort. Patient is obviously focused on her scheduled discharge to home and returning to [facility] normal routine. \"  Patient is feeling gratified with her progress on ARU and especially identifies that her speech has improved to the point that she no longer feels so discouraged. Instead, she is expressing hopefulness for continued recovery and improvement. Patient is not reporting any acute distress thoughts nor feelings on discharge. Patient is obviously disturbed that she has encountered illness at all; however, she is hoping for continued recovery and better stabilization. Her confidence and self esteem are improved. Patient continues to insist that she has not required psychiatric services in the years since earlier diagnosed with Bipolar Disorder, nor taking any psychotropic medication; however, she was encouraged to consider services if she finds her mood becoming more distressed, in the future.     Mayra Perez, THE [facility] 7/13/2018 8:55 AM

## 2019-07-19 ENCOUNTER — HOSPITAL ENCOUNTER (OUTPATIENT)
Dept: MAMMOGRAPHY | Age: 59
Discharge: HOME | End: 2019-07-19
Attending: INTERNAL MEDICINE
Payer: MEDICARE

## 2019-07-19 DIAGNOSIS — Z12.39 SCREENING FOR BREAST CANCER: ICD-10-CM

## 2019-07-19 PROCEDURE — 77063 BREAST TOMOSYNTHESIS BI: CPT

## 2019-10-16 ENCOUNTER — HOSPITAL ENCOUNTER (OUTPATIENT)
Dept: CT IMAGING | Age: 59
Discharge: HOME | End: 2019-10-16
Attending: INTERNAL MEDICINE
Payer: MEDICARE

## 2019-10-16 DIAGNOSIS — R22.0 MASS OF MANDIBLE: ICD-10-CM

## 2019-10-16 LAB — CREAT UR-MCNC: 1.3 MG/DL (ref 0.6–1.3)

## 2019-10-16 PROCEDURE — 74011636320 HC RX REV CODE- 636/320: Performed by: RADIOLOGY

## 2019-10-16 PROCEDURE — 70488 CT MAXILLOFACIAL W/O & W/DYE: CPT

## 2019-10-16 PROCEDURE — 82565 ASSAY OF CREATININE: CPT

## 2019-10-16 RX ADMIN — IOPAMIDOL 100 ML: 612 INJECTION, SOLUTION INTRAVENOUS at 07:45

## 2020-11-05 ENCOUNTER — HOSPITAL ENCOUNTER (OUTPATIENT)
Dept: MAMMOGRAPHY | Age: 60
Discharge: HOME | End: 2020-11-05
Payer: MEDICARE

## 2020-11-05 DIAGNOSIS — Z12.31 SCREENING MAMMOGRAM, ENCOUNTER FOR: ICD-10-CM

## 2020-11-05 PROCEDURE — 77063 BREAST TOMOSYNTHESIS BI: CPT

## 2021-11-08 ENCOUNTER — HOSPITAL ENCOUNTER (OUTPATIENT)
Dept: WOMENS IMAGING | Age: 61
Discharge: HOME | End: 2021-11-08
Attending: NURSE PRACTITIONER
Payer: MEDICARE

## 2021-11-08 DIAGNOSIS — Z12.31 BREAST CANCER SCREENING BY MAMMOGRAM: ICD-10-CM

## 2021-11-08 PROCEDURE — 77063 BREAST TOMOSYNTHESIS BI: CPT

## 2022-03-18 PROBLEM — Z86.69 HISTORY OF ENCEPHALOPATHY: Status: ACTIVE | Noted: 2018-06-13

## 2022-03-18 PROBLEM — Z87.448 HISTORY OF ACUTE RENAL FAILURE: Status: ACTIVE | Noted: 2018-06-13

## 2022-03-18 PROBLEM — R53.1 GENERALIZED WEAKNESS: Status: ACTIVE | Noted: 2018-06-13

## 2022-03-18 PROBLEM — E55.9 VITAMIN D DEFICIENCY: Status: ACTIVE | Noted: 2018-06-28

## 2022-03-18 PROBLEM — R13.10 DYSPHAGIA: Status: ACTIVE | Noted: 2018-06-14

## 2022-03-19 PROBLEM — R74.8 LOW SERUM HIGH DENSITY LIPOPROTEIN (HDL): Status: ACTIVE | Noted: 2018-06-14

## 2022-03-19 PROBLEM — Z86.79 HISTORY OF ATRIAL FIBRILLATION: Status: ACTIVE | Noted: 2018-06-13

## 2022-03-19 PROBLEM — Z74.09 IMPAIRED MOBILITY AND ADLS: Status: ACTIVE | Noted: 2018-06-13

## 2022-03-19 PROBLEM — Z78.9 IMPAIRED MOBILITY AND ADLS: Status: ACTIVE | Noted: 2018-06-13

## 2022-03-19 PROBLEM — M62.82 RHABDOMYOLYSIS: Status: ACTIVE | Noted: 2018-06-13

## 2022-03-20 PROBLEM — E88.09 HYPOALBUMINEMIA: Status: ACTIVE | Noted: 2018-05-25

## 2022-11-01 ENCOUNTER — TRANSCRIBE ORDER (OUTPATIENT)
Dept: SCHEDULING | Age: 62
End: 2022-11-01

## 2022-11-01 DIAGNOSIS — N18.31 STAGE 3A CHRONIC KIDNEY DISEASE (HCC): Primary | ICD-10-CM

## 2022-11-09 ENCOUNTER — HOSPITAL ENCOUNTER (OUTPATIENT)
Dept: WOMENS IMAGING | Age: 62
Discharge: HOME | End: 2022-11-09
Payer: MEDICARE

## 2022-11-09 DIAGNOSIS — Z12.31 ENCOUNTER FOR SCREENING MAMMOGRAM FOR BREAST CANCER: ICD-10-CM

## 2022-11-09 PROCEDURE — 77063 BREAST TOMOSYNTHESIS BI: CPT

## 2022-11-18 ENCOUNTER — TRANSCRIBE ORDER (OUTPATIENT)
Dept: SCHEDULING | Age: 62
End: 2022-11-18

## 2022-11-18 DIAGNOSIS — N18.31 STAGE 3A CHRONIC KIDNEY DISEASE (HCC): Primary | ICD-10-CM

## 2023-01-09 ENCOUNTER — HOSPITAL ENCOUNTER (OUTPATIENT)
Dept: ULTRASOUND IMAGING | Age: 63
Discharge: HOME | End: 2023-01-09
Attending: INTERNAL MEDICINE
Payer: MEDICARE

## 2023-01-09 DIAGNOSIS — N18.31 STAGE 3A CHRONIC KIDNEY DISEASE (HCC): ICD-10-CM

## 2023-01-09 PROCEDURE — 76770 US EXAM ABDO BACK WALL COMP: CPT

## 2023-01-31 DIAGNOSIS — N18.31 STAGE 3A CHRONIC KIDNEY DISEASE (HCC): Primary | ICD-10-CM

## 2023-02-04 DIAGNOSIS — N18.31 STAGE 3A CHRONIC KIDNEY DISEASE (HCC): Primary | ICD-10-CM

## 2023-05-17 ENCOUNTER — HOSPITAL ENCOUNTER (OUTPATIENT)
Facility: HOSPITAL | Age: 63
Discharge: HOME | End: 2023-05-17
Attending: RADIOLOGY | Admitting: RADIOLOGY
Payer: MEDICARE

## 2023-05-17 VITALS
TEMPERATURE: 99.2 F | OXYGEN SATURATION: 97 % | RESPIRATION RATE: 20 BRPM | WEIGHT: 197.2 LBS | HEART RATE: 110 BPM | SYSTOLIC BLOOD PRESSURE: 134 MMHG | DIASTOLIC BLOOD PRESSURE: 75 MMHG

## 2023-05-17 DIAGNOSIS — N18.31 STAGE 3A CHRONIC KIDNEY DISEASE (CKD) (HCC): ICD-10-CM

## 2023-05-17 LAB
CREAT UR-MCNC: 1.2 MG/DL (ref 0.6–1.3)
LABCORP SPECIMEN COLLECTION: NORMAL

## 2023-05-17 PROCEDURE — 2580000003 HC RX 258: Performed by: INTERNAL MEDICINE

## 2023-05-17 PROCEDURE — 74170 CT ABD WO CNTRST FLWD CNTRST: CPT

## 2023-05-17 PROCEDURE — 6360000004 HC RX CONTRAST MEDICATION: Performed by: INTERNAL MEDICINE

## 2023-05-17 RX ORDER — SODIUM CHLORIDE 9 MG/ML
INJECTION, SOLUTION INTRAVENOUS ONCE
Status: COMPLETED | OUTPATIENT
Start: 2023-05-17 | End: 2023-05-17

## 2023-05-17 RX ORDER — IODIXANOL 320 MG/ML
100 INJECTION, SOLUTION INTRAVASCULAR
Status: COMPLETED | OUTPATIENT
Start: 2023-05-17 | End: 2023-05-17

## 2023-05-17 RX ADMIN — SODIUM CHLORIDE: 9 INJECTION, SOLUTION INTRAVENOUS at 11:40

## 2023-05-17 RX ADMIN — IODIXANOL 100 ML: 320 INJECTION, SOLUTION INTRAVASCULAR at 15:04

## 2023-05-17 ASSESSMENT — PAIN - FUNCTIONAL ASSESSMENT: PAIN_FUNCTIONAL_ASSESSMENT: 0-10
